# Patient Record
Sex: FEMALE | Race: BLACK OR AFRICAN AMERICAN | NOT HISPANIC OR LATINO | Employment: OTHER | URBAN - METROPOLITAN AREA
[De-identification: names, ages, dates, MRNs, and addresses within clinical notes are randomized per-mention and may not be internally consistent; named-entity substitution may affect disease eponyms.]

---

## 2017-08-18 ENCOUNTER — OFFICE VISIT (OUTPATIENT)
Dept: PAIN MEDICINE | Facility: CLINIC | Age: 52
End: 2017-08-18
Payer: MEDICARE

## 2017-08-18 VITALS
SYSTOLIC BLOOD PRESSURE: 141 MMHG | DIASTOLIC BLOOD PRESSURE: 98 MMHG | WEIGHT: 201 LBS | HEIGHT: 64 IN | HEART RATE: 78 BPM | BODY MASS INDEX: 34.31 KG/M2

## 2017-08-18 DIAGNOSIS — M47.819 FACET ARTHROPATHY: ICD-10-CM

## 2017-08-18 DIAGNOSIS — M25.552 LEFT HIP PAIN: Primary | ICD-10-CM

## 2017-08-18 DIAGNOSIS — M25.511 RIGHT SHOULDER PAIN, UNSPECIFIED CHRONICITY: ICD-10-CM

## 2017-08-18 PROCEDURE — 99203 OFFICE O/P NEW LOW 30 MIN: CPT | Mod: S$PBB,,, | Performed by: ANESTHESIOLOGY

## 2017-08-18 PROCEDURE — 99999 PR PBB SHADOW E&M-NEW PATIENT-LVL III: CPT | Mod: PBBFAC,,, | Performed by: ANESTHESIOLOGY

## 2017-08-18 PROCEDURE — 99203 OFFICE O/P NEW LOW 30 MIN: CPT | Mod: PBBFAC,PO | Performed by: ANESTHESIOLOGY

## 2017-08-18 RX ORDER — TRAMADOL HYDROCHLORIDE 50 MG/1
100 TABLET ORAL 2 TIMES DAILY PRN
Refills: 0 | COMMUNITY
Start: 2017-08-08 | End: 2019-10-14

## 2017-08-18 RX ORDER — AMOXICILLIN 500 MG/1
CAPSULE ORAL
Refills: 0 | COMMUNITY
Start: 2017-08-11 | End: 2019-10-14

## 2017-08-18 RX ORDER — DILTIAZEM HYDROCHLORIDE 240 MG/1
240 CAPSULE, COATED, EXTENDED RELEASE ORAL
COMMUNITY

## 2017-08-18 RX ORDER — SPIRONOLACTONE 25 MG/1
25 TABLET ORAL DAILY
Refills: 5 | COMMUNITY
Start: 2017-07-13 | End: 2022-01-12

## 2017-08-18 RX ORDER — FLUTICASONE PROPIONATE AND SALMETEROL 500; 50 UG/1; UG/1
1 POWDER RESPIRATORY (INHALATION)
COMMUNITY

## 2017-08-18 RX ORDER — HYDROCODONE BITARTRATE AND ACETAMINOPHEN 7.5; 325 MG/1; MG/1
1 TABLET ORAL EVERY 6 HOURS PRN
Refills: 0 | COMMUNITY
Start: 2017-08-15 | End: 2019-12-19

## 2017-08-18 RX ORDER — ONDANSETRON 4 MG/1
TABLET, FILM COATED ORAL
Refills: 0 | COMMUNITY
Start: 2017-07-13 | End: 2020-10-27

## 2017-08-18 RX ORDER — CYCLOBENZAPRINE HCL 5 MG
TABLET ORAL
Refills: 1 | COMMUNITY
Start: 2017-07-20 | End: 2019-10-14

## 2017-08-18 RX ORDER — GABAPENTIN 300 MG/1
CAPSULE ORAL
Refills: 0 | COMMUNITY
Start: 2017-08-03 | End: 2019-10-14

## 2017-08-18 RX ORDER — UBIDECARENONE 75 MG
500 CAPSULE ORAL
COMMUNITY
Start: 2015-06-10 | End: 2019-10-14

## 2017-08-18 RX ORDER — MELOXICAM 15 MG/1
15 TABLET ORAL DAILY
Refills: 3 | COMMUNITY
Start: 2017-08-10 | End: 2019-10-14

## 2017-08-18 RX ORDER — OMEPRAZOLE 20 MG/1
20 CAPSULE, DELAYED RELEASE ORAL
COMMUNITY
Start: 2015-04-09 | End: 2020-10-27

## 2017-08-18 RX ORDER — MONTELUKAST SODIUM 10 MG/1
10 TABLET ORAL
COMMUNITY
Start: 2015-04-09 | End: 2020-03-24 | Stop reason: SDUPTHER

## 2017-08-18 RX ORDER — HYDROCHLOROTHIAZIDE 12.5 MG/1
12.5 CAPSULE ORAL EVERY MORNING
Refills: 11 | COMMUNITY
Start: 2017-08-14 | End: 2023-05-18

## 2017-08-18 RX ORDER — OXYCODONE AND ACETAMINOPHEN 5; 325 MG/1; MG/1
1 TABLET ORAL EVERY 4 HOURS PRN
Refills: 0 | COMMUNITY
Start: 2017-08-10 | End: 2019-10-14

## 2017-08-18 RX ORDER — PREDNISONE 20 MG/1
TABLET ORAL
Refills: 0 | COMMUNITY
Start: 2017-07-13 | End: 2019-10-14

## 2017-08-18 RX ORDER — MEDROXYPROGESTERONE ACETATE 10 MG/1
TABLET ORAL
Refills: 3 | COMMUNITY
Start: 2017-08-09 | End: 2020-10-27

## 2017-08-18 RX ORDER — HYDROCODONE BITARTRATE AND ACETAMINOPHEN 5; 325 MG/1; MG/1
1 TABLET ORAL NIGHTLY PRN
Refills: 0 | COMMUNITY
Start: 2017-08-11 | End: 2019-10-14

## 2017-08-18 RX ORDER — OMEPRAZOLE 40 MG/1
CAPSULE, DELAYED RELEASE ORAL DAILY
Refills: 3 | COMMUNITY
Start: 2017-07-20 | End: 2020-10-27

## 2017-08-18 RX ORDER — AMOXICILLIN 875 MG/1
TABLET, FILM COATED ORAL
Refills: 0 | COMMUNITY
Start: 2017-07-13 | End: 2019-10-14

## 2017-08-18 RX ORDER — ZOLPIDEM TARTRATE 10 MG/1
TABLET ORAL
Refills: 0 | COMMUNITY
Start: 2017-08-03 | End: 2019-10-14

## 2017-08-18 RX ORDER — EPINEPHRINE 0.3 MG/.3ML
0.3 INJECTION SUBCUTANEOUS
COMMUNITY
Start: 2015-06-10

## 2017-08-18 RX ORDER — ALBUTEROL SULFATE 90 UG/1
2 AEROSOL, METERED RESPIRATORY (INHALATION)
COMMUNITY
Start: 2015-04-09 | End: 2021-12-16

## 2017-08-18 RX ORDER — OXYCODONE AND ACETAMINOPHEN 7.5; 325 MG/1; MG/1
1 TABLET ORAL EVERY 4 HOURS PRN
Refills: 0 | COMMUNITY
Start: 2017-07-13 | End: 2019-10-14

## 2017-08-18 RX ORDER — CHLORHEXIDINE GLUCONATE ORAL RINSE 1.2 MG/ML
SOLUTION DENTAL
Refills: 0 | COMMUNITY
Start: 2017-08-16 | End: 2019-10-14

## 2017-08-18 RX ORDER — BUTALBITAL, ACETAMINOPHEN AND CAFFEINE 50; 325; 40 MG/1; MG/1; MG/1
TABLET ORAL
Refills: 0 | COMMUNITY
Start: 2017-07-20 | End: 2019-10-14

## 2017-08-18 RX ORDER — NAPROXEN SODIUM 550 MG/1
TABLET ORAL
Refills: 3 | COMMUNITY
Start: 2017-08-09 | End: 2019-10-14

## 2017-08-18 RX ORDER — AMLODIPINE AND BENAZEPRIL HYDROCHLORIDE 10; 40 MG/1; MG/1
1 CAPSULE ORAL
COMMUNITY
Start: 2015-04-09 | End: 2020-10-27

## 2017-08-18 RX ORDER — FAMOTIDINE 20 MG/1
20 TABLET, FILM COATED ORAL
COMMUNITY
Start: 2015-06-10 | End: 2019-10-14

## 2017-08-18 RX ORDER — BUPROPION HYDROCHLORIDE 150 MG/1
150 TABLET ORAL DAILY
Refills: 0 | COMMUNITY
Start: 2017-08-03 | End: 2020-03-24

## 2017-08-18 RX ORDER — ESCITALOPRAM OXALATE 20 MG/1
20 TABLET ORAL DAILY
Refills: 2 | COMMUNITY
Start: 2017-07-13 | End: 2020-10-27

## 2017-08-18 NOTE — PROGRESS NOTES
This note was completed with dictation software and grammatical errors may exist.    Referring Physician: Self, Aaareferral    PCP: [facility]      CC:  Left hip pain    HPI:   Amanda Mcguire is a 51 y.o. female presents with left-sided hip pain.  Pain has been present for 2 years.  No recent traumatic incident.  She has intermittent aching, burning, grabbing pain over her left hip.  Pain radiates to her left groin and down her left leg.  Pain worsens with sitting, bending, walking, lifting getting up.  She has tried physical therapy with mild benefits.  She states having imaging at [facility] as well as [facility].  She did not provide any records today.  She denies any weakness.  No bowel bladder changes.  She states seeing orthopedics which did not provide any recommendations.  She has seen Dr. David Crystal in the past, outside pain provider who recently prescribed a prescription of tramadol 50 mg every 6 hours as needed.    ROS:  CONSTITUTIONAL: No fevers, chills, night sweats, wt. loss, appetite changes  SKIN: no rashes or itching  ENT: No headaches, head trauma, vision changes, or eye pain  LYMPH NODES: None noticed   CV: No chest pain, palpitations.   RESP: No shortness of breath, dyspnea on exertion, cough, wheezing, or hemoptysis  GI: No nausea, emesis, diarrhea, constipation, melena, hematochezia, pain.    : No dysuria, hematuria, urgency, or frequency   HEME: No easy bruising, bleeding problems  PSYCHIATRIC: No depression, anxiety, psychosis, hallucinations.  NEURO: No seizures, memory loss, dizziness or difficulty sleeping  MSK: + History of present illness      Past Medical History:   Diagnosis Date    Anxiety     Arthritis     Asthma     Depression     Encounter for blood transfusion     Hypertension      Past Surgical History:   Procedure Laterality Date    CHOLECYSTECTOMY      FRACTURE SURGERY       Family History   Problem  "Relation Age of Onset    Cancer Mother     Diabetes Mother     Arthritis Mother     Hypertension Mother     Cancer Father     Diabetes Father     Arthritis Father     Hypertension Father      Social History     Social History    Marital status:      Spouse name: N/A    Number of children: N/A    Years of education: N/A     Social History Main Topics    Smoking status: Never Smoker    Smokeless tobacco: Never Used    Alcohol use None    Drug use: No    Sexual activity: No     Other Topics Concern    None     Social History Narrative    None         Medications/Allergies: See med card    Vitals:    08/18/17 1001   BP: (!) 141/98   Pulse: 78   Weight: 91.2 kg (201 lb)   Height: 5' 4" (1.626 m)   PainSc: 10-Worst pain ever   PainLoc: Hip         Physical exam:    GENERAL: A and O x3, the patient appears well groomed and is in no acute distress.  Skin: No rashes or obvious lesions  HEENT: normocephalic, atraumatic  CARDIOVASCULAR:  Palpable peripheral pulses  LUNGS: easy work of breathing  ABDOMEN: soft, nontender   UPPER EXTREMITIES: Normal alignment, normal range of motion, no atrophy, no skin changes,  hair growth and nail growth normal and equal bilaterally. No swelling, no tenderness.    LOWER EXTREMITIES:  Normal alignment, normal range of motion, no atrophy, no skin changes,  hair growth and nail growth normal and equal bilaterally. No swelling, no tenderness.  CERVICAL SPINE:  Cervical spine: ROM is full in flexion, extension and lateral rotation with moderate increased pain.  Spurling's maneuver causes no neck pain to either side.  Myofascial exam: No Tenderness to palpation across cervical paraspinous region bilaterally.    LUMBAR SPINE  Lumbar spine: ROM is full with flexion extension and oblique extension with mild  increased pain.    Javad's test causes no increased pain on either side.    Supine straight leg raise is negative bilaterally.    Internal and external rotation of the " hip causes increased pain on left side.  Myofascial exam: No tenderness to palpation across lumbar paraspinous muscles.      MENTAL STATUS: normal orientation, speech, language, and fund of knowledge for social situation.  Emotional state appropriate.    CRANIAL NERVES:  II:  PERRL bilaterally,   III,IV,VI: EOMI.    V:  Facial sensation equal bilaterally  VII:  Facial motor function normal.  VIII:  Hearing equal to finger rub bilaterally  IX/X: Gag normal, palate symmetric  XI:  Shoulder shrug equal, head turn equal  XII:  Tongue midline without fasciculations      MOTOR: Tone and bulk: normal bilateral upper and lower Strength: normal   Delt Bi Tri WE WF     R 5 5 5 5 5 5   L 5 5 5 5 5 5     IP ADD ABD Quad TA Gas HAM  R 5 5 5 5 5 5 5  L 5 5 5 5 5 5 5    SENSATION: Light touch and pinprick intact bilaterally  REFLEXES: normal, symmetric, nonbrisk.  Toes down, no clonus. No hoffmans.  GAIT: normal rise, base, steps, and arm swing.        Imaging:  Requesting    Assessment:  Patient referred for left hip pain  1. Left hip pain    2. Right shoulder pain, unspecified chronicity    3. Facet arthropathy          Plan:  - I have stressed the importance of physical activity and exercise to improve overall health  - Request past records to review.  Patient states having imaging of her lower back as well as her left hip.  - Once imaging is reviewed, we'll make further recommendations for treatment  - We'll contact patient once records are reviewed      Thank you for referring this interesting patient, and I look forward to continuing to collaborate in her care.

## 2017-08-21 ENCOUNTER — DOCUMENTATION ONLY (OUTPATIENT)
Dept: PAIN MEDICINE | Facility: CLINIC | Age: 52
End: 2017-08-21

## 2017-08-21 NOTE — PROGRESS NOTES
MRI left lower extremity without contrast 4/20/2017 ([place]    Oblique fracture of the distal fibula with grossly unchanged alignment.  Evidence of healing onset is noted.

## 2018-12-28 ENCOUNTER — TELEPHONE (OUTPATIENT)
Dept: ADMINISTRATIVE | Facility: OTHER | Age: 53
End: 2018-12-28

## 2018-12-28 NOTE — TELEPHONE ENCOUNTER
----- Message from Alexis Ovalle sent at 12/28/2018  2:48 PM CST -----  Contact: same  Patient called in and stated she would like to become a NP with Dr. Beltran.  Patient stated she has RA.  Patient call back number is [phone]

## 2019-02-19 ENCOUNTER — NURSE TRIAGE (OUTPATIENT)
Dept: ADMINISTRATIVE | Facility: CLINIC | Age: 54
End: 2019-02-19

## 2019-02-19 ENCOUNTER — TELEPHONE (OUTPATIENT)
Dept: RHEUMATOLOGY | Facility: CLINIC | Age: 54
End: 2019-02-19

## 2019-02-19 NOTE — TELEPHONE ENCOUNTER
"  Reason for Disposition   Took another person's prescription drug    Answer Assessment - Initial Assessment Questions  1. SYMPTOMS: "Do you have any symptoms?"      vominting and diarrhea, abdominal pain, can barley swallow  2. SEVERITY: If symptoms are present, ask "Are they mild, moderate or severe?"      severe    Protocols used: ST MEDICATION QUESTION CALL-A-AH    Ms. Mcguire states she is experiencing vomiting, diarrhea, severe abdominal pain, and she can barely swallow. Patient states she has been taking the wrong medication for 1 - 2 weeks. She states the medication is Dexilant 60 mg, which was not prescribed for the patient. She states medication was issued by her pharmacist incorrectly and he is scheduled to pick the medication up from her home. Patient advised to go to the ED.   "

## 2019-02-19 NOTE — TELEPHONE ENCOUNTER
----- Message from Jaimie Wright sent at 2/19/2019 11:12 AM CST -----  Contact: patient contact #[phone]   Amanda Mcguire (stated she's not a patient of Dr. Beltran) call stated [facility] in [place] contacted her and told her to immediately stop taking a medication Dexilant.  He coming to her house to get the medication because he can lose his job.  He told her the medication was missing and he accidentally gave her one of Dr. Beltran's patients medication (Amanda Mcguire).  She just wanted to inform Dr. Beltran of what's happening.  She stated she never notice the different initials on the medication bottle.  She stated she been taking the medication for about 2 weeks complains of severe diarrhea, abdominal pain, and stated its messing with her lungs.  This call was connected to [facility] on call.

## 2019-04-02 ENCOUNTER — TELEPHONE (OUTPATIENT)
Dept: RHEUMATOLOGY | Facility: CLINIC | Age: 54
End: 2019-04-02

## 2019-04-02 NOTE — TELEPHONE ENCOUNTER
----- Message from Monique Harman sent at 4/2/2019  9:26 AM CDT -----  Type: Needs Medical Advice    Who Called:  Patient  Best Call Back Number: [phone]  Additional Information: Would like to schedule a new patient appointment due to having rheumatoid arthritis and osteoporosis. Please call to schedule.

## 2019-10-14 ENCOUNTER — OFFICE VISIT (OUTPATIENT)
Dept: RHEUMATOLOGY | Facility: CLINIC | Age: 54
End: 2019-10-14
Payer: MEDICARE

## 2019-10-14 VITALS
DIASTOLIC BLOOD PRESSURE: 81 MMHG | HEART RATE: 84 BPM | HEIGHT: 64 IN | BODY MASS INDEX: 36.96 KG/M2 | SYSTOLIC BLOOD PRESSURE: 124 MMHG | WEIGHT: 216.5 LBS

## 2019-10-14 DIAGNOSIS — M25.541 JOINT PAIN IN FINGERS OF BOTH HANDS: ICD-10-CM

## 2019-10-14 DIAGNOSIS — M02.30 REACTIVE ARTHRITIS: ICD-10-CM

## 2019-10-14 DIAGNOSIS — M62.838 MUSCLE SPASMS OF LOWER EXTREMITY, UNSPECIFIED LATERALITY: ICD-10-CM

## 2019-10-14 DIAGNOSIS — L52 ERYTHEMA NODOSUM: Primary | ICD-10-CM

## 2019-10-14 DIAGNOSIS — D52.9 ANEMIA DUE TO FOLIC ACID DEFICIENCY, UNSPECIFIED DEFICIENCY TYPE: ICD-10-CM

## 2019-10-14 DIAGNOSIS — G89.4 CHRONIC PAIN SYNDROME: ICD-10-CM

## 2019-10-14 DIAGNOSIS — M25.542 JOINT PAIN IN FINGERS OF BOTH HANDS: ICD-10-CM

## 2019-10-14 DIAGNOSIS — R79.9 ABNORMAL FINDING OF BLOOD CHEMISTRY, UNSPECIFIED: ICD-10-CM

## 2019-10-14 DIAGNOSIS — R94.6 ABNORMAL RESULTS OF THYROID FUNCTION STUDIES: ICD-10-CM

## 2019-10-14 DIAGNOSIS — K85.90 ACUTE PANCREATITIS, UNSPECIFIED COMPLICATION STATUS, UNSPECIFIED PANCREATITIS TYPE: ICD-10-CM

## 2019-10-14 PROCEDURE — 99205 PR OFFICE/OUTPT VISIT, NEW, LEVL V, 60-74 MIN: ICD-10-PCS | Mod: 25,S$GLB,, | Performed by: INTERNAL MEDICINE

## 2019-10-14 PROCEDURE — 99999 PR PBB SHADOW E&M-EST. PATIENT-LVL III: CPT | Mod: PBBFAC,,, | Performed by: INTERNAL MEDICINE

## 2019-10-14 PROCEDURE — 99205 OFFICE O/P NEW HI 60 MIN: CPT | Mod: 25,S$GLB,, | Performed by: INTERNAL MEDICINE

## 2019-10-14 PROCEDURE — 99999 PR PBB SHADOW E&M-EST. PATIENT-LVL III: ICD-10-PCS | Mod: PBBFAC,,, | Performed by: INTERNAL MEDICINE

## 2019-10-14 PROCEDURE — 3008F PR BODY MASS INDEX (BMI) DOCUMENTED: ICD-10-PCS | Mod: CPTII,S$GLB,, | Performed by: INTERNAL MEDICINE

## 2019-10-14 PROCEDURE — 96372 PR INJECTION,THERAP/PROPH/DIAG2ST, IM OR SUBCUT: ICD-10-PCS | Mod: S$GLB,,, | Performed by: INTERNAL MEDICINE

## 2019-10-14 PROCEDURE — 96372 THER/PROPH/DIAG INJ SC/IM: CPT | Mod: S$GLB,,, | Performed by: INTERNAL MEDICINE

## 2019-10-14 PROCEDURE — 3008F BODY MASS INDEX DOCD: CPT | Mod: CPTII,S$GLB,, | Performed by: INTERNAL MEDICINE

## 2019-10-14 RX ORDER — HYDROCODONE BITARTRATE AND ACETAMINOPHEN 10; 325 MG/1; MG/1
1 TABLET ORAL EVERY 8 HOURS PRN
Qty: 90 TABLET | Refills: 0 | Status: SHIPPED | OUTPATIENT
Start: 2019-11-01 | End: 2019-11-29 | Stop reason: SDUPTHER

## 2019-10-14 RX ORDER — GABAPENTIN 600 MG/1
600 TABLET ORAL 3 TIMES DAILY
Qty: 90 TABLET | Refills: 11 | Status: SHIPPED | OUTPATIENT
Start: 2019-10-14 | End: 2020-07-13

## 2019-10-14 RX ORDER — KETOROLAC TROMETHAMINE 30 MG/ML
60 INJECTION, SOLUTION INTRAMUSCULAR; INTRAVENOUS
Status: COMPLETED | OUTPATIENT
Start: 2019-10-14 | End: 2019-10-14

## 2019-10-14 RX ORDER — PROMETHAZINE HYDROCHLORIDE 25 MG/1
25 TABLET ORAL EVERY 4 HOURS
Qty: 45 TABLET | Refills: 3 | Status: SHIPPED | OUTPATIENT
Start: 2019-10-14 | End: 2019-11-03

## 2019-10-14 RX ORDER — PANTOPRAZOLE SODIUM 40 MG/1
40 TABLET, DELAYED RELEASE ORAL DAILY
Qty: 30 TABLET | Refills: 11 | Status: SHIPPED | OUTPATIENT
Start: 2019-10-14 | End: 2020-10-27 | Stop reason: SDUPTHER

## 2019-10-14 RX ORDER — CYANOCOBALAMIN 1000 UG/ML
1000 INJECTION, SOLUTION INTRAMUSCULAR; SUBCUTANEOUS
Status: COMPLETED | OUTPATIENT
Start: 2019-10-14 | End: 2019-10-14

## 2019-10-14 RX ORDER — TRIAMCINOLONE ACETONIDE 1 MG/G
OINTMENT TOPICAL 2 TIMES DAILY
Qty: 80 G | Refills: 3 | Status: SHIPPED | OUTPATIENT
Start: 2019-10-14 | End: 2020-03-24 | Stop reason: SDUPTHER

## 2019-10-14 RX ORDER — METHYLPREDNISOLONE ACETATE 80 MG/ML
160 INJECTION, SUSPENSION INTRA-ARTICULAR; INTRALESIONAL; INTRAMUSCULAR; SOFT TISSUE
Status: COMPLETED | OUTPATIENT
Start: 2019-10-14 | End: 2019-10-14

## 2019-10-14 RX ORDER — HYDROXYCHLOROQUINE SULFATE 200 MG/1
200 TABLET, FILM COATED ORAL 2 TIMES DAILY
Qty: 60 TABLET | Refills: 6 | Status: SHIPPED | OUTPATIENT
Start: 2019-10-14 | End: 2020-03-24 | Stop reason: SDUPTHER

## 2019-10-14 RX ORDER — BACLOFEN 20 MG/1
20 TABLET ORAL 3 TIMES DAILY
Qty: 90 TABLET | Refills: 11 | Status: SHIPPED | OUTPATIENT
Start: 2019-10-14 | End: 2020-10-08

## 2019-10-14 RX ORDER — AZITHROMYCIN 250 MG/1
TABLET, FILM COATED ORAL
Qty: 6 TABLET | Refills: 0 | Status: SHIPPED | OUTPATIENT
Start: 2019-10-14 | End: 2019-12-19

## 2019-10-14 RX ADMIN — METHYLPREDNISOLONE ACETATE 160 MG: 80 INJECTION, SUSPENSION INTRA-ARTICULAR; INTRALESIONAL; INTRAMUSCULAR; SOFT TISSUE at 01:10

## 2019-10-14 RX ADMIN — CYANOCOBALAMIN 1000 MCG: 1000 INJECTION, SOLUTION INTRAMUSCULAR; SUBCUTANEOUS at 01:10

## 2019-10-14 RX ADMIN — KETOROLAC TROMETHAMINE 60 MG: 30 INJECTION, SOLUTION INTRAMUSCULAR; INTRAVENOUS at 01:10

## 2019-10-14 ASSESSMENT — ROUTINE ASSESSMENT OF PATIENT INDEX DATA (RAPID3)
TOTAL RAPID3 SCORE: 8.55
MDHAQ FUNCTION SCORE: 1.7
PSYCHOLOGICAL DISTRESS SCORE: 7.7
PAIN SCORE: 10
PATIENT GLOBAL ASSESSMENT SCORE: 10

## 2019-10-14 NOTE — PROGRESS NOTES
Administered 1 cc B12 1000mcg to right upper outer gluteal .  Pt tolerated well.No acute reaction noted to site. Pt instructed on S/S to report. Advised pt to waitn in lobby 15 minutes after receiving injection to monitor for any reactions. Pt verbalized understanding.     Lot: [id number]  Exp:feb21  Administered 2 cc DepoMedrol 80mg/cc  to right upper outer gluteal. Pt tolerated well. No acute reaction noted to site. Pt instructed on S/S to report. Advised patient to wait in lobby 15 minutes after receiving injection to monitor for any reactions..  Pt verbalized understanding.     Lot: [id number]  Exp: 12/2020  Administered 2 cc  Toradol 30mg/cc  to right dorsogluteal. Pt tolerated well. No acute reaction noted to site. Pt instructed on S/S to report. Advised patient to wait in lobby 15 minutes after receiving injection to monitor for any reactions. Pt verbalized understanding.     Lot: [id number]  Exp: 05/2021

## 2019-10-14 NOTE — PROGRESS NOTES
Subjective:       Patient ID: Amanda Mcguire is a 54 y.o. female.    Chief Complaint: Osteoarthritis    HPI: 53 yo female wit oa sees Dr Saldana x 2years but last 6 months she has had sever muscle spasm , leg pain,, family hx RA sister, sjogren sister, guillian barre sister, pancreatic cancer 2 brother and 1sister.neice RA, Patient complains of arthralgias and myalgias for which has been present for a few years. Pain is located in multiple joints, both shoulder(s), both elbow(s), both wrist(s), both MCP(s): 1st, 2nd, 3rd, 4th and 5th, both PIP(s): 1st, 2nd, 3rd, 4th and 5th, both DIP(s): 1st and 2nd, both hip(s), both knee(s) and both MTP(s): 1st, 2nd, 3rd, 4th and 5th, is described as aching, pulsating, shooting and throbbing, and is constant, moderate .  Associated symptoms include: crepitation, decreased range of motion, edema, effusion, tenderness and warmth.  The patient has tried nothing for pain relief.  Dry eyes and dry mouth. Am gelling    Review of Systems   Constitutional: Positive for chills and fatigue. Negative for activity change, appetite change, diaphoresis, fever and unexpected weight change.   HENT: Negative for congestion, dental problem, ear discharge, ear pain, facial swelling, mouth sores, nosebleeds, postnasal drip, rhinorrhea, sinus pressure, sneezing, sore throat, tinnitus, trouble swallowing and voice change.    Eyes: Negative for photophobia, pain, discharge, redness and itching.   Respiratory: Negative for apnea, cough, chest tightness, shortness of breath and wheezing.    Cardiovascular: Positive for leg swelling. Negative for chest pain and palpitations.   Gastrointestinal: Positive for abdominal pain. Negative for abdominal distention, constipation, diarrhea, nausea and vomiting.   Endocrine: Negative for cold intolerance, heat intolerance, polydipsia and polyuria.   Genitourinary: Negative for decreased urine volume, difficulty urinating, dysuria, flank pain, frequency, hematuria and  "urgency.   Musculoskeletal: Positive for arthralgias, back pain, gait problem, joint swelling, myalgias, neck pain and neck stiffness.   Skin: Negative for pallor, rash and wound.   Allergic/Immunologic: Negative for immunocompromised state.   Neurological: Positive for weakness. Negative for dizziness, tremors, numbness and headaches.   Hematological: Negative for adenopathy. Does not bruise/bleed easily.   Psychiatric/Behavioral: Negative for sleep disturbance. The patient is not nervous/anxious.          Objective:   /81   Pulse 84   Ht 5' 4" (1.626 m)   Wt 98.2 kg (216 lb 7.9 oz)   BMI 37.16 kg/m²      Physical Exam   Vitals reviewed.  Constitutional: She is oriented to person, place, and time. She appears distressed.   HENT:   Head: Normocephalic and atraumatic.   Eyes: EOM are normal. Pupils are equal, round, and reactive to light. Right eye exhibits no discharge. Left eye exhibits no discharge.   Neck: Neck supple. No thyromegaly present.   Cardiovascular: Normal rate, regular rhythm and normal heart sounds.  Exam reveals no gallop and no friction rub.    No murmur heard.  Pulmonary/Chest: Breath sounds normal. She has no wheezes. She has no rales. She exhibits no tenderness.   Abdominal: There is no tenderness. There is no rebound and no guarding.       Right Side Rheumatological Exam     Examination finds the elbow normal.    The patient is tender to palpation of the shoulder, wrist, knee, 1st PIP, 1st MCP, 2nd PIP, 2nd MCP, 3rd PIP, 3rd MCP, 4th PIP, 4th MCP, 5th PIP and 5th MCP    She has swelling of the 1st PIP, 1st MCP, 2nd PIP, 2nd MCP, 3rd PIP, 3rd MCP, 4th PIP, 4th MCP, 5th PIP and 5th MCP    Shoulder Exam   Tenderness Location: no tenderness    Range of Motion   Active abduction: abnormal   Adduction: abnormal  Sensation: normal    Knee Exam   Patellofemoral Crepitus: positive  Effusion: positive  Sensation: normal    Hip Exam   Tenderness Location: posterior  Sensation: " normal    Elbow/Wrist Exam   Tenderness Location: no tenderness  Sensation: normal    Left Side Rheumatological Exam     The patient is tender to palpation of the shoulder, elbow, wrist, knee, 1st PIP, 1st MCP, 2nd PIP, 2nd MCP, 3rd PIP, 3rd MCP, 4th PIP, 4th MCP, 5th PIP and 5th MCP.    She has swelling of the 1st PIP, 1st MCP, 2nd PIP, 2nd MCP, 3rd PIP, 3rd MCP, 4th PIP, 4th MCP, 5th PIP and 5th MCP    Shoulder Exam   Tenderness Location: no tenderness    Range of Motion   Active abduction: abnormal   Sensation: normal    Knee Exam     Patellofemoral Crepitus: positive  Effusion: positive  Sensation: normal    Hip Exam   Tenderness Location: posterior  Sensation: normal    Elbow/Wrist Exam   Sensation: normal      Back/Neck Exam   General Inspection   Gait: normal         Lymphadenopathy:     She has no cervical adenopathy.   Neurological: She is alert and oriented to person, place, and time. Gait normal.   Skin: Skin is dry. No rash noted. No erythema. There is pallor.     Psychiatric: Mood and affect normal.   Musculoskeletal: She exhibits tenderness and deformity.   edema        Result Narrative   REASON FOR EXAM: right lower quadrant abdominal pain     TECHNICAL FACTORS: Multiple contiguous axial CT images were obtained of the abdomen and pelvis after administration of intravenous contrast. 2D reformatted images were performed. Automated exposure control was utilized for radiation dose reduction.     DOSE: 100 mL Isovue-370 IV    COMPARISON: 10/17/2018    ABDOMEN FINDINGS: Lung bases appear unremarkable. The liver displays generalized low density. No focal liver finding. The spleen appears unremarkable. There is edema and enlargement of the pancreatic head. There is edema of peripancreatic fat and fat of   the root of the mesentery. Multiple small mesenteric lymph nodes are demonstrated ranging up to short axis of 8 mm. Fluid layers within the posterior aspect of Gerota's space on the right. The gallbladder  and biliary duct system appear normal. No bowel   abnormality is identified. The left adrenal gland displays a 3 cm mass, unchanged from prior study. Osseous structures appear unremarkable.    PELVIS FINDINGS: No bowel abnormality is identified. The appendix is demonstrated and is normal. There is fluid layering within the lower aspect of the right posterior Gerota's space area. The uterus is enlarged and displays calcification. The urinary   bladder appears unremarkable. Osseous structures appear unremarkable.    IMPRESSION:   1.  Findings are consistent with pancreatitis. However there are additional new findings which may indicate concurrent mesenteric adenitis.   2.  The appendix demonstrated and is normal.  3. Uterine leiomyoma formation        Electronically signed by Terrence Willis MD on 9/14/2019 3:25 PM     Component Name  3/13/2019 7/26/2018     NEGATIVE     <16       NEGATIVE     SEE BELOW     SEE BELOW     149    CRP 54 (H)                Assessment:       1. Erythema nodosum    2. Reactive arthritis    3. Joint pain in fingers of both hands    4. Muscle spasms of lower extremity, unspecified laterality    5. Abnormal results of thyroid function studies     6. Anemia due to folic acid deficiency, unspecified deficiency type     7. Abnormal finding of blood chemistry, unspecified     8. Chronic pain syndrome    9. Acute pancreatitis, unspecified complication status, unspecified pancreatitis type            Plan:       Amanda was seen today for osteoarthritis.    Diagnoses and all orders for this visit:    Erythema nodosum  -     Cancel: CBC auto differential; Future  -     Cancel: Comprehensive metabolic panel; Future  -     Cancel: C-reactive protein; Future  -     Cancel: Sedimentation rate; Future  -     Cancel: TSH; Future  -     Cancel: T4, free; Future  -     Cancel: T3, free; Future  -     Cancel: Sjogrens syndrome-A extractable nuclear antibody; Future  -     Cancel: Sjogrens syndrome-B  extractable nuclear antibody; Future  -     Cancel: JONATHAN Screen w/Reflex; Future  -     Cancel: Anti Sm/RNP Antibody; Future  -     Cancel: Anti-DNA antibody, double-stranded; Future  -     Cancel: Anti-Histone Antibody; Future  -     Cancel: Antimitochondrial antibody; Future  -     Cancel: Anti-Smith antibody; Future  -     Cancel: Anti-smooth muscle antibody; Future  -     Cancel: Cyclic citrul peptide antibody, IgG; Future  -     Cancel: Folate; Future  -     Cancel: Hepatitis C antibody; Future  -     Cancel: Hepatitis B surface antigen; Future  -     Cancel: Hepatitis B surface antibody; Future  -     Cancel: Hepatitis B core antibody, IgM; Future  -     Cancel: Vitamin B12; Future  -     Cancel: PTH, intact; Future  -     Cancel: Rheumatoid factor; Future  -     Cancel: Angiotensin converting enzyme; Future  -     Cancel: Aldolase; Future  -     Cancel: CK; Future  -     azithromycin (Z-DUONG) 250 MG tablet; Take 2 tablets by mouth on day 1; Take 1 tablet by mouth on days 2-5  -     hydroxychloroquine (PLAQUENIL) 200 mg tablet; Take 1 tablet (200 mg total) by mouth 2 (two) times daily. for 60 doses  -     promethazine (PHENERGAN) 25 MG tablet; Take 1 tablet (25 mg total) by mouth every 4 (four) hours. for 20 days  -     pantoprazole (PROTONIX) 40 MG tablet; Take 1 tablet (40 mg total) by mouth once daily.  -     triamcinolone acetonide 0.1% (KENALOG) 0.1 % ointment; Apply topically 2 (two) times daily.  -     folic acid-vit B6-vit B12 (FOLBEE) 2.5-25-1 mg Tab; Take 1 tablet by mouth once daily.  -     ketorolac injection 60 mg  -     methylPREDNISolone acetate injection 160 mg  -     cyanocobalamin injection 1,000 mcg  -     Cancel: Hemoglobin A1c; Future  -     HYDROcodone-acetaminophen (NORCO)  mg per tablet; Take 1 tablet by mouth every 8 (eight) hours as needed for Pain.  -     gabapentin (NEURONTIN) 600 MG tablet; Take 1 tablet (600 mg total) by mouth 3 (three) times daily.  -     baclofen (LIORESAL)  20 MG tablet; Take 1 tablet (20 mg total) by mouth 3 (three) times daily.  -     Hemoglobin A1c; Future  -     CK; Future  -     Aldolase; Future  -     Angiotensin converting enzyme; Future  -     Rheumatoid factor; Future  -     PTH, intact; Future  -     Vitamin B12; Future  -     Hepatitis B core antibody, IgM; Future  -     Hepatitis B surface antibody; Future  -     Hemoglobin A1c  -     CK  -     Aldolase  -     Angiotensin converting enzyme  -     Rheumatoid factor  -     PTH, intact  -     Vitamin B12  -     Hepatitis B core antibody, IgM  -     Hepatitis B surface antibody  -     Hepatitis B surface antigen; Future  -     Hepatitis C antibody; Future  -     Folate; Future  -     Cyclic citrul peptide antibody, IgG; Future  -     Anti-smooth muscle antibody; Future  -     Anti-Smith antibody; Future  -     Antimitochondrial antibody; Future  -     Anti-Histone Antibody; Future  -     Anti-DNA antibody, double-stranded; Future  -     Hepatitis B surface antigen  -     Hepatitis C antibody  -     Folate  -     Cyclic citrul peptide antibody, IgG  -     Anti-smooth muscle antibody  -     Anti-Smith antibody  -     Antimitochondrial antibody  -     Anti-Histone Antibody  -     Anti-DNA antibody, double-stranded  -     JONATHAN Screen w/Reflex; Future  -     Anti Sm/RNP Antibody; Future  -     Sjogrens syndrome-B extractable nuclear antibody; Future  -     Sjogrens syndrome-A extractable nuclear antibody; Future  -     T3, free; Future  -     T4, free; Future  -     TSH; Future  -     Sedimentation rate; Future  -     C-reactive protein; Future  -     JONATHAN Screen w/Reflex  -     Anti Sm/RNP Antibody  -     Sjogrens syndrome-B extractable nuclear antibody  -     Sjogrens syndrome-A extractable nuclear antibody  -     T3, free  -     T4, free  -     TSH  -     Sedimentation rate  -     C-reactive protein  -     CBC auto differential; Future  -     Comprehensive metabolic panel; Future  -     CBC auto differential  -      Comprehensive metabolic panel    Reactive arthritis  -     Cancel: CBC auto differential; Future  -     Cancel: Comprehensive metabolic panel; Future  -     Cancel: C-reactive protein; Future  -     Cancel: Sedimentation rate; Future  -     Cancel: TSH; Future  -     Cancel: T4, free; Future  -     Cancel: T3, free; Future  -     Cancel: Sjogrens syndrome-A extractable nuclear antibody; Future  -     Cancel: Sjogrens syndrome-B extractable nuclear antibody; Future  -     Cancel: JONATHAN Screen w/Reflex; Future  -     Cancel: Anti Sm/RNP Antibody; Future  -     Cancel: Anti-DNA antibody, double-stranded; Future  -     Cancel: Anti-Histone Antibody; Future  -     Cancel: Antimitochondrial antibody; Future  -     Cancel: Anti-Smith antibody; Future  -     Cancel: Anti-smooth muscle antibody; Future  -     Cancel: Cyclic citrul peptide antibody, IgG; Future  -     Cancel: Folate; Future  -     Cancel: Hepatitis C antibody; Future  -     Cancel: Hepatitis B surface antigen; Future  -     Cancel: Hepatitis B surface antibody; Future  -     Cancel: Hepatitis B core antibody, IgM; Future  -     Cancel: Vitamin B12; Future  -     Cancel: PTH, intact; Future  -     Cancel: Rheumatoid factor; Future  -     Cancel: Angiotensin converting enzyme; Future  -     Cancel: Aldolase; Future  -     Cancel: CK; Future  -     azithromycin (Z-DUONG) 250 MG tablet; Take 2 tablets by mouth on day 1; Take 1 tablet by mouth on days 2-5  -     hydroxychloroquine (PLAQUENIL) 200 mg tablet; Take 1 tablet (200 mg total) by mouth 2 (two) times daily. for 60 doses  -     promethazine (PHENERGAN) 25 MG tablet; Take 1 tablet (25 mg total) by mouth every 4 (four) hours. for 20 days  -     pantoprazole (PROTONIX) 40 MG tablet; Take 1 tablet (40 mg total) by mouth once daily.  -     triamcinolone acetonide 0.1% (KENALOG) 0.1 % ointment; Apply topically 2 (two) times daily.  -     folic acid-vit B6-vit B12 (FOLBEE) 2.5-25-1 mg Tab; Take 1 tablet by mouth once  daily.  -     ketorolac injection 60 mg  -     methylPREDNISolone acetate injection 160 mg  -     cyanocobalamin injection 1,000 mcg  -     Cancel: Hemoglobin A1c; Future  -     HYDROcodone-acetaminophen (NORCO)  mg per tablet; Take 1 tablet by mouth every 8 (eight) hours as needed for Pain.  -     gabapentin (NEURONTIN) 600 MG tablet; Take 1 tablet (600 mg total) by mouth 3 (three) times daily.  -     baclofen (LIORESAL) 20 MG tablet; Take 1 tablet (20 mg total) by mouth 3 (three) times daily.  -     Hemoglobin A1c; Future  -     CK; Future  -     Aldolase; Future  -     Angiotensin converting enzyme; Future  -     Rheumatoid factor; Future  -     PTH, intact; Future  -     Vitamin B12; Future  -     Hepatitis B core antibody, IgM; Future  -     Hepatitis B surface antibody; Future  -     Hemoglobin A1c  -     CK  -     Aldolase  -     Angiotensin converting enzyme  -     Rheumatoid factor  -     PTH, intact  -     Vitamin B12  -     Hepatitis B core antibody, IgM  -     Hepatitis B surface antibody  -     Hepatitis B surface antigen; Future  -     Hepatitis C antibody; Future  -     Folate; Future  -     Cyclic citrul peptide antibody, IgG; Future  -     Anti-smooth muscle antibody; Future  -     Anti-Smith antibody; Future  -     Antimitochondrial antibody; Future  -     Anti-Histone Antibody; Future  -     Anti-DNA antibody, double-stranded; Future  -     Hepatitis B surface antigen  -     Hepatitis C antibody  -     Folate  -     Cyclic citrul peptide antibody, IgG  -     Anti-smooth muscle antibody  -     Anti-Smith antibody  -     Antimitochondrial antibody  -     Anti-Histone Antibody  -     Anti-DNA antibody, double-stranded  -     JONATHAN Screen w/Reflex; Future  -     Anti Sm/RNP Antibody; Future  -     Sjogrens syndrome-B extractable nuclear antibody; Future  -     Sjogrens syndrome-A extractable nuclear antibody; Future  -     T3, free; Future  -     T4, free; Future  -     TSH; Future  -      Sedimentation rate; Future  -     C-reactive protein; Future  -     JONATHAN Screen w/Reflex  -     Anti Sm/RNP Antibody  -     Sjogrens syndrome-B extractable nuclear antibody  -     Sjogrens syndrome-A extractable nuclear antibody  -     T3, free  -     T4, free  -     TSH  -     Sedimentation rate  -     C-reactive protein  -     CBC auto differential; Future  -     Comprehensive metabolic panel; Future  -     CBC auto differential  -     Comprehensive metabolic panel    Joint pain in fingers of both hands  -     Cancel: CBC auto differential; Future  -     Cancel: Comprehensive metabolic panel; Future  -     Cancel: C-reactive protein; Future  -     Cancel: Sedimentation rate; Future  -     Cancel: TSH; Future  -     Cancel: T4, free; Future  -     Cancel: T3, free; Future  -     Cancel: Sjogrens syndrome-A extractable nuclear antibody; Future  -     Cancel: Sjogrens syndrome-B extractable nuclear antibody; Future  -     Cancel: JONATHAN Screen w/Reflex; Future  -     Cancel: Anti Sm/RNP Antibody; Future  -     Cancel: Anti-DNA antibody, double-stranded; Future  -     Cancel: Anti-Histone Antibody; Future  -     Cancel: Antimitochondrial antibody; Future  -     Cancel: Anti-Smith antibody; Future  -     Cancel: Anti-smooth muscle antibody; Future  -     Cancel: Cyclic citrul peptide antibody, IgG; Future  -     Cancel: Folate; Future  -     Cancel: Hepatitis C antibody; Future  -     Cancel: Hepatitis B surface antigen; Future  -     Cancel: Hepatitis B surface antibody; Future  -     Cancel: Hepatitis B core antibody, IgM; Future  -     Cancel: Vitamin B12; Future  -     Cancel: PTH, intact; Future  -     Cancel: Rheumatoid factor; Future  -     Cancel: Angiotensin converting enzyme; Future  -     Cancel: Aldolase; Future  -     Cancel: CK; Future  -     azithromycin (Z-DUONG) 250 MG tablet; Take 2 tablets by mouth on day 1; Take 1 tablet by mouth on days 2-5  -     hydroxychloroquine (PLAQUENIL) 200 mg tablet; Take 1  tablet (200 mg total) by mouth 2 (two) times daily. for 60 doses  -     promethazine (PHENERGAN) 25 MG tablet; Take 1 tablet (25 mg total) by mouth every 4 (four) hours. for 20 days  -     pantoprazole (PROTONIX) 40 MG tablet; Take 1 tablet (40 mg total) by mouth once daily.  -     triamcinolone acetonide 0.1% (KENALOG) 0.1 % ointment; Apply topically 2 (two) times daily.  -     folic acid-vit B6-vit B12 (FOLBEE) 2.5-25-1 mg Tab; Take 1 tablet by mouth once daily.  -     ketorolac injection 60 mg  -     methylPREDNISolone acetate injection 160 mg  -     cyanocobalamin injection 1,000 mcg  -     Cancel: Hemoglobin A1c; Future  -     HYDROcodone-acetaminophen (NORCO)  mg per tablet; Take 1 tablet by mouth every 8 (eight) hours as needed for Pain.  -     gabapentin (NEURONTIN) 600 MG tablet; Take 1 tablet (600 mg total) by mouth 3 (three) times daily.  -     baclofen (LIORESAL) 20 MG tablet; Take 1 tablet (20 mg total) by mouth 3 (three) times daily.  -     Hemoglobin A1c; Future  -     CK; Future  -     Aldolase; Future  -     Angiotensin converting enzyme; Future  -     Rheumatoid factor; Future  -     PTH, intact; Future  -     Vitamin B12; Future  -     Hepatitis B core antibody, IgM; Future  -     Hepatitis B surface antibody; Future  -     Hemoglobin A1c  -     CK  -     Aldolase  -     Angiotensin converting enzyme  -     Rheumatoid factor  -     PTH, intact  -     Vitamin B12  -     Hepatitis B core antibody, IgM  -     Hepatitis B surface antibody  -     Hepatitis B surface antigen; Future  -     Hepatitis C antibody; Future  -     Folate; Future  -     Cyclic citrul peptide antibody, IgG; Future  -     Anti-smooth muscle antibody; Future  -     Anti-Smith antibody; Future  -     Antimitochondrial antibody; Future  -     Anti-Histone Antibody; Future  -     Anti-DNA antibody, double-stranded; Future  -     Hepatitis B surface antigen  -     Hepatitis C antibody  -     Folate  -     Cyclic citrul peptide  antibody, IgG  -     Anti-smooth muscle antibody  -     Anti-Smith antibody  -     Antimitochondrial antibody  -     Anti-Histone Antibody  -     Anti-DNA antibody, double-stranded  -     JONATHAN Screen w/Reflex; Future  -     Anti Sm/RNP Antibody; Future  -     Sjogrens syndrome-B extractable nuclear antibody; Future  -     Sjogrens syndrome-A extractable nuclear antibody; Future  -     T3, free; Future  -     T4, free; Future  -     TSH; Future  -     Sedimentation rate; Future  -     C-reactive protein; Future  -     JONATHAN Screen w/Reflex  -     Anti Sm/RNP Antibody  -     Sjogrens syndrome-B extractable nuclear antibody  -     Sjogrens syndrome-A extractable nuclear antibody  -     T3, free  -     T4, free  -     TSH  -     Sedimentation rate  -     C-reactive protein  -     CBC auto differential; Future  -     Comprehensive metabolic panel; Future  -     CBC auto differential  -     Comprehensive metabolic panel    Muscle spasms of lower extremity, unspecified laterality  -     Cancel: CBC auto differential; Future  -     Cancel: Comprehensive metabolic panel; Future  -     Cancel: C-reactive protein; Future  -     Cancel: Sedimentation rate; Future  -     Cancel: TSH; Future  -     Cancel: T4, free; Future  -     Cancel: T3, free; Future  -     Cancel: Sjogrens syndrome-A extractable nuclear antibody; Future  -     Cancel: Sjogrens syndrome-B extractable nuclear antibody; Future  -     Cancel: JONATHAN Screen w/Reflex; Future  -     Cancel: Anti Sm/RNP Antibody; Future  -     Cancel: Anti-DNA antibody, double-stranded; Future  -     Cancel: Anti-Histone Antibody; Future  -     Cancel: Antimitochondrial antibody; Future  -     Cancel: Anti-Smith antibody; Future  -     Cancel: Anti-smooth muscle antibody; Future  -     Cancel: Cyclic citrul peptide antibody, IgG; Future  -     Cancel: Folate; Future  -     Cancel: Hepatitis C antibody; Future  -     Cancel: Hepatitis B surface antigen; Future  -     Cancel: Hepatitis B  surface antibody; Future  -     Cancel: Hepatitis B core antibody, IgM; Future  -     Cancel: Vitamin B12; Future  -     Cancel: PTH, intact; Future  -     Cancel: Rheumatoid factor; Future  -     Cancel: Angiotensin converting enzyme; Future  -     Cancel: Aldolase; Future  -     Cancel: CK; Future  -     azithromycin (Z-DUONG) 250 MG tablet; Take 2 tablets by mouth on day 1; Take 1 tablet by mouth on days 2-5  -     hydroxychloroquine (PLAQUENIL) 200 mg tablet; Take 1 tablet (200 mg total) by mouth 2 (two) times daily. for 60 doses  -     promethazine (PHENERGAN) 25 MG tablet; Take 1 tablet (25 mg total) by mouth every 4 (four) hours. for 20 days  -     pantoprazole (PROTONIX) 40 MG tablet; Take 1 tablet (40 mg total) by mouth once daily.  -     triamcinolone acetonide 0.1% (KENALOG) 0.1 % ointment; Apply topically 2 (two) times daily.  -     folic acid-vit B6-vit B12 (FOLBEE) 2.5-25-1 mg Tab; Take 1 tablet by mouth once daily.  -     ketorolac injection 60 mg  -     methylPREDNISolone acetate injection 160 mg  -     cyanocobalamin injection 1,000 mcg  -     Cancel: Hemoglobin A1c; Future  -     HYDROcodone-acetaminophen (NORCO)  mg per tablet; Take 1 tablet by mouth every 8 (eight) hours as needed for Pain.  -     gabapentin (NEURONTIN) 600 MG tablet; Take 1 tablet (600 mg total) by mouth 3 (three) times daily.  -     baclofen (LIORESAL) 20 MG tablet; Take 1 tablet (20 mg total) by mouth 3 (three) times daily.  -     Hemoglobin A1c; Future  -     CK; Future  -     Aldolase; Future  -     Angiotensin converting enzyme; Future  -     Rheumatoid factor; Future  -     PTH, intact; Future  -     Vitamin B12; Future  -     Hepatitis B core antibody, IgM; Future  -     Hepatitis B surface antibody; Future  -     Hemoglobin A1c  -     CK  -     Aldolase  -     Angiotensin converting enzyme  -     Rheumatoid factor  -     PTH, intact  -     Vitamin B12  -     Hepatitis B core antibody, IgM  -     Hepatitis B surface  antibody  -     Hepatitis B surface antigen; Future  -     Hepatitis C antibody; Future  -     Folate; Future  -     Cyclic citrul peptide antibody, IgG; Future  -     Anti-smooth muscle antibody; Future  -     Anti-Smith antibody; Future  -     Antimitochondrial antibody; Future  -     Anti-Histone Antibody; Future  -     Anti-DNA antibody, double-stranded; Future  -     Hepatitis B surface antigen  -     Hepatitis C antibody  -     Folate  -     Cyclic citrul peptide antibody, IgG  -     Anti-smooth muscle antibody  -     Anti-Smith antibody  -     Antimitochondrial antibody  -     Anti-Histone Antibody  -     Anti-DNA antibody, double-stranded  -     JONATHAN Screen w/Reflex; Future  -     Anti Sm/RNP Antibody; Future  -     Sjogrens syndrome-B extractable nuclear antibody; Future  -     Sjogrens syndrome-A extractable nuclear antibody; Future  -     T3, free; Future  -     T4, free; Future  -     TSH; Future  -     Sedimentation rate; Future  -     C-reactive protein; Future  -     JONATHAN Screen w/Reflex  -     Anti Sm/RNP Antibody  -     Sjogrens syndrome-B extractable nuclear antibody  -     Sjogrens syndrome-A extractable nuclear antibody  -     T3, free  -     T4, free  -     TSH  -     Sedimentation rate  -     C-reactive protein  -     CBC auto differential; Future  -     Comprehensive metabolic panel; Future  -     CBC auto differential  -     Comprehensive metabolic panel    Abnormal results of thyroid function studies   -     Cancel: CBC auto differential; Future  -     Cancel: Comprehensive metabolic panel; Future  -     Cancel: C-reactive protein; Future  -     Cancel: Sedimentation rate; Future  -     Cancel: TSH; Future  -     Cancel: T4, free; Future  -     Cancel: T3, free; Future  -     Cancel: Sjogrens syndrome-A extractable nuclear antibody; Future  -     Cancel: Sjogrens syndrome-B extractable nuclear antibody; Future  -     Cancel: JONATHAN Screen w/Reflex; Future  -     Cancel: Anti Sm/RNP Antibody;  Future  -     Cancel: Anti-DNA antibody, double-stranded; Future  -     Cancel: Anti-Histone Antibody; Future  -     Cancel: Antimitochondrial antibody; Future  -     Cancel: Anti-Smith antibody; Future  -     Cancel: Anti-smooth muscle antibody; Future  -     Cancel: Cyclic citrul peptide antibody, IgG; Future  -     Cancel: Folate; Future  -     Cancel: Hepatitis C antibody; Future  -     Cancel: Hepatitis B surface antigen; Future  -     Cancel: Hepatitis B surface antibody; Future  -     Cancel: Hepatitis B core antibody, IgM; Future  -     Cancel: Vitamin B12; Future  -     Cancel: PTH, intact; Future  -     Cancel: Rheumatoid factor; Future  -     Cancel: Angiotensin converting enzyme; Future  -     Cancel: Aldolase; Future  -     Cancel: CK; Future  -     azithromycin (Z-DUONG) 250 MG tablet; Take 2 tablets by mouth on day 1; Take 1 tablet by mouth on days 2-5  -     hydroxychloroquine (PLAQUENIL) 200 mg tablet; Take 1 tablet (200 mg total) by mouth 2 (two) times daily. for 60 doses  -     promethazine (PHENERGAN) 25 MG tablet; Take 1 tablet (25 mg total) by mouth every 4 (four) hours. for 20 days  -     pantoprazole (PROTONIX) 40 MG tablet; Take 1 tablet (40 mg total) by mouth once daily.  -     triamcinolone acetonide 0.1% (KENALOG) 0.1 % ointment; Apply topically 2 (two) times daily.  -     folic acid-vit B6-vit B12 (FOLBEE) 2.5-25-1 mg Tab; Take 1 tablet by mouth once daily.  -     ketorolac injection 60 mg  -     methylPREDNISolone acetate injection 160 mg  -     cyanocobalamin injection 1,000 mcg  -     Cancel: Hemoglobin A1c; Future  -     HYDROcodone-acetaminophen (NORCO)  mg per tablet; Take 1 tablet by mouth every 8 (eight) hours as needed for Pain.  -     gabapentin (NEURONTIN) 600 MG tablet; Take 1 tablet (600 mg total) by mouth 3 (three) times daily.  -     baclofen (LIORESAL) 20 MG tablet; Take 1 tablet (20 mg total) by mouth 3 (three) times daily.  -     Hemoglobin A1c; Future  -     CK;  Future  -     Aldolase; Future  -     Angiotensin converting enzyme; Future  -     Rheumatoid factor; Future  -     PTH, intact; Future  -     Vitamin B12; Future  -     Hepatitis B core antibody, IgM; Future  -     Hepatitis B surface antibody; Future  -     Hemoglobin A1c  -     CK  -     Aldolase  -     Angiotensin converting enzyme  -     Rheumatoid factor  -     PTH, intact  -     Vitamin B12  -     Hepatitis B core antibody, IgM  -     Hepatitis B surface antibody  -     Hepatitis B surface antigen; Future  -     Hepatitis C antibody; Future  -     Folate; Future  -     Cyclic citrul peptide antibody, IgG; Future  -     Anti-smooth muscle antibody; Future  -     Anti-Smith antibody; Future  -     Antimitochondrial antibody; Future  -     Anti-Histone Antibody; Future  -     Anti-DNA antibody, double-stranded; Future  -     Hepatitis B surface antigen  -     Hepatitis C antibody  -     Folate  -     Cyclic citrul peptide antibody, IgG  -     Anti-smooth muscle antibody  -     Anti-Smith antibody  -     Antimitochondrial antibody  -     Anti-Histone Antibody  -     Anti-DNA antibody, double-stranded  -     JONATHAN Screen w/Reflex; Future  -     Anti Sm/RNP Antibody; Future  -     Sjogrens syndrome-B extractable nuclear antibody; Future  -     Sjogrens syndrome-A extractable nuclear antibody; Future  -     T3, free; Future  -     T4, free; Future  -     TSH; Future  -     Sedimentation rate; Future  -     C-reactive protein; Future  -     JONATHAN Screen w/Reflex  -     Anti Sm/RNP Antibody  -     Sjogrens syndrome-B extractable nuclear antibody  -     Sjogrens syndrome-A extractable nuclear antibody  -     T3, free  -     T4, free  -     TSH  -     Sedimentation rate  -     C-reactive protein  -     CBC auto differential; Future  -     Comprehensive metabolic panel; Future  -     CBC auto differential  -     Comprehensive metabolic panel    Anemia due to folic acid deficiency, unspecified deficiency type   -     Cancel:  CBC auto differential; Future  -     Cancel: Comprehensive metabolic panel; Future  -     Cancel: C-reactive protein; Future  -     Cancel: Sedimentation rate; Future  -     Cancel: TSH; Future  -     Cancel: T4, free; Future  -     Cancel: T3, free; Future  -     Cancel: Sjogrens syndrome-A extractable nuclear antibody; Future  -     Cancel: Sjogrens syndrome-B extractable nuclear antibody; Future  -     Cancel: JONATHAN Screen w/Reflex; Future  -     Cancel: Anti Sm/RNP Antibody; Future  -     Cancel: Anti-DNA antibody, double-stranded; Future  -     Cancel: Anti-Histone Antibody; Future  -     Cancel: Antimitochondrial antibody; Future  -     Cancel: Anti-Smith antibody; Future  -     Cancel: Anti-smooth muscle antibody; Future  -     Cancel: Cyclic citrul peptide antibody, IgG; Future  -     Cancel: Folate; Future  -     Cancel: Hepatitis C antibody; Future  -     Cancel: Hepatitis B surface antigen; Future  -     Cancel: Hepatitis B surface antibody; Future  -     Cancel: Hepatitis B core antibody, IgM; Future  -     Cancel: Vitamin B12; Future  -     Cancel: PTH, intact; Future  -     Cancel: Rheumatoid factor; Future  -     Cancel: Angiotensin converting enzyme; Future  -     Cancel: Aldolase; Future  -     Cancel: CK; Future  -     azithromycin (Z-DUONG) 250 MG tablet; Take 2 tablets by mouth on day 1; Take 1 tablet by mouth on days 2-5  -     hydroxychloroquine (PLAQUENIL) 200 mg tablet; Take 1 tablet (200 mg total) by mouth 2 (two) times daily. for 60 doses  -     promethazine (PHENERGAN) 25 MG tablet; Take 1 tablet (25 mg total) by mouth every 4 (four) hours. for 20 days  -     pantoprazole (PROTONIX) 40 MG tablet; Take 1 tablet (40 mg total) by mouth once daily.  -     triamcinolone acetonide 0.1% (KENALOG) 0.1 % ointment; Apply topically 2 (two) times daily.  -     folic acid-vit B6-vit B12 (FOLBEE) 2.5-25-1 mg Tab; Take 1 tablet by mouth once daily.  -     ketorolac injection 60 mg  -     methylPREDNISolone  acetate injection 160 mg  -     cyanocobalamin injection 1,000 mcg  -     Cancel: Hemoglobin A1c; Future  -     HYDROcodone-acetaminophen (NORCO)  mg per tablet; Take 1 tablet by mouth every 8 (eight) hours as needed for Pain.  -     gabapentin (NEURONTIN) 600 MG tablet; Take 1 tablet (600 mg total) by mouth 3 (three) times daily.  -     baclofen (LIORESAL) 20 MG tablet; Take 1 tablet (20 mg total) by mouth 3 (three) times daily.  -     Hemoglobin A1c; Future  -     CK; Future  -     Aldolase; Future  -     Angiotensin converting enzyme; Future  -     Rheumatoid factor; Future  -     PTH, intact; Future  -     Vitamin B12; Future  -     Hepatitis B core antibody, IgM; Future  -     Hepatitis B surface antibody; Future  -     Hemoglobin A1c  -     CK  -     Aldolase  -     Angiotensin converting enzyme  -     Rheumatoid factor  -     PTH, intact  -     Vitamin B12  -     Hepatitis B core antibody, IgM  -     Hepatitis B surface antibody  -     Hepatitis B surface antigen; Future  -     Hepatitis C antibody; Future  -     Folate; Future  -     Cyclic citrul peptide antibody, IgG; Future  -     Anti-smooth muscle antibody; Future  -     Anti-Smith antibody; Future  -     Antimitochondrial antibody; Future  -     Anti-Histone Antibody; Future  -     Anti-DNA antibody, double-stranded; Future  -     Hepatitis B surface antigen  -     Hepatitis C antibody  -     Folate  -     Cyclic citrul peptide antibody, IgG  -     Anti-smooth muscle antibody  -     Anti-Smith antibody  -     Antimitochondrial antibody  -     Anti-Histone Antibody  -     Anti-DNA antibody, double-stranded  -     JONATHAN Screen w/Reflex; Future  -     Anti Sm/RNP Antibody; Future  -     Sjogrens syndrome-B extractable nuclear antibody; Future  -     Sjogrens syndrome-A extractable nuclear antibody; Future  -     T3, free; Future  -     T4, free; Future  -     TSH; Future  -     Sedimentation rate; Future  -     C-reactive protein; Future  -     JONATHAN  Screen w/Reflex  -     Anti Sm/RNP Antibody  -     Sjogrens syndrome-B extractable nuclear antibody  -     Sjogrens syndrome-A extractable nuclear antibody  -     T3, free  -     T4, free  -     TSH  -     Sedimentation rate  -     C-reactive protein  -     CBC auto differential; Future  -     Comprehensive metabolic panel; Future  -     CBC auto differential  -     Comprehensive metabolic panel    Abnormal finding of blood chemistry, unspecified   -     Cancel: Hemoglobin A1c; Future  -     HYDROcodone-acetaminophen (NORCO)  mg per tablet; Take 1 tablet by mouth every 8 (eight) hours as needed for Pain.  -     gabapentin (NEURONTIN) 600 MG tablet; Take 1 tablet (600 mg total) by mouth 3 (three) times daily.  -     baclofen (LIORESAL) 20 MG tablet; Take 1 tablet (20 mg total) by mouth 3 (three) times daily.  -     Hemoglobin A1c; Future  -     Hemoglobin A1c    Chronic pain syndrome  -     HYDROcodone-acetaminophen (NORCO)  mg per tablet; Take 1 tablet by mouth every 8 (eight) hours as needed for Pain.  -     gabapentin (NEURONTIN) 600 MG tablet; Take 1 tablet (600 mg total) by mouth 3 (three) times daily.  -     baclofen (LIORESAL) 20 MG tablet; Take 1 tablet (20 mg total) by mouth 3 (three) times daily.    Acute pancreatitis, unspecified complication status, unspecified pancreatitis type    will start plaquenil increase gabapentin added promethazine for pancreatitis and protonix, pt is not a heavy drinker  I have  check [place] prescription monitoring program site and no unusual or abnormal behavior has occurred pt understand the risk and benefits of taking opioid medications and has decided to continue the  medication

## 2019-10-17 ENCOUNTER — TELEPHONE (OUTPATIENT)
Dept: RHEUMATOLOGY | Facility: CLINIC | Age: 54
End: 2019-10-17

## 2019-10-17 NOTE — TELEPHONE ENCOUNTER
Returned patient call regarding paper work. Patient stated she needs a letter stating her son lives with her. Stated he is her care giver and needs this for her . Nurse informed a message will be sent to Dr Beltran. Patient stated she will need this letter no later than the 23rd of this month.

## 2019-10-17 NOTE — TELEPHONE ENCOUNTER
----- Message from Nelsy Sauer sent at 10/16/2019 10:58 AM CDT -----  Type: Needs Medical Advice    Who Called:  Patient  Best Call Back Number: [phone]  Additional Information: Patient needs to speak with nurse concerning paperwork that was suppose to have been mailed to her/has not received/please call back to advise.

## 2019-10-18 NOTE — TELEPHONE ENCOUNTER
----- Message from Leatha Leach sent at 10/18/2019  9:21 AM CDT -----  Contact: Patient  Type: Needs Medical Advice    Who Called:  Patient  Best Call Back Number:   Additional Information: Calling to find out if the letter stating (that her son is living with her and taking care of her) has been mailed out. She needs the letter by Tuesday, 10/22/19

## 2019-11-05 ENCOUNTER — TELEPHONE (OUTPATIENT)
Dept: RHEUMATOLOGY | Facility: CLINIC | Age: 54
End: 2019-11-05

## 2019-11-05 NOTE — TELEPHONE ENCOUNTER
----- Message from Nia nAguiano sent at 11/5/2019  8:08 AM CST -----  Contact: patient  Type: Needs Medical Advice    Who Called:  Patient    Symptoms (please be specific):  Numbness from waist to knee on left side, painful. Also has a bad cold, can't taste    How long has patient had these symptoms:  Has worsened for about 6-7 days    Best Call Back Number: [phone]    Additional Information: requesting a call back to discuss issues

## 2019-11-05 NOTE — TELEPHONE ENCOUNTER
Spoke to pt, she reports worsening pain and numbness from waist down on left side over the last week. Pt does also have congestion. She reports the pain has severe. Recommonded pt seek attention at nearest ED. She advises she will report to [address].

## 2019-11-07 ENCOUNTER — TELEPHONE (OUTPATIENT)
Dept: RHEUMATOLOGY | Facility: CLINIC | Age: 54
End: 2019-11-07

## 2019-11-08 ENCOUNTER — DOCUMENTATION ONLY (OUTPATIENT)
Dept: RHEUMATOLOGY | Facility: CLINIC | Age: 54
End: 2019-11-08

## 2019-11-08 NOTE — TELEPHONE ENCOUNTER
Spoke to pt, she advises she has either a bad cold or pneumonia. Advised Dr. Beltran is not a PCP, if she was having any difficulty with congetion, shortness of breath or fever that she needed to be evaluated by her PCP, urgent care or ER if difficulty breathing. Pt verbalized understanding.

## 2019-11-08 NOTE — TELEPHONE ENCOUNTER
----- Message from Jonas Guerra sent at 11/7/2019  8:44 AM CST -----  Type: Needs Medical Advice    Who Called:  Patient  Symptoms (please be specific):  Cold, possible pneumonia  How long has patient had these symptoms: 5 days   Pharmacy name and phone #:    [facility] - [address]  [address]  [place] [address]  Phone: [phone] Fax: [phone]      Best Call Back Number: [phone]  Additional Information: Patient states that she would like a callback regarding her symptoms and needing something called in.  Patient states that she needs something as inexpensive as possible.  Please call to advise

## 2019-11-08 NOTE — PROGRESS NOTES
Received lab results from [facility] collected 10-31-19. Dr Beltran reviewed and no change in treatment needed at this time.

## 2019-11-08 NOTE — TELEPHONE ENCOUNTER
----- Message from Leatha Leach sent at 11/8/2019  1:14 PM CST -----  Contact: Patient  Type: Needs Medical Advice    Who Called:  Patient  Symptoms (please be specific):  Bad cold  Best Call Back Number:   Additional Information: Calling to follow up on the request for something to be called in for her bad cold

## 2019-11-20 ENCOUNTER — TELEPHONE (OUTPATIENT)
Dept: RHEUMATOLOGY | Facility: CLINIC | Age: 54
End: 2019-11-20

## 2019-11-20 NOTE — TELEPHONE ENCOUNTER
----- Message from Bre Bailey sent at 11/20/2019  1:10 PM CST -----  Contact: Pt  Type: Needs Medical Advice    Who Called:  Pt  Best Call Back Number: [phone]  Additional Information: Requesting a call back regarding calling in some medication.Pt wanted to know if the doctor can call in some congestion medication for her to the pharmacy . Pt stated that her PCP couldn't call it in for her .  Please Advise ---Thank you

## 2019-11-20 NOTE — TELEPHONE ENCOUNTER
Pt is not currently on treatment and was advised on 11/5 and 11/7 to go to nearest ED with symptoms.

## 2019-11-29 DIAGNOSIS — D52.9 ANEMIA DUE TO FOLIC ACID DEFICIENCY, UNSPECIFIED DEFICIENCY TYPE: ICD-10-CM

## 2019-11-29 DIAGNOSIS — R94.6 ABNORMAL RESULTS OF THYROID FUNCTION STUDIES: ICD-10-CM

## 2019-11-29 DIAGNOSIS — M02.30 REACTIVE ARTHRITIS: ICD-10-CM

## 2019-11-29 DIAGNOSIS — M25.542 JOINT PAIN IN FINGERS OF BOTH HANDS: ICD-10-CM

## 2019-11-29 DIAGNOSIS — M62.838 MUSCLE SPASMS OF LOWER EXTREMITY, UNSPECIFIED LATERALITY: ICD-10-CM

## 2019-11-29 DIAGNOSIS — G89.4 CHRONIC PAIN SYNDROME: ICD-10-CM

## 2019-11-29 DIAGNOSIS — L52 ERYTHEMA NODOSUM: ICD-10-CM

## 2019-11-29 DIAGNOSIS — M25.541 JOINT PAIN IN FINGERS OF BOTH HANDS: ICD-10-CM

## 2019-11-29 DIAGNOSIS — R79.9 ABNORMAL FINDING OF BLOOD CHEMISTRY, UNSPECIFIED: ICD-10-CM

## 2019-11-29 NOTE — TELEPHONE ENCOUNTER
----- Message from Alea Arora sent at 11/29/2019  3:49 PM CST -----  Contact: patient   Type:  RX Refill Request    Who Called: patient   Refill or New Rx:  refill  RX Name and Strength: [place]   Preferred Pharmacy with phone number:    [facility] - [id number] [facility] [address]  Phone: [phone] Fax: [phone]  Best Call Back Number:  [phone] (home)   Additional Information:  Pt is on her last pill

## 2019-11-29 NOTE — TELEPHONE ENCOUNTER
----- Message from Jennifer Bonner sent at 11/29/2019  9:12 AM CST -----    Type:  RX Refill Request    Who Called:  pt  Refill RX Name and Strength:    Narco 10  mg  How is the patient currently taking it? (ex. 1XDay):    3  A day  Is this a 30 day or 90 day RX:  30   Preferred Pharmacy with phone number:  [facility] [place] - [place] - [address]  [address]  [place] [address]  Phone: [phone] Fax: [phone]  Best Call Back Number:  [phone]  Or [phone]  Additional Information:    Almost  Out  of Med  Please   Fill  today

## 2019-12-01 RX ORDER — HYDROCODONE BITARTRATE AND ACETAMINOPHEN 10; 325 MG/1; MG/1
1 TABLET ORAL EVERY 8 HOURS PRN
Qty: 90 TABLET | Refills: 0 | Status: SHIPPED | OUTPATIENT
Start: 2019-12-01 | End: 2019-12-30 | Stop reason: SDUPTHER

## 2019-12-19 ENCOUNTER — OFFICE VISIT (OUTPATIENT)
Dept: RHEUMATOLOGY | Facility: CLINIC | Age: 54
End: 2019-12-19
Payer: MEDICARE

## 2019-12-19 VITALS
BODY MASS INDEX: 37.9 KG/M2 | HEIGHT: 64 IN | DIASTOLIC BLOOD PRESSURE: 89 MMHG | HEART RATE: 93 BPM | TEMPERATURE: 98 F | OXYGEN SATURATION: 97 % | WEIGHT: 222 LBS | SYSTOLIC BLOOD PRESSURE: 133 MMHG

## 2019-12-19 DIAGNOSIS — R05.9 COUGH: ICD-10-CM

## 2019-12-19 DIAGNOSIS — L52 ERYTHEMA NODOSUM: ICD-10-CM

## 2019-12-19 DIAGNOSIS — M02.30 REACTIVE ARTHRITIS: Primary | ICD-10-CM

## 2019-12-19 DIAGNOSIS — R73.9 HYPERGLYCEMIA: ICD-10-CM

## 2019-12-19 DIAGNOSIS — D52.9 ANEMIA DUE TO FOLIC ACID DEFICIENCY, UNSPECIFIED DEFICIENCY TYPE: ICD-10-CM

## 2019-12-19 DIAGNOSIS — Z79.899 HIGH RISK MEDICATION USE: ICD-10-CM

## 2019-12-19 DIAGNOSIS — R63.1 EXCESSIVE THIRST: ICD-10-CM

## 2019-12-19 DIAGNOSIS — J45.909 ASTHMA, UNSPECIFIED ASTHMA SEVERITY, UNSPECIFIED WHETHER COMPLICATED, UNSPECIFIED WHETHER PERSISTENT: ICD-10-CM

## 2019-12-19 DIAGNOSIS — M62.838 MUSCLE SPASMS OF LOWER EXTREMITY, UNSPECIFIED LATERALITY: ICD-10-CM

## 2019-12-19 DIAGNOSIS — J32.9 SINUSITIS, UNSPECIFIED CHRONICITY, UNSPECIFIED LOCATION: ICD-10-CM

## 2019-12-19 DIAGNOSIS — R20.2 PARESTHESIA OF SKIN: ICD-10-CM

## 2019-12-19 DIAGNOSIS — G89.4 CHRONIC PAIN SYNDROME: ICD-10-CM

## 2019-12-19 PROCEDURE — 99214 OFFICE O/P EST MOD 30 MIN: CPT | Mod: S$GLB,,, | Performed by: PHYSICIAN ASSISTANT

## 2019-12-19 PROCEDURE — 3008F PR BODY MASS INDEX (BMI) DOCUMENTED: ICD-10-PCS | Mod: CPTII,S$GLB,, | Performed by: PHYSICIAN ASSISTANT

## 2019-12-19 PROCEDURE — 99999 PR PBB SHADOW E&M-EST. PATIENT-LVL III: ICD-10-PCS | Mod: PBBFAC,,, | Performed by: PHYSICIAN ASSISTANT

## 2019-12-19 PROCEDURE — 99214 PR OFFICE/OUTPT VISIT, EST, LEVL IV, 30-39 MIN: ICD-10-PCS | Mod: S$GLB,,, | Performed by: PHYSICIAN ASSISTANT

## 2019-12-19 PROCEDURE — 3008F BODY MASS INDEX DOCD: CPT | Mod: CPTII,S$GLB,, | Performed by: PHYSICIAN ASSISTANT

## 2019-12-19 PROCEDURE — 99999 PR PBB SHADOW E&M-EST. PATIENT-LVL III: CPT | Mod: PBBFAC,,, | Performed by: PHYSICIAN ASSISTANT

## 2019-12-19 RX ORDER — ALBUTEROL SULFATE 90 UG/1
1 AEROSOL, METERED RESPIRATORY (INHALATION) EVERY 6 HOURS PRN
Qty: 18 G | Refills: 0 | Status: SHIPPED | OUTPATIENT
Start: 2019-12-19 | End: 2020-12-18

## 2019-12-19 RX ORDER — PROMETHAZINE HYDROCHLORIDE 6.25 MG/5ML
12.5 SYRUP ORAL EVERY 8 HOURS PRN
Qty: 118 ML | Refills: 0 | Status: SHIPPED | OUTPATIENT
Start: 2019-12-19 | End: 2020-01-23

## 2019-12-19 RX ORDER — LEVOFLOXACIN 500 MG/1
500 TABLET, FILM COATED ORAL DAILY
Qty: 7 TABLET | Refills: 0 | Status: SHIPPED | OUTPATIENT
Start: 2019-12-19 | End: 2020-03-24

## 2019-12-19 RX ORDER — HYDROXYZINE PAMOATE 25 MG/1
CAPSULE ORAL
Refills: 0 | COMMUNITY
Start: 2019-10-21 | End: 2020-03-24 | Stop reason: SDUPTHER

## 2019-12-19 RX ORDER — HYDRALAZINE HYDROCHLORIDE 50 MG/1
50 TABLET, FILM COATED ORAL DAILY
COMMUNITY
Start: 2019-11-23 | End: 2022-01-12

## 2019-12-19 RX ORDER — PROMETHAZINE HYDROCHLORIDE 6.25 MG/5ML
SYRUP ORAL
Refills: 0 | COMMUNITY
Start: 2019-11-18 | End: 2020-01-23

## 2019-12-19 RX ORDER — PHENTERMINE HYDROCHLORIDE 37.5 MG/1
37.5 TABLET ORAL DAILY
COMMUNITY
Start: 2019-11-23 | End: 2022-01-12

## 2019-12-19 RX ORDER — DICYCLOMINE HYDROCHLORIDE 20 MG/1
20 TABLET ORAL DAILY
COMMUNITY
Start: 2019-12-14

## 2019-12-19 ASSESSMENT — ROUTINE ASSESSMENT OF PATIENT INDEX DATA (RAPID3)
PATIENT GLOBAL ASSESSMENT SCORE: 10
PSYCHOLOGICAL DISTRESS SCORE: 4.4
FATIGUE SCORE: 3.3
PAIN SCORE: 8
TOTAL RAPID3 SCORE: 7.33
MDHAQ FUNCTION SCORE: 1.2

## 2019-12-19 NOTE — PROGRESS NOTES
Subjective:       Patient ID: Amanda Mcguire is a 54 y.o. female.    Chief Complaint: Disease Management    Mrs. Mcguire is a 54 year old female who presents to clinic for follow up on reactive arthritis, osteoarthritis. She is a new patient to me.She has been doing poorly since her last visit. She complains of 3 week hx of cough, sore throat, headache, chest congestion, rhinorrhea, and chills. She completed azithromycin and medrol dose pack with no improvement in her symptoms. Vital signs are stable today. She continues to suffer with pain in her hands and feet, worse in the morning, with stiffness lasting 15 minutes and improving with activity. Her main concern is leg cramps in the lower legs and arms. Cramping pain is severe and occurs mainly at night time. She does not feel baclofen as helped with her symptoms. Gabapentin is providing incomplete resolution as well. Norco has been helpful and she denies side effects. She is tolerating plaquenil without issues. Unfortunately, extensive labs were ordered, but only a handful of the tests were completed and work up thus far is negative.     Current tx:  1. Plaquenil      Review of Systems   Constitutional: Positive for chills. Negative for activity change, appetite change, fatigue and fever.   HENT: Positive for congestion, postnasal drip, rhinorrhea, sinus pressure, sinus pain and sore throat.    Eyes: Negative for visual disturbance.   Respiratory: Positive for cough and shortness of breath.    Cardiovascular: Negative for chest pain, palpitations and leg swelling.   Gastrointestinal: Positive for abdominal pain and nausea. Negative for constipation, diarrhea and vomiting.   Musculoskeletal: Positive for arthralgias, joint swelling and myalgias.   Neurological: Negative for dizziness, weakness, light-headedness and headaches.         Objective:     Vitals:    12/19/19 0944   BP: 133/89   Pulse: 93   Temp: 97.6 °F (36.4 °C)       Past Medical History:   Diagnosis  Date    Anxiety     Arthritis     Asthma     Depression     Encounter for blood transfusion     Hypertension      Past Surgical History:   Procedure Laterality Date    CHOLECYSTECTOMY      FRACTURE SURGERY            Physical Exam   Constitutional:   Obese body habitus.   Eyes: Right conjunctiva is not injected. Left conjunctiva is not injected. Right eye exhibits normal extraocular motion. Left eye exhibits normal extraocular motion.   Neck: No JVD present. No thyromegaly present.   Cardiovascular: Normal rate and regular rhythm.  Exam reveals no decreased pulses.    Pulmonary/Chest: She has no wheezes. She has no rhonchi. She has no rales.       Right Side Rheumatological Exam     Examination finds the shoulder, elbow, wrist, knee, 1st PIP, 1st MCP, 2nd MCP, 3rd MCP, 4th MCP and 5th MCP normal.    The patient is tender to palpation of the 2nd PIP, 3rd PIP, 4th PIP and 5th PIP    She has swelling of the 2nd PIP, 3rd PIP, 4th PIP and 5th PIP    Left Side Rheumatological Exam     Examination finds the shoulder, elbow, wrist, knee, 1st PIP, 1st MCP, 2nd MCP, 3rd MCP, 4th PIP, 4th MCP, 5th PIP and 5th MCP normal.    The patient is tender to palpation of the 2nd PIP and 3rd PIP.    She has swelling of the 2nd PIP and 3rd PIP      Lymphadenopathy:     She has no cervical adenopathy.   Neurological: Gait normal.   Skin: No rash noted.     Psychiatric: Mood and affect normal.   Musculoskeletal: She exhibits tenderness (multiple tender points in her muscles throughout).         Recent labs reviewed:  SM & RNP Antibodies (10/31/2019 1:32 PM CDT)  SM & RNP Antibodies (10/31/2019 1:32 PM CDT)   Component Value Ref Range Performed At Pathologist [facility]   Test Requested see belowComment: EXTRACTABLE NUCLEAR ANTIGENS ANTIBODY QUANT., ANTI-SM/RNP   [facility]     SM Antibody <1.0 NEG <1.0 NEGATIVE [facility]     SM/RNP Ab <1.0 NEG  Comment:   Test Performed By:  [facility]                      [place], [place].   <1.0 NEGATIVE [facility]       SM & RNP Antibodies (10/31/2019 1:32 PM CDT)   Specimen   Blood - Blood     SM & RNP Antibodies (10/31/2019 1:32 PM CDT)   Performing Organization Address City/[facility] Phone Number   [facility]             Back to top of Lab Results       JONATHAN (10/31/2019 1:32 PM CDT)  JONATHAN (10/31/2019 1:32 PM CDT)   Component Value Ref Range Performed At Pathologist [facility]   Test Requested see belowComment: ANTINUCLEAR ANTIBODY, TITER AND PATTERN NEGATIVE [facility]     JONATHAN NEGATIVE  Comment:   JONATHAN IFA is a first line screen for detecting the presence of  up to approximately 150 autoantibodies in various autoimmune  diseases. A negative JONATHAN IFA result suggests an  JONATHAN-associated autoimmune disease is not present at this  time, but is not definitive. If there is high clinical  suspicion for Sjogren's syndrome, testing for anti-SS-A/Ro  antibody should be considered. Anti-Eduarda-1 antibody should be  considered for clinically suspected inflammatory myopathies.  AC-0: Negative  International Consensus on JONATHAN Patterns  https://doi.org/10.1515/mmln-6021-4551  For additional information, please refer to  http://education.[employer]/faq/MEW391  (This link is being provided for information/educational  purposes only.)   NEGATIVE [facility]     JONATHAN Titer SEE BELOW  Comment:   Test Not Performed. Reflex testing not required.  Reference Ranges for Anti-Nuclear Ab Titer:    <1:40      Negative    1:40-1:80  Low Antibody Level    >1:80      Elevated Antibody Level     [facility]     JONATHAN Pattern SEE BELOW  Comment:   Test Not Performed. Reflex testing not required.  Test Performed By:  [facility], [place]     [facility]       JONATHAN (10/31/2019 1:32 PM CDT)   Specimen   Blood - Blood     JONATHAN (10/31/2019 1:32 PM CDT)   Performing Organization Address City/[facility] Phone Number   [facility]             Back to top  of Lab Results       T3, free (10/31/2019 1:32 PM CDT)  T3, free (10/31/2019 1:32 PM CDT)   Component Value Ref Range Performed At Pathologist [facility] [place]   Free T3 3.55  Comment:   Reference Range changed due to Reagent formulation design  change by Vendor.   2.90 - 4.20 pg/mL [facility]       T3, free (10/31/2019 1:32 PM CDT)   Specimen   Blood - Blood     T3, free (10/31/2019 1:32 PM CDT)   Performing Organization Address City/[facility] Phone Number   [facility]             Back to top of Lab Results       SJOGREN'S AB (10/31/2019 1:32 PM CDT)  SJOGREN'S AB (10/31/2019 1:32 PM CDT)   Component Value Ref Range Performed At [facility]   Test Requested see belowComment: SJOGREN'S SYNDROME ANTIBODIES (SS-A/RO & SS-B/LA)   [facility]     Sjogren's A Ab <1.0 NEG <1.0 NEGATIVE [facility]     Sjogren's B Ab <1.0 NEG  Comment:   Test Performed By:  [facility], [place].   <1.0 NEGATIVE [facility]       SJOGREN'S AB (10/31/2019 1:32 PM CDT)   Specimen   Blood - Blood     SJOGREN'S AB (10/31/2019 1:32 PM CDT)   Performing Organization Address City/[facility] Phone Number   [facility] [place]             Back to top of Lab Results       Sedimentation rate, automated (10/31/2019 1:32 PM CDT)  Sedimentation rate, automated (10/31/2019 1:32 PM CDT)   Component Value Ref Range Performed At [facility]   Sed Rate 24 0 - 30 [facility]       Sedimentation rate, automated (10/31/2019 1:32 PM CDT)   Specimen   Blood - Blood     Sedimentation rate, automated (10/31/2019 1:32 PM CDT)   Performing Organization Address City/[facility] Phone Number   [facility]             Back to top of Lab Results       TSH (10/31/2019 1:32 PM CDT)  TSH (10/31/2019 1:32 PM CDT)   Component Value Ref Range Performed At Pathologist [facility] [place]   TSH 1.86 0.34 - 5.60 [facility]       TSH (10/31/2019 1:32 PM CDT)   Specimen   Blood - Blood     TSH (10/31/2019 1:32 PM CDT)    Performing Organization Address City/[facility] Phone Number   [facility]             Back to top of Lab Results       T4, free (10/31/2019 1:32 PM CDT)  T4, free (10/31/2019 1:32 PM CDT)   Component Value Ref Range Performed At [facility]   T4, Free 0.87 0.58 - 1.34 ng/dL [facility]       T4, free (10/31/2019 1:32 PM CDT)   Specimen   Blood - Blood     T4, free (10/31/2019 1:32 PM CDT)   Performing Organization Address City/[facility] Phone Number   [facility]             Back to top of Lab Results       C-reactive protein (10/31/2019 1:32 PM CDT)  C-reactive protein (10/31/2019 1:32 PM CDT)   Component Value Ref Range Performed At [facility]ologist [facility]   CRP 7.0 0.0 - 9.9 mg/L [facility]       C-reactive protein (10/31/2019 1:32 PM CDT)   Specimen   Blood - Blood       Assessment:       1. Reactive arthritis    2. Erythema nodosum    3. Muscle spasms of lower extremity, unspecified laterality    4. Chronic pain syndrome    5. High risk medication use    6. Asthma, unspecified asthma severity, unspecified whether complicated, unspecified whether persistent    7. Sinusitis, unspecified chronicity, unspecified location    8. 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 Cough    10. Excessive thirst    11. Paresthesia of skin     12. Hyperglycemia            Plan:       Reactive arthritis  -     Hepatitis C antibody; Future; Expected date: 12/19/2019  -     Hepatitis B core antibody, IgM; Future; Expected date: 12/19/2019  -     Hepatitis B surface antigen; Future; Expected date: 12/19/2019  -     Hemoglobin A1c; Future; Expected date: 12/19/2019  -     PTH, intact; Future; Expected date: 12/19/2019  -     Folate; Future; Expected date: 12/19/2019  -     Cyclic citrul peptide antibody, IgG; Future; Expected date: 12/19/2019  -     CK; Future; Expected date: 12/19/2019  -     Aldolase; Future; Expected date: 12/19/2019  -     Angiotensin converting enzyme; Future; Expected  date: 12/19/2019  -     Antimitochondrial antibody; Future; Expected date: 12/19/2019  -     Anti-smooth muscle antibody; Future; Expected date: 12/19/2019    Erythema nodosum  -     Hepatitis C antibody; Future; Expected date: 12/19/2019  -     Hepatitis B core antibody, IgM; Future; Expected date: 12/19/2019  -     Hepatitis B surface antigen; Future; Expected date: 12/19/2019  -     Hemoglobin A1c; Future; Expected date: 12/19/2019  -     PTH, intact; Future; Expected date: 12/19/2019  -     Folate; Future; Expected date: 12/19/2019  -     Cyclic citrul peptide antibody, IgG; Future; Expected date: 12/19/2019  -     CK; Future; Expected date: 12/19/2019  -     Aldolase; Future; Expected date: 12/19/2019  -     Angiotensin converting enzyme; Future; Expected date: 12/19/2019  -     Antimitochondrial antibody; Future; Expected date: 12/19/2019  -     Anti-smooth muscle antibody; Future; Expected date: 12/19/2019    Muscle spasms of lower extremity, unspecified laterality    Chronic pain syndrome    High risk medication use    Asthma, unspecified asthma severity, unspecified whether complicated, unspecified whether persistent  -     albuterol (PROVENTIL/VENTOLIN HFA) 90 mcg/actuation inhaler; Inhale 1 puff into the lungs every 6 (six) hours as needed.  Dispense: 18 g; Refill: 0    Sinusitis, unspecified chronicity, unspecified location    Anemia due to folic acid deficiency, unspecified deficiency type   -     folic acid-vit B6-vit B12 (FOLBEE) 2.5-25-1 mg Tab; Take 1 tablet by mouth once daily.  Dispense: 90 each; Refill: 3    Cough  -     levoFLOXacin (LEVAQUIN) 500 MG tablet; Take 1 tablet (500 mg total) by mouth once daily.  Dispense: 7 tablet; Refill: 0  -     promethazine (PHENERGAN) 6.25 mg/5 mL syrup; Take 10 mLs (12.5 mg total) by mouth every 8 (eight) hours as needed (cough).  Dispense: 118 mL; Refill: 0    Excessive thirst  -     Hemoglobin A1c; Future; Expected date: 12/19/2019    Paresthesia of skin   -      Folate; Future; Expected date: 12/19/2019    Hyperglycemia  -     Hemoglobin A1c; Future; Expected date: 12/19/2019        Assessment:  54 year old female with   Reactive arthritis, erythema nodosum on plaquenil  --chronic pain syndrome on norco 7.5/325  --HTN  --hx of asthma  --hx of pancreatitis 9/2019    Plan:  1. Treat current infection: levaquin 500 x 7 days, cough syrup, albuterol inhal. F/U with PCP if sx persist   2. Print [facility] rx and good rx coupon provided  3. Cont. Plaquenil 200 mg bid  4. Cont baclofen PRN, gabapentin 600 tid  5. Cont norco 10/325 per Dr. Beltran. I have check [place] prescription monitoring program site and no unusual or abnormal behavior has occurred pt understand the risk and benefits of taking opioid medications and has decided to continue the medication.  6. Labs re-ordered    Follow up:  3 months Dr. Beltran w/ labs prior

## 2019-12-30 DIAGNOSIS — R79.9 ABNORMAL FINDING OF BLOOD CHEMISTRY, UNSPECIFIED: ICD-10-CM

## 2019-12-30 DIAGNOSIS — G89.4 CHRONIC PAIN SYNDROME: ICD-10-CM

## 2019-12-30 DIAGNOSIS — R94.6 ABNORMAL RESULTS OF THYROID FUNCTION STUDIES: ICD-10-CM

## 2019-12-30 DIAGNOSIS — M62.838 MUSCLE SPASMS OF LOWER EXTREMITY, UNSPECIFIED LATERALITY: ICD-10-CM

## 2019-12-30 DIAGNOSIS — D52.9 ANEMIA DUE TO FOLIC ACID DEFICIENCY, UNSPECIFIED DEFICIENCY TYPE: ICD-10-CM

## 2019-12-30 DIAGNOSIS — L52 ERYTHEMA NODOSUM: ICD-10-CM

## 2019-12-30 DIAGNOSIS — M25.542 JOINT PAIN IN FINGERS OF BOTH HANDS: ICD-10-CM

## 2019-12-30 DIAGNOSIS — M02.30 REACTIVE ARTHRITIS: ICD-10-CM

## 2019-12-30 DIAGNOSIS — M25.541 JOINT PAIN IN FINGERS OF BOTH HANDS: ICD-10-CM

## 2019-12-30 NOTE — TELEPHONE ENCOUNTER
----- Message from Leatha Hany sent at 12/30/2019  9:37 AM CST -----  Contact: Patient  Type:  RX Refill Request    Who Called:  Patient  Refill or New Rx:  Refill  RX Name and Strength:  HYDROcodone-acetaminophen (NORCO)  mg per tablet  How is the patient currently taking it? (ex. 1XDay):  Take 1 tablet by mouth every 8 (eight) hours as needed for Pain. - Oral  Is this a 30 day or 90 day RX:  90 tablet  Preferred Pharmacy with phone number:    [facility] [place] - [address] [address]  Phone: [phone] Fax: [phone]  Local or Mail Order:  Local  Ordering Provider:  Dr Sukhjinder Beltran  Best Call Back Number:  [phone]  Additional Information:  Calling to request a refill. She also wants to speak with the Nurse about getting something called in for the muscle spasms

## 2019-12-31 RX ORDER — HYDROCODONE BITARTRATE AND ACETAMINOPHEN 10; 325 MG/1; MG/1
1 TABLET ORAL EVERY 8 HOURS PRN
Qty: 90 TABLET | Refills: 0 | Status: SHIPPED | OUTPATIENT
Start: 2019-12-31 | End: 2020-02-17

## 2019-12-31 NOTE — TELEPHONE ENCOUNTER
----- Message from Nelsy Sauer sent at 12/31/2019  8:21 AM CST -----  Type: Needs Medical Advice    Who Called:  Patient  Best Call Back Number: [phone] or [phone] (cellphone)  Additional Information: Patient stated she requested refill of medication: HYDROcodone-acetaminophen (NORCO)  mg per tablet/stated that medication was ordered at [facility] but received only quantity of 60 instead of 90/patient is getting ready to leave town (daughter is having surgery)and will not have enough medication to get by/please call back to advise.

## 2020-01-10 ENCOUNTER — TELEPHONE (OUTPATIENT)
Dept: RHEUMATOLOGY | Facility: CLINIC | Age: 55
End: 2020-01-10

## 2020-01-10 NOTE — TELEPHONE ENCOUNTER
----- Message from Yolis Blount sent at 1/10/2020  9:27 AM CST -----  Contact: Racquel lawrence/ [facility]  Type: Needs Medical Advice    Who Called:  Racquel  Best Call Back Number: [phone]  Additional Information: Pls call Racquel regarding clarification on lab orders. The pt is there now. pls call ASAP

## 2020-01-10 NOTE — TELEPHONE ENCOUNTER
Spoke to lab and she wants to know if you would like to do the hep b screen(in house)  which includes (surface antibody, antigen, and core antibody, IgM ) and if not would you like qualitative or quantatative for the hep b surface antigen. Please advise. Thanks.

## 2020-01-10 NOTE — TELEPHONE ENCOUNTER
----- Message from Jaquelin Greer sent at 1/10/2020  9:53 AM CST -----  Contact: Racquel with [facility]  Type: Needs Medical Advice    Who Called:  Racquel with [facility]  Symptoms (please be specific):    How long has patient had these symptoms:    Pharmacy name and phone #:    Best Call Back Number: [phone]  Additional Information: Racquel has patient waiting to have labs drawn. She needs orders clarified. Patient has been waiting a hour. Please call Racquel. Thanks!

## 2020-01-21 ENCOUNTER — TELEPHONE (OUTPATIENT)
Dept: RHEUMATOLOGY | Facility: CLINIC | Age: 55
End: 2020-01-21

## 2020-01-21 NOTE — TELEPHONE ENCOUNTER
----- Message from Yolis Blount sent at 1/21/2020  2:16 PM CST -----  Contact: Amanda peters  Type:  Patient Returning Call    Who Called:  Amanda  Who Left Message for Patient:  Dominga  Does the patient know what this is regarding?:  meds being called in  Best Call Back Number:  [phone]  Additional Information:  Pls call.pt to adv. The number is her cell #. She is leaving her house so pls don't call on the house phone

## 2020-01-21 NOTE — TELEPHONE ENCOUNTER
----- Message from Renard Simms sent at 1/21/2020 12:05 PM CST -----  Contact: pt  Type: Needs Medical Advice    Who Called:  pt    Best Call Back Number: [phone]  Additional Information: pt has a runny nose, cough, and congestion. Would like to know if Dr. Beltran will call something in to the pharmacy. Please call to advise.    [facility] [place] [facility] [place] - [address] [facility] [address]  Phone: [phone] Fax: [phone]

## 2020-01-21 NOTE — TELEPHONE ENCOUNTER
----- Message from Kaitlynn Mcpherson sent at 1/21/2020  1:30 PM CST -----  Contact: Pt  Type:  Patient Returning Call    Who Called: Pt  Who Left Message for Patient: N/A   Does the patient know what this is regarding?:  N/A  Best Call Back Number:[phone]  Additional Information:  Pt is returning a call to office. Please call pt [phone] and advise.

## 2020-01-22 ENCOUNTER — TELEPHONE (OUTPATIENT)
Dept: RHEUMATOLOGY | Facility: CLINIC | Age: 55
End: 2020-01-22

## 2020-01-22 NOTE — TELEPHONE ENCOUNTER
----- Message from Serina Willis sent at 1/22/2020 12:03 PM CST -----  Contact: patient  Type:  Patient Returning Call    Who Called:  patient  Who Left Message for Patient:  Shameka  Does the patient know what this is regarding?:  meds  Best Call Back Number:  [phone] or [phone]  Additional Information:

## 2020-01-22 NOTE — TELEPHONE ENCOUNTER
Attempted pt again, lvm at provided number that if having cold/flu-like symptoms she needs to be evaluated by pcp.

## 2020-01-23 ENCOUNTER — TELEPHONE (OUTPATIENT)
Dept: RHEUMATOLOGY | Facility: CLINIC | Age: 55
End: 2020-01-23

## 2020-01-23 DIAGNOSIS — R05.9 COUGH: ICD-10-CM

## 2020-01-23 RX ORDER — PROMETHAZINE HYDROCHLORIDE 6.25 MG/5ML
12.5 SYRUP ORAL EVERY 8 HOURS PRN
Qty: 118 ML | Refills: 0 | Status: SHIPPED | OUTPATIENT
Start: 2020-01-23 | End: 2020-03-17 | Stop reason: SDUPTHER

## 2020-01-23 NOTE — TELEPHONE ENCOUNTER
----- Message from Kaitlynn Mcpherson sent at 1/23/2020  3:54 PM CST -----  Contact: Yue from [facility]  Type: Needs Medical Advice    Who Called:  Yue  Best Call Back Number: [phone]  Additional Information: Yue states the medication  promethazine (PHENERGAN) 6.25 mg/5 mL syrup is unavailable. Yue would like advise on what she need to do. Please call Yue [phone] advise.

## 2020-01-23 NOTE — TELEPHONE ENCOUNTER
----- Message from Alexis Ovalle sent at 1/23/2020 12:15 PM CST -----  Contact: same  Type:  RX Refill Request    Who Called:  patient  Refill or New Rx:  refill  RX Name and Strength:  promethazine (PHENERGAN) 6.25 mg/5 mL syrup  How is the patient currently taking it? (ex. 1XDay):  Take 10 mLs (12.5 mg total) by mouth every 8 (eight) hours as needed (cough). - Oral  Is this a 30 day or 90 day RX:  118 mL 0 12/19/2019   Preferred Pharmacy with phone number:    [facility] [place] [facility] [place] - [address] [facility]  [address] [facility] [address]  Phone: [phone] Fax: [phone]  Ordering Provider:  Nany Guerra Call Back Number:  [phone]  Additional Information:  n/a

## 2020-01-23 NOTE — TELEPHONE ENCOUNTER
----- Message from Amanda Guzman sent at 1/23/2020  3:28 PM CST -----  Contact: patient  Type: Needs Medical Advice    Who Called:  Patient  .Pharmacy name and phone #:  [facility] Call Back Number: [phone] (home)   Additional Information: the patient said she needs the Dr to send an order for  [facility] faxed today asap to [phone] she asked if this may be done today she said she hope she gets some success with this since she is not getting reply's back about her Rx for a cough Syrup she has been calling for 4 days says why does she has to wait days without no one getting back with her

## 2020-01-23 NOTE — TELEPHONE ENCOUNTER
Returned pharmacy call. Office was informed liquid phenergan was not available so tablets were filled.

## 2020-01-28 ENCOUNTER — TELEPHONE (OUTPATIENT)
Dept: RHEUMATOLOGY | Facility: CLINIC | Age: 55
End: 2020-01-28

## 2020-01-28 NOTE — TELEPHONE ENCOUNTER
----- Message from Monique Harman sent at 1/28/2020 11:49 AM CST -----  Type: Needs Medical Advice    Who Called:  Patient  Best Call Back Number: [phone] (home)       Additional Information: Needs office to fax a referral to her therapist. Please fax to the [facility] , Dr. Landers at fax [phone]. Please call patient when complete.

## 2020-02-14 DIAGNOSIS — D52.9 ANEMIA DUE TO FOLIC ACID DEFICIENCY, UNSPECIFIED DEFICIENCY TYPE: ICD-10-CM

## 2020-02-14 DIAGNOSIS — M25.541 JOINT PAIN IN FINGERS OF BOTH HANDS: ICD-10-CM

## 2020-02-14 DIAGNOSIS — R94.6 ABNORMAL RESULTS OF THYROID FUNCTION STUDIES: ICD-10-CM

## 2020-02-14 DIAGNOSIS — R05.9 COUGH: ICD-10-CM

## 2020-02-14 DIAGNOSIS — G89.4 CHRONIC PAIN SYNDROME: ICD-10-CM

## 2020-02-14 DIAGNOSIS — M62.838 MUSCLE SPASMS OF LOWER EXTREMITY, UNSPECIFIED LATERALITY: ICD-10-CM

## 2020-02-14 DIAGNOSIS — R79.9 ABNORMAL FINDING OF BLOOD CHEMISTRY, UNSPECIFIED: ICD-10-CM

## 2020-02-14 DIAGNOSIS — L52 ERYTHEMA NODOSUM: ICD-10-CM

## 2020-02-14 DIAGNOSIS — M25.542 JOINT PAIN IN FINGERS OF BOTH HANDS: ICD-10-CM

## 2020-02-14 DIAGNOSIS — M02.30 REACTIVE ARTHRITIS: ICD-10-CM

## 2020-02-17 NOTE — TELEPHONE ENCOUNTER
----- Message from Samantha Otto sent at 2/17/2020  2:56 PM CST -----  Contact: self  Patient need a refill on hydrocodone and cough medicine please send to [facility], any questions please call back at [phone] (home)     Case number [id number]  [facility] [place] - [address]  [place] [address]  Phone: [phone] Fax: [phone]

## 2020-02-18 RX ORDER — PROMETHAZINE HYDROCHLORIDE 6.25 MG/5ML
12.5 SYRUP ORAL EVERY 8 HOURS PRN
Qty: 118 ML | Refills: 0 | OUTPATIENT
Start: 2020-02-18

## 2020-02-18 RX ORDER — HYDROCODONE BITARTRATE AND ACETAMINOPHEN 10; 325 MG/1; MG/1
TABLET ORAL
Qty: 90 TABLET | Refills: 0 | Status: SHIPPED | OUTPATIENT
Start: 2020-02-18 | End: 2020-03-18 | Stop reason: SDUPTHER

## 2020-03-17 DIAGNOSIS — R05.9 COUGH: ICD-10-CM

## 2020-03-17 DIAGNOSIS — G89.4 CHRONIC PAIN SYNDROME: ICD-10-CM

## 2020-03-17 DIAGNOSIS — D52.9 ANEMIA DUE TO FOLIC ACID DEFICIENCY, UNSPECIFIED DEFICIENCY TYPE: ICD-10-CM

## 2020-03-17 DIAGNOSIS — M25.541 JOINT PAIN IN FINGERS OF BOTH HANDS: ICD-10-CM

## 2020-03-17 DIAGNOSIS — M62.838 MUSCLE SPASMS OF LOWER EXTREMITY, UNSPECIFIED LATERALITY: ICD-10-CM

## 2020-03-17 DIAGNOSIS — R94.6 ABNORMAL RESULTS OF THYROID FUNCTION STUDIES: ICD-10-CM

## 2020-03-17 DIAGNOSIS — L52 ERYTHEMA NODOSUM: ICD-10-CM

## 2020-03-17 DIAGNOSIS — R79.9 ABNORMAL FINDING OF BLOOD CHEMISTRY, UNSPECIFIED: ICD-10-CM

## 2020-03-17 DIAGNOSIS — M25.542 JOINT PAIN IN FINGERS OF BOTH HANDS: ICD-10-CM

## 2020-03-17 DIAGNOSIS — M02.30 REACTIVE ARTHRITIS: ICD-10-CM

## 2020-03-17 RX ORDER — PROMETHAZINE HYDROCHLORIDE 6.25 MG/5ML
12.5 SYRUP ORAL EVERY 8 HOURS PRN
Qty: 118 ML | Refills: 0 | Status: SHIPPED | OUTPATIENT
Start: 2020-03-17 | End: 2020-03-24

## 2020-03-17 NOTE — TELEPHONE ENCOUNTER
----- Message from Amanda Guzman sent at 3/17/2020  9:32 AM CDT -----  Contact: Patient  Type: Needs Medical Advice    Who Called: patient  Pharmacy name and phone #:  [facility] for the Hydro[facility]one  Best Call Back Number: [phone] (home)   Additional Information: the pt needs her pain medication and her cough medicine, the Hydrocodone goes to the [facility] and the cough medicine goes to [facility] please send today please call to advise.

## 2020-03-18 RX ORDER — HYDROCODONE BITARTRATE AND ACETAMINOPHEN 10; 325 MG/1; MG/1
1 TABLET ORAL EVERY 8 HOURS PRN
Qty: 90 TABLET | Refills: 0 | Status: SHIPPED | OUTPATIENT
Start: 2020-03-18 | End: 2020-03-19 | Stop reason: SDUPTHER

## 2020-03-18 RX ORDER — HYDROCODONE BITARTRATE AND ACETAMINOPHEN 10; 325 MG/1; MG/1
TABLET ORAL
Qty: 90 TABLET | Refills: 0 | OUTPATIENT
Start: 2020-03-18

## 2020-03-18 NOTE — TELEPHONE ENCOUNTER
----- Message from Nia Anguiano sent at 3/18/2020  9:03 AM CDT -----  Contact: Patient  Type:  RX Refill Request    Who Called:  Patient  Refill or New Rx:  Refill    RX Name and Strength:  HYDROcodone-acetaminophen (NORCO)  mg per tablet  How is the patient currently taking it? (ex. 1XDay):  2XDay  Is this a 30 day or 90 day RX:  30  Preferred Pharmacy with phone number: [facility] [place] - [address] [facility] [phone] (Phone)      Local or Mail Order:  Local  Ordering Provider: Devin Guerra Call Back Number:  [phone]  Additional Information:

## 2020-03-19 ENCOUNTER — TELEPHONE (OUTPATIENT)
Dept: RHEUMATOLOGY | Facility: CLINIC | Age: 55
End: 2020-03-19

## 2020-03-19 DIAGNOSIS — R79.9 ABNORMAL FINDING OF BLOOD CHEMISTRY, UNSPECIFIED: ICD-10-CM

## 2020-03-19 DIAGNOSIS — M25.541 JOINT PAIN IN FINGERS OF BOTH HANDS: ICD-10-CM

## 2020-03-19 DIAGNOSIS — M02.30 REACTIVE ARTHRITIS: ICD-10-CM

## 2020-03-19 DIAGNOSIS — G89.4 CHRONIC PAIN SYNDROME: ICD-10-CM

## 2020-03-19 DIAGNOSIS — L52 ERYTHEMA NODOSUM: ICD-10-CM

## 2020-03-19 DIAGNOSIS — M25.542 JOINT PAIN IN FINGERS OF BOTH HANDS: ICD-10-CM

## 2020-03-19 DIAGNOSIS — R94.6 ABNORMAL RESULTS OF THYROID FUNCTION STUDIES: ICD-10-CM

## 2020-03-19 DIAGNOSIS — M62.838 MUSCLE SPASMS OF LOWER EXTREMITY, UNSPECIFIED LATERALITY: ICD-10-CM

## 2020-03-19 DIAGNOSIS — D52.9 ANEMIA DUE TO FOLIC ACID DEFICIENCY, UNSPECIFIED DEFICIENCY TYPE: ICD-10-CM

## 2020-03-19 RX ORDER — HYDROCODONE BITARTRATE AND ACETAMINOPHEN 10; 325 MG/1; MG/1
1 TABLET ORAL EVERY 8 HOURS PRN
Qty: 90 TABLET | Refills: 0 | Status: SHIPPED | OUTPATIENT
Start: 2020-03-19 | End: 2020-03-24 | Stop reason: SDUPTHER

## 2020-03-19 NOTE — TELEPHONE ENCOUNTER
----- Message from Amanda Guzman sent at 3/19/2020  3:19 PM CDT -----  Contact: Pharmacist  Type: Needs Medical Advice    Who Called: Fide  Pharmacy name and phone #:  [facility] in [place]  Best Call Back Number: [phone]  Additional Information: the Pharmacist is calling in regards to the patient Rx  She is on Different controlled substances would like to speak a nurse before she can fill the Rx or the Dr please call to advise.

## 2020-03-19 NOTE — TELEPHONE ENCOUNTER
----- Message from Nelys Henley sent at 3/19/2020  3:17 PM CDT -----  Type: Needs Medical Advice    Who Called:  Patient   [facility] [place] - [address] [address] [facility]  [place] [address]  Phone: [phone] Fax: [phone]  Best Call Back Number: [phone]  Additional Information: patient states her prescription for [place] was sent to the wrong pharmacy it should have gone to [facility] in [place], she is not able to pick it up form [facility] stating it needs an approval. Contact to advise.

## 2020-03-19 NOTE — TELEPHONE ENCOUNTER
----- Message from Eddi Andrade sent at 3/19/2020  9:21 AM CDT -----  Contact: pt  Type:  Patient Returning Call    Who Called:  pt  Does the patient know what this is regarding?:  Pt received all med refills except for her  NORCO  Best Call Back Number:    Additional Information:  Thank you

## 2020-03-24 ENCOUNTER — OFFICE VISIT (OUTPATIENT)
Dept: RHEUMATOLOGY | Facility: CLINIC | Age: 55
End: 2020-03-24
Payer: COMMERCIAL

## 2020-03-24 ENCOUNTER — TELEPHONE (OUTPATIENT)
Dept: RHEUMATOLOGY | Facility: CLINIC | Age: 55
End: 2020-03-24

## 2020-03-24 DIAGNOSIS — D52.9 ANEMIA DUE TO FOLIC ACID DEFICIENCY, UNSPECIFIED DEFICIENCY TYPE: ICD-10-CM

## 2020-03-24 DIAGNOSIS — R94.6 ABNORMAL RESULTS OF THYROID FUNCTION STUDIES: ICD-10-CM

## 2020-03-24 DIAGNOSIS — M25.542 JOINT PAIN IN FINGERS OF BOTH HANDS: ICD-10-CM

## 2020-03-24 DIAGNOSIS — M25.541 JOINT PAIN IN FINGERS OF BOTH HANDS: ICD-10-CM

## 2020-03-24 DIAGNOSIS — R09.89 SINUS COMPLAINT: ICD-10-CM

## 2020-03-24 DIAGNOSIS — H53.8 VISION BLURRED: Primary | ICD-10-CM

## 2020-03-24 DIAGNOSIS — G89.4 CHRONIC PAIN SYNDROME: ICD-10-CM

## 2020-03-24 DIAGNOSIS — L52 ERYTHEMA NODOSUM: ICD-10-CM

## 2020-03-24 DIAGNOSIS — M62.838 MUSCLE SPASMS OF LOWER EXTREMITY, UNSPECIFIED LATERALITY: ICD-10-CM

## 2020-03-24 DIAGNOSIS — R79.9 ABNORMAL FINDING OF BLOOD CHEMISTRY, UNSPECIFIED: ICD-10-CM

## 2020-03-24 DIAGNOSIS — R23.2 HOT FLASHES: ICD-10-CM

## 2020-03-24 DIAGNOSIS — M02.30 REACTIVE ARTHRITIS: ICD-10-CM

## 2020-03-24 PROCEDURE — 99215 OFFICE O/P EST HI 40 MIN: CPT | Mod: 95,,, | Performed by: INTERNAL MEDICINE

## 2020-03-24 PROCEDURE — 99215 PR OFFICE/OUTPT VISIT, EST, LEVL V, 40-54 MIN: ICD-10-PCS | Mod: 95,,, | Performed by: INTERNAL MEDICINE

## 2020-03-24 RX ORDER — HYDROCODONE BITARTRATE AND ACETAMINOPHEN 10; 325 MG/1; MG/1
1 TABLET ORAL EVERY 8 HOURS PRN
Qty: 90 TABLET | Refills: 0 | Status: SHIPPED | OUTPATIENT
Start: 2020-04-17 | End: 2020-05-17

## 2020-03-24 RX ORDER — MONTELUKAST SODIUM 10 MG/1
10 TABLET ORAL NIGHTLY
Qty: 30 TABLET | Refills: 4 | Status: SHIPPED | OUTPATIENT
Start: 2020-03-24 | End: 2020-04-23

## 2020-03-24 RX ORDER — PROMETHAZINE HYDROCHLORIDE AND DEXTROMETHORPHAN HYDROBROMIDE 6.25; 15 MG/5ML; MG/5ML
5 SYRUP ORAL EVERY 6 HOURS PRN
Qty: 180 BOTTLE | Refills: 1 | Status: SHIPPED | OUTPATIENT
Start: 2020-03-24 | End: 2020-07-13

## 2020-03-24 RX ORDER — HYDROXYZINE PAMOATE 25 MG/1
CAPSULE ORAL
Qty: 45 CAPSULE | Refills: 3 | Status: SHIPPED | OUTPATIENT
Start: 2020-03-24 | End: 2020-10-27 | Stop reason: SDUPTHER

## 2020-03-24 RX ORDER — PROMETHAZINE HYDROCHLORIDE AND DEXTROMETHORPHAN HYDROBROMIDE 6.25; 15 MG/5ML; MG/5ML
SYRUP ORAL
Qty: 180 ML | Refills: 1 | OUTPATIENT
Start: 2020-03-24

## 2020-03-24 RX ORDER — HYDROXYCHLOROQUINE SULFATE 200 MG/1
200 TABLET, FILM COATED ORAL 2 TIMES DAILY
Qty: 60 TABLET | Refills: 6 | Status: SHIPPED | OUTPATIENT
Start: 2020-03-24 | End: 2020-04-23

## 2020-03-24 RX ORDER — AZITHROMYCIN 250 MG/1
TABLET, FILM COATED ORAL
Qty: 6 TABLET | Refills: 0 | Status: SHIPPED | OUTPATIENT
Start: 2020-03-24 | End: 2020-10-27 | Stop reason: ALTCHOICE

## 2020-03-24 RX ORDER — TRIAMCINOLONE ACETONIDE 1 MG/G
OINTMENT TOPICAL 2 TIMES DAILY
Qty: 80 G | Refills: 3 | Status: SHIPPED | OUTPATIENT
Start: 2020-03-24 | End: 2020-10-27 | Stop reason: SDUPTHER

## 2020-03-24 NOTE — TELEPHONE ENCOUNTER
----- Message from Jeff Mcpherson sent at 3/24/2020  5:31 PM CDT -----  Contact: Patient  Patient called in and wanted to speak with the office regarding a prescription and would like a call back from the office. She can be reached at    [phone]

## 2020-03-24 NOTE — TELEPHONE ENCOUNTER
Spoke to pt, she was verifying that her cough medicine was sent to her pharmacy. Confirmed that is was sent to [facility] today. No further questions.

## 2020-03-24 NOTE — TELEPHONE ENCOUNTER
----- Message from Collin Vera sent at 3/24/2020  3:39 PM CDT -----  Contact: pt  Type: Needs Medical Advice    Who Called:  pt    Pharmacy name and phone #:   [facility] - [facility] - [address] [id number] [facility]  [place] [address]  Phone: [phone] Fax: [phone]    Best Call Back Number: [phone]  Additional Information: pt called to inform the office that the cough med she was previously given is the only one her ins will cover. Please call to advise

## 2020-03-24 NOTE — PROGRESS NOTES
Subjective:       Patient ID: Amanda Mcguire is a 54 y.o. female.    Chief Complaint: Disease Management and Pain    Follow up: 54 year old female  reactive arthritis, osteoarthritis. She has  Numbness hip  B down both legs. She continues to suffer with pain in her hands and feet, worse in the morning, with stiffness lasting 15 minutes and improving with activity. Her main concern is leg cramps in the lower legs and arms. Cramping pain is severe and occurs mainly at night time. She does not feel baclofen as helped with her symptoms. Gabapentin is providing incomplete resolution as well. Norco has been helpful and she denies side effects. She is tolerating plaquenil without issues.      Current tx:  1. Plaquenil      Review of Systems   Constitutional: Positive for activity change. Negative for appetite change, chills, fatigue and fever.   HENT: Positive for congestion, postnasal drip, rhinorrhea, sinus pressure, sinus pain and sore throat.    Eyes: Negative for visual disturbance.   Respiratory: Negative for cough and shortness of breath.    Cardiovascular: Negative for chest pain, palpitations and leg swelling.   Gastrointestinal: Positive for abdominal pain and nausea. Negative for constipation, diarrhea and vomiting.   Musculoskeletal: Positive for arthralgias, joint swelling and myalgias.   Neurological: Negative for dizziness, weakness, light-headedness and headaches.         Objective:     There were no vitals filed for this visit.    Past Medical History:   Diagnosis Date    Anxiety     Arthritis     Asthma     Depression     Encounter for blood transfusion     Hypertension      Past Surgical History:   Procedure Laterality Date    CHOLECYSTECTOMY      FRACTURE SURGERY            Physical Exam   Constitutional: She is oriented to person, place, and time and well-developed, well-nourished, and in no distress.   Neurological: She is alert and oriented to person, place, and time.   Psychiatric: Affect  and judgment normal.         CK (01/10/2020 10:19 AM CST)  CK (01/10/2020 10:19 AM CST)   Component Value Ref Range Performed At Pathologist [facility]    (H) 5 - 235 U/L [facility]       CK (01/10/2020 10:19 AM CST)   Specimen   Blood - Blood     CK (01/10/2020 10:19 AM CST)   Performing Organization Address City/[facility] Phone Number   [place]             Back to top of Lab Results       PTH, Intact (01/10/2020 10:19 AM CST)  PTH, Intact (01/10/2020 10:19 AM CST)   Component Value Ref Range Performed At Pathologist [facility]   PTH Intact 59.0 12.0 - 88.0 pg/mL [facility]       PTH, Intact (01/10/2020 10:19 AM CST)   Specimen   Blood - Blood     PTH, Intact (01/10/2020 10:19 AM CST)   Performing Organization Address City/State/[facility] Phone Number   [place]             Back to top of Lab Results       Folate (01/10/2020 10:19 AM CST)  Folate (01/10/2020 10:19 AM CST)   Component Value Ref Range Performed At Pathologist [facility]   Folate 6.4 >5.8 ng/mL [facility]       Folate (01/10/2020 10:19 AM CST)   Specimen   Blood - Blood     Folate (01/10/2020 10:19 AM CST)   Performing Organization Address City/State/[facility] Phone Number   [place]             Back to top of Lab Results       Hemoglobin A1c (01/10/2020 10:19 AM CST)  Hemoglobin A1c (01/10/2020 10:19 AM CST)   Component Value Ref Range Performed At [facility]   Hemoglobin A1C 5.5 4.6 - 6.2 % [facility]       Hemoglobin A1c (01/10/2020 10:19 AM CST)   Specimen   Blood - Blood     Hemoglobin A1c (01/10/2020 10:19 AM CST)   Performing Organization Address City/[facility] Phone Number   [place]             Back to top of Lab Results       Cyclic citrul peptide antibody, IgG (01/10/2020 10:19 AM CST)  Cyclic citrul peptide antibody, IgG (01/10/2020 10:19 AM CST)   Component Value Ref Range Performed At [facility]   Test Requested see belowComment: CYCLIC CITRULLINE PEPTIDE (CCP) IGG   [facility]     CCP, Ab IGG  <16  Comment:   Test Performed By:  [facility] [place]   NEGATIVE:` <20`WEAK POSITIVE:` 20-39`MODERATE POSITIVE:` 40-59`STRONG POSITIVE` >59 [facility]       Cyclic citrul peptide antibody, IgG (01/10/2020 10:19 AM CST)   Specimen   Blood - Blood     Cyclic citrul peptide antibody, IgG (01/10/2020 10:19 AM CST)   Performing Organization Address City/[facility] Phone Number   [place]             Back to top of Lab Results       Hepatitis C Antibody Screen (01/10/2020 10:19 AM CST)  Hepatitis C Antibody Screen (01/10/2020 10:19 AM CST)   Component Value Ref Range Performed At [facility]ologist [facility]   Hepatitis C Ab NONREACTIVE NONREACTIVE [facility]       Hepatitis C Antibody Screen (01/10/2020 10:19 AM CST)   Specimen   Blood - Blood     Hepatitis C Antibody Screen (01/10/2020 10:19 AM CST)   Performing Organization Address City/State/[facility] Phone Number   [place]             Back to top of Lab Results       Aldolase (01/10/2020 10:18 AM CST)  Aldolase (01/10/2020 10:18 AM CST)   Component Value Ref Range Performed At [facility]ologist [facility]   Aldolase 4.6  Comment:   Test Performed By:  [facility] [place]   < OR = 8.1 U/L [facility]       Aldolase (01/10/2020 10:18 AM CST)   Specimen   Blood - Blood     Aldolase (01/10/2020 10:18 AM CST)   Performing Organization Address City/State/[facility] Phone Number   [place]             Back to top of Lab Results       Anti-smooth muscle antibody, IgG (01/10/2020 10:18 AM CST)  Anti-smooth muscle antibody, IgG (01/10/2020 10:18 AM CST)   Component Value Ref Range Performed At [facility]ologist [facility]   Test Requested see belowComment: ACTIN (SMOOTH MUSCLE) ANTIBODY, IGG   [facility] [place]     SM. Muscle Ab <20  Comment:   Antibodies recognizing actin are the main component  of smooth muscle antibodies associated with  autoimmune liver disease. Actin  antibodies are  found in approximately 75% of patients with  autoimmune hepatitis (AIH) type 1, approximately  65% of patients with autoimmune cholangitis,  approximately 30% of patients with primary biliary  cirrhosis, and approximately 2% of healthy people.  High values are closely correlated with AIH type 1.  Test Performed By:  [place]   <20 NEGATIVE`> OR = 20 POSITIVE U [facility]       Anti-smooth muscle antibody, IgG (01/10/2020 10:18 AM CST)   Specimen   Blood - Blood     Anti-smooth muscle antibody, IgG (01/10/2020 10:18 AM CST)   Performing Organization Address City/[facility] Phone Number   [place]             Back to top of Lab Results       Angiotensin converting enzyme (01/10/2020 10:18 AM CST)  Angiotensin converting enzyme (01/10/2020 10:18 AM CST)   Component Value Ref Range Performed At Pathologist [facility]   Test Requested see belowComment: ANGIOTENSIN CONVERTING ENZYME (ACE)   [facility]     ACE 23  Comment:   Test Performed By:  [place]   9 - 67 U/L [facility]       Angiotensin converting enzyme (01/10/2020 10:18 AM CST)   Specimen   Blood - Blood     Angiotensin converting enzyme (01/10/2020 10:18 AM CST)   Performing Organization Address City/[facility] Phone Number   [place]             Back to top of Lab Results       Antimitochondrial antibody (01/10/2020 10:18 AM CST)  Antimitochondrial antibody (01/10/2020 10:18 AM CST)   Component Value Ref Range Performed At Pathologist [facility]   Test Requested see belowComment: ANTIMITOCHONDRIAL ANTIBODY   [facility]     Mitochondrial Ab NEGATIVE  Comment:   This test was developed and its analytical performance  characteristics have been determined by [facility]. It has not been  cleared or approved by FDA. This assay has been validated  pursuant  to the CLIA regulations and is used for clinical  purposes.   NEGATIVE [place] [place]     Mitochondrial Ab Titer SEE BELOW  Comment:   Test Not Performed. Screening test Negative or Not Detected.  Titer not performed.  Test Performed By:  [facility] [place].   <1:20 [place]       Antimitochondrial antibody (01/10/2020 10:18 AM CST)   Specimen   Blood - Blood     Antimitochondrial antibody (01/10/2020 10:18 AM CST)   Performing Organization Address City/[facility] Phone Number   [place]             Back to top of Lab Results       Hepatitis B Screen (01/10/2020 10:13 AM CST)  Hepatitis B Screen (01/10/2020 10:13 AM CST)   Component Value Ref Range Performed At Pathologist [facility]   Hepatitis B Core Ab Total NONREACTIVE NONREACTIVE [facility]     Hepatitis B Surface Ab Qual NONREACTIVE NONREACTIVE [facility]     Hepatitis B Surface Ag NONREACTIVE NONREACTIVE [facility]       Hepatitis B Screen (01/10/2020 10:13 AM CST)   Specimen   Blood - Blood     Hepatitis B Screen (01/10/2020 10:13 AM CST)   Performing Organization Address City/[facility] Phone Number   [place]               Back to top of Lab Results  reviewed labs with patient during this visit              Assessment:       1. Vision blurred    2. Chronic pain syndrome    3. Erythema nodosum    4. Reactive arthritis    5. Joint pain in fingers of both hands    6. Muscle spasms of lower extremity, unspecified laterality    7. Abnormal results of thyroid function studies     8. 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 Abnormal finding of blood chemistry, unspecified     10. Sinus complaint    11. Hot flashes            Plan:       Vision blurred  -     Ambulatory referral/consult to Optometry; Future; Expected date: 03/31/2020    Chronic pain syndrome  -     hydrOXYzine pamoate (VISTARIL) 25 MG Cap; TAKE ONE CAPSULE BY MOUTH THREE TIMES DAILY AS NEEDED FOR  ITCHING  Dispense: 45 capsule; Refill: 3  -     HYDROcodone-acetaminophen (NORCO)  mg per tablet; Take 1 tablet by mouth every 8 (eight) hours as needed.  Dispense: 90 tablet; Refill: 0  -     CBC auto differential; Future; Expected date: 03/24/2020  -     Comprehensive metabolic panel; Future; Expected date: 03/24/2020  -     C-Reactive Protein; Future; Expected date: 03/24/2020  -     Sedimentation rate; Future; Expected date: 03/24/2020    Erythema nodosum  -     hydrOXYzine pamoate (VISTARIL) 25 MG Cap; TAKE ONE CAPSULE BY MOUTH THREE TIMES DAILY AS NEEDED FOR ITCHING  Dispense: 45 capsule; Refill: 3  -     HYDROcodone-acetaminophen (NORCO)  mg per tablet; Take 1 tablet by mouth every 8 (eight) hours as needed.  Dispense: 90 tablet; Refill: 0  -     azithromycin (Z-DUONG) 250 MG tablet; Take 2 tablets by mouth on day 1; Take 1 tablet by mouth on days 2-5  Dispense: 6 tablet; Refill: 0  -     triamcinolone acetonide 0.1% (KENALOG) 0.1 % ointment; Apply topically 2 (two) times daily.  Dispense: 80 g; Refill: 3  -     hydroxychloroquine (PLAQUENIL) 200 mg tablet; Take 1 tablet (200 mg total) by mouth 2 (two) times daily. for 60 doses  Dispense: 60 tablet; Refill: 6  -     CBC auto differential; Future; Expected date: 03/24/2020  -     Comprehensive metabolic panel; Future; Expected date: 03/24/2020  -     C-Reactive Protein; Future; Expected date: 03/24/2020  -     Sedimentation rate; Future; Expected date: 03/24/2020    Reactive arthritis  -     hydrOXYzine pamoate (VISTARIL) 25 MG Cap; TAKE ONE CAPSULE BY MOUTH THREE TIMES DAILY AS NEEDED FOR ITCHING  Dispense: 45 capsule; Refill: 3  -     HYDROcodone-acetaminophen (NORCO)  mg per tablet; Take 1 tablet by mouth every 8 (eight) hours as needed.  Dispense: 90 tablet; Refill: 0  -     azithromycin (Z-DUONG) 250 MG tablet; Take 2 tablets by mouth on day 1; Take 1 tablet by mouth on days 2-5  Dispense: 6 tablet; Refill: 0  -     triamcinolone acetonide  0.1% (KENALOG) 0.1 % ointment; Apply topically 2 (two) times daily.  Dispense: 80 g; Refill: 3  -     hydroxychloroquine (PLAQUENIL) 200 mg tablet; Take 1 tablet (200 mg total) by mouth 2 (two) times daily. for 60 doses  Dispense: 60 tablet; Refill: 6  -     CBC auto differential; Future; Expected date: 03/24/2020  -     Comprehensive metabolic panel; Future; Expected date: 03/24/2020  -     C-Reactive Protein; Future; Expected date: 03/24/2020  -     Sedimentation rate; Future; Expected date: 03/24/2020    Joint pain in fingers of both hands  -     hydrOXYzine pamoate (VISTARIL) 25 MG Cap; TAKE ONE CAPSULE BY MOUTH THREE TIMES DAILY AS NEEDED FOR ITCHING  Dispense: 45 capsule; Refill: 3  -     HYDROcodone-acetaminophen (NORCO)  mg per tablet; Take 1 tablet by mouth every 8 (eight) hours as needed.  Dispense: 90 tablet; Refill: 0  -     triamcinolone acetonide 0.1% (KENALOG) 0.1 % ointment; Apply topically 2 (two) times daily.  Dispense: 80 g; Refill: 3  -     hydroxychloroquine (PLAQUENIL) 200 mg tablet; Take 1 tablet (200 mg total) by mouth 2 (two) times daily. for 60 doses  Dispense: 60 tablet; Refill: 6    Muscle spasms of lower extremity, unspecified laterality  -     HYDROcodone-acetaminophen (NORCO)  mg per tablet; Take 1 tablet by mouth every 8 (eight) hours as needed.  Dispense: 90 tablet; Refill: 0  -     triamcinolone acetonide 0.1% (KENALOG) 0.1 % ointment; Apply topically 2 (two) times daily.  Dispense: 80 g; Refill: 3  -     hydroxychloroquine (PLAQUENIL) 200 mg tablet; Take 1 tablet (200 mg total) by mouth 2 (two) times daily. for 60 doses  Dispense: 60 tablet; Refill: 6  -     CBC auto differential; Future; Expected date: 03/24/2020  -     Comprehensive metabolic panel; Future; Expected date: 03/24/2020  -     C-Reactive Protein; Future; Expected date: 03/24/2020  -     Sedimentation rate; Future; Expected date: 03/24/2020    Abnormal results of thyroid function studies   -      HYDROcodone-acetaminophen (NORCO)  mg per tablet; Take 1 tablet by mouth every 8 (eight) hours as needed.  Dispense: 90 tablet; Refill: 0  -     triamcinolone acetonide 0.1% (KENALOG) 0.1 % ointment; Apply topically 2 (two) times daily.  Dispense: 80 g; Refill: 3  -     hydroxychloroquine (PLAQUENIL) 200 mg tablet; Take 1 tablet (200 mg total) by mouth 2 (two) times daily. for 60 doses  Dispense: 60 tablet; Refill: 6    Anemia due to folic acid deficiency, unspecified deficiency type   -     HYDROcodone-acetaminophen (NORCO)  mg per tablet; Take 1 tablet by mouth every 8 (eight) hours as needed.  Dispense: 90 tablet; Refill: 0  -     triamcinolone acetonide 0.1% (KENALOG) 0.1 % ointment; Apply topically 2 (two) times daily.  Dispense: 80 g; Refill: 3  -     hydroxychloroquine (PLAQUENIL) 200 mg tablet; Take 1 tablet (200 mg total) by mouth 2 (two) times daily. for 60 doses  Dispense: 60 tablet; Refill: 6    Abnormal finding of blood chemistry, unspecified   -     HYDROcodone-acetaminophen (NORCO)  mg per tablet; Take 1 tablet by mouth every 8 (eight) hours as needed.  Dispense: 90 tablet; Refill: 0  -     CBC auto differential; Future; Expected date: 03/24/2020  -     Comprehensive metabolic panel; Future; Expected date: 03/24/2020  -     C-Reactive Protein; Future; Expected date: 03/24/2020  -     Sedimentation rate; Future; Expected date: 03/24/2020    Sinus complaint  -     azithromycin (Z-DUONG) 250 MG tablet; Take 2 tablets by mouth on day 1; Take 1 tablet by mouth on days 2-5  Dispense: 6 tablet; Refill: 0  -     montelukast (SINGULAIR) 10 mg tablet; Take 1 tablet (10 mg total) by mouth every evening.  Dispense: 30 tablet; Refill: 4  -     promethazine-dextromethorphan (PROMETHAZINE-DM) 6.25-15 mg/5 mL Syrp; Take 5 mLs by mouth every 6 (six) hours as needed.  Dispense: 180 Bottle; Refill: 1    Hot flashes  -     Ambulatory referral/consult to Obstetrics / Gynecology; Future; Expected date:  03/31/2020    Other orders  -     Discontinue: CMPD gabapentin 10%- clonidine 0.2%- baclofen 2%- lidocaine 2%- prilocaine 2% transdermal gel; Apply 1 to 2 grams up to 4 times daily as directed for additional pain relief  Dispense: 240 g; Refill: 3      The patient location is: home  The chief complaint leading to consultation is: RA  Visit type: Virtual visit with synchronous audio and video  Total time spent with patient: 45  Each patient to whom he or she provides medical services by telemedicine is:  (1) informed of the relationship between the physician and patient and the respective role of any other health care provider with respect to management of the patient; and (2) notified that he or she may decline to receive medical services by telemedicine and may withdraw from such care at any time.

## 2020-03-25 DIAGNOSIS — J20.9 ACUTE BRONCHITIS, UNSPECIFIED ORGANISM: Primary | ICD-10-CM

## 2020-03-25 DIAGNOSIS — R05.9 COUGH: ICD-10-CM

## 2020-03-25 NOTE — TELEPHONE ENCOUNTER
----- Message from Noemi Linn sent at 3/25/2020  3:54 PM CDT -----  Contact: pt [phone]  Patient called  Back she states she called yesterday and asked if you will change the cough medication   To the plain medication she is not able to afford the DM   Pharmacy [facility]

## 2020-03-25 NOTE — TELEPHONE ENCOUNTER
----- Message from Princess SHELDON Fletcher sent at 3/25/2020  9:06 AM CDT -----  Contact: pt  Type: Needs Medical Advice    Who Called:  Patient  Pharmacy name and phone #:    [facility] - [address] [id number] [facility]  [place] [address]  Phone: [phone] Fax: [phone]  Best Call Back Number: [phone]  Additional Information: Calling to advise the patient's insurance wont cover medication promethazine-dextromethorphan (PROMETHAZINE-DM) 6.25-15 mg/5 mL Syrp). Patient is needing something else in place of it.

## 2020-03-26 RX ORDER — PROMETHAZINE HYDROCHLORIDE AND PHENYLEPHRINE HYDROCHLORIDE 6.25; 5 MG/5ML; MG/5ML
5 SYRUP ORAL EVERY 6 HOURS PRN
Qty: 118 ML | Refills: 0 | OUTPATIENT
Start: 2020-03-26 | End: 2020-04-05

## 2020-03-26 RX ORDER — PROMETHAZINE HYDROCHLORIDE 6.25 MG/5ML
6.25 SYRUP ORAL EVERY 8 HOURS PRN
Qty: 118 ML | Refills: 3 | Status: SHIPPED | OUTPATIENT
Start: 2020-03-26 | End: 2020-05-17

## 2020-04-07 ENCOUNTER — DOCUMENTATION ONLY (OUTPATIENT)
Dept: RHEUMATOLOGY | Facility: CLINIC | Age: 55
End: 2020-04-07

## 2020-04-07 NOTE — PROGRESS NOTES
Prescription for compound transdermal gel faxed with demographic sheet [facility] at [phone]  
Impaired Gait

## 2020-04-09 ENCOUNTER — DOCUMENTATION ONLY (OUTPATIENT)
Dept: RHEUMATOLOGY | Facility: CLINIC | Age: 55
End: 2020-04-09

## 2020-04-15 ENCOUNTER — TELEPHONE (OUTPATIENT)
Dept: RHEUMATOLOGY | Facility: CLINIC | Age: 55
End: 2020-04-15

## 2020-04-15 NOTE — TELEPHONE ENCOUNTER
----- Message from Mahsa Ashford sent at 4/15/2020  1:53 PM CDT -----  Contact: Dionne Truong needing a call back about the pt medicine promethazine-dextromethorphan (PROMETHAZINE-DM) 6.25-15 mg/5 mL Syrp      Human # [phone]  Ref #[id number]

## 2020-04-20 ENCOUNTER — DOCUMENTATION ONLY (OUTPATIENT)
Dept: RHEUMATOLOGY | Facility: CLINIC | Age: 55
End: 2020-04-20

## 2020-05-15 DIAGNOSIS — R94.6 ABNORMAL RESULTS OF THYROID FUNCTION STUDIES: ICD-10-CM

## 2020-05-15 DIAGNOSIS — M02.30 REACTIVE ARTHRITIS: ICD-10-CM

## 2020-05-15 DIAGNOSIS — R79.9 ABNORMAL FINDING OF BLOOD CHEMISTRY, UNSPECIFIED: ICD-10-CM

## 2020-05-15 DIAGNOSIS — D52.9 ANEMIA DUE TO FOLIC ACID DEFICIENCY, UNSPECIFIED DEFICIENCY TYPE: ICD-10-CM

## 2020-05-15 DIAGNOSIS — M25.542 JOINT PAIN IN FINGERS OF BOTH HANDS: ICD-10-CM

## 2020-05-15 DIAGNOSIS — M25.541 JOINT PAIN IN FINGERS OF BOTH HANDS: ICD-10-CM

## 2020-05-15 DIAGNOSIS — L52 ERYTHEMA NODOSUM: ICD-10-CM

## 2020-05-15 DIAGNOSIS — M62.838 MUSCLE SPASMS OF LOWER EXTREMITY, UNSPECIFIED LATERALITY: ICD-10-CM

## 2020-05-15 DIAGNOSIS — G89.4 CHRONIC PAIN SYNDROME: ICD-10-CM

## 2020-05-15 DIAGNOSIS — R05.9 COUGH: ICD-10-CM

## 2020-05-15 NOTE — TELEPHONE ENCOUNTER
----- Message from Annamarie Martinez MA sent at 5/15/2020  4:00 PM CDT -----  Contact: self/ [phone]  Refill request  Medication HYDROcodone-acetaminophen (NORCO)  mg per tablet   Provider: Dr Beltran   Pharmacy and Phone: [facility] [place] [address] [phone] (Phone

## 2020-05-17 RX ORDER — HYDROCODONE BITARTRATE AND ACETAMINOPHEN 10; 325 MG/1; MG/1
TABLET ORAL
Qty: 90 TABLET | Refills: 0 | Status: SHIPPED | OUTPATIENT
Start: 2020-05-17 | End: 2020-06-05 | Stop reason: SDUPTHER

## 2020-05-17 RX ORDER — PROMETHAZINE HYDROCHLORIDE 6.25 MG/5ML
SYRUP ORAL
Qty: 240 ML | Refills: 3 | Status: SHIPPED | OUTPATIENT
Start: 2020-05-17 | End: 2020-10-27 | Stop reason: SDUPTHER

## 2020-05-27 ENCOUNTER — TELEPHONE (OUTPATIENT)
Dept: RHEUMATOLOGY | Facility: CLINIC | Age: 55
End: 2020-05-27

## 2020-05-27 NOTE — TELEPHONE ENCOUNTER
----- Message from María Day sent at 5/27/2020  8:12 AM CDT -----  Contact: pt  ..Name of Caller Pt  Reason for Visit/Symptoms whole body hurts/hurts when she breathes /sides/hurts towalk  Best Contact Number or Confirm if Dion[facility]abt Preferred [phone]  Preferred Date/Time of Appointment today  Interested in Virtual Visit (yes/no)no  Additional Information pt went to [place] [facility] on Sunday and monday they advised UTI

## 2020-06-05 DIAGNOSIS — G89.4 CHRONIC PAIN SYNDROME: ICD-10-CM

## 2020-06-05 DIAGNOSIS — M25.542 JOINT PAIN IN FINGERS OF BOTH HANDS: ICD-10-CM

## 2020-06-05 DIAGNOSIS — R94.6 ABNORMAL RESULTS OF THYROID FUNCTION STUDIES: ICD-10-CM

## 2020-06-05 DIAGNOSIS — R05.9 COUGH: ICD-10-CM

## 2020-06-05 DIAGNOSIS — R79.9 ABNORMAL FINDING OF BLOOD CHEMISTRY, UNSPECIFIED: ICD-10-CM

## 2020-06-05 DIAGNOSIS — D52.9 ANEMIA DUE TO FOLIC ACID DEFICIENCY, UNSPECIFIED DEFICIENCY TYPE: ICD-10-CM

## 2020-06-05 DIAGNOSIS — M62.838 MUSCLE SPASMS OF LOWER EXTREMITY, UNSPECIFIED LATERALITY: ICD-10-CM

## 2020-06-05 DIAGNOSIS — L52 ERYTHEMA NODOSUM: ICD-10-CM

## 2020-06-05 DIAGNOSIS — M25.541 JOINT PAIN IN FINGERS OF BOTH HANDS: ICD-10-CM

## 2020-06-05 DIAGNOSIS — M02.30 REACTIVE ARTHRITIS: ICD-10-CM

## 2020-06-05 NOTE — TELEPHONE ENCOUNTER
----- Message from Hermelinda Robb sent at 6/5/2020  3:30 PM CDT -----  Contact: Patient Refill Need New Rx (2) Meds  Type:  RX Refill Request    Who Called:  Pt  Refill or New Rx:  New  RX Name and Strength:  promethazine (PHENERGAN) 6.25 mg/5 mL syrup and HYDROcodone-acetaminophen (NORCO)  mg per tablet  How is the patient currently taking it? (ex. 1XDay):  ..  Is this a 30 day or 90 day RX:  30 day  Preferred Pharmacy with phone number:    [facility]  [place] - [address] [address]  Phone: [phone] Fax: [phone]  Local or Mail Order:  LocalOrdering Provider:  melody Guerra Call Back Number:  [phone]

## 2020-06-08 RX ORDER — HYDROCODONE BITARTRATE AND ACETAMINOPHEN 10; 325 MG/1; MG/1
TABLET ORAL
Qty: 90 TABLET | Refills: 0 | Status: SHIPPED | OUTPATIENT
Start: 2020-06-17 | End: 2020-07-13 | Stop reason: SDUPTHER

## 2020-06-08 RX ORDER — PROMETHAZINE HYDROCHLORIDE 6.25 MG/5ML
SYRUP ORAL
Qty: 240 ML | Refills: 3 | OUTPATIENT
Start: 2020-06-08

## 2020-06-22 ENCOUNTER — TELEPHONE (OUTPATIENT)
Dept: RHEUMATOLOGY | Facility: CLINIC | Age: 55
End: 2020-06-22

## 2020-06-22 NOTE — TELEPHONE ENCOUNTER
Returned patient call regarding today's appointment. Raegan can do a virtual visit with Arabella. Patient agreed to see Arabella July 13th at 2 pm.

## 2020-06-22 NOTE — TELEPHONE ENCOUNTER
Attempted to return patient call regarding rescheduling appointment and PA on cough medication. Unable to reach or leave message no voice mail set up.

## 2020-06-22 NOTE — TELEPHONE ENCOUNTER
----- Message from Jonas Guerra sent at 6/22/2020 10:33 AM CDT -----  Type: Needs Medical Advice  Who Called: promethazine (PHENERGAN) 6.25 mg/5 mL syrup        Pharmacy name and phone #:    [facility] - [address] [address] [facility]  [place] [address]  Phone: [phone] Fax: [phone]    [facility] Call Back Number: [phone]  Additional Information: Patient states that she would like a callback regarding needing a PA from [employer] for her promethazine (PHENERGAN) 6.25 mg/5 mL syrup

## 2020-06-22 NOTE — TELEPHONE ENCOUNTER
----- Message from Jonas Guerra sent at 6/22/2020 10:31 AM CDT -----  Type:  Sooner Apoointment Request    Caller is requesting a sooner appointment.  Caller declined first available appointment listed below.  Caller will not accept being placed on the waitlist and is requesting a message be sent to doctor.    Name of Caller:  Patient  When is the first available appointment?  Patient needs to reschedule today's appointment and soonest is 08/21-Virtual Audio  Symptoms:  FU  Best Call Back Number:  [phone]  Additional Information:

## 2020-07-13 ENCOUNTER — OFFICE VISIT (OUTPATIENT)
Dept: RHEUMATOLOGY | Facility: CLINIC | Age: 55
End: 2020-07-13
Payer: COMMERCIAL

## 2020-07-13 DIAGNOSIS — M25.542 JOINT PAIN IN FINGERS OF BOTH HANDS: ICD-10-CM

## 2020-07-13 DIAGNOSIS — M02.30 REACTIVE ARTHRITIS: Primary | ICD-10-CM

## 2020-07-13 DIAGNOSIS — G89.4 CHRONIC PAIN SYNDROME: ICD-10-CM

## 2020-07-13 DIAGNOSIS — G62.9 NEUROPATHY: ICD-10-CM

## 2020-07-13 DIAGNOSIS — R79.9 ABNORMAL FINDING OF BLOOD CHEMISTRY, UNSPECIFIED: ICD-10-CM

## 2020-07-13 DIAGNOSIS — M25.541 JOINT PAIN IN FINGERS OF BOTH HANDS: ICD-10-CM

## 2020-07-13 DIAGNOSIS — M02.30 REACTIVE ARTHRITIS: ICD-10-CM

## 2020-07-13 DIAGNOSIS — D52.9 ANEMIA DUE TO FOLIC ACID DEFICIENCY, UNSPECIFIED DEFICIENCY TYPE: ICD-10-CM

## 2020-07-13 DIAGNOSIS — R05.3 CHRONIC COUGH: ICD-10-CM

## 2020-07-13 DIAGNOSIS — M62.838 MUSCLE SPASMS OF LOWER EXTREMITY, UNSPECIFIED LATERALITY: ICD-10-CM

## 2020-07-13 DIAGNOSIS — L52 ERYTHEMA NODOSUM: ICD-10-CM

## 2020-07-13 DIAGNOSIS — Z79.899 HIGH RISK MEDICATION USE: ICD-10-CM

## 2020-07-13 DIAGNOSIS — R94.6 ABNORMAL RESULTS OF THYROID FUNCTION STUDIES: ICD-10-CM

## 2020-07-13 PROCEDURE — 99214 OFFICE O/P EST MOD 30 MIN: CPT | Mod: 95,,, | Performed by: PHYSICIAN ASSISTANT

## 2020-07-13 PROCEDURE — 99214 PR OFFICE/OUTPT VISIT, EST, LEVL IV, 30-39 MIN: ICD-10-PCS | Mod: 95,,, | Performed by: PHYSICIAN ASSISTANT

## 2020-07-13 RX ORDER — GABAPENTIN 800 MG/1
800 TABLET ORAL 3 TIMES DAILY
Qty: 90 TABLET | Refills: 3 | Status: SHIPPED | OUTPATIENT
Start: 2020-07-13 | End: 2020-10-27 | Stop reason: SDUPTHER

## 2020-07-13 RX ORDER — PROMETHAZINE HYDROCHLORIDE 6.25 MG/5ML
6.25 SYRUP ORAL EVERY 8 HOURS PRN
Qty: 240 ML | Refills: 3 | Status: SHIPPED | OUTPATIENT
Start: 2020-07-13 | End: 2020-09-29 | Stop reason: SDUPTHER

## 2020-07-13 NOTE — Clinical Note
Ernesto four prior to f/u with dr. Beltran  Call pharmacy and cancel prometh syrup from me with 118 ml dispense. Dr. Beltran usually writes for 240 so I will send a new rx for that amount.

## 2020-07-13 NOTE — PROGRESS NOTES
The patient location is:  home  The chief complaint leading to consultation is: arthritis     Visit type: audiovisual    Face to Face time with patient: 20  25 minutes of total time spent on the encounter, which includes face to face time and non-face to face time preparing to see the patient (eg, review of tests), Obtaining and/or reviewing separately obtained history, Documenting clinical information in the electronic or other health record, Independently interpreting results (not separately reported) and communicating results to the patient/family/caregiver, or Care coordination (not separately reported).   Each patient to whom he or she provides medical services by telemedicine is:  (1) informed of the relationship between the physician and patient and the respective role of any other health care provider with respect to management of the patient; and (2) notified that he or she may decline to receive medical services by telemedicine and may withdraw from such care at any time.    Notes:   Subjective:       Patient ID: Amanda Mcguire is a 54 y.o. female.    Chief Complaint: Disease Management    Mrs. Mcguire is a 54 year old female who presents to telemedicine virtual visit for follow up on reactive arthritis, osteoarthritis. She has been doing poorly since her last visits. She complains of severe burning, throbbing pain in her feet that is constant throughout the day. Gabapentin does not provide relief. She denies drowsiness with medication. Norco is helpful for severe pain without side effects. She complains of joint pain involving her ankles and knees. Ortho completed bilateral IACS injections recently, which provided significant relief. [facility] for UTI.     Current tx:  1. Plaquenil      Review of Systems   Constitutional: Positive for chills. Negative for activity change, appetite change, fatigue and fever.   Eyes: Negative for visual disturbance.   Cardiovascular: Negative for chest  pain, palpitations and leg swelling.   Gastrointestinal: Positive for abdominal pain and nausea. Negative for constipation, diarrhea and vomiting.   Musculoskeletal: Positive for arthralgias, joint swelling and myalgias.   Neurological: Negative for dizziness, weakness, light-headedness and headaches.         Objective:     There were no vitals filed for this visit.    Past Medical History:   Diagnosis Date    Anxiety     Arthritis     Asthma     Depression     Encounter for blood transfusion     Hypertension      Past Surgical History:   Procedure Laterality Date    CHOLECYSTECTOMY      FRACTURE SURGERY            Physical Exam   Patient is alert and oriented.        Recent labs reviewed:  5/25/2020 reviewed from [facility]    Recent imaging reviewed:  Interface, Rad Results In - 07/09/2020  1:11 PM CDT  REASON FOR EXAM: [M25.562]-Pain in left knee     TECHNICAL FACTORS: Two views of the left knee and one view of the right knee    COMPARISON: Right knee radiographs from June 17, 2020    FINDINGS:   Right knee:  There is no evidence of acute fracture. There is no evidence of subluxation. There is joint space narrowing and osteophytosis of the medial and lateral compartments of the right knee. No significant soft tissue swelling is identified.    Left knee: There is no evidence of acute fracture. There is no evidence of subluxation. There is joint space narrowing and osteophytosis throughout the left knee, mostly affecting the medial compartment. No significant soft tissue swelling is identified. There is a left knee joint effusion.      IMPRESSION:   1.  Osteoarthritis of both knees, mostly affecting the medial compartments.   2.  Joint effusion of the left knee.  Assessment:       1. Reactive arthritis    2. Chronic pain syndrome    3. High risk medication use    4. Neuropathy    5. Chronic cough            Plan:       Reactive arthritis    Chronic pain syndrome  -     gabapentin (NEURONTIN) 800 MG tablet;  Take 1 tablet (800 mg total) by mouth 3 (three) times daily.  Dispense: 90 tablet; Refill: 3    High risk medication use    Neuropathy    Chronic cough        Assessment:  54 year old female with   Reactive arthritis, osteoarthritis, erythema nodosum on plaquenil  --chronic pain syndrome on norco 10/325  --HTN  --hx of asthma  --hx of pancreatitis 9/2019    Plan:  1. Cont. Plaquenil 200 mg bid  2. Increase gabapentin 800 mg tid. Monitor for s/e dizziness/drowsiness.  3. Cont norco 10/325 per Dr. Beltran. I have check [place] prescription monitoring program site and no unusual or abnormal behavior has occurred pt understand the risk and benefits of taking opioid medications and has decided to continue the medication.  4. Consider addition of NSAID in the future    Follow up:  3 months Dr. Beltran w/ labs prior

## 2020-07-16 NOTE — TELEPHONE ENCOUNTER
----- Message from Belinda Villalta sent at 7/16/2020  1:20 PM CDT -----  Regarding: refill  Type:  RX Refill Request    Who Called:  pt  Refill or New Rx:  refill  RX Name and Strength:  HYDROcodone-acetaminophen (NORCO)  mg per tablet, desoximetasone 0.25 per cent cream  How is the patient currently taking it? (ex. 1XDay):  2 day  Is this a 30 day or 90 day RX: 90  Preferred Pharmacy with phone number:    [facility] - [place], [place] - [address] [address]  Phone: [phone] Fax: [phone]       Local or Mail Order:  Local  Ordering Provider: Dr Devin Guerra Call Back Number:  [phone] (home)     Additional Information:

## 2020-07-17 RX ORDER — HYDROCODONE BITARTRATE AND ACETAMINOPHEN 10; 325 MG/1; MG/1
1 TABLET ORAL EVERY 8 HOURS PRN
Qty: 90 TABLET | Refills: 0 | Status: SHIPPED | OUTPATIENT
Start: 2020-07-17 | End: 2020-08-13

## 2020-09-08 ENCOUNTER — NURSE TRIAGE (OUTPATIENT)
Dept: ADMINISTRATIVE | Facility: CLINIC | Age: 55
End: 2020-09-08

## 2020-09-08 DIAGNOSIS — M02.30 REACTIVE ARTHRITIS: ICD-10-CM

## 2020-09-08 DIAGNOSIS — G89.4 CHRONIC PAIN SYNDROME: ICD-10-CM

## 2020-09-08 NOTE — TELEPHONE ENCOUNTER
Spoke to pt and she states she is having stomach pains and would like for you to order her a xray for her stomach. Pt would also like lab orders specific to gout and an A1c.   Pt would also like for her pain medication to be called in Friday if possible before you leave because it is due Saturday.

## 2020-09-08 NOTE — TELEPHONE ENCOUNTER
Pt called with a list of complaints/requests: she wants X-Rays of her stomach, BG test, gout test, pain medication refill, antibiotics for a sore throat. She reports the bottom of her feet burn. When she eats something sweet she starts itching. States she only wants sweets and is drinking a lot of water. She reported she went to ER twice for this now  and they say nothing is wrong with her. RN advised pt to go to ER now per protocol. Pt does not wish to do this since she said she has already been twice and they have not helped her. RN continued to advise ER. Provided reasons to call back . Pt has no further questions. Message sent to Dr Beltran requesting callback per pt's request.     Reason for Disposition   SEVERE abdominal pain (e.g., excruciating)    Additional Information   Negative: Passed out (i.e., fainted, collapsed and was not responding)   Negative: Shock suspected (e.g., cold/pale/clammy skin, too weak to stand, low BP, rapid pulse)   Negative: Sounds like a life-threatening emergency to the triager    Protocols used: ABDOMINAL PAIN - FEMALE-A-[place]

## 2020-09-08 NOTE — TELEPHONE ENCOUNTER
----- Message from Jennifer Bonner sent at 9/8/2020  9:11 AM CDT -----  Contact: call  pt [phone]// [phone]   Type: Needs Medical Advice  Who Called: pt  Best Call Back Number: call  pt [phone]// [phone]  Additional Information:  pt is  calling to get  some  order for   lab  wk  //  to  test  for   gout and  a1c and  stomach  pain // pt  sent to [facility]  also   for  stomach  pain// please call

## 2020-09-11 DIAGNOSIS — G89.4 CHRONIC PAIN SYNDROME: ICD-10-CM

## 2020-09-11 DIAGNOSIS — M02.30 REACTIVE ARTHRITIS: ICD-10-CM

## 2020-09-14 NOTE — TELEPHONE ENCOUNTER
----- Message from Shanna Downey sent at 2020  1:19 PM CDT -----  Regarding: refill  Contact: patient  Type:  RX Refill Request  Who Called:  patient  Refill or New Rx:  Refill  RX Name and Strength:  HYDROcodone-acetaminophen (NORCO)  mg per tablet  How is the patient currently taking it? (ex. 1XDay):  ?  Is this a 30 day or 90 day RX:  30  Preferred Pharmacy with phone number:    [facility] [place] - [address] [address] [facility] [address]  Phone: [phone] Fax: [phone]  Local or Mail Order:  local  Ordering Provider:  melody Guerra Call Back Number:  [phone]  Additional Information:  Patient is requesting early fill day due to weather and leaving town for . Thanks!

## 2020-09-15 NOTE — TELEPHONE ENCOUNTER
----- Message from Yolis Blount sent at 9/15/2020 11:13 AM CDT -----  Regarding: refill pain meds  Contact: Amanda peters  Type:  RX Refill Request    Who Called:  Amanda  Refill or New Rx:  refill  RX Name and Strength:  HYDROcodone-acetaminophen (NORCO)  mg per tablet  How is the patient currently taking it? (ex. 1XDay):  as directed  Is this a 30 day or 90 day RX:  30  Preferred Pharmacy with phone number:    [facility] - [place], [place] - [address] [facility] [address]  Phone: [phone] Fax: [phone]    Local or Mail Order:  local  Ordering Provider:  Devin Guerra Call Back Number:  [phone] or cell [phone]  Additional Information:  Pls call pt if any issues w/ her refill

## 2020-09-16 RX ORDER — HYDROCODONE BITARTRATE AND ACETAMINOPHEN 10; 325 MG/1; MG/1
1 TABLET ORAL EVERY 8 HOURS PRN
Qty: 90 TABLET | Refills: 0 | Status: SHIPPED | OUTPATIENT
Start: 2020-09-16 | End: 2020-10-16 | Stop reason: SDUPTHER

## 2020-09-16 RX ORDER — HYDROCODONE BITARTRATE AND ACETAMINOPHEN 10; 325 MG/1; MG/1
TABLET ORAL
Qty: 90 TABLET | Refills: 0 | OUTPATIENT
Start: 2020-09-16

## 2020-09-16 NOTE — TELEPHONE ENCOUNTER
----- Message from Ana Alfaro sent at 9/16/2020 11:19 AM CDT -----  Contact: patient  Type:  RX Refill Request    Who Called:  Patient  Refill or New Rx:  refill  RX Name and Strength:  HYDROcodone-acetaminophen (NORCO)  mg per tablet  How is the patient currently taking it? (ex. 1XDay):    Is this a 30 day or 90 day RX:    Preferred Pharmacy with phone number:    Local or Mail Order:  Local  Ordering Provider:  Dr.Spady Guerra Call Back Number:  [phone]  Additional Information: requesting a call  back stated left several messages to return call regarding medication

## 2020-09-16 NOTE — TELEPHONE ENCOUNTER
----- Message from Monique Harman sent at 9/16/2020  4:41 PM CDT -----  Regarding: refill  Contact: Patient/[phone]  Type:  RX Refill Request    Who Called:  Patient/[phone]    Refill or New Rx:  Refill  RX Name and Strength:  HYDROcodone-acetaminophen (NORCO)  mg per tablet      How is the patient currently taking it? (ex. 1XDay):  3xday  Is this a 30 day or 90 day RX:  90 pills  Preferred Pharmacy with phone number:    [facility] [place] - [address] [address]  Phone: [phone] Fax: [phone]       Local or Mail Order:  local  Ordering Provider:  same    Additional Information:  States she has called the office 3 or 4 times to get this refill, along with the pharmacy but neither of them has heard from the office as of yet. Please call to advise of the status. She has been completely out of her medication.

## 2020-09-29 DIAGNOSIS — R05.3 CHRONIC COUGH: ICD-10-CM

## 2020-09-29 RX ORDER — PROMETHAZINE HYDROCHLORIDE 6.25 MG/5ML
6.25 SYRUP ORAL EVERY 8 HOURS PRN
Qty: 240 ML | Refills: 3 | Status: SHIPPED | OUTPATIENT
Start: 2020-09-29 | End: 2020-10-27

## 2020-09-29 NOTE — TELEPHONE ENCOUNTER
----- Message from Chris Batres sent at 9/29/2020  1:17 PM CDT -----  Type:  RX Refill Request    Who Called:  Patient  Refill or New Rx:  Refill  RX Name and Strength:  promethazine (PHENERGAN) 6.25 mg/5 mL syrup  How is the patient currently taking it? (ex. 1XDay):  As ordered  Is this a 30 day or 90 day RX:  As ordered  Preferred Pharmacy with phone number:    [facility] - [place], [place] - [address] [facility] [address]  Phone: [phone] Fax: [phone]  Local or Mail Order:  Local  Ordering Provider:  Dr. Devin Guerra Call Back Number:  [phone] or [phone]  Additional Information:  NA

## 2020-10-16 DIAGNOSIS — M02.30 REACTIVE ARTHRITIS: ICD-10-CM

## 2020-10-16 DIAGNOSIS — G89.4 CHRONIC PAIN SYNDROME: ICD-10-CM

## 2020-10-16 NOTE — TELEPHONE ENCOUNTER
----- Message from Belinda Villalta sent at 10/16/2020  2:00 PM CDT -----  Regarding: refill  Type:  RX Refill Request    Who Called:  pt  Refill or New Rx:  pt  RX Name and Strength:  HYDROcodone-acetaminophen (NORCO)  mg per tablet, azithromycin (Z-DUONG) 250 MG tablet  How is the patient currently taking it? (ex. 1XDay):  3 x day  Is this a 30 day or 90 day RX:  90  Preferred Pharmacy with phone number:    [facility] - [place], [place] - [address] [address]  Phone: [phone] Fax: [phone]    Local or Mail Order: Local  Ordering Provider: Dr Devin Guerra Call Back Number:  [phone] (home)     Additional Information:  pt is out of medication thanks

## 2020-10-16 NOTE — TELEPHONE ENCOUNTER
----- Message from Les Bradley sent at 10/16/2020  8:48 AM CDT -----  Regarding: pt  Type: Needs Medical Advice    Who Called:  pt  Symptoms (please be specific):  sore throat  How long has patient had these symptoms:  4 days  Pharmacy name and phone #:    [facility] [place] - [address] [facility]  [address] [facility]  [place] [address]  Phone: [phone] Fax: [phone]    Best Call Back Number: [phone]   Additional Information: pt is asking for some antibiotics to assist with sore throat. Please call to discuss.

## 2020-10-16 NOTE — TELEPHONE ENCOUNTER
----- Message from Belinda Villalta sent at 10/16/2020  2:00 PM CDT -----  Regarding: refill  Type:  RX Refill Request    Who Called:  pt  Refill or New Rx:  pt  RX Name and Strength:  HYDROcodone-acetaminophen (NORCO)  mg per tablet, azithromycin (Z-DUONG) 250 MG tablet  How is the patient currently taking it? (ex. 1XDay):  3 x day  Is this a 30 day or 90 day RX:  90  Preferred Pharmacy with phone number:    [facility] - [place], [place] - [address] [address]  Phone: [phone] Fax: [phone]    Local or Mail Order: Local  Ordering Provider: Dr Devin Guerra Call Back Number:  [phone] (home)     Additional Information:  pt is out of medication thanks

## 2020-10-19 DIAGNOSIS — G89.4 CHRONIC PAIN SYNDROME: ICD-10-CM

## 2020-10-19 DIAGNOSIS — M02.30 REACTIVE ARTHRITIS: ICD-10-CM

## 2020-10-19 NOTE — TELEPHONE ENCOUNTER
----- Message from Ana Alfaro sent at 10/19/2020  3:40 PM CDT -----  Contact: patient  Type:  RX Refill Request    Who Called:  patient  Refill or New Rx:  refill  RX Name and Strength:  pain medication  How is the patient currently taking it? (ex. 1XDay):      Is this a 30 day or 90 day RX:      [facility] with phone number:  [facility]  Local or Mail Order:  local  Ordering Provider:  Dr.Spady Guerra Call Back Number:  [phone]  Additional Information:  patient stated she has been in pain the whole weekend requesting a call back

## 2020-10-19 NOTE — TELEPHONE ENCOUNTER
----- Message from Chris Batres sent at 10/19/2020  8:18 AM CDT -----  Type:  RX Refill Request    Who Called:  Patient  Refill or New Rx:  Refill  RX Name and Strength:  HYDROcodone-acetaminophen (NORCO)  mg per tablet  How is the patient currently taking it? (ex. 1XDay):  As ordered  Is this a 30 day or 90 day RX:  30  Preferred Pharmacy with phone number:    [facility] - [place], [place] - [address] [address] [facility] [address]  Phone: [phone] Fax: [phone]  Local or Mail Order:  Local  Ordering Provider:  Dr. Devin Guerra Call Back Number:  [phone]  Additional Information:  Patient would like to speak to the office. Thanks!

## 2020-10-19 NOTE — TELEPHONE ENCOUNTER
----- Message from Amanda Guzman sent at 10/19/2020 12:01 PM CDT -----  Regarding: Rx Refill  Type: Needs Medical Advice  Who Called:  Patient  Pharmacy [facility] and phone #:  [facility] Call Back Number: [phone] (home)   Additional Information: the patient is asking for her pain meds to be called in for hydrocodone please advise asap please patient is out and she said she has a sore throat and was supposed to have gotten a Rx for that as well please call her back to let her know if a Rx will be called in for the pain meds

## 2020-10-19 NOTE — TELEPHONE ENCOUNTER
----- Message from Ana Alfaro sent at 10/19/2020  3:40 PM CDT -----  Contact: patient  Type:  RX Refill Request    Who Called:  patient  Refill or New Rx:  refill  RX Name and Strength:  pain medication  How is the patient currently taking it? (ex. 1XDay):      Is this a 30 day or 90 day RX:      [facility] with phone number[facility]  [facility]  Local or Mail Order:  local  Ordering Provider:  Dr.Spady Guerra Call Back Number:  [phone]  Additional Information:  patient stated she has been in pain the whole weekend requesting a call back

## 2020-10-19 NOTE — TELEPHONE ENCOUNTER
----- Message from Brian Ness sent at 10/19/2020  2:15 PM CDT -----  Regarding: Rx refill  Type:  RX Refill Request    Who Called:  Ptnt  [phone] or [phone]    Refill    RX Name and Strength:  HYDROcodone-acetaminophen (NORCO)  mg per tablet 90 tablet 0 9/16/2020  No  Sig - Route: Take 1 tablet by mouth every 8 (eight) hours as needed for Pain. - Oral  Sent to pharmacy as: HYDROcodone-acetaminophen (NORCO)  mg per tablet  Class: Normal  Earliest Fill Date: 9/16/2020      How is the patient currently taking it? (ex. 1XDay): See above.    Is this a 30 day or 90 day RX:  See above.    Preferred Pharmacy with phone number:      [facility] [place] - [address] [facility] [address]  Phone: [phone] Fax: [phone]    Local or Mail Order:  Local    Ordering Provider:  Dr Devin Guerra Call Back Number: Ptnt  [phone] or [phone]        Additional Information:      Advised completely out of medication, advised [facility] pharmacy does not this RX, please advise.

## 2020-10-21 RX ORDER — HYDROCODONE BITARTRATE AND ACETAMINOPHEN 10; 325 MG/1; MG/1
1 TABLET ORAL EVERY 8 HOURS PRN
Qty: 90 TABLET | Refills: 0 | Status: SHIPPED | OUTPATIENT
Start: 2020-10-21 | End: 2020-10-27 | Stop reason: SDUPTHER

## 2020-10-22 RX ORDER — HYDROCODONE BITARTRATE AND ACETAMINOPHEN 10; 325 MG/1; MG/1
TABLET ORAL
Qty: 90 TABLET | Refills: 0 | OUTPATIENT
Start: 2020-10-22

## 2020-10-27 ENCOUNTER — OFFICE VISIT (OUTPATIENT)
Dept: RHEUMATOLOGY | Facility: CLINIC | Age: 55
End: 2020-10-27
Payer: MEDICARE

## 2020-10-27 VITALS
HEIGHT: 64 IN | HEART RATE: 79 BPM | DIASTOLIC BLOOD PRESSURE: 77 MMHG | BODY MASS INDEX: 37.22 KG/M2 | WEIGHT: 218 LBS | SYSTOLIC BLOOD PRESSURE: 115 MMHG

## 2020-10-27 DIAGNOSIS — R05.9 COUGH: ICD-10-CM

## 2020-10-27 DIAGNOSIS — L29.9 ITCHING: ICD-10-CM

## 2020-10-27 DIAGNOSIS — K12.1 ORAL ULCER: ICD-10-CM

## 2020-10-27 DIAGNOSIS — G89.4 CHRONIC PAIN SYNDROME: ICD-10-CM

## 2020-10-27 DIAGNOSIS — J32.9 SINUSITIS, UNSPECIFIED CHRONICITY, UNSPECIFIED LOCATION: ICD-10-CM

## 2020-10-27 DIAGNOSIS — M02.30 REACTIVE ARTHRITIS: Primary | ICD-10-CM

## 2020-10-27 DIAGNOSIS — K21.9 GASTROESOPHAGEAL REFLUX DISEASE, UNSPECIFIED WHETHER ESOPHAGITIS PRESENT: ICD-10-CM

## 2020-10-27 DIAGNOSIS — M02.30 REACTIVE ARTHRITIS: ICD-10-CM

## 2020-10-27 PROCEDURE — 99999 PR PBB SHADOW E&M-EST. PATIENT-LVL III: CPT | Mod: PBBFAC,,, | Performed by: PHYSICIAN ASSISTANT

## 2020-10-27 PROCEDURE — 3008F BODY MASS INDEX DOCD: CPT | Mod: CPTII,S$GLB,, | Performed by: PHYSICIAN ASSISTANT

## 2020-10-27 PROCEDURE — 99214 PR OFFICE/OUTPT VISIT, EST, LEVL IV, 30-39 MIN: ICD-10-PCS | Mod: S$GLB,,, | Performed by: PHYSICIAN ASSISTANT

## 2020-10-27 PROCEDURE — 3008F PR BODY MASS INDEX (BMI) DOCUMENTED: ICD-10-PCS | Mod: CPTII,S$GLB,, | Performed by: PHYSICIAN ASSISTANT

## 2020-10-27 PROCEDURE — 99214 OFFICE O/P EST MOD 30 MIN: CPT | Mod: S$GLB,,, | Performed by: PHYSICIAN ASSISTANT

## 2020-10-27 PROCEDURE — 99999 PR PBB SHADOW E&M-EST. PATIENT-LVL III: ICD-10-PCS | Mod: PBBFAC,,, | Performed by: PHYSICIAN ASSISTANT

## 2020-10-27 RX ORDER — SERTRALINE HYDROCHLORIDE 50 MG/1
TABLET, FILM COATED ORAL
COMMUNITY
Start: 2020-10-05 | End: 2022-01-12

## 2020-10-27 RX ORDER — AMOXICILLIN AND CLAVULANATE POTASSIUM 875; 125 MG/1; MG/1
1 TABLET, FILM COATED ORAL 2 TIMES DAILY
Qty: 20 TABLET | Refills: 0 | Status: SHIPPED | OUTPATIENT
Start: 2020-10-27 | End: 2020-11-06

## 2020-10-27 RX ORDER — BUDESONIDE AND FORMOTEROL FUMARATE DIHYDRATE 160; 4.5 UG/1; UG/1
AEROSOL RESPIRATORY (INHALATION)
COMMUNITY
Start: 2020-10-17 | End: 2021-12-16

## 2020-10-27 RX ORDER — IBUPROFEN 600 MG/1
600 TABLET ORAL 3 TIMES DAILY PRN
Qty: 90 TABLET | Refills: 1 | Status: SHIPPED | OUTPATIENT
Start: 2020-10-27 | End: 2021-03-03

## 2020-10-27 RX ORDER — PANTOPRAZOLE SODIUM 40 MG/1
40 TABLET, DELAYED RELEASE ORAL DAILY
Qty: 30 TABLET | Refills: 6 | Status: SHIPPED | OUTPATIENT
Start: 2020-10-27 | End: 2021-07-06 | Stop reason: SDUPTHER

## 2020-10-27 RX ORDER — GABAPENTIN 800 MG/1
800 TABLET ORAL 3 TIMES DAILY
Qty: 90 TABLET | Refills: 3 | Status: SHIPPED | OUTPATIENT
Start: 2020-10-27 | End: 2021-07-06 | Stop reason: SDUPTHER

## 2020-10-27 RX ORDER — NAPROXEN 500 MG/1
TABLET ORAL
COMMUNITY
Start: 2020-10-08 | End: 2020-10-27

## 2020-10-27 RX ORDER — FUROSEMIDE 20 MG/1
TABLET ORAL
COMMUNITY
Start: 2020-10-08

## 2020-10-27 RX ORDER — TRIAMCINOLONE ACETONIDE 1 MG/G
OINTMENT TOPICAL 2 TIMES DAILY
Qty: 80 G | Refills: 3 | Status: SHIPPED | OUTPATIENT
Start: 2020-10-27 | End: 2021-07-06 | Stop reason: SDUPTHER

## 2020-10-27 RX ORDER — DIPHENOXYLATE HYDROCHLORIDE AND ATROPINE SULFATE 2.5; .025 MG/1; MG/1
TABLET ORAL
COMMUNITY
Start: 2020-10-16 | End: 2020-10-27

## 2020-10-27 RX ORDER — MELOXICAM 15 MG/1
15 TABLET ORAL DAILY
COMMUNITY
Start: 2020-09-08 | End: 2021-07-06

## 2020-10-27 RX ORDER — TIZANIDINE 4 MG/1
4 TABLET ORAL DAILY
COMMUNITY
Start: 2020-09-09 | End: 2020-10-27

## 2020-10-27 RX ORDER — HYDROXYCHLOROQUINE SULFATE 200 MG/1
200 TABLET, FILM COATED ORAL 2 TIMES DAILY
Qty: 60 TABLET | Refills: 6 | Status: SHIPPED | OUTPATIENT
Start: 2020-10-27 | End: 2021-03-03

## 2020-10-27 RX ORDER — HYDROXYZINE PAMOATE 25 MG/1
CAPSULE ORAL
Qty: 45 CAPSULE | Refills: 3 | Status: SHIPPED | OUTPATIENT
Start: 2020-10-27 | End: 2021-03-03 | Stop reason: SDUPTHER

## 2020-10-27 RX ORDER — LIDOCAINE HYDROCHLORIDE 20 MG/ML
SOLUTION OROPHARYNGEAL EVERY 8 HOURS PRN
Qty: 100 ML | Refills: 0 | Status: SHIPPED | OUTPATIENT
Start: 2020-10-27

## 2020-10-27 RX ORDER — PROMETHAZINE HYDROCHLORIDE 6.25 MG/5ML
6.25 SYRUP ORAL EVERY 6 HOURS PRN
Qty: 240 ML | Refills: 3 | Status: SHIPPED | OUTPATIENT
Start: 2020-10-27 | End: 2021-01-25 | Stop reason: SDUPTHER

## 2020-10-27 RX ORDER — ALPRAZOLAM 0.5 MG/1
0.5 TABLET ORAL NIGHTLY PRN
COMMUNITY
Start: 2020-10-02 | End: 2020-10-27

## 2020-10-27 RX ORDER — POTASSIUM CHLORIDE 20 MEQ/1
TABLET, EXTENDED RELEASE ORAL
COMMUNITY
Start: 2020-10-14 | End: 2021-03-03 | Stop reason: SDUPTHER

## 2020-10-27 RX ORDER — DICLOFENAC SODIUM 10 MG/G
GEL TOPICAL
COMMUNITY
Start: 2020-09-01 | End: 2020-10-27 | Stop reason: SDUPTHER

## 2020-10-27 RX ORDER — ZOLPIDEM TARTRATE 10 MG/1
TABLET ORAL
COMMUNITY
Start: 2020-10-02 | End: 2020-10-27

## 2020-10-27 RX ORDER — DICLOFENAC SODIUM 10 MG/G
GEL TOPICAL 4 TIMES DAILY
Qty: 300 G | Refills: 3 | Status: SHIPPED | OUTPATIENT
Start: 2020-10-27 | End: 2021-02-17 | Stop reason: SDUPTHER

## 2020-10-27 RX ORDER — PREDNISONE 20 MG/1
40 TABLET ORAL DAILY
COMMUNITY
Start: 2020-10-02 | End: 2020-10-27

## 2020-10-27 RX ORDER — ATORVASTATIN CALCIUM 40 MG/1
TABLET, FILM COATED ORAL
COMMUNITY
Start: 2020-10-07 | End: 2023-08-15

## 2020-10-27 RX ORDER — HYDROXYCHLOROQUINE SULFATE 200 MG/1
TABLET, FILM COATED ORAL
COMMUNITY
Start: 2020-10-08 | End: 2020-10-27 | Stop reason: SDUPTHER

## 2020-10-27 RX ORDER — SUCRALFATE 1 G/10ML
SUSPENSION ORAL
COMMUNITY
Start: 2020-10-12 | End: 2021-03-03

## 2020-10-27 RX ORDER — HYDROCODONE BITARTRATE AND ACETAMINOPHEN 10; 325 MG/1; MG/1
1 TABLET ORAL EVERY 8 HOURS PRN
Qty: 90 TABLET | Refills: 0 | Status: SHIPPED | OUTPATIENT
Start: 2020-11-20 | End: 2020-12-15 | Stop reason: SDUPTHER

## 2020-10-27 RX ORDER — TRAZODONE HYDROCHLORIDE 100 MG/1
100 TABLET ORAL NIGHTLY
COMMUNITY
Start: 2020-09-17 | End: 2022-03-08 | Stop reason: SDUPTHER

## 2020-10-27 RX ORDER — AMLODIPINE BESYLATE 10 MG/1
TABLET ORAL
COMMUNITY
Start: 2020-10-03

## 2020-10-27 ASSESSMENT — ROUTINE ASSESSMENT OF PATIENT INDEX DATA (RAPID3)
FATIGUE SCORE: 2.2
MDHAQ FUNCTION SCORE: 0.9
PATIENT GLOBAL ASSESSMENT SCORE: 9
PSYCHOLOGICAL DISTRESS SCORE: 4.4
TOTAL RAPID3 SCORE: 6.67
PAIN SCORE: 8

## 2020-10-27 NOTE — PROGRESS NOTES
"  Subjective:       Patient ID: Amanda Mcguire is a 55 y.o. female.    Chief Complaint: Disease Management and Arthritis    Mrs. Mcguire is a 55 year old female who presents to clinic for follow up on reactive arthritis, osteoarthritis. Multiple hospital admissions since her last visit:    She was admitted to [facility] 9/15 presenting with chest pain.   "ACS was ruled out.Her echocardiogram was unremarkable with an ejection fraction = 65-75%. Nuclear medicine stress test showed no evidence of reversible defects to nuclear imaging.She also had complaints of abdominal pain and diarrhea on arrival. Her CT scan showed evidence of acute colitis. She is followed by Dr. Wilkins with general surgery who has performed a colonoscopy on her in the past. The case was discussed with him and he recommended antibiotic therapy and will follow up with her in the outpatient setting. She'll be discharged home on a 10 day course of Cipro and Flagyl. "    She was admitted to [facility] 9/25/2020 with SBO. General surgery and GI consulted NG tube placed, given IV antibiotics and CT scan consistent with crohn's disease. Colonoscopy was also consistent with crohn's disease.  discharged 10/2. No f/u with Dr. Medina in GI yet, but she plans to call. She reports sore throat and painful tongue. PCP evaluated her on 10/23 prescribed magic mouthwash for thrush, but she could not afford this.     She complains of severe burning, throbbing pain in her feet that is constant throughout the day. Gabapentin does not provide relief. She denies drowsiness with medication. Norco is helpful for severe pain without side effects. She complains of joint pain involving her ankles and knees.     Current tx:  1. Plaquenil      Review of Systems   Constitutional: Positive for chills. Negative for activity change, appetite change, fatigue and fever.   Eyes: Negative for visual disturbance.   Cardiovascular: Negative for chest pain, palpitations and leg " swelling.   Gastrointestinal: Positive for abdominal pain and nausea. Negative for constipation, diarrhea and vomiting.   Musculoskeletal: Positive for arthralgias, joint swelling and myalgias.   Neurological: Negative for dizziness, weakness, light-headedness and headaches.         Objective:     Vitals:    10/27/20 0919   BP: 115/77   Pulse: 79       Past Medical History:   Diagnosis Date    Anxiety     Arthritis     Asthma     Depression     Encounter for blood transfusion     Hypertension      Past Surgical History:   Procedure Laterality Date    CHOLECYSTECTOMY      FRACTURE SURGERY            Outside records from hospital admissions reviewed:    Recent labs:  Glucose 86  Comment:               Fasting reference interval     65 - 99 mg/dL [facility]     BUN 15 7 - 25 mg/dL [facility]     CREATININE 0.95  Comment:   For patients >49 years of age, the reference limit  for Creatinine is approximately 13% higher for people  identified as -American.     0.50 - 1.05 mg/dL [facility]     EGFR Non- 68 > OR = 60 mL/min/1.73m2 [facility]     EGFR  79 > OR = 60 mL/min/1.73m2 [facility]     BUN/Creatinine Ratio NOT APPLICABLE 6 - 22 (calc) [facility]     Sodium 143 135 - 146 mmol/L [facility]     Potassium 4.2 3.5 - 5.3 mmol/L [facility]     Chloride 108 98 - 110 mmol/L [facility]     CARBON DIOXIDE 25 20 - 32 mmol/L [facility]     Calcium 9.4 8.6 - 10.4 mg/dL [facility]       Basic metabolic panel (10/19/2020 1:10 PM CDT)   Specimen   Blood specimen (specimen) - Blood     Basic metabolic panel (10/19/2020 1:10 PM CDT)   Resulting Agency Comment   Performing Organization Information:       Site ID: [id number]       Name: [facility]       Address: [address] [place] [id number]       Director: Zia Lara       Basic metabolic panel (10/19/2020 1:10 PM CDT)   Performing Organization Address City/State/ZIP Code Phone Number   [facility]   [address] [place] [address]        Back to top of Lab  Results       Hemoglobin A1c (10/19/2020 1:10 PM CDT)  Hemoglobin A1c (10/19/2020 1:10 PM CDT)   Component Value Ref Range Performed At Pathologist Signature   HGB A1C% 5.3  Comment:   For the purpose of screening for the presence of  diabetes:    <5.7%       Consistent with the absence of diabetes  5.7-6.4%    Consistent with increased risk for diabetes             (prediabetes)  > or =6.5%  Consistent with diabetes    This assay result is consistent with a decreased risk  of diabetes.    Currently, no consensus exists regarding use of  hemoglobin A1c for diagnosis of diabetes in children.    According to American Diabetes Association (ADA)  guidelines, hemoglobin A1c <7.0% represents optimal  control in non-pregnant diabetic patients. Different  metrics may apply to specific patient populations.   Standards of Medical Care in Diabetes(ADA).      <5.7 % of total Hgb       CBC with Differential (10/09/2020 9:45 AM CDT)  CBC with Differential (10/09/2020 9:45 AM CDT)   Component Value Ref Range Performed At Pathologist Signature   WBC 19.0 (H) 4.4 - 11.2 10*3/uL [facility]     RBC 4.48 4.20 - 5.40 10*6/uL [facility]     HGB 11.6 (L) 12.0 - 16.0 g/dL [facility]     HCT 36.5 (L) 37.0 - 47.0 % [facility]     MCV 81.5 81.0 - 99.0 fL [facility]     MCH 25.9 (L) 27.0 - 31.0 pg [facility]     MCHC 31.8 (L) 33.0 - 37.0 g/dL [facility]     RDW 15.5 (H) 11.5 - 14.5 % [facility]     Platelet Count 569 (H) 130 - 375 10*3/uL [facility]     MPV 8.9 8.7 - 13.0 fL [facility]     Neutrophils Percent 71.0 (H) 36.0 - 66.0 % [facility]     Lymphocytes Percent 15.3 (L) 21.0 - 50.0 % [facility]     Monocytes Percent 8.6 2.0 - 10.0 % [facility]     Eosinophils Percent 0.0 0.0 - 10.0 % [facility]     Basophils Percent 0.4 0.0 - 1.0 % [facility]     Immature Granulocyte % 4.4 (H) 0.0 - 0.4 % [facility]     Neutrophils Absolute 13.5 (H) 1.4 - 6.5 10*3/uL [facility]     Lymphocytes Absolute 2.9 1.2 - 3.4 10*3/uL [facility]     Monocytes  Absolute 1.6 (H) 0.1 - 1.0 10*3/uL [facility]     Eosinophils Absolute 0.1 0.0 - 0.7 10*3/uL [facility]     Basophils Absolute 0.1 0.0 - 0.2 10*3/uL [facility]     # Immature Granulocyte 0.83 (H) 0.00 - 0.03 10*3/uL [facility]     Anisocytosis 1+   [facility]         Assessment:       1. Reactive arthritis    2. Cough    3. Chronic pain syndrome    4. Itching    5. Gastroesophageal reflux disease, unspecified whether esophagitis present    6. Sinusitis, unspecified chronicity, unspecified location    7. Oral ulcer            Plan:       Reactive arthritis  -     diclofenac sodium (VOLTAREN) 1 % Gel; Apply topically 4 (four) times daily.  Dispense: 300 g; Refill: 3  -     gabapentin (NEURONTIN) 800 MG tablet; Take 1 tablet (800 mg total) by mouth 3 (three) times daily.  Dispense: 90 tablet; Refill: 3  -     hydrOXYchloroQUINE (PLAQUENIL) 200 mg tablet; Take 1 tablet (200 mg total) by mouth 2 (two) times daily.  Dispense: 60 tablet; Refill: 6  -     ibuprofen (ADVIL,MOTRIN) 600 MG tablet; Take 1 tablet (600 mg total) by mouth 3 (three) times daily as needed for Pain.  Dispense: 90 tablet; Refill: 1  -     CBC auto differential; Future; Expected date: 10/27/2020  -     Comprehensive Metabolic Panel; Future; Expected date: 10/27/2020  -     C-Reactive Protein; Future; Expected date: 10/27/2020  -     Sedimentation rate; Future; Expected date: 10/27/2020    Cough  -     promethazine (PHENERGAN) 6.25 mg/5 mL syrup; Take 5 mLs (6.25 mg total) by mouth every 6 (six) hours as needed (cough).  Dispense: 240 mL; Refill: 3    Chronic pain syndrome    Itching  -     hydrOXYzine pamoate (VISTARIL) 25 MG Cap; TAKE ONE CAPSULE BY MOUTH THREE TIMES DAILY AS NEEDED FOR ITCHING  Dispense: 45 capsule; Refill: 3  -     triamcinolone acetonide 0.1% (KENALOG) 0.1 % ointment; Apply topically 2 (two) times daily.  Dispense: 80 g; Refill: 3    Gastroesophageal reflux disease, unspecified whether esophagitis present  -     pantoprazole  (PROTONIX) 40 MG tablet; Take 1 tablet (40 mg total) by mouth once daily.  Dispense: 30 tablet; Refill: 6    Sinusitis, unspecified chronicity, unspecified location  -     amoxicillin-clavulanate 875-125mg (AUGMENTIN) 875-125 mg per tablet; Take 1 tablet by mouth 2 (two) times daily. for 10 days  Dispense: 20 tablet; Refill: 0    Oral ulcer  -     lidocaine HCl 2% (LIDOCAINE VISCOUS) 2 % Soln; by Mucous Membrane route every 8 (eight) hours as needed.  Dispense: 100 mL; Refill: 0        Assessment:  55 year old female with   Reactive arthritis, osteoarthritis, erythema nodosum on plaquenil  --chronic pain syndrome on norco 10/325  --HTN  --hx of asthma  --hx of pancreatitis 9/2019  --NEW diagnosis of Crohn's disease 10/2020    Plan:  1. Check labs today  2. Augmentin bid x 10 days for sinusitis. Oragel and viscous lidocaine for oral ulcer. F/u with ENT if sx persist  3. Cont norco 10/325 per Dr. Beltran. I have check [place] prescription monitoring program site and no unusual or abnormal behavior has occurred pt understand the risk and benefits of taking opioid medications and has decided to continue the medication.  4. Cont. Gabapentin 800 mg tid, ibuprofen PRN  5. F/u with GI for crohn's tx and discuss alternative arthritis tx with Dr. Beltran given new diagnosis     Follow up:  3 months Dr. Beltran w/ labs prior

## 2020-12-01 ENCOUNTER — TELEPHONE (OUTPATIENT)
Dept: RHEUMATOLOGY | Facility: CLINIC | Age: 55
End: 2020-12-01

## 2020-12-02 ENCOUNTER — TELEPHONE (OUTPATIENT)
Dept: RHEUMATOLOGY | Facility: CLINIC | Age: 55
End: 2020-12-02

## 2020-12-02 NOTE — TELEPHONE ENCOUNTER
Returned patient call and informed 240 ml was sent to pharmacy. Patient stated only 4 oz was filled patient stated she normally get 8 oz. Nurse informed that a script was sent for 240 ml. Advised patient to check bottle when she gets home.

## 2020-12-02 NOTE — TELEPHONE ENCOUNTER
----- Message from Noemi Linn sent at 12/1/2020 10:49 AM CST -----  Contact: pt  Patient called back she is asking why her cough medication was only called in for 4 ml only instead of 8 ml  promethazine (PHENERGAN) 6.25 mg/5 mL syrup    - [facility] [place] - [address] [facility];    Call back [phone]

## 2020-12-02 NOTE — TELEPHONE ENCOUNTER
----- Message from Nelsy Henley sent at 12/2/2020  2:43 PM CST -----  Contact: self  Type: Needs Medical Advice  Who Called:  patient     Pharmacy name and phone #:    [facility] [place] [place] - [address] [facility]  [place] [address]  Phone: [phone] Fax: [phone]    Best Call Back Number: [phone] (home)   Additional Information: patient requesting a return call states on 4oz of promethazine was called in and she usually gets 8oz, also requesting antibiotics for a throat infection

## 2020-12-03 ENCOUNTER — TELEPHONE (OUTPATIENT)
Dept: RHEUMATOLOGY | Facility: CLINIC | Age: 55
End: 2020-12-03

## 2020-12-03 NOTE — TELEPHONE ENCOUNTER
----- Message from Nelsy Sauer sent at 12/3/2020 10:36 AM CST -----  Type: Needs Medical Advice    Who Called:  Patient  Best Call Back Number: [phone] or [phone]  Additional Information:  Patient calling nurse (Arelis)back concerning medication: promethazine (PHENERGAN) 6.25 mg/5 mL syrup/stated pharmacy informed her that only 110 ml was ordered/please call patient back to advise.

## 2020-12-10 ENCOUNTER — TELEPHONE (OUTPATIENT)
Dept: RHEUMATOLOGY | Facility: CLINIC | Age: 55
End: 2020-12-10

## 2020-12-10 DIAGNOSIS — G89.4 CHRONIC PAIN SYNDROME: ICD-10-CM

## 2020-12-10 DIAGNOSIS — R05.9 COUGH: ICD-10-CM

## 2020-12-10 DIAGNOSIS — M02.30 REACTIVE ARTHRITIS: ICD-10-CM

## 2020-12-10 RX ORDER — PROMETHAZINE HYDROCHLORIDE 6.25 MG/5ML
6.25 SYRUP ORAL EVERY 6 HOURS PRN
Qty: 240 ML | Refills: 3 | Status: CANCELLED | OUTPATIENT
Start: 2020-12-10

## 2020-12-10 RX ORDER — HYDROCODONE BITARTRATE AND ACETAMINOPHEN 10; 325 MG/1; MG/1
1 TABLET ORAL EVERY 8 HOURS PRN
Qty: 90 TABLET | Refills: 0 | Status: CANCELLED | OUTPATIENT
Start: 2020-12-10

## 2020-12-10 NOTE — TELEPHONE ENCOUNTER
----- Message from Joie Hendrickson sent at 12/10/2020 10:58 AM CST -----  Contact: pt  Type:  RX Refill Request    Who Called:  pt  Refill or New Rx:  refill  RX Name and Strength:    1. promethazine (PHENERGAN) 6.25 mg/5 mL syrup-Pt states that she'll need to get the big bottle if possible.  2. HYDROcodone-acetaminophen (NORCO)  mg per tablet  How is the patient currently taking it? (ex. 1XDay):  as directed  Preferred Pharmacy with phone number:    [facility]  [place] - [address] [facility] [address]  Phone: [phone] Fax: [phone]        Local or Mail Order:  local  Ordering Provider:  Dr. Devin Guerra Call Back Number:  [phone]

## 2020-12-11 DIAGNOSIS — R05.9 COUGH: ICD-10-CM

## 2020-12-11 NOTE — TELEPHONE ENCOUNTER
Pt is requesting refill. Spoke to pharmacy and the reason she only got half is because that rx came from a different provider. Advised pt that the refills that we sent are on file at pharmacy so she could contact them.

## 2020-12-15 DIAGNOSIS — M02.30 REACTIVE ARTHRITIS: ICD-10-CM

## 2020-12-15 DIAGNOSIS — G89.4 CHRONIC PAIN SYNDROME: ICD-10-CM

## 2020-12-21 ENCOUNTER — TELEPHONE (OUTPATIENT)
Dept: RHEUMATOLOGY | Facility: CLINIC | Age: 55
End: 2020-12-21

## 2020-12-21 NOTE — TELEPHONE ENCOUNTER
----- Message from Shanna Downey sent at 12/21/2020 11:56 AM CST -----  Contact: patient  Type:  RX Refill Request    Who Called:  patient  Refill or New Rx:  refill  RX Name and Strength:  HYDROcodone-acetaminophen (NORCO)  mg per tablet  How is the patient currently taking it? (ex. 1XDay):  ?  Is this a 30 day or 90 day RX:  ?  Preferred Pharmacy with phone number:    [facility] [place] - [address] [facility] [address]  Phone: [phone] Fax: [phone]  Local or Mail Order:  local  Ordering Provider:  melody Guerra Call Back Number:  na  Additional Information:  Patient states she called last week in early but meds still not at pharmacy.  Thanks!

## 2020-12-21 NOTE — TELEPHONE ENCOUNTER
----- Message from Shanna Downey sent at 12/21/2020 11:56 AM CST -----  Contact: patient  Type:  RX Refill Request    Who Called:  patient  Refill or New Rx:  refill  RX Name and Strength:  HYDROcodone-acetaminophen (NORCO)  mg per tablet  How is the patient currently taking it? (ex. 1XDay):  ?  Is this a 30 day or 90 day RX:  ?  Preferred Pharmacy with phone number:    [facility] [place] - [address] [facility] [address]  Phone: [phone] Fax: [phone]  Local or Mail Order:  local  Ordering Provider:  melody Guerra Call Back Number:  na  Additional Information:  Patient states she called last week in early but meds still not at pharmacy.  Thanks!

## 2020-12-22 RX ORDER — HYDROCODONE BITARTRATE AND ACETAMINOPHEN 10; 325 MG/1; MG/1
1 TABLET ORAL EVERY 8 HOURS PRN
Qty: 90 TABLET | Refills: 0 | Status: SHIPPED | OUTPATIENT
Start: 2020-12-22 | End: 2021-01-21

## 2020-12-24 DIAGNOSIS — M02.30 REACTIVE ARTHRITIS: ICD-10-CM

## 2020-12-24 DIAGNOSIS — G89.4 CHRONIC PAIN SYNDROME: ICD-10-CM

## 2020-12-25 RX ORDER — HYDROCODONE BITARTRATE AND ACETAMINOPHEN 10; 325 MG/1; MG/1
TABLET ORAL
Qty: 90 TABLET | Refills: 0 | OUTPATIENT
Start: 2020-12-25

## 2021-01-19 DIAGNOSIS — M02.30 REACTIVE ARTHRITIS: ICD-10-CM

## 2021-01-19 DIAGNOSIS — G89.4 CHRONIC PAIN SYNDROME: ICD-10-CM

## 2021-01-21 ENCOUNTER — TELEPHONE (OUTPATIENT)
Dept: RHEUMATOLOGY | Facility: CLINIC | Age: 56
End: 2021-01-21

## 2021-01-21 RX ORDER — HYDROCODONE BITARTRATE AND ACETAMINOPHEN 10; 325 MG/1; MG/1
TABLET ORAL
Qty: 90 TABLET | Refills: 0 | Status: SHIPPED | OUTPATIENT
Start: 2021-01-21 | End: 2021-03-03 | Stop reason: SDUPTHER

## 2021-01-25 DIAGNOSIS — R05.9 COUGH: ICD-10-CM

## 2021-01-25 RX ORDER — PROMETHAZINE HYDROCHLORIDE 6.25 MG/5ML
6.25 SYRUP ORAL EVERY 6 HOURS PRN
Qty: 240 ML | Refills: 3 | Status: SHIPPED | OUTPATIENT
Start: 2021-01-25 | End: 2021-03-15 | Stop reason: SDUPTHER

## 2021-01-27 ENCOUNTER — TELEPHONE (OUTPATIENT)
Dept: RHEUMATOLOGY | Facility: CLINIC | Age: 56
End: 2021-01-27

## 2021-02-11 DIAGNOSIS — G89.4 CHRONIC PAIN SYNDROME: ICD-10-CM

## 2021-02-11 DIAGNOSIS — M02.30 REACTIVE ARTHRITIS: ICD-10-CM

## 2021-02-17 DIAGNOSIS — M02.30 REACTIVE ARTHRITIS: ICD-10-CM

## 2021-02-17 DIAGNOSIS — G89.4 CHRONIC PAIN SYNDROME: ICD-10-CM

## 2021-02-17 RX ORDER — HYDROCODONE BITARTRATE AND ACETAMINOPHEN 10; 325 MG/1; MG/1
1 TABLET ORAL EVERY 8 HOURS PRN
Qty: 90 TABLET | Refills: 0 | Status: CANCELLED | OUTPATIENT
Start: 2021-02-17

## 2021-02-18 RX ORDER — HYDROCODONE BITARTRATE AND ACETAMINOPHEN 10; 325 MG/1; MG/1
1 TABLET ORAL EVERY 8 HOURS PRN
Qty: 90 TABLET | Refills: 0 | Status: SHIPPED | OUTPATIENT
Start: 2021-02-18 | End: 2021-03-03 | Stop reason: SDUPTHER

## 2021-02-18 RX ORDER — DICLOFENAC SODIUM 10 MG/G
GEL TOPICAL 4 TIMES DAILY
Qty: 300 G | Refills: 3 | Status: SHIPPED | OUTPATIENT
Start: 2021-02-18 | End: 2022-01-02

## 2021-02-22 ENCOUNTER — LAB VISIT (OUTPATIENT)
Dept: LAB | Facility: HOSPITAL | Age: 56
End: 2021-02-22
Attending: INTERNAL MEDICINE
Payer: MEDICARE

## 2021-02-22 DIAGNOSIS — G89.4 CHRONIC PAIN SYNDROME: ICD-10-CM

## 2021-02-22 DIAGNOSIS — R79.9 ABNORMAL FINDING OF BLOOD CHEMISTRY, UNSPECIFIED: ICD-10-CM

## 2021-02-22 DIAGNOSIS — M62.838 MUSCLE SPASMS OF LOWER EXTREMITY, UNSPECIFIED LATERALITY: ICD-10-CM

## 2021-02-22 DIAGNOSIS — M02.30 REACTIVE ARTHRITIS: ICD-10-CM

## 2021-02-22 DIAGNOSIS — L52 ERYTHEMA NODOSUM: ICD-10-CM

## 2021-02-22 LAB — ERYTHROCYTE [SEDIMENTATION RATE] IN BLOOD BY WESTERGREN METHOD: 13 MM/HR (ref 0–20)

## 2021-02-22 PROCEDURE — 85651 RBC SED RATE NONAUTOMATED: CPT | Mod: PO

## 2021-02-22 PROCEDURE — 86140 C-REACTIVE PROTEIN: CPT

## 2021-02-22 PROCEDURE — 36415 COLL VENOUS BLD VENIPUNCTURE: CPT | Mod: PO

## 2021-02-22 PROCEDURE — 80053 COMPREHEN METABOLIC PANEL: CPT

## 2021-02-22 PROCEDURE — 85025 COMPLETE CBC W/AUTO DIFF WBC: CPT

## 2021-02-23 LAB
ALBUMIN SERPL BCP-MCNC: 3.7 G/DL (ref 3.5–5.2)
ALP SERPL-CCNC: 94 U/L (ref 55–135)
ALT SERPL W/O P-5'-P-CCNC: 30 U/L (ref 10–44)
ANION GAP SERPL CALC-SCNC: 10 MMOL/L (ref 8–16)
AST SERPL-CCNC: 26 U/L (ref 10–40)
BASOPHILS # BLD AUTO: 0.07 K/UL (ref 0–0.2)
BASOPHILS NFR BLD: 0.9 % (ref 0–1.9)
BILIRUB SERPL-MCNC: 0.3 MG/DL (ref 0.1–1)
BUN SERPL-MCNC: 20 MG/DL (ref 6–20)
CALCIUM SERPL-MCNC: 8.9 MG/DL (ref 8.7–10.5)
CHLORIDE SERPL-SCNC: 109 MMOL/L (ref 95–110)
CO2 SERPL-SCNC: 23 MMOL/L (ref 23–29)
CREAT SERPL-MCNC: 0.7 MG/DL (ref 0.5–1.4)
CRP SERPL-MCNC: 3 MG/L (ref 0–8.2)
DIFFERENTIAL METHOD: ABNORMAL
EOSINOPHIL # BLD AUTO: 0.3 K/UL (ref 0–0.5)
EOSINOPHIL NFR BLD: 3.6 % (ref 0–8)
ERYTHROCYTE [DISTWIDTH] IN BLOOD BY AUTOMATED COUNT: 21.3 % (ref 11.5–14.5)
EST. GFR  (AFRICAN AMERICAN): >60 ML/MIN/1.73 M^2
EST. GFR  (NON AFRICAN AMERICAN): >60 ML/MIN/1.73 M^2
GLUCOSE SERPL-MCNC: 76 MG/DL (ref 70–110)
HCT VFR BLD AUTO: 36.2 % (ref 37–48.5)
HGB BLD-MCNC: 10.8 G/DL (ref 12–16)
IMM GRANULOCYTES # BLD AUTO: 0.03 K/UL (ref 0–0.04)
IMM GRANULOCYTES NFR BLD AUTO: 0.4 % (ref 0–0.5)
LYMPHOCYTES # BLD AUTO: 3.1 K/UL (ref 1–4.8)
LYMPHOCYTES NFR BLD: 38.7 % (ref 18–48)
MCH RBC QN AUTO: 22.3 PG (ref 27–31)
MCHC RBC AUTO-ENTMCNC: 29.8 G/DL (ref 32–36)
MCV RBC AUTO: 75 FL (ref 82–98)
MONOCYTES # BLD AUTO: 0.9 K/UL (ref 0.3–1)
MONOCYTES NFR BLD: 11.8 % (ref 4–15)
NEUTROPHILS # BLD AUTO: 3.6 K/UL (ref 1.8–7.7)
NEUTROPHILS NFR BLD: 44.6 % (ref 38–73)
NRBC BLD-RTO: 0 /100 WBC
PLATELET # BLD AUTO: 324 K/UL (ref 150–350)
PMV BLD AUTO: 10.5 FL (ref 9.2–12.9)
POTASSIUM SERPL-SCNC: 3.3 MMOL/L (ref 3.5–5.1)
PROT SERPL-MCNC: 6.8 G/DL (ref 6–8.4)
RBC # BLD AUTO: 4.84 M/UL (ref 4–5.4)
SODIUM SERPL-SCNC: 142 MMOL/L (ref 136–145)
WBC # BLD AUTO: 7.96 K/UL (ref 3.9–12.7)

## 2021-03-03 ENCOUNTER — OFFICE VISIT (OUTPATIENT)
Dept: RHEUMATOLOGY | Facility: CLINIC | Age: 56
End: 2021-03-03
Payer: MEDICARE

## 2021-03-03 ENCOUNTER — TELEPHONE (OUTPATIENT)
Dept: RHEUMATOLOGY | Facility: CLINIC | Age: 56
End: 2021-03-03

## 2021-03-03 VITALS
SYSTOLIC BLOOD PRESSURE: 129 MMHG | HEART RATE: 81 BPM | BODY MASS INDEX: 39.36 KG/M2 | DIASTOLIC BLOOD PRESSURE: 83 MMHG | WEIGHT: 229.25 LBS

## 2021-03-03 DIAGNOSIS — M87.052 AVASCULAR NECROSIS OF BONES OF BOTH HIPS: Primary | ICD-10-CM

## 2021-03-03 DIAGNOSIS — M02.30 REACTIVE ARTHRITIS: ICD-10-CM

## 2021-03-03 DIAGNOSIS — K05.10 BLISTER OF GINGIVA WITH INFECTION, INITIAL ENCOUNTER: ICD-10-CM

## 2021-03-03 DIAGNOSIS — S00.522A BLISTER OF GINGIVA WITH INFECTION, INITIAL ENCOUNTER: ICD-10-CM

## 2021-03-03 DIAGNOSIS — M62.838 MUSCLE SPASMS OF LOWER EXTREMITY, UNSPECIFIED LATERALITY: ICD-10-CM

## 2021-03-03 DIAGNOSIS — K50.012 CROHN'S DISEASE OF SMALL INTESTINE WITH INTESTINAL OBSTRUCTION: ICD-10-CM

## 2021-03-03 DIAGNOSIS — M25.542 JOINT PAIN IN FINGERS OF BOTH HANDS: ICD-10-CM

## 2021-03-03 DIAGNOSIS — G89.4 CHRONIC PAIN SYNDROME: ICD-10-CM

## 2021-03-03 DIAGNOSIS — L40.9 PSORIASIS OF NAIL: ICD-10-CM

## 2021-03-03 DIAGNOSIS — Z79.899 HIGH RISK MEDICATION USE: ICD-10-CM

## 2021-03-03 DIAGNOSIS — M25.541 JOINT PAIN IN FINGERS OF BOTH HANDS: ICD-10-CM

## 2021-03-03 DIAGNOSIS — L29.9 ITCHING: ICD-10-CM

## 2021-03-03 DIAGNOSIS — D52.9 ANEMIA DUE TO FOLIC ACID DEFICIENCY, UNSPECIFIED DEFICIENCY TYPE: ICD-10-CM

## 2021-03-03 DIAGNOSIS — M87.051 AVASCULAR NECROSIS OF BONES OF BOTH HIPS: Primary | ICD-10-CM

## 2021-03-03 PROCEDURE — 96372 THER/PROPH/DIAG INJ SC/IM: CPT | Mod: S$GLB,,, | Performed by: INTERNAL MEDICINE

## 2021-03-03 PROCEDURE — 99215 OFFICE O/P EST HI 40 MIN: CPT | Mod: 25,S$GLB,, | Performed by: INTERNAL MEDICINE

## 2021-03-03 PROCEDURE — 3008F PR BODY MASS INDEX (BMI) DOCUMENTED: ICD-10-PCS | Mod: CPTII,S$GLB,, | Performed by: INTERNAL MEDICINE

## 2021-03-03 PROCEDURE — 96372 PR INJECTION,THERAP/PROPH/DIAG2ST, IM OR SUBCUT: ICD-10-PCS | Mod: S$GLB,,, | Performed by: INTERNAL MEDICINE

## 2021-03-03 PROCEDURE — 3008F BODY MASS INDEX DOCD: CPT | Mod: CPTII,S$GLB,, | Performed by: INTERNAL MEDICINE

## 2021-03-03 PROCEDURE — 99999 PR PBB SHADOW E&M-EST. PATIENT-LVL III: ICD-10-PCS | Mod: PBBFAC,,, | Performed by: INTERNAL MEDICINE

## 2021-03-03 PROCEDURE — 99999 PR PBB SHADOW E&M-EST. PATIENT-LVL III: CPT | Mod: PBBFAC,,, | Performed by: INTERNAL MEDICINE

## 2021-03-03 PROCEDURE — 1125F AMNT PAIN NOTED PAIN PRSNT: CPT | Mod: S$GLB,,, | Performed by: INTERNAL MEDICINE

## 2021-03-03 PROCEDURE — 99215 PR OFFICE/OUTPT VISIT, EST, LEVL V, 40-54 MIN: ICD-10-PCS | Mod: 25,S$GLB,, | Performed by: INTERNAL MEDICINE

## 2021-03-03 PROCEDURE — 1125F PR PAIN SEVERITY QUANTIFIED, PAIN PRESENT: ICD-10-PCS | Mod: S$GLB,,, | Performed by: INTERNAL MEDICINE

## 2021-03-03 RX ORDER — SUMATRIPTAN 50 MG/1
50 TABLET, FILM COATED ORAL
COMMUNITY
Start: 2021-02-23 | End: 2022-09-09

## 2021-03-03 RX ORDER — HYDROCODONE BITARTRATE AND ACETAMINOPHEN 10; 325 MG/1; MG/1
1 TABLET ORAL EVERY 8 HOURS PRN
Qty: 90 TABLET | Refills: 0 | Status: SHIPPED | OUTPATIENT
Start: 2021-04-15 | End: 2021-05-15

## 2021-03-03 RX ORDER — PREDNISONE 20 MG/1
20 TABLET ORAL DAILY
COMMUNITY
Start: 2020-12-14 | End: 2021-03-03

## 2021-03-03 RX ORDER — BACLOFEN 20 MG/1
20 TABLET ORAL 3 TIMES DAILY
Qty: 90 TABLET | Refills: 11 | Status: SHIPPED | OUTPATIENT
Start: 2021-03-03 | End: 2022-01-12

## 2021-03-03 RX ORDER — POTASSIUM CHLORIDE 750 MG/1
TABLET, EXTENDED RELEASE ORAL
Qty: 30 TABLET | Refills: 2 | Status: SHIPPED | OUTPATIENT
Start: 2021-03-03 | End: 2022-01-12

## 2021-03-03 RX ORDER — CHLORHEXIDINE GLUCONATE ORAL RINSE 1.2 MG/ML
15 SOLUTION DENTAL 2 TIMES DAILY
Qty: 473 ML | Refills: 6 | Status: SHIPPED | OUTPATIENT
Start: 2021-03-03 | End: 2021-03-17

## 2021-03-03 RX ORDER — CYANOCOBALAMIN/FOLIC AC/VIT B6 1-2.5-25MG
1 TABLET ORAL DAILY
Qty: 90 EACH | Refills: 3 | Status: SHIPPED | OUTPATIENT
Start: 2021-03-03 | End: 2021-06-01

## 2021-03-03 RX ORDER — HYDROCODONE BITARTRATE AND ACETAMINOPHEN 10; 325 MG/1; MG/1
1 TABLET ORAL EVERY 8 HOURS PRN
Qty: 90 TABLET | Refills: 0 | Status: SHIPPED | OUTPATIENT
Start: 2021-03-15 | End: 2021-04-14

## 2021-03-03 RX ORDER — NITROFURANTOIN 25; 75 MG/1; MG/1
CAPSULE ORAL
COMMUNITY
Start: 2021-02-19 | End: 2021-03-03 | Stop reason: ALTCHOICE

## 2021-03-03 RX ORDER — TIZANIDINE 4 MG/1
4 TABLET ORAL EVERY 8 HOURS
COMMUNITY
Start: 2021-02-24 | End: 2023-07-17 | Stop reason: SDUPTHER

## 2021-03-03 RX ORDER — CEPHALEXIN 500 MG/1
1000 CAPSULE ORAL EVERY 12 HOURS
Qty: 40 CAPSULE | Refills: 0 | Status: SHIPPED | OUTPATIENT
Start: 2021-03-03 | End: 2021-03-13

## 2021-03-03 RX ORDER — HYDROCODONE BITARTRATE AND ACETAMINOPHEN 10; 325 MG/1; MG/1
1 TABLET ORAL EVERY 8 HOURS PRN
Qty: 90 TABLET | Refills: 0 | Status: SHIPPED | OUTPATIENT
Start: 2021-05-14 | End: 2021-06-14

## 2021-03-03 RX ORDER — CEFTRIAXONE 1 G/1
1 INJECTION, POWDER, FOR SOLUTION INTRAMUSCULAR; INTRAVENOUS
Status: COMPLETED | OUTPATIENT
Start: 2021-03-03 | End: 2021-03-03

## 2021-03-03 RX ORDER — ALPRAZOLAM 0.5 MG/1
0.5 TABLET ORAL NIGHTLY PRN
COMMUNITY
Start: 2021-02-18 | End: 2022-07-06

## 2021-03-03 RX ORDER — ZOLPIDEM TARTRATE 10 MG/1
TABLET ORAL
COMMUNITY
Start: 2021-02-23 | End: 2021-03-03

## 2021-03-03 RX ORDER — METHOTREXATE 10 MG/1
TABLET, FILM COATED ORAL
COMMUNITY
Start: 2021-01-29 | End: 2021-11-03

## 2021-03-03 RX ORDER — HYDROXYZINE PAMOATE 25 MG/1
CAPSULE ORAL
Qty: 45 CAPSULE | Refills: 3 | Status: SHIPPED | OUTPATIENT
Start: 2021-03-03 | End: 2021-07-06 | Stop reason: SDUPTHER

## 2021-03-03 RX ORDER — FLUTICASONE PROPIONATE 50 MCG
SPRAY, SUSPENSION (ML) NASAL
COMMUNITY
Start: 2021-02-23 | End: 2023-05-18

## 2021-03-03 RX ORDER — BUDESONIDE 3 MG/1
3 CAPSULE, COATED PELLETS ORAL DAILY
Qty: 30 CAPSULE | Refills: 3 | Status: SHIPPED | OUTPATIENT
Start: 2021-03-03 | End: 2021-09-04

## 2021-03-03 RX ORDER — CYANOCOBALAMIN 1000 UG/ML
1000 INJECTION, SOLUTION INTRAMUSCULAR; SUBCUTANEOUS
Status: COMPLETED | OUTPATIENT
Start: 2021-03-03 | End: 2021-03-03

## 2021-03-03 RX ADMIN — CYANOCOBALAMIN 1000 MCG: 1000 INJECTION, SOLUTION INTRAMUSCULAR; SUBCUTANEOUS at 03:03

## 2021-03-03 RX ADMIN — CEFTRIAXONE 1 G: 1 INJECTION, POWDER, FOR SOLUTION INTRAMUSCULAR; INTRAVENOUS at 03:03

## 2021-03-03 ASSESSMENT — ROUTINE ASSESSMENT OF PATIENT INDEX DATA (RAPID3)
PSYCHOLOGICAL DISTRESS SCORE: 4.4
FATIGUE SCORE: 2.2
PATIENT GLOBAL ASSESSMENT SCORE: 9
TOTAL RAPID3 SCORE: 6.61
MDHAQ FUNCTION SCORE: 1
PAIN SCORE: 7.5

## 2021-03-04 ENCOUNTER — TELEPHONE (OUTPATIENT)
Dept: RHEUMATOLOGY | Facility: CLINIC | Age: 56
End: 2021-03-04

## 2021-03-15 DIAGNOSIS — R05.9 COUGH: ICD-10-CM

## 2021-03-19 RX ORDER — PROMETHAZINE HYDROCHLORIDE 6.25 MG/5ML
6.25 SYRUP ORAL EVERY 6 HOURS PRN
Qty: 240 ML | Refills: 3 | Status: SHIPPED | OUTPATIENT
Start: 2021-03-19 | End: 2021-06-08

## 2021-03-26 RX ORDER — METHOTREXATE 10 MG/1
10 TABLET, FILM COATED ORAL
Status: CANCELLED | OUTPATIENT
Start: 2021-03-26

## 2021-03-29 ENCOUNTER — TELEPHONE (OUTPATIENT)
Dept: RHEUMATOLOGY | Facility: CLINIC | Age: 56
End: 2021-03-29

## 2021-04-12 ENCOUNTER — TELEPHONE (OUTPATIENT)
Dept: RHEUMATOLOGY | Facility: CLINIC | Age: 56
End: 2021-04-12

## 2021-04-21 RX ORDER — MONTELUKAST SODIUM 10 MG/1
10 TABLET ORAL NIGHTLY
Qty: 30 TABLET | Refills: 3 | Status: SHIPPED | OUTPATIENT
Start: 2021-04-21 | End: 2021-05-21

## 2021-05-10 ENCOUNTER — TELEPHONE (OUTPATIENT)
Dept: RHEUMATOLOGY | Facility: CLINIC | Age: 56
End: 2021-05-10

## 2021-05-10 ENCOUNTER — PATIENT MESSAGE (OUTPATIENT)
Dept: RESEARCH | Facility: HOSPITAL | Age: 56
End: 2021-05-10

## 2021-05-10 DIAGNOSIS — R05.9 COUGH: ICD-10-CM

## 2021-05-10 RX ORDER — PROMETHAZINE HYDROCHLORIDE 6.25 MG/5ML
6.25 SYRUP ORAL EVERY 6 HOURS PRN
Qty: 240 ML | Refills: 3 | Status: CANCELLED | OUTPATIENT
Start: 2021-05-10

## 2021-05-11 DIAGNOSIS — R05.9 COUGH: ICD-10-CM

## 2021-05-11 RX ORDER — PROMETHAZINE HYDROCHLORIDE 6.25 MG/5ML
SYRUP ORAL
Qty: 240 ML | Refills: 3 | OUTPATIENT
Start: 2021-05-11

## 2021-05-12 ENCOUNTER — TELEPHONE (OUTPATIENT)
Dept: OPHTHALMOLOGY | Facility: CLINIC | Age: 56
End: 2021-05-12

## 2021-05-12 ENCOUNTER — OFFICE VISIT (OUTPATIENT)
Dept: OPTOMETRY | Facility: CLINIC | Age: 56
End: 2021-05-12
Payer: MEDICARE

## 2021-05-12 ENCOUNTER — TELEPHONE (OUTPATIENT)
Dept: RHEUMATOLOGY | Facility: CLINIC | Age: 56
End: 2021-05-12

## 2021-05-12 VITALS — SYSTOLIC BLOOD PRESSURE: 117 MMHG | HEART RATE: 89 BPM | DIASTOLIC BLOOD PRESSURE: 77 MMHG

## 2021-05-12 DIAGNOSIS — H11.31 SUBCONJUNCTIVAL HEMORRHAGE, NON-TRAUMATIC, RIGHT: Primary | ICD-10-CM

## 2021-05-12 DIAGNOSIS — H16.141 SUPERFICIAL PUNCTATE KERATITIS OF RIGHT EYE: ICD-10-CM

## 2021-05-12 DIAGNOSIS — H43.393 VITREOUS FLOATERS, BILATERAL: ICD-10-CM

## 2021-05-12 PROCEDURE — 92004 PR EYE EXAM, NEW PATIENT,COMPREHESV: ICD-10-PCS | Mod: S$GLB,,, | Performed by: OPTOMETRIST

## 2021-05-12 PROCEDURE — 1125F AMNT PAIN NOTED PAIN PRSNT: CPT | Mod: S$GLB,,, | Performed by: OPTOMETRIST

## 2021-05-12 PROCEDURE — 99999 PR PBB SHADOW E&M-EST. PATIENT-LVL V: CPT | Mod: PBBFAC,,, | Performed by: OPTOMETRIST

## 2021-05-12 PROCEDURE — 99999 PR PBB SHADOW E&M-EST. PATIENT-LVL V: ICD-10-PCS | Mod: PBBFAC,,, | Performed by: OPTOMETRIST

## 2021-05-12 PROCEDURE — 1125F PR PAIN SEVERITY QUANTIFIED, PAIN PRESENT: ICD-10-PCS | Mod: S$GLB,,, | Performed by: OPTOMETRIST

## 2021-05-12 PROCEDURE — 92004 COMPRE OPH EXAM NEW PT 1/>: CPT | Mod: S$GLB,,, | Performed by: OPTOMETRIST

## 2021-05-12 RX ORDER — PREDNISOLONE ACETATE 10 MG/ML
1 SUSPENSION/ DROPS OPHTHALMIC 4 TIMES DAILY
Qty: 5 ML | Refills: 0 | Status: SHIPPED | OUTPATIENT
Start: 2021-05-12 | End: 2021-05-17

## 2021-05-18 RX ORDER — HYDROXYCHLOROQUINE SULFATE 200 MG/1
200 TABLET, FILM COATED ORAL 2 TIMES DAILY
Qty: 60 TABLET | Refills: 5 | Status: SHIPPED | OUTPATIENT
Start: 2021-05-18 | End: 2021-07-06

## 2021-05-20 ENCOUNTER — TELEPHONE (OUTPATIENT)
Dept: RHEUMATOLOGY | Facility: CLINIC | Age: 56
End: 2021-05-20

## 2021-05-24 ENCOUNTER — TELEPHONE (OUTPATIENT)
Dept: RHEUMATOLOGY | Facility: CLINIC | Age: 56
End: 2021-05-24

## 2021-05-27 ENCOUNTER — TELEPHONE (OUTPATIENT)
Dept: RHEUMATOLOGY | Facility: CLINIC | Age: 56
End: 2021-05-27

## 2021-05-27 DIAGNOSIS — R05.9 COUGH: ICD-10-CM

## 2021-05-27 DIAGNOSIS — R05.9 COUGH: Primary | ICD-10-CM

## 2021-05-27 RX ORDER — BENZONATATE 100 MG/1
100 CAPSULE ORAL 3 TIMES DAILY PRN
Qty: 90 CAPSULE | Refills: 3 | Status: SHIPPED | OUTPATIENT
Start: 2021-05-27 | End: 2021-06-06

## 2021-05-27 RX ORDER — PROMETHAZINE HYDROCHLORIDE 6.25 MG/5ML
6.25 SYRUP ORAL EVERY 6 HOURS PRN
Qty: 240 ML | Refills: 3 | Status: CANCELLED | OUTPATIENT
Start: 2021-05-27

## 2021-06-02 DIAGNOSIS — R05.9 COUGH: ICD-10-CM

## 2021-06-02 RX ORDER — PROMETHAZINE HYDROCHLORIDE 6.25 MG/5ML
6.25 SYRUP ORAL EVERY 6 HOURS PRN
Qty: 240 ML | Refills: 0 | Status: CANCELLED | OUTPATIENT
Start: 2021-06-02

## 2021-06-04 ENCOUNTER — TELEPHONE (OUTPATIENT)
Dept: RHEUMATOLOGY | Facility: CLINIC | Age: 56
End: 2021-06-04

## 2021-06-08 ENCOUNTER — TELEPHONE (OUTPATIENT)
Dept: RHEUMATOLOGY | Facility: CLINIC | Age: 56
End: 2021-06-08

## 2021-06-11 ENCOUNTER — TELEPHONE (OUTPATIENT)
Dept: RHEUMATOLOGY | Facility: CLINIC | Age: 56
End: 2021-06-11

## 2021-06-14 DIAGNOSIS — M02.30 REACTIVE ARTHRITIS: ICD-10-CM

## 2021-06-14 DIAGNOSIS — G89.4 CHRONIC PAIN SYNDROME: ICD-10-CM

## 2021-06-14 RX ORDER — HYDROCODONE BITARTRATE AND ACETAMINOPHEN 10; 325 MG/1; MG/1
TABLET ORAL
Qty: 90 TABLET | Refills: 0 | Status: SHIPPED | OUTPATIENT
Start: 2021-06-14 | End: 2021-07-06 | Stop reason: SDUPTHER

## 2021-07-06 ENCOUNTER — OFFICE VISIT (OUTPATIENT)
Dept: RHEUMATOLOGY | Facility: CLINIC | Age: 56
End: 2021-07-06
Payer: MEDICARE

## 2021-07-06 DIAGNOSIS — M02.30 REACTIVE ARTHRITIS: Primary | ICD-10-CM

## 2021-07-06 DIAGNOSIS — M87.052 AVASCULAR NECROSIS OF BONES OF BOTH HIPS: ICD-10-CM

## 2021-07-06 DIAGNOSIS — K50.012 CROHN'S DISEASE OF SMALL INTESTINE WITH INTESTINAL OBSTRUCTION: ICD-10-CM

## 2021-07-06 DIAGNOSIS — R05.9 COUGH: ICD-10-CM

## 2021-07-06 DIAGNOSIS — L40.9 PSORIASIS: ICD-10-CM

## 2021-07-06 DIAGNOSIS — G89.4 CHRONIC PAIN SYNDROME: ICD-10-CM

## 2021-07-06 DIAGNOSIS — K21.9 GASTROESOPHAGEAL REFLUX DISEASE, UNSPECIFIED WHETHER ESOPHAGITIS PRESENT: ICD-10-CM

## 2021-07-06 DIAGNOSIS — K04.7 DENTAL INFECTION: ICD-10-CM

## 2021-07-06 DIAGNOSIS — M87.051 AVASCULAR NECROSIS OF BONES OF BOTH HIPS: ICD-10-CM

## 2021-07-06 DIAGNOSIS — L29.9 ITCHING: ICD-10-CM

## 2021-07-06 PROCEDURE — 99214 OFFICE O/P EST MOD 30 MIN: CPT | Mod: 95,,, | Performed by: PHYSICIAN ASSISTANT

## 2021-07-06 PROCEDURE — 99214 PR OFFICE/OUTPT VISIT, EST, LEVL IV, 30-39 MIN: ICD-10-PCS | Mod: 95,,, | Performed by: PHYSICIAN ASSISTANT

## 2021-07-06 RX ORDER — PANTOPRAZOLE SODIUM 40 MG/1
40 TABLET, DELAYED RELEASE ORAL DAILY
Qty: 30 TABLET | Refills: 3 | Status: SHIPPED | OUTPATIENT
Start: 2021-07-06 | End: 2021-11-09 | Stop reason: SDUPTHER

## 2021-07-06 RX ORDER — PROMETHAZINE HYDROCHLORIDE 6.25 MG/5ML
5 SYRUP ORAL EVERY 6 HOURS PRN
Qty: 240 ML | Refills: 1 | Status: SHIPPED | OUTPATIENT
Start: 2021-07-06 | End: 2021-09-10 | Stop reason: SDUPTHER

## 2021-07-06 RX ORDER — TRIAMCINOLONE ACETONIDE 1 MG/G
OINTMENT TOPICAL 2 TIMES DAILY
Qty: 80 G | Refills: 3 | Status: SHIPPED | OUTPATIENT
Start: 2021-07-06 | End: 2024-06-21

## 2021-07-06 RX ORDER — CEPHALEXIN 500 MG/1
1000 CAPSULE ORAL EVERY 12 HOURS
Qty: 40 CAPSULE | Refills: 0 | Status: SHIPPED | OUTPATIENT
Start: 2021-07-06 | End: 2021-07-16

## 2021-07-06 RX ORDER — GABAPENTIN 800 MG/1
800 TABLET ORAL 3 TIMES DAILY
Qty: 90 TABLET | Refills: 3 | Status: SHIPPED | OUTPATIENT
Start: 2021-07-06 | End: 2022-03-08 | Stop reason: SDUPTHER

## 2021-07-06 RX ORDER — HYDROXYZINE PAMOATE 25 MG/1
CAPSULE ORAL
Qty: 45 CAPSULE | Refills: 3 | Status: SHIPPED | OUTPATIENT
Start: 2021-07-06 | End: 2021-11-09 | Stop reason: SDUPTHER

## 2021-07-06 RX ORDER — PROMETHAZINE HYDROCHLORIDE AND DEXTROMETHORPHAN HYDROBROMIDE 6.25; 15 MG/5ML; MG/5ML
5 SYRUP ORAL EVERY 6 HOURS PRN
Qty: 240 ML | Refills: 0 | Status: CANCELLED | OUTPATIENT
Start: 2021-07-06

## 2021-08-10 DIAGNOSIS — G89.4 CHRONIC PAIN SYNDROME: ICD-10-CM

## 2021-08-10 DIAGNOSIS — M02.30 REACTIVE ARTHRITIS: ICD-10-CM

## 2021-08-14 RX ORDER — HYDROCODONE BITARTRATE AND ACETAMINOPHEN 10; 325 MG/1; MG/1
1 TABLET ORAL EVERY 8 HOURS PRN
Qty: 90 TABLET | Refills: 0 | Status: SHIPPED | OUTPATIENT
Start: 2021-08-14 | End: 2021-09-15

## 2021-08-16 ENCOUNTER — TELEPHONE (OUTPATIENT)
Dept: RHEUMATOLOGY | Facility: CLINIC | Age: 56
End: 2021-08-16

## 2021-09-08 ENCOUNTER — TELEPHONE (OUTPATIENT)
Dept: RHEUMATOLOGY | Facility: CLINIC | Age: 56
End: 2021-09-08

## 2021-09-10 ENCOUNTER — TELEPHONE (OUTPATIENT)
Dept: RHEUMATOLOGY | Facility: CLINIC | Age: 56
End: 2021-09-10
Payer: MEDICARE

## 2021-09-10 DIAGNOSIS — R05.9 COUGH: ICD-10-CM

## 2021-09-10 RX ORDER — PROMETHAZINE HYDROCHLORIDE 6.25 MG/5ML
5 SYRUP ORAL EVERY 6 HOURS PRN
Qty: 240 ML | Refills: 1 | Status: SHIPPED | OUTPATIENT
Start: 2021-09-10 | End: 2021-12-08 | Stop reason: SDUPTHER

## 2021-09-13 DIAGNOSIS — M02.30 REACTIVE ARTHRITIS: ICD-10-CM

## 2021-09-13 DIAGNOSIS — G89.4 CHRONIC PAIN SYNDROME: ICD-10-CM

## 2021-09-15 DIAGNOSIS — M02.30 REACTIVE ARTHRITIS: ICD-10-CM

## 2021-09-15 DIAGNOSIS — G89.4 CHRONIC PAIN SYNDROME: ICD-10-CM

## 2021-09-15 RX ORDER — HYDROCODONE BITARTRATE AND ACETAMINOPHEN 10; 325 MG/1; MG/1
TABLET ORAL
Qty: 90 TABLET | Refills: 0 | Status: SHIPPED | OUTPATIENT
Start: 2021-09-15 | End: 2021-10-15

## 2021-09-19 ENCOUNTER — TELEPHONE (OUTPATIENT)
Dept: RHEUMATOLOGY | Facility: CLINIC | Age: 56
End: 2021-09-19

## 2021-09-19 RX ORDER — HYDROCODONE BITARTRATE AND ACETAMINOPHEN 10; 325 MG/1; MG/1
1 TABLET ORAL EVERY 8 HOURS PRN
Qty: 90 TABLET | Refills: 0 | OUTPATIENT
Start: 2021-09-19

## 2021-10-14 DIAGNOSIS — G89.4 CHRONIC PAIN SYNDROME: ICD-10-CM

## 2021-10-14 DIAGNOSIS — M02.30 REACTIVE ARTHRITIS: ICD-10-CM

## 2021-10-17 RX ORDER — HYDROCODONE BITARTRATE AND ACETAMINOPHEN 10; 325 MG/1; MG/1
TABLET ORAL
Qty: 90 TABLET | Refills: 0 | OUTPATIENT
Start: 2021-10-17

## 2021-10-27 ENCOUNTER — LAB VISIT (OUTPATIENT)
Dept: LAB | Facility: HOSPITAL | Age: 56
End: 2021-10-27
Attending: PHYSICIAN ASSISTANT
Payer: MEDICARE

## 2021-10-27 DIAGNOSIS — M87.052 AVASCULAR NECROSIS OF BONES OF BOTH HIPS: ICD-10-CM

## 2021-10-27 DIAGNOSIS — M87.051 AVASCULAR NECROSIS OF BONES OF BOTH HIPS: ICD-10-CM

## 2021-10-27 DIAGNOSIS — M02.30 REACTIVE ARTHRITIS: ICD-10-CM

## 2021-10-27 DIAGNOSIS — K50.012 CROHN'S DISEASE OF SMALL INTESTINE WITH INTESTINAL OBSTRUCTION: ICD-10-CM

## 2021-10-27 LAB
ALBUMIN SERPL BCP-MCNC: 3.7 G/DL (ref 3.5–5.2)
ALP SERPL-CCNC: 113 U/L (ref 55–135)
ALT SERPL W/O P-5'-P-CCNC: 30 U/L (ref 10–44)
ANION GAP SERPL CALC-SCNC: 15 MMOL/L (ref 8–16)
AST SERPL-CCNC: 28 U/L (ref 10–40)
BASOPHILS # BLD AUTO: 0.05 K/UL (ref 0–0.2)
BASOPHILS NFR BLD: 0.7 % (ref 0–1.9)
BILIRUB SERPL-MCNC: 0.3 MG/DL (ref 0.1–1)
BUN SERPL-MCNC: 17 MG/DL (ref 6–20)
CALCIUM SERPL-MCNC: 10 MG/DL (ref 8.7–10.5)
CHLORIDE SERPL-SCNC: 104 MMOL/L (ref 95–110)
CO2 SERPL-SCNC: 20 MMOL/L (ref 23–29)
CREAT SERPL-MCNC: 0.8 MG/DL (ref 0.5–1.4)
CRP SERPL-MCNC: 16.5 MG/L (ref 0–8.2)
DIFFERENTIAL METHOD: ABNORMAL
EOSINOPHIL # BLD AUTO: 0.2 K/UL (ref 0–0.5)
EOSINOPHIL NFR BLD: 2.5 % (ref 0–8)
ERYTHROCYTE [DISTWIDTH] IN BLOOD BY AUTOMATED COUNT: 16.9 % (ref 11.5–14.5)
ERYTHROCYTE [SEDIMENTATION RATE] IN BLOOD BY WESTERGREN METHOD: 18 MM/HR (ref 0–20)
EST. GFR  (AFRICAN AMERICAN): >60 ML/MIN/1.73 M^2
EST. GFR  (NON AFRICAN AMERICAN): >60 ML/MIN/1.73 M^2
GLUCOSE SERPL-MCNC: 75 MG/DL (ref 70–110)
HCT VFR BLD AUTO: 43.5 % (ref 37–48.5)
HGB BLD-MCNC: 14.1 G/DL (ref 12–16)
IMM GRANULOCYTES # BLD AUTO: 0.05 K/UL (ref 0–0.04)
IMM GRANULOCYTES NFR BLD AUTO: 0.7 % (ref 0–0.5)
LYMPHOCYTES # BLD AUTO: 2.2 K/UL (ref 1–4.8)
LYMPHOCYTES NFR BLD: 31.4 % (ref 18–48)
MCH RBC QN AUTO: 26.8 PG (ref 27–31)
MCHC RBC AUTO-ENTMCNC: 32.4 G/DL (ref 32–36)
MCV RBC AUTO: 83 FL (ref 82–98)
MONOCYTES # BLD AUTO: 0.8 K/UL (ref 0.3–1)
MONOCYTES NFR BLD: 11.3 % (ref 4–15)
NEUTROPHILS # BLD AUTO: 3.8 K/UL (ref 1.8–7.7)
NEUTROPHILS NFR BLD: 53.4 % (ref 38–73)
NRBC BLD-RTO: 0 /100 WBC
PLATELET # BLD AUTO: 255 K/UL (ref 150–450)
PMV BLD AUTO: 11.2 FL (ref 9.2–12.9)
POTASSIUM SERPL-SCNC: 3.9 MMOL/L (ref 3.5–5.1)
PROT SERPL-MCNC: 7.7 G/DL (ref 6–8.4)
RBC # BLD AUTO: 5.27 M/UL (ref 4–5.4)
SODIUM SERPL-SCNC: 139 MMOL/L (ref 136–145)
WBC # BLD AUTO: 7.08 K/UL (ref 3.9–12.7)

## 2021-10-27 PROCEDURE — 80053 COMPREHEN METABOLIC PANEL: CPT | Performed by: PHYSICIAN ASSISTANT

## 2021-10-27 PROCEDURE — 85651 RBC SED RATE NONAUTOMATED: CPT | Mod: PO | Performed by: PHYSICIAN ASSISTANT

## 2021-10-27 PROCEDURE — 86140 C-REACTIVE PROTEIN: CPT | Performed by: PHYSICIAN ASSISTANT

## 2021-10-27 PROCEDURE — 36415 COLL VENOUS BLD VENIPUNCTURE: CPT | Mod: PO | Performed by: PHYSICIAN ASSISTANT

## 2021-10-27 PROCEDURE — 85025 COMPLETE CBC W/AUTO DIFF WBC: CPT | Performed by: PHYSICIAN ASSISTANT

## 2021-11-03 ENCOUNTER — OFFICE VISIT (OUTPATIENT)
Dept: RHEUMATOLOGY | Facility: CLINIC | Age: 56
End: 2021-11-03
Payer: MEDICARE

## 2021-11-03 VITALS
HEART RATE: 89 BPM | DIASTOLIC BLOOD PRESSURE: 79 MMHG | WEIGHT: 242 LBS | SYSTOLIC BLOOD PRESSURE: 129 MMHG | BODY MASS INDEX: 41.32 KG/M2 | HEIGHT: 64 IN

## 2021-11-03 DIAGNOSIS — J45.909 ASTHMA, UNSPECIFIED ASTHMA SEVERITY, UNSPECIFIED WHETHER COMPLICATED, UNSPECIFIED WHETHER PERSISTENT: ICD-10-CM

## 2021-11-03 DIAGNOSIS — R94.6 ABNORMAL RESULTS OF THYROID FUNCTION STUDIES: ICD-10-CM

## 2021-11-03 DIAGNOSIS — H53.8 BLURRY VISION: ICD-10-CM

## 2021-11-03 DIAGNOSIS — R05.9 COUGH: ICD-10-CM

## 2021-11-03 DIAGNOSIS — M02.311 REACTIVE ARTHRITIS OF RIGHT SHOULDER: ICD-10-CM

## 2021-11-03 DIAGNOSIS — L40.9 PSORIASIS OF NAIL: ICD-10-CM

## 2021-11-03 DIAGNOSIS — J40 BRONCHITIS: ICD-10-CM

## 2021-11-03 DIAGNOSIS — M87.059 AVASCULAR NECROSIS OF BONE OF HIP, UNSPECIFIED LATERALITY: ICD-10-CM

## 2021-11-03 DIAGNOSIS — G44.1 OTHER VASCULAR HEADACHE: ICD-10-CM

## 2021-11-03 DIAGNOSIS — G89.4 CHRONIC PAIN SYNDROME: ICD-10-CM

## 2021-11-03 DIAGNOSIS — R42 DIZZINESS AND GIDDINESS: ICD-10-CM

## 2021-11-03 DIAGNOSIS — K50.012 CROHN'S DISEASE OF SMALL INTESTINE WITH INTESTINAL OBSTRUCTION: Primary | ICD-10-CM

## 2021-11-03 PROCEDURE — 99215 OFFICE O/P EST HI 40 MIN: CPT | Mod: 25,S$GLB,, | Performed by: INTERNAL MEDICINE

## 2021-11-03 PROCEDURE — 99499 UNLISTED E&M SERVICE: CPT | Mod: S$GLB,,, | Performed by: INTERNAL MEDICINE

## 2021-11-03 PROCEDURE — 99499 RISK ADDL DX/OHS AUDIT: ICD-10-PCS | Mod: S$GLB,,, | Performed by: INTERNAL MEDICINE

## 2021-11-03 PROCEDURE — 3008F PR BODY MASS INDEX (BMI) DOCUMENTED: ICD-10-PCS | Mod: CPTII,S$GLB,, | Performed by: INTERNAL MEDICINE

## 2021-11-03 PROCEDURE — 3078F PR MOST RECENT DIASTOLIC BLOOD PRESSURE < 80 MM HG: ICD-10-PCS | Mod: CPTII,S$GLB,, | Performed by: INTERNAL MEDICINE

## 2021-11-03 PROCEDURE — 1160F PR REVIEW ALL MEDS BY PRESCRIBER/CLIN PHARMACIST DOCUMENTED: ICD-10-PCS | Mod: CPTII,S$GLB,, | Performed by: INTERNAL MEDICINE

## 2021-11-03 PROCEDURE — 1160F RVW MEDS BY RX/DR IN RCRD: CPT | Mod: CPTII,S$GLB,, | Performed by: INTERNAL MEDICINE

## 2021-11-03 PROCEDURE — 99999 PR PBB SHADOW E&M-EST. PATIENT-LVL IV: CPT | Mod: PBBFAC,,, | Performed by: INTERNAL MEDICINE

## 2021-11-03 PROCEDURE — 3008F BODY MASS INDEX DOCD: CPT | Mod: CPTII,S$GLB,, | Performed by: INTERNAL MEDICINE

## 2021-11-03 PROCEDURE — 96372 THER/PROPH/DIAG INJ SC/IM: CPT | Mod: S$GLB,,, | Performed by: INTERNAL MEDICINE

## 2021-11-03 PROCEDURE — 3078F DIAST BP <80 MM HG: CPT | Mod: CPTII,S$GLB,, | Performed by: INTERNAL MEDICINE

## 2021-11-03 PROCEDURE — 99999 PR PBB SHADOW E&M-EST. PATIENT-LVL IV: ICD-10-PCS | Mod: PBBFAC,,, | Performed by: INTERNAL MEDICINE

## 2021-11-03 PROCEDURE — 3074F SYST BP LT 130 MM HG: CPT | Mod: CPTII,S$GLB,, | Performed by: INTERNAL MEDICINE

## 2021-11-03 PROCEDURE — 1159F MED LIST DOCD IN RCRD: CPT | Mod: CPTII,S$GLB,, | Performed by: INTERNAL MEDICINE

## 2021-11-03 PROCEDURE — 1159F PR MEDICATION LIST DOCUMENTED IN MEDICAL RECORD: ICD-10-PCS | Mod: CPTII,S$GLB,, | Performed by: INTERNAL MEDICINE

## 2021-11-03 PROCEDURE — 3074F PR MOST RECENT SYSTOLIC BLOOD PRESSURE < 130 MM HG: ICD-10-PCS | Mod: CPTII,S$GLB,, | Performed by: INTERNAL MEDICINE

## 2021-11-03 PROCEDURE — 96372 PR INJECTION,THERAP/PROPH/DIAG2ST, IM OR SUBCUT: ICD-10-PCS | Mod: S$GLB,,, | Performed by: INTERNAL MEDICINE

## 2021-11-03 PROCEDURE — 99215 PR OFFICE/OUTPT VISIT, EST, LEVL V, 40-54 MIN: ICD-10-PCS | Mod: 25,S$GLB,, | Performed by: INTERNAL MEDICINE

## 2021-11-03 RX ORDER — NEBULIZER AND COMPRESSOR
EACH MISCELLANEOUS
COMMUNITY
Start: 2020-12-01 | End: 2024-02-26

## 2021-11-03 RX ORDER — CEFTRIAXONE 1 G/1
1 INJECTION, POWDER, FOR SOLUTION INTRAMUSCULAR; INTRAVENOUS
Status: COMPLETED | OUTPATIENT
Start: 2021-11-03 | End: 2021-11-03

## 2021-11-03 RX ORDER — MELOXICAM 15 MG/1
TABLET ORAL
COMMUNITY
Start: 2021-08-09 | End: 2022-03-08 | Stop reason: SDUPTHER

## 2021-11-03 RX ORDER — ALBUTEROL SULFATE 90 UG/1
2 AEROSOL, METERED RESPIRATORY (INHALATION) EVERY 6 HOURS PRN
Qty: 18 G | Refills: 6 | Status: SHIPPED | OUTPATIENT
Start: 2021-11-03 | End: 2024-06-21

## 2021-11-03 RX ORDER — KETOROLAC TROMETHAMINE 30 MG/ML
60 INJECTION, SOLUTION INTRAMUSCULAR; INTRAVENOUS
Status: COMPLETED | OUTPATIENT
Start: 2021-11-03 | End: 2021-11-03

## 2021-11-03 RX ORDER — DEXAMETHASONE SODIUM PHOSPHATE 4 MG/ML
8 INJECTION, SOLUTION INTRA-ARTICULAR; INTRALESIONAL; INTRAMUSCULAR; INTRAVENOUS; SOFT TISSUE
Status: COMPLETED | OUTPATIENT
Start: 2021-11-03 | End: 2021-11-03

## 2021-11-03 RX ORDER — HYDROCODONE BITARTRATE AND ACETAMINOPHEN 10; 325 MG/1; MG/1
1 TABLET ORAL EVERY 8 HOURS PRN
Qty: 90 TABLET | Refills: 0 | Status: SHIPPED | OUTPATIENT
Start: 2022-01-03 | End: 2022-02-02

## 2021-11-03 RX ORDER — HYDROCODONE BITARTRATE AND ACETAMINOPHEN 10; 325 MG/1; MG/1
1 TABLET ORAL EVERY 8 HOURS PRN
Qty: 90 TABLET | Refills: 0 | Status: SHIPPED | OUTPATIENT
Start: 2021-11-03 | End: 2021-11-03 | Stop reason: SDUPTHER

## 2021-11-03 RX ORDER — HYDROXYCHLOROQUINE SULFATE 200 MG/1
TABLET, FILM COATED ORAL
COMMUNITY
Start: 2021-10-09 | End: 2021-11-17 | Stop reason: SDUPTHER

## 2021-11-03 RX ORDER — BUTALBITAL, ACETAMINOPHEN AND CAFFEINE 50; 325; 40 MG/1; MG/1; MG/1
1 TABLET ORAL EVERY 4 HOURS PRN
COMMUNITY
Start: 2021-08-11 | End: 2023-05-18

## 2021-11-03 RX ORDER — PROMETHAZINE HYDROCHLORIDE AND DEXTROMETHORPHAN HYDROBROMIDE 6.25; 15 MG/5ML; MG/5ML
5 SYRUP ORAL EVERY 4 HOURS PRN
Qty: 240 ML | Refills: 3 | Status: SHIPPED | OUTPATIENT
Start: 2021-11-03 | End: 2021-11-18

## 2021-11-03 RX ORDER — DOXYCYCLINE HYCLATE 100 MG
100 TABLET ORAL 2 TIMES DAILY
Qty: 20 TABLET | Refills: 0 | Status: SHIPPED | OUTPATIENT
Start: 2021-11-03 | End: 2021-11-13

## 2021-11-03 RX ORDER — ONDANSETRON 4 MG/1
4 TABLET, ORALLY DISINTEGRATING ORAL EVERY 8 HOURS PRN
COMMUNITY
Start: 2021-08-18 | End: 2022-12-22

## 2021-11-03 RX ORDER — CYANOCOBALAMIN 1000 UG/ML
1000 INJECTION, SOLUTION INTRAMUSCULAR; SUBCUTANEOUS
Status: COMPLETED | OUTPATIENT
Start: 2021-11-03 | End: 2021-11-03

## 2021-11-03 RX ORDER — ZOLPIDEM TARTRATE 10 MG/1
TABLET ORAL
COMMUNITY
Start: 2021-10-08 | End: 2021-12-16

## 2021-11-03 RX ORDER — ALBUTEROL SULFATE 1.25 MG/3ML
1.25 SOLUTION RESPIRATORY (INHALATION) EVERY 6 HOURS PRN
Qty: 75 ML | Refills: 3 | Status: SHIPPED | OUTPATIENT
Start: 2021-11-03 | End: 2022-11-03

## 2021-11-03 RX ORDER — HYDROCODONE BITARTRATE AND ACETAMINOPHEN 10; 325 MG/1; MG/1
1 TABLET ORAL EVERY 8 HOURS PRN
Qty: 90 TABLET | Refills: 0 | Status: SHIPPED | OUTPATIENT
Start: 2021-12-03 | End: 2021-11-03 | Stop reason: SDUPTHER

## 2021-11-03 RX ORDER — CHLORHEXIDINE GLUCONATE ORAL RINSE 1.2 MG/ML
SOLUTION DENTAL
COMMUNITY
Start: 2021-08-09 | End: 2022-08-02

## 2021-11-03 RX ADMIN — DEXAMETHASONE SODIUM PHOSPHATE 8 MG: 4 INJECTION, SOLUTION INTRA-ARTICULAR; INTRALESIONAL; INTRAMUSCULAR; INTRAVENOUS; SOFT TISSUE at 12:11

## 2021-11-03 RX ADMIN — KETOROLAC TROMETHAMINE 60 MG: 30 INJECTION, SOLUTION INTRAMUSCULAR; INTRAVENOUS at 12:11

## 2021-11-03 RX ADMIN — CYANOCOBALAMIN 1000 MCG: 1000 INJECTION, SOLUTION INTRAMUSCULAR; SUBCUTANEOUS at 12:11

## 2021-11-03 RX ADMIN — CEFTRIAXONE 1 G: 1 INJECTION, POWDER, FOR SOLUTION INTRAMUSCULAR; INTRAVENOUS at 12:11

## 2021-11-03 ASSESSMENT — ROUTINE ASSESSMENT OF PATIENT INDEX DATA (RAPID3)
PATIENT GLOBAL ASSESSMENT SCORE: 9
TOTAL RAPID3 SCORE: 7.55
PSYCHOLOGICAL DISTRESS SCORE: 4.4
PAIN SCORE: 9
FATIGUE SCORE: 1.1
MDHAQ FUNCTION SCORE: 1.4

## 2021-11-09 DIAGNOSIS — K21.9 GASTROESOPHAGEAL REFLUX DISEASE, UNSPECIFIED WHETHER ESOPHAGITIS PRESENT: ICD-10-CM

## 2021-11-09 DIAGNOSIS — L29.9 ITCHING: ICD-10-CM

## 2021-11-09 NOTE — TELEPHONE ENCOUNTER
Pharmacy requesting refill on Hydroxyzine 25mg and Pantoprazole DR 40mg  Pt's LOV 11/03/2021  Pt's NOV 03/08/2022  Medication pending

## 2021-11-10 RX ORDER — HYDROXYZINE PAMOATE 25 MG/1
CAPSULE ORAL
Qty: 45 CAPSULE | Refills: 3 | Status: SHIPPED | OUTPATIENT
Start: 2021-11-10 | End: 2022-11-08 | Stop reason: SDUPTHER

## 2021-11-10 RX ORDER — PANTOPRAZOLE SODIUM 40 MG/1
40 TABLET, DELAYED RELEASE ORAL DAILY
Qty: 30 TABLET | Refills: 3 | Status: SHIPPED | OUTPATIENT
Start: 2021-11-10 | End: 2022-03-08 | Stop reason: SDUPTHER

## 2021-11-10 NOTE — TELEPHONE ENCOUNTER
Attempted to return patient call regarding infusion confusion. Nurse left a message that at last visit Dr Beltran said to schedule a appointment with GI doctor to discuss treatment for chron's and see if they can write something that will also cover RA. Advised to contact office with any questions.

## 2021-11-10 NOTE — TELEPHONE ENCOUNTER
----- Message from Bety Romero sent at 11/4/2021 11:23 AM CDT -----  Type: Needs Medical Advice  Who Called:  pt  Best Call Back Number: [phone]  Additional Information: There seems to be confusion regarding some infusions that were discussed in the appt or last conversation with Dr Beltran.   Please call to clarify.

## 2021-11-18 RX ORDER — HYDROXYCHLOROQUINE SULFATE 200 MG/1
TABLET, FILM COATED ORAL
Qty: 60 TABLET | Refills: 3 | Status: SHIPPED | OUTPATIENT
Start: 2021-11-18 | End: 2022-03-08 | Stop reason: SDUPTHER

## 2021-11-24 ENCOUNTER — HOSPITAL ENCOUNTER (OUTPATIENT)
Dept: RADIOLOGY | Facility: HOSPITAL | Age: 56
Discharge: HOME | End: 2021-11-24
Attending: INTERNAL MEDICINE
Payer: MEDICARE

## 2021-11-24 DIAGNOSIS — G44.1 OTHER VASCULAR HEADACHE: ICD-10-CM

## 2021-11-24 DIAGNOSIS — R42 DIZZINESS AND GIDDINESS: ICD-10-CM

## 2021-11-24 DIAGNOSIS — M87.059 AVASCULAR NECROSIS OF BONE OF HIP, UNSPECIFIED LATERALITY: ICD-10-CM

## 2021-11-24 PROCEDURE — 73521 X-RAY EXAM HIPS BI 2 VIEWS: CPT | Mod: TC,FY,PO

## 2021-11-24 PROCEDURE — 73521 XR HIPS BILATERAL 2 VIEW INCL AP PELVIS: ICD-10-PCS | Mod: 26,,, | Performed by: RADIOLOGY

## 2021-11-24 PROCEDURE — 70450 CT HEAD WITHOUT CONTRAST: ICD-10-PCS | Mod: 26,,, | Performed by: RADIOLOGY

## 2021-11-24 PROCEDURE — 70450 CT HEAD/BRAIN W/O DYE: CPT | Mod: TC,PO

## 2021-11-24 PROCEDURE — 70450 CT HEAD/BRAIN W/O DYE: CPT | Mod: 26,,, | Performed by: RADIOLOGY

## 2021-11-24 PROCEDURE — 73521 X-RAY EXAM HIPS BI 2 VIEWS: CPT | Mod: 26,,, | Performed by: RADIOLOGY

## 2021-12-06 ENCOUNTER — TELEPHONE (OUTPATIENT)
Dept: GASTROENTEROLOGY | Facility: CLINIC | Age: 56
End: 2021-12-06
Payer: MEDICARE

## 2021-12-07 ENCOUNTER — TELEPHONE (OUTPATIENT)
Dept: RHEUMATOLOGY | Facility: CLINIC | Age: 56
End: 2021-12-07
Payer: MEDICARE

## 2021-12-08 ENCOUNTER — TELEPHONE (OUTPATIENT)
Dept: OPTOMETRY | Facility: CLINIC | Age: 56
End: 2021-12-08
Payer: MEDICARE

## 2021-12-08 DIAGNOSIS — M25.559 HIP PAIN: Primary | ICD-10-CM

## 2021-12-08 DIAGNOSIS — R05.9 COUGH: ICD-10-CM

## 2021-12-08 RX ORDER — PROMETHAZINE HYDROCHLORIDE 6.25 MG/5ML
5 SYRUP ORAL EVERY 6 HOURS PRN
Qty: 240 ML | Refills: 1 | Status: SHIPPED | OUTPATIENT
Start: 2021-12-08 | End: 2022-08-12

## 2021-12-10 ENCOUNTER — TELEPHONE (OUTPATIENT)
Dept: RHEUMATOLOGY | Facility: CLINIC | Age: 56
End: 2021-12-10
Payer: MEDICARE

## 2021-12-10 ENCOUNTER — TELEPHONE (OUTPATIENT)
Dept: ORTHOPEDICS | Facility: CLINIC | Age: 56
End: 2021-12-10
Payer: MEDICARE

## 2021-12-10 DIAGNOSIS — R05.9 COUGH: ICD-10-CM

## 2021-12-10 RX ORDER — PROMETHAZINE HYDROCHLORIDE 6.25 MG/5ML
5 SYRUP ORAL EVERY 6 HOURS PRN
Qty: 240 ML | Refills: 1 | Status: CANCELLED | OUTPATIENT
Start: 2021-12-10

## 2021-12-16 ENCOUNTER — OFFICE VISIT (OUTPATIENT)
Dept: GASTROENTEROLOGY | Facility: CLINIC | Age: 56
End: 2021-12-16
Payer: MEDICARE

## 2021-12-16 VITALS — WEIGHT: 242.94 LBS | BODY MASS INDEX: 43.05 KG/M2 | HEIGHT: 63 IN

## 2021-12-16 DIAGNOSIS — L40.9 PSORIASIS OF NAIL: ICD-10-CM

## 2021-12-16 DIAGNOSIS — K63.9 DISEASE OF INTESTINE, UNSPECIFIED: ICD-10-CM

## 2021-12-16 DIAGNOSIS — G89.4 CHRONIC PAIN SYNDROME: ICD-10-CM

## 2021-12-16 DIAGNOSIS — R05.9 COUGH: ICD-10-CM

## 2021-12-16 DIAGNOSIS — M02.311 REACTIVE ARTHRITIS OF RIGHT SHOULDER: ICD-10-CM

## 2021-12-16 DIAGNOSIS — K50.012 CROHN'S DISEASE OF SMALL INTESTINE WITH INTESTINAL OBSTRUCTION: Primary | ICD-10-CM

## 2021-12-16 PROCEDURE — 99999 PR PBB SHADOW E&M-EST. PATIENT-LVL IV: ICD-10-PCS | Mod: PBBFAC,,, | Performed by: INTERNAL MEDICINE

## 2021-12-16 PROCEDURE — 99999 PR PBB SHADOW E&M-EST. PATIENT-LVL IV: CPT | Mod: PBBFAC,,, | Performed by: INTERNAL MEDICINE

## 2021-12-16 PROCEDURE — 99204 OFFICE O/P NEW MOD 45 MIN: CPT | Mod: S$GLB,,, | Performed by: INTERNAL MEDICINE

## 2021-12-16 PROCEDURE — 99204 PR OFFICE/OUTPT VISIT, NEW, LEVL IV, 45-59 MIN: ICD-10-PCS | Mod: S$GLB,,, | Performed by: INTERNAL MEDICINE

## 2021-12-28 ENCOUNTER — TELEPHONE (OUTPATIENT)
Dept: RHEUMATOLOGY | Facility: CLINIC | Age: 56
End: 2021-12-28
Payer: MEDICARE

## 2021-12-28 ENCOUNTER — CLINICAL SUPPORT (OUTPATIENT)
Dept: RHEUMATOLOGY | Facility: CLINIC | Age: 56
End: 2021-12-28
Payer: MEDICARE

## 2021-12-28 VITALS — DIASTOLIC BLOOD PRESSURE: 85 MMHG | SYSTOLIC BLOOD PRESSURE: 134 MMHG | HEART RATE: 76 BPM

## 2021-12-28 DIAGNOSIS — G89.4 CHRONIC PAIN SYNDROME: Primary | ICD-10-CM

## 2021-12-28 DIAGNOSIS — M25.542 JOINT PAIN IN FINGERS OF BOTH HANDS: ICD-10-CM

## 2021-12-28 DIAGNOSIS — M02.311 REACTIVE ARTHRITIS OF RIGHT SHOULDER: ICD-10-CM

## 2021-12-28 DIAGNOSIS — M25.541 JOINT PAIN IN FINGERS OF BOTH HANDS: ICD-10-CM

## 2021-12-28 PROCEDURE — 96372 PR INJECTION,THERAP/PROPH/DIAG2ST, IM OR SUBCUT: ICD-10-PCS | Mod: S$GLB,,, | Performed by: PHYSICIAN ASSISTANT

## 2021-12-28 PROCEDURE — 96372 THER/PROPH/DIAG INJ SC/IM: CPT | Mod: S$GLB,,, | Performed by: PHYSICIAN ASSISTANT

## 2021-12-28 PROCEDURE — 99999 PR PBB SHADOW E&M-EST. PATIENT-LVL II: CPT | Mod: PBBFAC,,,

## 2021-12-28 PROCEDURE — 99999 PR PBB SHADOW E&M-EST. PATIENT-LVL II: ICD-10-PCS | Mod: PBBFAC,,,

## 2021-12-28 RX ORDER — CYANOCOBALAMIN 1000 UG/ML
1000 INJECTION, SOLUTION INTRAMUSCULAR; SUBCUTANEOUS
Status: COMPLETED | OUTPATIENT
Start: 2021-12-28 | End: 2021-12-28

## 2021-12-28 RX ORDER — DEXAMETHASONE SODIUM PHOSPHATE 4 MG/ML
8 INJECTION, SOLUTION INTRA-ARTICULAR; INTRALESIONAL; INTRAMUSCULAR; INTRAVENOUS; SOFT TISSUE
Status: COMPLETED | OUTPATIENT
Start: 2021-12-28 | End: 2021-12-28

## 2021-12-28 RX ORDER — KETOROLAC TROMETHAMINE 30 MG/ML
60 INJECTION, SOLUTION INTRAMUSCULAR; INTRAVENOUS
Status: COMPLETED | OUTPATIENT
Start: 2021-12-28 | End: 2021-12-28

## 2021-12-28 RX ADMIN — CYANOCOBALAMIN 1000 MCG: 1000 INJECTION, SOLUTION INTRAMUSCULAR; SUBCUTANEOUS at 10:12

## 2021-12-28 RX ADMIN — DEXAMETHASONE SODIUM PHOSPHATE 8 MG: 4 INJECTION, SOLUTION INTRA-ARTICULAR; INTRALESIONAL; INTRAMUSCULAR; INTRAVENOUS; SOFT TISSUE at 10:12

## 2021-12-28 RX ADMIN — KETOROLAC TROMETHAMINE 60 MG: 30 INJECTION, SOLUTION INTRAMUSCULAR; INTRAVENOUS at 10:12

## 2021-12-30 ENCOUNTER — TELEPHONE (OUTPATIENT)
Dept: RHEUMATOLOGY | Facility: CLINIC | Age: 56
End: 2021-12-30

## 2021-12-30 NOTE — TELEPHONE ENCOUNTER
----- Message from Zohreh Woodruff sent at 12/29/2021  3:45 PM CST -----  Contact: Pt  Type: Needs Medical Advice    Who Called:Pt  Best Call Back Number:[phone]    Additional Information Requesting a call back regarding Pt was calling in regards to some antibiotics an cough medicine pt states they were supposed to pick some up last month when called in but the medication were too expensive pt was requesting that different brand be called in if possible please call if any questions Thank you  Please Advise-Thank you

## 2021-12-30 NOTE — TELEPHONE ENCOUNTER
If she is still sick since her last visit with Dr. Beltran in November then she needs to be re-evaluated by PCP or urgent care for infection and obtain covid testing. Nurse informed pt of this on 12/10/21.     Which cough medication was too expensive? Dr. Beltran sent promethazine dm 11/3/21 and also phenergan syrup 12/8/21 with a refill. Did she get those filled?

## 2022-01-03 ENCOUNTER — TELEPHONE (OUTPATIENT)
Dept: GASTROENTEROLOGY | Facility: CLINIC | Age: 57
End: 2022-01-03
Payer: MEDICARE

## 2022-01-03 NOTE — TELEPHONE ENCOUNTER
----- Message from Darby Richey sent at 1/3/2022 11:15 AM CST -----  Type: Needs Medical Advice  Who Called:  Pt  Symptoms (please be specific):  Pt whole house hold is sick and needs to reschedule her procedure    Best Call Back Number: [phone]  Additional Information: Please call and advise

## 2022-01-07 ENCOUNTER — TELEPHONE (OUTPATIENT)
Dept: RHEUMATOLOGY | Facility: CLINIC | Age: 57
End: 2022-01-07
Payer: MEDICARE

## 2022-01-07 NOTE — TELEPHONE ENCOUNTER
----- Message from Mauricio Sheridan sent at 1/5/2022 12:26 PM CST -----  Contact: Self  Type: Needs Medical Advice  Who Called:  Patient  Symptoms (please be specific):  COVID-positive, cough, sore throat, body aches, runny eyes  How long has patient had these symptoms:  3d  Pharmacy name and phone #:    [facility] [place] - [address] [facility]  [address] [place] [address]  Phone: [phone] Fax: [phone]    Best Call Back Number: [phone]  Additional Information:  Would like cough meds and antibiotics called in for symptoms.

## 2022-01-07 NOTE — TELEPHONE ENCOUNTER
S/w pt and informed her of Dr. Beltran's protocol for Covid positive patients.  I will call in a Zpak and Decadron to pt's preferred pharmacy.  Pt verbalized understanding.    Flakita Ashley MA  1/7/2022

## 2022-01-07 NOTE — TELEPHONE ENCOUNTER
----- Message from Belinda Villalta sent at 1/6/2022 12:20 PM CST -----  Regarding: positive for covid pt called to see if provider would call in medication to her pharmacy pls call to advise  Type: Needs Medical Advice  Who Called: pt  Symptoms (please be specific):    How long has patient had these symptoms:    Pharmacy name and phone #:   [facility] [place] - [address] [facility]  [address] [facility] [place] [address]  Phone: [phone] Fax: [phone]      Best Call Back Number: [phone] (home)     Additional Information: positive for covid pt called to see if provider would call in medication to her pharmacy pls call to advise

## 2022-01-11 ENCOUNTER — TELEPHONE (OUTPATIENT)
Dept: RHEUMATOLOGY | Facility: CLINIC | Age: 57
End: 2022-01-11
Payer: MEDICARE

## 2022-01-11 ENCOUNTER — TELEPHONE (OUTPATIENT)
Dept: GASTROENTEROLOGY | Facility: CLINIC | Age: 57
End: 2022-01-11
Payer: MEDICARE

## 2022-01-11 DIAGNOSIS — Z01.818 PRE-OP TESTING: ICD-10-CM

## 2022-01-11 NOTE — TELEPHONE ENCOUNTER
----- Message from Jonas Guerra sent at 1/11/2022 10:14 AM CST -----  Type:  Sooner Apoointment Request    Caller is requesting a sooner appointment.  Caller declined first available appointment listed below.  Caller will not accept being placed on the waitlist and is requesting a message be sent to doctor.    Name of Caller:  Patient  When is the first available appointment?  ---  Symptoms:  Procedure  Best Call Back Number:  [phone]  Additional Information:

## 2022-01-11 NOTE — TELEPHONE ENCOUNTER
----- Message from Monique Harman sent at 1/11/2022 10:04 AM CST -----  Regarding: test results  Contact: Patient/[phone] (home)  Type:  Test Results    Who Called:  Patient/[phone] (home)     Name of Test (Lab/Mammo/Etc):  Cat Scan  Date of Test:  11/24/21  Ordering Provider:  same  Where the test was performed:  [facility]

## 2022-01-12 ENCOUNTER — OFFICE VISIT (OUTPATIENT)
Dept: ORTHOPEDICS | Facility: CLINIC | Age: 57
End: 2022-01-12
Payer: MEDICARE

## 2022-01-12 VITALS — HEIGHT: 63 IN | BODY MASS INDEX: 43.05 KG/M2 | WEIGHT: 242.94 LBS

## 2022-01-12 DIAGNOSIS — M70.61 TROCHANTERIC BURSITIS OF BOTH HIPS: Primary | ICD-10-CM

## 2022-01-12 DIAGNOSIS — M25.551 CHRONIC HIP PAIN, BILATERAL: ICD-10-CM

## 2022-01-12 DIAGNOSIS — M25.552 CHRONIC HIP PAIN, BILATERAL: ICD-10-CM

## 2022-01-12 DIAGNOSIS — M70.62 TROCHANTERIC BURSITIS OF BOTH HIPS: Primary | ICD-10-CM

## 2022-01-12 DIAGNOSIS — G89.29 CHRONIC HIP PAIN, BILATERAL: ICD-10-CM

## 2022-01-12 PROCEDURE — 99204 PR OFFICE/OUTPT VISIT, NEW, LEVL IV, 45-59 MIN: ICD-10-PCS | Mod: 25,S$GLB,, | Performed by: NURSE PRACTITIONER

## 2022-01-12 PROCEDURE — 1160F RVW MEDS BY RX/DR IN RCRD: CPT | Mod: CPTII,S$GLB,, | Performed by: NURSE PRACTITIONER

## 2022-01-12 PROCEDURE — 20610 DRAIN/INJ JOINT/BURSA W/O US: CPT | Mod: 50,S$GLB,, | Performed by: NURSE PRACTITIONER

## 2022-01-12 PROCEDURE — 99999 PR PBB SHADOW E&M-EST. PATIENT-LVL IV: ICD-10-PCS | Mod: PBBFAC,,, | Performed by: NURSE PRACTITIONER

## 2022-01-12 PROCEDURE — 3008F PR BODY MASS INDEX (BMI) DOCUMENTED: ICD-10-PCS | Mod: CPTII,S$GLB,, | Performed by: NURSE PRACTITIONER

## 2022-01-12 PROCEDURE — 3008F BODY MASS INDEX DOCD: CPT | Mod: CPTII,S$GLB,, | Performed by: NURSE PRACTITIONER

## 2022-01-12 PROCEDURE — 20610 LARGE JOINT ASPIRATION/INJECTION: BILATERAL GREATER TROCHANTERIC BURSA: ICD-10-PCS | Mod: 50,S$GLB,, | Performed by: NURSE PRACTITIONER

## 2022-01-12 PROCEDURE — 99204 OFFICE O/P NEW MOD 45 MIN: CPT | Mod: 25,S$GLB,, | Performed by: NURSE PRACTITIONER

## 2022-01-12 PROCEDURE — 99999 PR PBB SHADOW E&M-EST. PATIENT-LVL IV: CPT | Mod: PBBFAC,,, | Performed by: NURSE PRACTITIONER

## 2022-01-12 PROCEDURE — 1159F MED LIST DOCD IN RCRD: CPT | Mod: CPTII,S$GLB,, | Performed by: NURSE PRACTITIONER

## 2022-01-12 PROCEDURE — 1159F PR MEDICATION LIST DOCUMENTED IN MEDICAL RECORD: ICD-10-PCS | Mod: CPTII,S$GLB,, | Performed by: NURSE PRACTITIONER

## 2022-01-12 PROCEDURE — 1160F PR REVIEW ALL MEDS BY PRESCRIBER/CLIN PHARMACIST DOCUMENTED: ICD-10-PCS | Mod: CPTII,S$GLB,, | Performed by: NURSE PRACTITIONER

## 2022-01-12 RX ORDER — SERTRALINE HYDROCHLORIDE 100 MG/1
100 TABLET, FILM COATED ORAL DAILY
COMMUNITY
Start: 2022-01-05 | End: 2023-05-18

## 2022-01-12 RX ADMIN — TRIAMCINOLONE ACETONIDE 40 MG: 40 INJECTION, SUSPENSION INTRA-ARTICULAR; INTRAMUSCULAR at 03:01

## 2022-01-12 RX ADMIN — LIDOCAINE HYDROCHLORIDE 5 ML: 10 INJECTION INFILTRATION; PERINEURAL at 03:01

## 2022-01-12 NOTE — PROGRESS NOTES
Chief Complaint   Patient presents with    Right Hip - Pain    Left Hip - Pain       HPI:    This is a 56 y.o. F who presents today complaining of bilateral hip pain for 6 weeks after no known injury or trauma. Pain is dull; states hurts to sleep on it. No numbness or tingling. No associated signs or symptoms.      Past Medical History:   Diagnosis Date    Anxiety     Arthritis     Asthma     Depression     Encounter for blood transfusion     Hypertension       Past Surgical History:   Procedure Laterality Date    CHOLECYSTECTOMY      FRACTURE SURGERY        Current Outpatient Medications on File Prior to Visit   Medication Sig Dispense Refill    albuterol (ACCUNEB) 1.25 mg/3 mL Nebu Take 3 mLs (1.25 mg total) by nebulization every 6 (six) hours as needed (cough). Rescue 75 mL 3    albuterol (PROVENTIL/VENTOLIN HFA) 90 mcg/actuation inhaler Inhale 2 puffs into the lungs every 6 (six) hours as needed for Wheezing. Rescue 18 g 6    ALPRAZolam (XANAX) 0.5 MG tablet Take 0.5 mg by mouth nightly as needed.      amLODIPine (NORVASC) 10 MG tablet Take 1 tablet (10 mg total) by mouth daily      atorvastatin (LIPITOR) 40 MG tablet Take 1 tablet (40 mg total) by mouth daily      budesonide (ENTOCORT EC) 3 mg capsule TAKE ONE CAPSULE BY MOUTH DAILY 30 capsule 3    butalbital-acetaminophen-caffeine -40 mg (FIORICET, ESGIC) -40 mg per tablet Take 1 tablet by mouth every 4 (four) hours as needed.      chlorhexidine (PERIDEX) 0.12 % solution SMARTSIG:By Mouth      diclofenac sodium (VOLTAREN) 1 % Gel APPLY TOPICALLY FOUR TIMES DAILY 300 g 3    dicyclomine (BENTYL) 20 mg tablet Take 20 mg by mouth once daily.       diltiaZEM (CARDIZEM CD) 240 MG 24 hr capsule Take 240 mg by mouth.      epinephrine (EPIPEN) 0.3 mg/0.3 mL AtIn Inject 0.3 mg into the muscle.      fluticasone propionate (FLONASE) 50 mcg/actuation nasal spray       fluticasone-salmeterol 500-50 mcg/dose (ADVAIR) 500-50 mcg/dose DsDv  diskus inhaler Inhale 1 puff into the lungs.      furosemide (LASIX) 20 MG tablet Take 1 tablet (20 mg total) by mouth daily      gabapentin (NEURONTIN) 800 MG tablet Take 1 tablet (800 mg total) by mouth 3 (three) times daily. 90 tablet 3    hydrochlorothiazide (MICROZIDE) 12.5 mg capsule Take 12.5 mg by mouth every morning.  11    HYDROcodone-acetaminophen (NORCO)  mg per tablet Take 1 tablet by mouth every 8 (eight) hours as needed for Pain. 90 tablet 0    hydrOXYchloroQUINE (PLAQUENIL) 200 mg tablet Take 1 tablet (200 mg total) by mouth 2 (two) times daily. 60 tablet 3    hydrOXYzine pamoate (VISTARIL) 25 MG Cap TAKE ONE CAPSULE BY MOUTH THREE TIMES DAILY AS NEEDED FOR ITCHING 45 capsule 3    ipratropium-albuteroL (COMBIVENT)  mcg/actuation inhaler Inhale 1 puff into the lungs 4 (four) times daily. Rescue 4 g 12    lidocaine HCl 2% (LIDOCAINE VISCOUS) 2 % Soln by Mucous Membrane route every 8 (eight) hours as needed. 100 mL 0    meloxicam (MOBIC) 15 MG tablet Take by mouth.      nebulizer and compressor Siomara Use as directed      ondansetron (ZOFRAN-ODT) 4 MG TbDL Take 4 mg by mouth every 8 (eight) hours as needed.      pantoprazole (PROTONIX) 40 MG tablet Take 1 tablet (40 mg total) by mouth once daily. 30 tablet 3    promethazine (PHENERGAN) 6.25 mg/5 mL syrup Take 5 mLs (6.25 mg total) by mouth every 6 (six) hours as needed (cough). 240 mL 1    sertraline (ZOLOFT) 100 MG tablet Take 100 mg by mouth once daily.      sumatriptan (IMITREX) 50 MG tablet Take 50 mg by mouth.       tiZANidine (ZANAFLEX) 4 MG tablet Take 4 mg by mouth every 8 (eight) hours.       traZODone (DESYREL) 100 MG tablet Take 100 mg by mouth nightly.      triamcinolone acetonide 0.1% (KENALOG) 0.1 % ointment Apply topically 2 (two) times daily. 80 g 3     No current facility-administered medications on file prior to visit.      Review of patient's allergies indicates:   Allergen Reactions     Amlodipine-benazepril Swelling    Ace inhibitors Swelling     Angioedema; was taking Benazepril.    Tramadol Itching      Family History not pertinent   Social History     Socioeconomic History    Marital status:    Tobacco Use    Smoking status: Never Smoker    Smokeless tobacco: Never Used   Substance and Sexual Activity    Drug use: No    Sexual activity: Never         Review of Systems:   Constitutional:  Denies fever or chills    Eyes:  Denies change in visual acuity    HENT:  Denies nasal congestion or sore throat    Respiratory:  Denies cough or shortness of breath    Cardiovascular:  Denies chest pain or edema    GI:  Denies abdominal pain, nausea, vomiting, bloody stools or diarrhea    :  Denies dysuria    Integument:  Denies rash    Neurologic:  Denies headache, focal weakness or sensory changes    Endocrine:  Denies polyuria or polydipsia    Lymphatic:  Denies swollen glands    Psychiatric:  Denies depression or anxiety       Physical Exam:    Constitutional:  Well developed, well nourished, no acute distress, non-toxic appearance    Integument:  Well hydrated, no rash    Lymphatic:  No lymphadenopathy noted    Neurologic:  Alert & oriented x 3,     Psychiatric:  Speech and behavior appropriate    Gi: abdomen soft  Eyes: EOMI     Bilateral Hip Exam Performed    bilateral Hip Exam     Tenderness   The patient is experiencing tenderness in the greater trochanter.    Range of Motion   The patient has normal hip ROM.    Muscle Strength   Abduction: 4/5     Other   Erythema: absent  Sensation: normal  Pulse: present    bilateral Hip Exam   Hip exam performed same as contralateral hip and is normal.     Imaging:    X-rays were performed today, personally reviewed by me and findings discussed with the patient.  2 views of the bilateral hip show no fractures or dislocations; degenerative changes/arthritis is noted   Bilaterally.       Assessment / Plan:      Trochanteric bursitis of both hips  -      Large Joint Aspiration/Injection: bilateral greater trochanteric bursa    Chronic hip pain, bilateral    Using an aseptic technique, I injected 5 cc of lidocaine 1% without and 1 cc of kenalog 40mg into the bilateral Hip. The patient tolerated this well.    RTC in 6 weeks for follow up with MD Fam for evaluation of further injections vs hip surgery.

## 2022-01-12 NOTE — TELEPHONE ENCOUNTER
No acute changes seen on CT scan head.  X-ray hips shows mild joint space loss consistent with osteoarthritis. There is also spurring noted.

## 2022-01-13 ENCOUNTER — PATIENT MESSAGE (OUTPATIENT)
Dept: RHEUMATOLOGY | Facility: CLINIC | Age: 57
End: 2022-01-13
Payer: MEDICARE

## 2022-01-13 RX ORDER — LIDOCAINE HYDROCHLORIDE 10 MG/ML
5 INJECTION INFILTRATION; PERINEURAL
Status: DISCONTINUED | OUTPATIENT
Start: 2022-01-12 | End: 2022-01-13 | Stop reason: HOSPADM

## 2022-01-13 RX ORDER — TRIAMCINOLONE ACETONIDE 40 MG/ML
40 INJECTION, SUSPENSION INTRA-ARTICULAR; INTRAMUSCULAR
Status: DISCONTINUED | OUTPATIENT
Start: 2022-01-12 | End: 2022-01-13 | Stop reason: HOSPADM

## 2022-02-07 RX ORDER — HYDROCODONE BITARTRATE AND ACETAMINOPHEN 10; 325 MG/1; MG/1
TABLET ORAL
Qty: 90 TABLET | Refills: 0 | Status: SHIPPED | OUTPATIENT
Start: 2022-02-07 | End: 2022-03-08 | Stop reason: SDUPTHER

## 2022-02-11 ENCOUNTER — TELEPHONE (OUTPATIENT)
Dept: RHEUMATOLOGY | Facility: CLINIC | Age: 57
End: 2022-02-11
Payer: MEDICARE

## 2022-02-11 NOTE — TELEPHONE ENCOUNTER
----- Message from Bety Romero sent at 2/10/2022  2:24 PM CST -----  Type: Needs Medical Advice  Who Called:  pt  Symptoms (please be specific):  pt is requesting cough syrup and antibiotics Please advise  Pharmacy name and phone #:    [facility] [place] - [address] [facility]  [address] [place] [address]  Phone: [phone] Fax: [phone]        Best Call Back Number:  [phone] (home)     Additional Information:

## 2022-02-11 NOTE — TELEPHONE ENCOUNTER
----- Message from Bety Romero sent at 2/10/2022  2:24 PM CST -----  Type: Needs Medical Advice  Who Called:  pt  Symptoms (please be specific):  pt is requesting cough syrup and antibiotics Please advise  Pharmacy name and phone #:    [facility] [place] - [address] [facility]  [address] [place] [address]  Phone: [phone] Fax: [phone]        Best Call Back Number:  [phone] (home)     Additional Information:

## 2022-02-11 NOTE — TELEPHONE ENCOUNTER
Pt has productive cough, congestion, sore throat, runny eyes, etc. She tested negative for Covid. Please advise

## 2022-02-11 NOTE — TELEPHONE ENCOUNTER
----- Message from Jaelyn Gibbs MA sent at 2/11/2022  8:37 AM CST -----  Pt has productive cough, very sore throat, congestion, runny eyes, etc, She tested negative for covid. She is aware she would have to pay for cough syrup out of pocket.  ----- Message -----  From: Bety Romero  Sent: 2/10/2022   2:25 PM CST  To: Devin Slaughter Staff    Type: Needs Medical Advice  Who Called:  pt  Symptoms (please be specific):  pt is requesting cough syrup and antibiotics Please advise  Pharmacy name and phone #:    [facility] [place] - [address]  [address] [place] [address]  Phone: [phone] Fax: [phone]        Best Call Back Number:  [phone] (home)     Additional Information:

## 2022-02-11 NOTE — TELEPHONE ENCOUNTER
----- Message from Jaelyn Gibbs MA sent at 2/11/2022  8:37 AM CST -----  Pt has productive cough, very sore throat, congestion, runny eyes, etc, She tested negative for covid. She is aware she would have to pay for cough syrup out of pocket.  ----- Message -----  From: Bety Romero  Sent: 2/10/2022   2:25 PM CST  To: Devin Slaughter Staff    Type: Needs Medical Advice  Who Called:  pt  Symptoms (please be specific):  pt is requesting cough syrup and antibiotics Please advise  Pharmacy name and phone #:    [facility] [place] - [address] [facility]  [address] [place] [address]  Phone: [phone] Fax: [phone]        Best Call Back Number:  [phone] (home)     Additional Information:

## 2022-02-14 ENCOUNTER — TELEPHONE (OUTPATIENT)
Dept: RHEUMATOLOGY | Facility: CLINIC | Age: 57
End: 2022-02-14
Payer: MEDICARE

## 2022-02-14 NOTE — TELEPHONE ENCOUNTER
----- Message from Lexi Diaz sent at 2/14/2022 12:44 PM CST -----  Type: Needs Medical Advice  Who Called:  Pt  Pharmacy name and phone #:    [facility] [place] - [address] [facility]  [address]  [place] [address]  Phone: [phone] Fax: [phone]  Best Call Back Number: [phone]   Additional Information: Pt requesting Cough rx and antibiotics for a cold she has had for 1 week-pt sts it is getting hard for pt to swallow--please advise--Thank you

## 2022-02-15 NOTE — TELEPHONE ENCOUNTER
----- Message from Soheila Robin sent at 2/15/2022 10:38 AM CST -----  .Type:  RX Refill Request    Who Called: pt     Refill or New Rx: refills     RX Name and Strength: promethazine (PHENERGAN) 6.25 mg/5 mL syrup pt also asked to have antibiotics     Preferred Pharmacy with phone number: [facility] [phone]     Pt Call Back Number:[phone]    Additional Information: Thank You

## 2022-02-15 NOTE — TELEPHONE ENCOUNTER
Spoke with patient and advised her to go to urgent care given her symptoms. Patient said she went to [facility] care and was diagnosed with slight cold but given no medication. Advised pt to follow up with PCP if symptoms continue.   None

## 2022-02-15 NOTE — TELEPHONE ENCOUNTER
----- Message from Blanca Franco sent at 2/15/2022  8:37 AM CST -----  This is her 3rd request.  Please call her.  Thank you!    Type: Needs Medical Advice  Who Called:  Pt  Pharmacy name and phone #:    [facility] - [place] - [address] [facility]  [address] [facility]  [place] [address]  Phone: [phone] Fax: [phone]  Best Call Back Number: [phone]   Additional Information: Pt requesting Cough rx and antibiotics for a cold she has had for 1 week-pt sts it is getting hard for pt to swallow and she has chills--please advise--Thank you

## 2022-02-25 ENCOUNTER — ANESTHESIA EVENT (OUTPATIENT)
Dept: ENDOSCOPY | Facility: HOSPITAL | Age: 57
End: 2022-02-25
Payer: MEDICARE

## 2022-02-25 ENCOUNTER — ANESTHESIA (OUTPATIENT)
Dept: ENDOSCOPY | Facility: HOSPITAL | Age: 57
End: 2022-02-25
Payer: MEDICARE

## 2022-02-25 NOTE — ANESTHESIA PREPROCEDURE EVALUATION
02/25/2022  Amanda Mcguire is a 56 y.o., female.      Pre-op Assessment    I have reviewed the Patient Summary Reports.     I have reviewed the Nursing Notes. I have reviewed the NPO Status.   I have reviewed the Medications.     Review of Systems  Anesthesia Hx:  No problems with previous Anesthesia  Denies Family Hx of Anesthesia complications.   Denies Personal Hx of Anesthesia complications.   Social:  Non-Smoker    Hematology/Oncology:         -- Anemia:   Cardiovascular:   Hypertension ECG has been reviewed. 9/20  Normal Cardiac ST   Pulmonary:   Asthma moderate    Hepatic/GI:   Bowel Prep. GERD Crohn's disease  Elevated Calprotectin   Musculoskeletal:   Arthritis   Spine Disorders: lumbar Chronic Pain    Neurological:   Headaches   Chronic Pain Syndrome   Endocrine:  Morbid Obesity / BMI > 40  Dermatological:   Psoriasis    Psych:   anxiety depression          Physical Exam  General: Anxious    Airway:  Mallampati: III / II  Neck: Girth Increased    Chest/Lungs:  Normal Respiratory Rate    Heart:  Rate: Normal  Rhythm: Regular Rhythm        Anesthesia Plan  Type of Anesthesia, risks & benefits discussed:    Anesthesia Type: Gen Natural Airway  Intra-op Monitoring Plan: Standard ASA Monitors  Induction:  IV  Informed Consent: Informed consent signed with the Patient and all parties understand the risks and agree with anesthesia plan.  All questions answered.   ASA Score: 3  Day of Surgery Review of History & Physical: H&P Update referred to the surgeon/provider.    Ready For Surgery From Anesthesia Perspective.     .

## 2022-02-28 ENCOUNTER — LAB VISIT (OUTPATIENT)
Dept: LAB | Facility: HOSPITAL | Age: 57
End: 2022-02-28
Attending: INTERNAL MEDICINE
Payer: MEDICARE

## 2022-02-28 DIAGNOSIS — M25.542 JOINT PAIN IN FINGERS OF BOTH HANDS: ICD-10-CM

## 2022-02-28 DIAGNOSIS — M62.838 MUSCLE SPASMS OF LOWER EXTREMITY, UNSPECIFIED LATERALITY: ICD-10-CM

## 2022-02-28 DIAGNOSIS — G44.1 OTHER VASCULAR HEADACHE: ICD-10-CM

## 2022-02-28 DIAGNOSIS — M87.052 AVASCULAR NECROSIS OF BONES OF BOTH HIPS: ICD-10-CM

## 2022-02-28 DIAGNOSIS — S00.522A BLISTER OF GINGIVA WITH INFECTION, INITIAL ENCOUNTER: ICD-10-CM

## 2022-02-28 DIAGNOSIS — K50.012 CROHN'S DISEASE OF SMALL INTESTINE WITH INTESTINAL OBSTRUCTION: ICD-10-CM

## 2022-02-28 DIAGNOSIS — M02.30 REACTIVE ARTHRITIS: ICD-10-CM

## 2022-02-28 DIAGNOSIS — M02.311 REACTIVE ARTHRITIS OF RIGHT SHOULDER: ICD-10-CM

## 2022-02-28 DIAGNOSIS — L29.9 ITCHING: ICD-10-CM

## 2022-02-28 DIAGNOSIS — G89.4 CHRONIC PAIN SYNDROME: ICD-10-CM

## 2022-02-28 DIAGNOSIS — L40.9 PSORIASIS OF NAIL: ICD-10-CM

## 2022-02-28 DIAGNOSIS — R05.9 COUGH: ICD-10-CM

## 2022-02-28 DIAGNOSIS — R42 DIZZINESS AND GIDDINESS: ICD-10-CM

## 2022-02-28 DIAGNOSIS — Z79.899 HIGH RISK MEDICATION USE: ICD-10-CM

## 2022-02-28 DIAGNOSIS — M25.541 JOINT PAIN IN FINGERS OF BOTH HANDS: ICD-10-CM

## 2022-02-28 DIAGNOSIS — D52.9 ANEMIA DUE TO FOLIC ACID DEFICIENCY, UNSPECIFIED DEFICIENCY TYPE: ICD-10-CM

## 2022-02-28 DIAGNOSIS — M87.051 AVASCULAR NECROSIS OF BONES OF BOTH HIPS: ICD-10-CM

## 2022-02-28 DIAGNOSIS — R94.6 ABNORMAL RESULTS OF THYROID FUNCTION STUDIES: ICD-10-CM

## 2022-02-28 DIAGNOSIS — K05.10 BLISTER OF GINGIVA WITH INFECTION, INITIAL ENCOUNTER: ICD-10-CM

## 2022-02-28 LAB
ALBUMIN SERPL BCP-MCNC: 3.2 G/DL (ref 3.5–5.2)
ALP SERPL-CCNC: 106 U/L (ref 55–135)
ALT SERPL W/O P-5'-P-CCNC: 20 U/L (ref 10–44)
ANION GAP SERPL CALC-SCNC: 11 MMOL/L (ref 8–16)
AST SERPL-CCNC: 16 U/L (ref 10–40)
BASOPHILS # BLD AUTO: 0.03 K/UL (ref 0–0.2)
BASOPHILS NFR BLD: 0.5 % (ref 0–1.9)
BILIRUB SERPL-MCNC: 0.3 MG/DL (ref 0.1–1)
BUN SERPL-MCNC: 19 MG/DL (ref 6–20)
CALCIUM SERPL-MCNC: 9.2 MG/DL (ref 8.7–10.5)
CCP AB SER IA-ACNC: 0.5 U/ML
CHLORIDE SERPL-SCNC: 109 MMOL/L (ref 95–110)
CO2 SERPL-SCNC: 20 MMOL/L (ref 23–29)
CREAT SERPL-MCNC: 0.7 MG/DL (ref 0.5–1.4)
CRP SERPL-MCNC: 9.7 MG/L (ref 0–8.2)
DIFFERENTIAL METHOD: ABNORMAL
EOSINOPHIL # BLD AUTO: 0.1 K/UL (ref 0–0.5)
EOSINOPHIL NFR BLD: 0.9 % (ref 0–8)
ERYTHROCYTE [DISTWIDTH] IN BLOOD BY AUTOMATED COUNT: 16.5 % (ref 11.5–14.5)
ERYTHROCYTE [SEDIMENTATION RATE] IN BLOOD BY WESTERGREN METHOD: 50 MM/HR (ref 0–36)
EST. GFR  (AFRICAN AMERICAN): >60 ML/MIN/1.73 M^2
EST. GFR  (NON AFRICAN AMERICAN): >60 ML/MIN/1.73 M^2
ESTIMATED AVG GLUCOSE: 100 MG/DL (ref 68–131)
GLUCOSE SERPL-MCNC: 87 MG/DL (ref 70–110)
HBA1C MFR BLD: 5.1 % (ref 4–5.6)
HCT VFR BLD AUTO: 37.4 % (ref 37–48.5)
HGB BLD-MCNC: 11.9 G/DL (ref 12–16)
IMM GRANULOCYTES # BLD AUTO: 0.05 K/UL (ref 0–0.04)
IMM GRANULOCYTES NFR BLD AUTO: 0.9 % (ref 0–0.5)
IRON SERPL-MCNC: 57 UG/DL (ref 30–160)
LYMPHOCYTES # BLD AUTO: 1.9 K/UL (ref 1–4.8)
LYMPHOCYTES NFR BLD: 32.4 % (ref 18–48)
MCH RBC QN AUTO: 27.9 PG (ref 27–31)
MCHC RBC AUTO-ENTMCNC: 31.8 G/DL (ref 32–36)
MCV RBC AUTO: 88 FL (ref 82–98)
MONOCYTES # BLD AUTO: 0.7 K/UL (ref 0.3–1)
MONOCYTES NFR BLD: 11.6 % (ref 4–15)
NEUTROPHILS # BLD AUTO: 3.1 K/UL (ref 1.8–7.7)
NEUTROPHILS NFR BLD: 53.7 % (ref 38–73)
NRBC BLD-RTO: 0 /100 WBC
PLATELET # BLD AUTO: 246 K/UL (ref 150–450)
PMV BLD AUTO: 10.6 FL (ref 9.2–12.9)
POTASSIUM SERPL-SCNC: 3.6 MMOL/L (ref 3.5–5.1)
PROT SERPL-MCNC: 6.6 G/DL (ref 6–8.4)
RBC # BLD AUTO: 4.27 M/UL (ref 4–5.4)
RHEUMATOID FACT SERPL-ACNC: <13 IU/ML (ref 0–15)
SARS-COV-2 IGG SERPL IA-ACNC: NORMAL AU/ML
SARS-COV-2 IGG SERPL QL IA: POSITIVE
SATURATED IRON: 16 % (ref 20–50)
SODIUM SERPL-SCNC: 140 MMOL/L (ref 136–145)
T3FREE SERPL-MCNC: 2.4 PG/ML (ref 2.3–4.2)
T4 FREE SERPL-MCNC: 0.81 NG/DL (ref 0.71–1.51)
TOTAL IRON BINDING CAPACITY: 349 UG/DL (ref 250–450)
TRANSFERRIN SERPL-MCNC: 236 MG/DL (ref 200–375)
TSH SERPL DL<=0.005 MIU/L-ACNC: 2.92 UIU/ML (ref 0.4–4)
WBC # BLD AUTO: 5.77 K/UL (ref 3.9–12.7)

## 2022-02-28 PROCEDURE — 86431 RHEUMATOID FACTOR QUANT: CPT | Performed by: INTERNAL MEDICINE

## 2022-02-28 PROCEDURE — 86200 CCP ANTIBODY: CPT | Performed by: INTERNAL MEDICINE

## 2022-02-28 PROCEDURE — 80053 COMPREHEN METABOLIC PANEL: CPT | Performed by: INTERNAL MEDICINE

## 2022-02-28 PROCEDURE — 85652 RBC SED RATE AUTOMATED: CPT | Performed by: INTERNAL MEDICINE

## 2022-02-28 PROCEDURE — 86039 ANTINUCLEAR ANTIBODIES (ANA): CPT | Performed by: INTERNAL MEDICINE

## 2022-02-28 PROCEDURE — 84439 ASSAY OF FREE THYROXINE: CPT | Performed by: INTERNAL MEDICINE

## 2022-02-28 PROCEDURE — 85025 COMPLETE CBC W/AUTO DIFF WBC: CPT | Performed by: INTERNAL MEDICINE

## 2022-02-28 PROCEDURE — 86140 C-REACTIVE PROTEIN: CPT | Performed by: INTERNAL MEDICINE

## 2022-02-28 PROCEDURE — 84481 FREE ASSAY (FT-3): CPT | Performed by: INTERNAL MEDICINE

## 2022-02-28 PROCEDURE — 86038 ANTINUCLEAR ANTIBODIES: CPT | Performed by: INTERNAL MEDICINE

## 2022-02-28 PROCEDURE — 86235 NUCLEAR ANTIGEN ANTIBODY: CPT | Mod: 59 | Performed by: INTERNAL MEDICINE

## 2022-02-28 PROCEDURE — 86235 NUCLEAR ANTIGEN ANTIBODY: CPT | Performed by: INTERNAL MEDICINE

## 2022-02-28 PROCEDURE — 84443 ASSAY THYROID STIM HORMONE: CPT | Performed by: INTERNAL MEDICINE

## 2022-02-28 PROCEDURE — 84466 ASSAY OF TRANSFERRIN: CPT | Performed by: INTERNAL MEDICINE

## 2022-02-28 PROCEDURE — 83036 HEMOGLOBIN GLYCOSYLATED A1C: CPT | Performed by: INTERNAL MEDICINE

## 2022-02-28 PROCEDURE — 36415 COLL VENOUS BLD VENIPUNCTURE: CPT | Mod: PO | Performed by: INTERNAL MEDICINE

## 2022-02-28 PROCEDURE — 86769 SARS-COV-2 COVID-19 ANTIBODY: CPT | Performed by: INTERNAL MEDICINE

## 2022-03-02 ENCOUNTER — TELEPHONE (OUTPATIENT)
Dept: GASTROENTEROLOGY | Facility: CLINIC | Age: 57
End: 2022-03-02
Payer: MEDICARE

## 2022-03-02 DIAGNOSIS — Z01.818 PRE-OP TESTING: ICD-10-CM

## 2022-03-02 LAB
ANA PATTERN 1: NORMAL
ANA SER QL IF: POSITIVE
ANA TITR SER IF: NORMAL {TITER}
ANTI-SSB ANTIBODY: 0.08 RATIO (ref 0–0.99)
ANTI-SSB INTERPRETATION: NEGATIVE

## 2022-03-02 NOTE — TELEPHONE ENCOUNTER
----- Message from Anny Maher sent at 3/2/2022 12:22 PM CST -----  Type:  Patient Call Back    Who Called: Amanda    What is the reqeust in detail:  pt is requesting a call back to schedule her procedure. Please Advise     Can the clinic reply by [facility]?    Best Call Back Number:[phone]

## 2022-03-04 ENCOUNTER — OFFICE VISIT (OUTPATIENT)
Dept: OPHTHALMOLOGY | Facility: CLINIC | Age: 57
End: 2022-03-04
Payer: MEDICARE

## 2022-03-04 DIAGNOSIS — H52.7 REFRACTIVE ERROR: ICD-10-CM

## 2022-03-04 DIAGNOSIS — H25.13 NUCLEAR SCLEROSIS OF BOTH EYES: Primary | ICD-10-CM

## 2022-03-04 DIAGNOSIS — H04.123 DRY EYES, BILATERAL: ICD-10-CM

## 2022-03-04 LAB
ANTI SM ANTIBODY: 0.08 RATIO (ref 0–0.99)
ANTI SM/RNP ANTIBODY: 0.07 RATIO (ref 0–0.99)
ANTI-SM INTERPRETATION: NEGATIVE
ANTI-SM/RNP INTERPRETATION: NEGATIVE
ANTI-SSA ANTIBODY: 0.07 RATIO (ref 0–0.99)
ANTI-SSA INTERPRETATION: NEGATIVE
ANTI-SSB ANTIBODY: 0.08 RATIO (ref 0–0.99)
ANTI-SSB INTERPRETATION: NEGATIVE
DSDNA AB SER-ACNC: NORMAL [IU]/ML

## 2022-03-04 PROCEDURE — 1159F MED LIST DOCD IN RCRD: CPT | Mod: CPTII,S$GLB,, | Performed by: OPHTHALMOLOGY

## 2022-03-04 PROCEDURE — 92004 COMPRE OPH EXAM NEW PT 1/>: CPT | Mod: S$GLB,,, | Performed by: OPHTHALMOLOGY

## 2022-03-04 PROCEDURE — 99999 PR PBB SHADOW E&M-EST. PATIENT-LVL III: CPT | Mod: PBBFAC,,, | Performed by: OPHTHALMOLOGY

## 2022-03-04 PROCEDURE — 1160F RVW MEDS BY RX/DR IN RCRD: CPT | Mod: CPTII,S$GLB,, | Performed by: OPHTHALMOLOGY

## 2022-03-04 PROCEDURE — 1160F PR REVIEW ALL MEDS BY PRESCRIBER/CLIN PHARMACIST DOCUMENTED: ICD-10-PCS | Mod: CPTII,S$GLB,, | Performed by: OPHTHALMOLOGY

## 2022-03-04 PROCEDURE — 92004 PR EYE EXAM, NEW PATIENT,COMPREHESV: ICD-10-PCS | Mod: S$GLB,,, | Performed by: OPHTHALMOLOGY

## 2022-03-04 PROCEDURE — 1159F PR MEDICATION LIST DOCUMENTED IN MEDICAL RECORD: ICD-10-PCS | Mod: CPTII,S$GLB,, | Performed by: OPHTHALMOLOGY

## 2022-03-04 PROCEDURE — 99999 PR PBB SHADOW E&M-EST. PATIENT-LVL III: ICD-10-PCS | Mod: PBBFAC,,, | Performed by: OPHTHALMOLOGY

## 2022-03-04 PROCEDURE — 3044F HG A1C LEVEL LT 7.0%: CPT | Mod: CPTII,S$GLB,, | Performed by: OPHTHALMOLOGY

## 2022-03-04 PROCEDURE — 92015 DETERMINE REFRACTIVE STATE: CPT | Mod: S$GLB,,, | Performed by: OPHTHALMOLOGY

## 2022-03-04 PROCEDURE — 3044F PR MOST RECENT HEMOGLOBIN A1C LEVEL <7.0%: ICD-10-PCS | Mod: CPTII,S$GLB,, | Performed by: OPHTHALMOLOGY

## 2022-03-04 PROCEDURE — 92015 PR REFRACTION: ICD-10-PCS | Mod: S$GLB,,, | Performed by: OPHTHALMOLOGY

## 2022-03-04 RX ORDER — MONTELUKAST SODIUM 10 MG/1
10 TABLET ORAL NIGHTLY
COMMUNITY
Start: 2022-02-15

## 2022-03-04 RX ORDER — PHENTERMINE HYDROCHLORIDE 37.5 MG/1
37.5 TABLET ORAL DAILY
COMMUNITY
Start: 2022-02-09 | End: 2024-02-26

## 2022-03-04 NOTE — PROGRESS NOTES
HPI     Pt here for diabetic eye exam. LDE- 1 year    Pt sts she has a rx for glasses that are about a year old. Pt can not wear   them due to them being to strong or depth perception is off with them on.   Pt has floaters OU, no changes. Pt denies flashes/pain. Pt sts eyes always   feel like they have trash in them. Pt using AT PRN.     Last edited by Clemencia Canada on 3/4/2022  2:06 PM. (History)        ROS     Negative for: Constitutional, Gastrointestinal, Neurological, Skin,   Genitourinary, Musculoskeletal, HENT, Endocrine, Cardiovascular, Eyes,   Respiratory, Psychiatric, Allergic/Imm, Heme/Lymph    Last edited by Israel Rai Jr., MD on 3/4/2022  2:51 PM. (History)        Assessment /Plan     For exam results, see Encounter Report.    Nuclear sclerosis of both eyes    Dry eyes, bilateral    Refractive error      -no decrease in ADLS, no significant glare, and functionality is satisfactory  -counseled on what to expect if the cataracts worsening and how it can effect the vision  -continue to monitor and if vision changes patient can call for another appointment  -  Warm compresses to eyelids along with eyelid massages at least twice daily OU  -  Refresh liquid gel 3-4x daliy OU; counseled patient on different brands that are available at pharmacies  -  Avoid Visine and Clear Eyes if they are not the preservative free brand  -  Discussed Gels and PM Ointment options  -  Counseled on external factors that can worsen symptom such as air vents and ceiling fans if they are blowing directly on patient for extended amounts of time  -  Counseled on taking more breaks when using the computer and reading  Rx for glasses given to the patient  Follow up in about 1 year (around 3/4/2023) for Annual with optometry.

## 2022-03-07 ENCOUNTER — TELEPHONE (OUTPATIENT)
Dept: GASTROENTEROLOGY | Facility: CLINIC | Age: 57
End: 2022-03-07
Payer: MEDICARE

## 2022-03-07 NOTE — TELEPHONE ENCOUNTER
----- Message from Yolis Valladares sent at 3/7/2022  3:11 PM CST -----  Contact: pt  Type: Needs Medical Advice    Who Called:pt  Best Call Back Number:[phone]  Additional  Information: pt would like a call to get instructions before her upcoming Colonoscopy on 3/10/22  Please Advise- Thank you

## 2022-03-08 ENCOUNTER — OFFICE VISIT (OUTPATIENT)
Dept: RHEUMATOLOGY | Facility: CLINIC | Age: 57
End: 2022-03-08
Payer: MEDICARE

## 2022-03-08 VITALS
HEIGHT: 63 IN | HEART RATE: 78 BPM | DIASTOLIC BLOOD PRESSURE: 87 MMHG | SYSTOLIC BLOOD PRESSURE: 126 MMHG | BODY MASS INDEX: 42.03 KG/M2 | WEIGHT: 237.19 LBS

## 2022-03-08 DIAGNOSIS — R76.8 ANA POSITIVE: ICD-10-CM

## 2022-03-08 DIAGNOSIS — I10 HYPERTENSION, UNSPECIFIED TYPE: ICD-10-CM

## 2022-03-08 DIAGNOSIS — G89.4 CHRONIC PAIN SYNDROME: Primary | ICD-10-CM

## 2022-03-08 DIAGNOSIS — G47.00 INSOMNIA, UNSPECIFIED TYPE: ICD-10-CM

## 2022-03-08 DIAGNOSIS — M02.30 REACTIVE ARTHRITIS, UNSPECIFIED SITE: ICD-10-CM

## 2022-03-08 DIAGNOSIS — M02.39 REACTIVE ARTHRITIS OF MULTIPLE SITES: ICD-10-CM

## 2022-03-08 DIAGNOSIS — R05.3 CHRONIC COUGH: Primary | ICD-10-CM

## 2022-03-08 DIAGNOSIS — K21.9 GASTROESOPHAGEAL REFLUX DISEASE, UNSPECIFIED WHETHER ESOPHAGITIS PRESENT: ICD-10-CM

## 2022-03-08 PROCEDURE — 99999 PR PBB SHADOW E&M-EST. PATIENT-LVL V: ICD-10-PCS | Mod: PBBFAC,,, | Performed by: PHYSICIAN ASSISTANT

## 2022-03-08 PROCEDURE — 3074F SYST BP LT 130 MM HG: CPT | Mod: CPTII,S$GLB,, | Performed by: PHYSICIAN ASSISTANT

## 2022-03-08 PROCEDURE — 99999 PR PBB SHADOW E&M-EST. PATIENT-LVL V: CPT | Mod: PBBFAC,,, | Performed by: PHYSICIAN ASSISTANT

## 2022-03-08 PROCEDURE — 99214 PR OFFICE/OUTPT VISIT, EST, LEVL IV, 30-39 MIN: ICD-10-PCS | Mod: S$GLB,,, | Performed by: PHYSICIAN ASSISTANT

## 2022-03-08 PROCEDURE — 3074F PR MOST RECENT SYSTOLIC BLOOD PRESSURE < 130 MM HG: ICD-10-PCS | Mod: CPTII,S$GLB,, | Performed by: PHYSICIAN ASSISTANT

## 2022-03-08 PROCEDURE — 3044F PR MOST RECENT HEMOGLOBIN A1C LEVEL <7.0%: ICD-10-PCS | Mod: CPTII,S$GLB,, | Performed by: PHYSICIAN ASSISTANT

## 2022-03-08 PROCEDURE — 3044F HG A1C LEVEL LT 7.0%: CPT | Mod: CPTII,S$GLB,, | Performed by: PHYSICIAN ASSISTANT

## 2022-03-08 PROCEDURE — 3008F BODY MASS INDEX DOCD: CPT | Mod: CPTII,S$GLB,, | Performed by: PHYSICIAN ASSISTANT

## 2022-03-08 PROCEDURE — 99214 OFFICE O/P EST MOD 30 MIN: CPT | Mod: S$GLB,,, | Performed by: PHYSICIAN ASSISTANT

## 2022-03-08 PROCEDURE — 3079F DIAST BP 80-89 MM HG: CPT | Mod: CPTII,S$GLB,, | Performed by: PHYSICIAN ASSISTANT

## 2022-03-08 PROCEDURE — 3079F PR MOST RECENT DIASTOLIC BLOOD PRESSURE 80-89 MM HG: ICD-10-PCS | Mod: CPTII,S$GLB,, | Performed by: PHYSICIAN ASSISTANT

## 2022-03-08 PROCEDURE — 3008F PR BODY MASS INDEX (BMI) DOCUMENTED: ICD-10-PCS | Mod: CPTII,S$GLB,, | Performed by: PHYSICIAN ASSISTANT

## 2022-03-08 RX ORDER — DOXYCYCLINE 100 MG/1
100 CAPSULE ORAL EVERY 12 HOURS
Qty: 20 CAPSULE | Refills: 0 | Status: SHIPPED | OUTPATIENT
Start: 2022-03-08 | End: 2022-12-22

## 2022-03-08 RX ORDER — PANTOPRAZOLE SODIUM 40 MG/1
40 TABLET, DELAYED RELEASE ORAL DAILY
Qty: 30 TABLET | Refills: 3 | Status: SHIPPED | OUTPATIENT
Start: 2022-03-08 | End: 2022-11-08

## 2022-03-08 RX ORDER — DILTIAZEM HYDROCHLORIDE 240 MG/1
240 CAPSULE, EXTENDED RELEASE ORAL DAILY
Qty: 90 CAPSULE | Refills: 0 | Status: SHIPPED | OUTPATIENT
Start: 2022-03-08 | End: 2023-05-18

## 2022-03-08 RX ORDER — MELOXICAM 15 MG/1
15 TABLET ORAL DAILY
Qty: 90 TABLET | Refills: 1 | Status: SHIPPED | OUTPATIENT
Start: 2022-03-08 | End: 2022-11-08

## 2022-03-08 RX ORDER — HYDROXYCHLOROQUINE SULFATE 200 MG/1
TABLET, FILM COATED ORAL
Qty: 180 TABLET | Refills: 1 | Status: SHIPPED | OUTPATIENT
Start: 2022-03-08 | End: 2022-07-06 | Stop reason: SDUPTHER

## 2022-03-08 RX ORDER — DILTIAZEM HYDROCHLORIDE 240 MG/1
240 CAPSULE, EXTENDED RELEASE ORAL
COMMUNITY
Start: 2022-03-08 | End: 2022-03-08 | Stop reason: SDUPTHER

## 2022-03-08 RX ORDER — TRAZODONE HYDROCHLORIDE 100 MG/1
100 TABLET ORAL NIGHTLY
Qty: 90 TABLET | Refills: 1 | Status: SHIPPED | OUTPATIENT
Start: 2022-03-08 | End: 2022-07-06 | Stop reason: SDUPTHER

## 2022-03-08 RX ORDER — GABAPENTIN 800 MG/1
800 TABLET ORAL 3 TIMES DAILY
Qty: 90 TABLET | Refills: 3 | Status: SHIPPED | OUTPATIENT
Start: 2022-03-08 | End: 2022-11-08 | Stop reason: SDUPTHER

## 2022-03-08 NOTE — PROGRESS NOTES
Subjective:       Patient ID: Amanda Mcguire is a 56 y.o. female.    Chief Complaint: Disease Management (4 mth f/u) and Pain (Back and side)    Mrs. Mcguire is a 56 year old female who presents to clinic for follow up on reactive arthritis, osteoarthritis. She is scheduled for colonoscopy this week with Dr. Whitman to evaluate crohn's disease.     She is doing poorly with regards to her arthritis. She has joint pain in her hands, knees, ankles, and low back. Pain is constant and aching. Taking percocet for severe pain with adequate control of her symptoms.     She complains of sore throat, productive cough, and chest congestion for 3-4 days.    Current tx:  1. Remicade and MTX (per GI)  2. norco  3. baclofen      Review of Systems   Constitutional: Negative for activity change, appetite change, fatigue and fever.   Eyes: Negative for visual disturbance.   Cardiovascular: Negative for chest pain, palpitations and leg swelling.   Gastrointestinal: Positive for abdominal pain and nausea. Negative for constipation, diarrhea and vomiting.   Musculoskeletal: Positive for arthralgias, joint swelling and myalgias.   Pulmonary: Positive for cough, shortness of breath  Neurological: Negative for dizziness, weakness, light-headedness and headaches.   Skin: no rash  Psych: No anxiety        Objective:     Vitals:    03/08/22 1108   BP: 126/87   Pulse: 78       Past Medical History:   Diagnosis Date    Anxiety     Arthritis     Asthma     Crohn's disease     Depression     Encounter for blood transfusion     Hypertension      Past Surgical History:   Procedure Laterality Date    CHOLECYSTECTOMY      FRACTURE SURGERY       Physical Exam   Constitutional:   Obese body habitus.   Eyes: Right conjunctiva is not injected. Left conjunctiva is not injected. Right eye exhibits normal extraocular motion. Left eye exhibits normal extraocular motion.   Neck: No JVD present. No thyromegaly present.   Cardiovascular: Normal  rate and regular rhythm.  Exam reveals no decreased pulses.    Pulmonary/Chest: She has no wheezes. She has no rhonchi. She has no rales.         Right Side Rheumatological Exam     Examination finds the shoulder, elbow, wrist, knee, 1st PIP, 1st MCP, 2nd MCP, 3rd MCP, 4th MCP and 5th MCP normal.    The patient is tender to palpation of the 2nd PIP, 3rd PIP, 4th PIP and 5th PIP    She has swelling of the 2nd PIP, 3rd PIP, 4th PIP and 5th PIP     Left Side Rheumatological Exam     Examination finds the shoulder, elbow, wrist, knee, 1st PIP, 1st MCP, 2nd MCP, 3rd MCP, 4th PIP, 4th MCP, 5th PIP and 5th MCP normal.    The patient is tender to palpation of the 2nd PIP and 3rd PIP.    She has swelling of the 2nd PIP and 3rd PIP       Lymphadenopathy:     She has no cervical adenopathy.   Neurological: Gait normal.   Skin: No rash noted.     Psychiatric: Mood and affect normal.   Musculoskeletal: She exhibits tenderness (multiple tender points in her muscles throughout).        Labs:  Component      Latest Ref Rng & Units 3/10/2022 2/28/2022            9:16 AM   WBC      3.90 - 12.70 K/uL     RBC      4.00 - 5.40 M/uL     Hemoglobin      12.0 - 16.0 g/dL     Hematocrit      37.0 - 48.5 %     MCV      82 - 98 fL     MCH      27.0 - 31.0 pg     MCHC      32.0 - 36.0 g/dL     RDW      11.5 - 14.5 %     Platelets      150 - 450 K/uL     MPV      9.2 - 12.9 fL     Immature Granulocytes      0.0 - 0.5 %     Gran # (ANC)      1.8 - 7.7 K/uL     Immature Grans (Abs)      0.00 - 0.04 K/uL     Lymph #      1.0 - 4.8 K/uL     Mono #      0.3 - 1.0 K/uL     Eos #      0.0 - 0.5 K/uL     Baso #      0.00 - 0.20 K/uL     nRBC      0 /100 WBC     Gran %      38.0 - 73.0 %     Lymph %      18.0 - 48.0 %     Mono %      4.0 - 15.0 %     Eosinophil %      0.0 - 8.0 %     Basophil %      0.0 - 1.9 %     Differential Method           Sodium      136 - 145 mmol/L     Potassium      3.5 - 5.1 mmol/L     Chloride      95 - 110 mmol/L     CO2       23 - 29 mmol/L     Glucose      70 - 110 mg/dL     BUN      6 - 20 mg/dL     Creatinine      0.5 - 1.4 mg/dL     Calcium      8.7 - 10.5 mg/dL     PROTEIN TOTAL      6.0 - 8.4 g/dL     Albumin      3.5 - 5.2 g/dL     BILIRUBIN TOTAL      0.1 - 1.0 mg/dL     Alkaline Phosphatase      55 - 135 U/L     AST      10 - 40 U/L     ALT      10 - 44 U/L     Anion Gap      8 - 16 mmol/L     eGFR if African American      >60 mL/min/1.73 m:2     eGFR if non African American      >60 mL/min/1.73 m:2     Anti Sm Antibody      0.00 - 0.99 Ratio  0.08   Anti-Sm Interpretation      Negative  Negative   Anti-SSA Antibody      0.00 - 0.99 Ratio  0.07   Anti-SSA Interpretation      Negative  Negative   Anti-SSB Antibody      0.00 - 0.99 Ratio  0.08   Anti-SSB Interpretation      Negative  Negative   ds DNA Ab      Negative 1:10  Negative 1:10   Anti Sm/RNP Antibody      0.00 - 0.99 Ratio  0.07   Anti-Sm/RNP Interpretation      Negative  Negative   Iron      30 - 160 ug/dL     Transferrin      200 - 375 mg/dL     TIBC      250 - 450 ug/dL     Saturated Iron      20 - 50 %     Hemoglobin A1C External      4.0 - 5.6 %     Estimated Avg Glucose      68 - 131 mg/dL     COVID-19 (SARS CoV-2) IgG Antibody Quantitative      AU/ml     COVID-19 (SARS CoV-2) IgG Antibody Interpretation           SARS Coronavirus 2 Antigen      Negative Negative     Acceptable       Yes    Sed Rate      0 - 36 mm/Hr     CRP      0.0 - 8.2 mg/L     Rheumatoid Factor      0.0 - 15.0 IU/mL     CCP Antibodies      <5.0 U/mL     JONATHAN Screen      Negative <1:80     TSH      0.400 - 4.000 uIU/mL     Free T4      0.71 - 1.51 ng/dL     T3, Free      2.3 - 4.2 pg/mL     JONATHAN PATTERN 1           JONATHAN Titer 1             Component      Latest Ref Rng & Units 2/28/2022           9:16 AM   WBC      3.90 - 12.70 K/uL 5.77   RBC      4.00 - 5.40 M/uL 4.27   Hemoglobin      12.0 - 16.0 g/dL 11.9 (L)   Hematocrit      37.0 - 48.5 % 37.4   MCV      82 - 98 fL 88   MCH       27.0 - 31.0 pg 27.9   MCHC      32.0 - 36.0 g/dL 31.8 (L)   RDW      11.5 - 14.5 % 16.5 (H)   Platelets      150 - 450 K/uL 246   MPV      9.2 - 12.9 fL 10.6   Immature Granulocytes      0.0 - 0.5 % 0.9 (H)   Gran # (ANC)      1.8 - 7.7 K/uL 3.1   Immature Grans (Abs)      0.00 - 0.04 K/uL 0.05 (H)   Lymph #      1.0 - 4.8 K/uL 1.9   Mono #      0.3 - 1.0 K/uL 0.7   Eos #      0.0 - 0.5 K/uL 0.1   Baso #      0.00 - 0.20 K/uL 0.03   nRBC      0 /100 WBC 0   Gran %      38.0 - 73.0 % 53.7   Lymph %      18.0 - 48.0 % 32.4   Mono %      4.0 - 15.0 % 11.6   Eosinophil %      0.0 - 8.0 % 0.9   Basophil %      0.0 - 1.9 % 0.5   Differential Method       Automated   Sodium      136 - 145 mmol/L 140   Potassium      3.5 - 5.1 mmol/L 3.6   Chloride      95 - 110 mmol/L 109   CO2      23 - 29 mmol/L 20 (L)   Glucose      70 - 110 mg/dL 87   BUN      6 - 20 mg/dL 19   Creatinine      0.5 - 1.4 mg/dL 0.7   Calcium      8.7 - 10.5 mg/dL 9.2   PROTEIN TOTAL      6.0 - 8.4 g/dL 6.6   Albumin      3.5 - 5.2 g/dL 3.2 (L)   BILIRUBIN TOTAL      0.1 - 1.0 mg/dL 0.3   Alkaline Phosphatase      55 - 135 U/L 106   AST      10 - 40 U/L 16   ALT      10 - 44 U/L 20   Anion Gap      8 - 16 mmol/L 11   eGFR if African American      >60 mL/min/1.73 m:2 >60.0   eGFR if non African American      >60 mL/min/1.73 m:2 >60.0   Anti Sm Antibody      0.00 - 0.99 Ratio    Anti-Sm Interpretation      Negative    Anti-SSA Antibody      0.00 - 0.99 Ratio    Anti-SSA Interpretation      Negative    Anti-SSB Antibody      0.00 - 0.99 Ratio 0.08   Anti-SSB Interpretation      Negative Negative   ds DNA Ab      Negative 1:10    Anti Sm/RNP Antibody      0.00 - 0.99 Ratio    Anti-Sm/RNP Interpretation      Negative    Iron      30 - 160 ug/dL 57   Transferrin      200 - 375 mg/dL 236   TIBC      250 - 450 ug/dL 349   Saturated Iron      20 - 50 % 16 (L)   Hemoglobin A1C External      4.0 - 5.6 % 5.1   Estimated Avg Glucose      68 - 131 mg/dL 100    COVID-19 (SARS CoV-2) IgG Antibody Quantitative      AU/ml >97833.0   COVID-19 (SARS CoV-2) IgG Antibody Interpretation       Positive   SARS Coronavirus 2 Antigen      Negative     Acceptable          Sed Rate      0 - 36 mm/Hr 50 (H)   CRP      0.0 - 8.2 mg/L 9.7 (H)   Rheumatoid Factor      0.0 - 15.0 IU/mL <13.0   CCP Antibodies      <5.0 U/mL 0.5   JONATHAN Screen      Negative <1:80 Positive (A)   TSH      0.400 - 4.000 uIU/mL 2.921   Free T4      0.71 - 1.51 ng/dL 0.81   T3, Free      2.3 - 4.2 pg/mL 2.4   JONATHAN PATTERN 1       Homogeneous   JONATHAN Titer 1       >=1:2560         1. Chronic cough    2. Reactive arthritis    3. Gastroesophageal reflux disease, unspecified whether esophagitis present    4. Hypertension, unspecified type    5. Insomnia, unspecified type    6. JONATHAN positive            Plan:       Chronic cough  -     NEBULIZER FOR HOME USE  -     NEBULIZER KIT (SUPPLIES) FOR HOME USE  -     Ambulatory referral/consult to Pulmonology; Future; Expected date: 03/15/2022  -     doxycycline (VIBRAMYCIN) 100 MG Cap; Take 1 capsule (100 mg total) by mouth every 12 (twelve) hours.  Dispense: 20 capsule; Refill: 0  -     X-Ray Chest PA And Lateral; Future; Expected date: 03/08/2022    Reactive arthritis  -     hydrOXYchloroQUINE (PLAQUENIL) 200 mg tablet; Take 1 tablet (200 mg total) by mouth 2 (two) times daily.  Dispense: 180 tablet; Refill: 1  -     meloxicam (MOBIC) 15 MG tablet; Take 1 tablet (15 mg total) by mouth once daily.  Dispense: 90 tablet; Refill: 1  -     gabapentin (NEURONTIN) 800 MG tablet; Take 1 tablet (800 mg total) by mouth 3 (three) times daily.  Dispense: 90 tablet; Refill: 3    Gastroesophageal reflux disease, unspecified whether esophagitis present  -     pantoprazole (PROTONIX) 40 MG tablet; Take 1 tablet (40 mg total) by mouth once daily.  Dispense: 30 tablet; Refill: 3    Hypertension, unspecified type  -     diltiaZEM (TIAZAC) 240 MG Cs24; Take 1 capsule (240 mg total) by  mouth once daily.  Dispense: 90 capsule; Refill: 0    Insomnia, unspecified type  -     traZODone (DESYREL) 100 MG tablet; Take 1 tablet (100 mg total) by mouth every evening.  Dispense: 90 tablet; Refill: 1    JONATHAN positive  -     JONATHAN Screen w/Reflex; Future; Expected date: 03/08/2022  -     CBC Auto Differential; Future; Expected date: 03/08/2022  -     Comprehensive Metabolic Panel; Future; Expected date: 03/08/2022  -     C-Reactive Protein; Future; Expected date: 03/08/2022  -     Sedimentation rate; Future; Expected date: 03/08/2022  -     Anti-Histone Antibody; Future; Expected date: 03/08/2022  -     Anti-DNA Ab, Double-Stranded; Future; Expected date: 03/08/2022  -     C3 Complement; Future; Expected date: 03/08/2022  -     C4 Complement; Future; Expected date: 03/08/2022        Assessment:  56 year old female with   Reactive arthritis, osteoarthritis, erythema nodosum on plaquenil  --chronic pain syndrome on norco 10/325  --HTN  --hx of asthma  --hx of pancreatitis 9/2019  --NEW diagnosis of Crohn's disease 10/2020    Plan:  1. Cont Remicade per GI  2. Nebulizer machine. CXR. Doxycycline. Referral to pulmonary for chronic cough  3. Cont. norco per MD.  I have checked [place] prescription monitoring program site and no unusual or abnormal behavior has occurred pt understand the risk and benefits of taking opioid medications and has decided to continue the medication.  4. Meds refilled.       Follow up:  3 months Dr. Beltran w/ labs prior

## 2022-03-10 ENCOUNTER — HOSPITAL ENCOUNTER (OUTPATIENT)
Facility: HOSPITAL | Age: 57
Discharge: HOME | End: 2022-03-10
Attending: INTERNAL MEDICINE | Admitting: INTERNAL MEDICINE
Payer: MEDICARE

## 2022-03-10 ENCOUNTER — PATIENT MESSAGE (OUTPATIENT)
Dept: RHEUMATOLOGY | Facility: CLINIC | Age: 57
End: 2022-03-10
Payer: MEDICARE

## 2022-03-10 DIAGNOSIS — K50.012 CROHN'S DISEASE OF SMALL INTESTINE WITH INTESTINAL OBSTRUCTION: ICD-10-CM

## 2022-03-10 DIAGNOSIS — Z01.818 PREOP TESTING: ICD-10-CM

## 2022-03-10 DIAGNOSIS — K50.919 CROHN'S DISEASE WITH COMPLICATION, UNSPECIFIED GASTROINTESTINAL TRACT LOCATION: Primary | ICD-10-CM

## 2022-03-10 DIAGNOSIS — K50.018 CROHN'S DISEASE OF SMALL INTESTINE WITH OTHER COMPLICATION: ICD-10-CM

## 2022-03-10 LAB
CTP QC/QA: YES
SARS-COV-2 AG RESP QL IA.RAPID: NEGATIVE

## 2022-03-10 PROCEDURE — 87811 SARS-COV-2 COVID19 W/OPTIC: CPT | Mod: PO | Performed by: INTERNAL MEDICINE

## 2022-03-10 PROCEDURE — 37000008 HC ANESTHESIA 1ST 15 MINUTES: Mod: PO | Performed by: INTERNAL MEDICINE

## 2022-03-10 PROCEDURE — 88305 TISSUE EXAM BY PATHOLOGIST: ICD-10-PCS | Mod: 26,,, | Performed by: PATHOLOGY

## 2022-03-10 PROCEDURE — 63600175 PHARM REV CODE 636 W HCPCS: Mod: PO | Performed by: NURSE ANESTHETIST, CERTIFIED REGISTERED

## 2022-03-10 PROCEDURE — 88305 TISSUE EXAM BY PATHOLOGIST: CPT | Performed by: PATHOLOGY

## 2022-03-10 PROCEDURE — 63600175 PHARM REV CODE 636 W HCPCS: Mod: PO | Performed by: INTERNAL MEDICINE

## 2022-03-10 PROCEDURE — 88342 CHG IMMUNOCYTOCHEMISTRY: ICD-10-PCS | Mod: 26,,, | Performed by: PATHOLOGY

## 2022-03-10 PROCEDURE — D9220A PRA ANESTHESIA: ICD-10-PCS | Mod: CRNA,,, | Performed by: NURSE ANESTHETIST, CERTIFIED REGISTERED

## 2022-03-10 PROCEDURE — 43251 EGD REMOVE LESION SNARE: CPT | Mod: PO | Performed by: INTERNAL MEDICINE

## 2022-03-10 PROCEDURE — 43251 PR EGD, FLEX, W/REMOVAL, TUMOR/POLYP/LESION(S), SNARE: ICD-10-PCS | Mod: ,,, | Performed by: INTERNAL MEDICINE

## 2022-03-10 PROCEDURE — 88305 TISSUE EXAM BY PATHOLOGIST: CPT | Mod: 26,,, | Performed by: PATHOLOGY

## 2022-03-10 PROCEDURE — 45380 COLONOSCOPY AND BIOPSY: CPT | Mod: ,,, | Performed by: INTERNAL MEDICINE

## 2022-03-10 PROCEDURE — 37000009 HC ANESTHESIA EA ADD 15 MINS: Mod: PO | Performed by: INTERNAL MEDICINE

## 2022-03-10 PROCEDURE — 45380 PR COLONOSCOPY,BIOPSY: ICD-10-PCS | Mod: ,,, | Performed by: INTERNAL MEDICINE

## 2022-03-10 PROCEDURE — 27201089 HC SNARE, DISP (ANY): Mod: PO | Performed by: INTERNAL MEDICINE

## 2022-03-10 PROCEDURE — 25000003 PHARM REV CODE 250: Mod: PO | Performed by: NURSE ANESTHETIST, CERTIFIED REGISTERED

## 2022-03-10 PROCEDURE — 27201012 HC FORCEPS, HOT/COLD, DISP: Mod: PO | Performed by: INTERNAL MEDICINE

## 2022-03-10 PROCEDURE — 43239 PR EGD, FLEX, W/BIOPSY, SGL/MULTI: ICD-10-PCS | Mod: 59,,, | Performed by: INTERNAL MEDICINE

## 2022-03-10 PROCEDURE — D9220A PRA ANESTHESIA: ICD-10-PCS | Mod: ANES,,, | Performed by: ANESTHESIOLOGY

## 2022-03-10 PROCEDURE — 45380 COLONOSCOPY AND BIOPSY: CPT | Mod: PO | Performed by: INTERNAL MEDICINE

## 2022-03-10 PROCEDURE — D9220A PRA ANESTHESIA: Mod: ANES,,, | Performed by: ANESTHESIOLOGY

## 2022-03-10 PROCEDURE — D9220A PRA ANESTHESIA: Mod: CRNA,,, | Performed by: NURSE ANESTHETIST, CERTIFIED REGISTERED

## 2022-03-10 PROCEDURE — 43239 EGD BIOPSY SINGLE/MULTIPLE: CPT | Mod: 59,,, | Performed by: INTERNAL MEDICINE

## 2022-03-10 PROCEDURE — 43251 EGD REMOVE LESION SNARE: CPT | Mod: ,,, | Performed by: INTERNAL MEDICINE

## 2022-03-10 PROCEDURE — 88342 IMHCHEM/IMCYTCHM 1ST ANTB: CPT | Performed by: PATHOLOGY

## 2022-03-10 PROCEDURE — 88342 IMHCHEM/IMCYTCHM 1ST ANTB: CPT | Mod: 26,,, | Performed by: PATHOLOGY

## 2022-03-10 RX ORDER — FENTANYL CITRATE 50 UG/ML
INJECTION, SOLUTION INTRAMUSCULAR; INTRAVENOUS
Status: DISCONTINUED | OUTPATIENT
Start: 2022-03-10 | End: 2022-03-10

## 2022-03-10 RX ORDER — HYDROCODONE BITARTRATE AND ACETAMINOPHEN 10; 325 MG/1; MG/1
1 TABLET ORAL EVERY 8 HOURS PRN
Qty: 90 TABLET | Refills: 0 | Status: SHIPPED | OUTPATIENT
Start: 2022-05-07 | End: 2022-06-03 | Stop reason: SDUPTHER

## 2022-03-10 RX ORDER — PROPOFOL 10 MG/ML
VIAL (ML) INTRAVENOUS
Status: DISCONTINUED | OUTPATIENT
Start: 2022-03-10 | End: 2022-03-10

## 2022-03-10 RX ORDER — HYDROCODONE BITARTRATE AND ACETAMINOPHEN 10; 325 MG/1; MG/1
1 TABLET ORAL EVERY 8 HOURS PRN
Qty: 90 TABLET | Refills: 0 | Status: SHIPPED | OUTPATIENT
Start: 2022-04-07 | End: 2022-07-06

## 2022-03-10 RX ORDER — PANTOPRAZOLE SODIUM 40 MG/1
40 TABLET, DELAYED RELEASE ORAL 2 TIMES DAILY
Qty: 60 TABLET | Refills: 11 | Status: SHIPPED | OUTPATIENT
Start: 2022-03-10 | End: 2022-11-08

## 2022-03-10 RX ORDER — LIDOCAINE HCL/PF 100 MG/5ML
SYRINGE (ML) INTRAVENOUS
Status: DISCONTINUED | OUTPATIENT
Start: 2022-03-10 | End: 2022-03-10

## 2022-03-10 RX ORDER — HYDROCODONE BITARTRATE AND ACETAMINOPHEN 10; 325 MG/1; MG/1
1 TABLET ORAL EVERY 8 HOURS PRN
Qty: 90 TABLET | Refills: 0 | Status: SHIPPED | OUTPATIENT
Start: 2022-03-10 | End: 2022-07-06

## 2022-03-10 RX ORDER — SODIUM CHLORIDE, SODIUM LACTATE, POTASSIUM CHLORIDE, CALCIUM CHLORIDE 600; 310; 30; 20 MG/100ML; MG/100ML; MG/100ML; MG/100ML
INJECTION, SOLUTION INTRAVENOUS CONTINUOUS
Status: DISCONTINUED | OUTPATIENT
Start: 2022-03-10 | End: 2022-12-22

## 2022-03-10 RX ORDER — PROPOFOL 10 MG/ML
VIAL (ML) INTRAVENOUS CONTINUOUS PRN
Status: DISCONTINUED | OUTPATIENT
Start: 2022-03-10 | End: 2022-03-10

## 2022-03-10 RX ORDER — SODIUM CHLORIDE 0.9 % (FLUSH) 0.9 %
10 SYRINGE (ML) INJECTION EVERY 6 HOURS PRN
Status: DISCONTINUED | OUTPATIENT
Start: 2022-03-10 | End: 2022-12-22

## 2022-03-10 RX ADMIN — PROPOFOL 150 MG: 10 INJECTION, EMULSION INTRAVENOUS at 09:03

## 2022-03-10 RX ADMIN — SODIUM CHLORIDE, SODIUM LACTATE, POTASSIUM CHLORIDE, AND CALCIUM CHLORIDE: .6; .31; .03; .02 INJECTION, SOLUTION INTRAVENOUS at 08:03

## 2022-03-10 RX ADMIN — PROPOFOL 100 MCG/KG/MIN: 10 INJECTION, EMULSION INTRAVENOUS at 08:03

## 2022-03-10 RX ADMIN — PROPOFOL 50 MG: 10 INJECTION, EMULSION INTRAVENOUS at 08:03

## 2022-03-10 RX ADMIN — PROPOFOL 100 MG: 10 INJECTION, EMULSION INTRAVENOUS at 08:03

## 2022-03-10 RX ADMIN — SODIUM CHLORIDE, SODIUM LACTATE, POTASSIUM CHLORIDE, AND CALCIUM CHLORIDE: .6; .31; .03; .02 INJECTION, SOLUTION INTRAVENOUS at 09:03

## 2022-03-10 RX ADMIN — LIDOCAINE HYDROCHLORIDE 100 MG: 20 INJECTION, SOLUTION INTRAVENOUS at 08:03

## 2022-03-10 RX ADMIN — PROPOFOL 100 MG: 10 INJECTION, EMULSION INTRAVENOUS at 09:03

## 2022-03-10 RX ADMIN — FENTANYL CITRATE 100 MCG: 50 INJECTION, SOLUTION INTRAMUSCULAR; INTRAVENOUS at 08:03

## 2022-03-10 NOTE — TRANSFER OF CARE
"Anesthesia Transfer of Care Note    Patient: Amanda Mcguire    Procedure(s) Performed: Procedure(s) (LRB):  EGD (ESOPHAGOGASTRODUODENOSCOPY) (N/A)  COLONOSCOPY (N/A)    Patient location: PACU    Anesthesia Type: general    Transport from OR: Transported from OR on room air with adequate spontaneous ventilation    Post pain: adequate analgesia    Post assessment: no apparent anesthetic complications and tolerated procedure well    Post vital signs: stable    Level of consciousness: awake and sedated    Nausea/Vomiting: no nausea/vomiting    Complications: none    Transfer of care protocol was followed      Last vitals:   Visit Vitals  BP (!) 154/78 (BP Location: Right arm, Patient Position: Lying)   Pulse 83   Temp 36.6 °C (97.9 °F) (Skin)   Resp 19   Ht 5' 3" (1.6 m)   Wt 107.5 kg (237 lb)   SpO2 95%   Breastfeeding No   BMI 41.98 kg/m²     "

## 2022-03-10 NOTE — PROVATION PATIENT INSTRUCTIONS
Discharge Summary/Instructions after an Endoscopic Procedure  Patient Name: Amanda Mcguire  Patient MRN: [id number]  Patient YOB: 1965  Thursday, March 10, 2022  Nilson Wiseman MD  Dear patient,  As a result of recent federal legislation (The Federal Cures Act), you may   receive lab or pathology results from your procedure in your [facility]   account before your physician is able to contact you. Your physician or   their representative will relay the results to you with their   recommendations at their soonest availability.  Thank you,  RESTRICTIONS:  During your procedure today, you received medications for sedation.  These   medications may affect your judgment, balance and coordination.  Therefore,   for 24 hours, you have the following restrictions:   - DO NOT drive a car, operate machinery, make legal/financial decisions,   sign important papers or drink alcohol.    ACTIVITY:  Today: no heavy lifting, straining or running due to procedural   sedation/anesthesia.  The following day: return to full activity including work.  DIET:  Eat and drink normally unless instructed otherwise.     TREATMENT FOR COMMON SIDE EFFECTS:  - Mild abdominal pain, nausea, belching, bloating or excessive gas:  rest,   eat lightly and use a heating pad.  - Sore Throat: treat with throat lozenges and/or gargle with warm salt   water.  - Because air was used during the procedure, expelling large amounts of air   from your rectum or belching is normal.  - If a bowel prep was taken, you may not have a bowel movement for 1-3 days.    This is normal.  SYMPTOMS TO WATCH FOR AND REPORT TO YOUR PHYSICIAN:  1. Abdominal pain or bloating, other than gas cramps.  2. Chest pain.  3. Back pain.  4. Signs of infection such as: chills or fever occurring within 24 hours   after the procedure.  5. Rectal bleeding, which would show as bright red, maroon, or black stools.   (A tablespoon of blood from the rectum is not serious, especially  if   hemorrhoids are present.)  6. Vomiting.  7. Weakness or dizziness.  GO DIRECTLY TO THE NEAREST EMERGENCY ROOM IF YOU HAVE ANY OF THE FOLLOWING:      Difficulty breathing              Chills and/or fever over 101 F   Persistent vomiting and/or vomiting blood   Severe abdominal pain   Severe chest pain   Black, tarry stools   Bleeding- more than one tablespoon   Any other symptom or condition that you feel may need urgent attention  Your doctor recommends these additional instructions:  If any biopsies were taken, your doctors clinic will contact you in 1 to 2   weeks with any results.  You are being discharged to home.   Advance your diet as tolerated.   Continue your present medications.   We are waiting for your pathology results.   Your physician has recommended a repeat upper endoscopy in eight weeks for   surveillance.   Take Protonix (pantoprazole) 40 mg by mouth twice a day for eight weeks.  For questions, problems or results please call your physician - Nilson Wiseman MD at Work:  [phone].  EMERGENCY PHONE NUMBER: [phone], LAB RESULTS: [phone]  IF A COMPLICATION OR EMERGENCY SITUATION ARISES AND YOU ARE UNABLE TO REACH   YOUR PHYSICIAN - GO DIRECTLY TO THE EMERGENCY ROOM.  ___________________________________________  Nurse Signature  ___________________________________________  Patient/Designated Responsible Party Signature  Nilson Wiseman MD  3/10/2022 9:17:23 AM  This report has been verified and signed electronically.  Dear patient,  As a result of recent federal legislation (The Federal Cures Act), you may   receive lab or pathology results from your procedure in your [facility]   account before your physician is able to contact you. Your physician or   their representative will relay the results to you with their   recommendations at their soonest availability.  Thank you.  PROVATION

## 2022-03-10 NOTE — PROVATION PATIENT INSTRUCTIONS
Discharge Summary/Instructions after an Endoscopic Procedure  Patient Name: Amanda Mcguire  Patient MRN: [id number]  Patient YOB: 1965  Thursday, March 10, 2022  Nilson Wiseman MD  Dear patient,  As a result of recent federal legislation (The Federal Cures Act), you may   receive lab or pathology results from your procedure in your [facility]   account before your physician is able to contact you. Your physician or   their representative will relay the results to you with their   recommendations at their soonest availability.  Thank you,  RESTRICTIONS:  During your procedure today, you received medications for sedation.  These   medications may affect your judgment, balance and coordination.  Therefore,   for 24 hours, you have the following restrictions:   - DO NOT drive a car, operate machinery, make legal/financial decisions,   sign important papers or drink alcohol.    ACTIVITY:  Today: no heavy lifting, straining or running due to procedural   sedation/anesthesia.  The following day: return to full activity including work.  DIET:  Eat and drink normally unless instructed otherwise.     TREATMENT FOR COMMON SIDE EFFECTS:  - Mild abdominal pain, nausea, belching, bloating or excessive gas:  rest,   eat lightly and use a heating pad.  - Sore Throat: treat with throat lozenges and/or gargle with warm salt   water.  - Because air was used during the procedure, expelling large amounts of air   from your rectum or belching is normal.  - If a bowel prep was taken, you may not have a bowel movement for 1-3 days.    This is normal.  SYMPTOMS TO WATCH FOR AND REPORT TO YOUR PHYSICIAN:  1. Abdominal pain or bloating, other than gas cramps.  2. Chest pain.  3. Back pain.  4. Signs of infection such as: chills or fever occurring within 24 hours   after the procedure.  5. Rectal bleeding, which would show as bright red, maroon, or black stools.   (A tablespoon of blood from the rectum is not serious, especially  if   hemorrhoids are present.)  6. Vomiting.  7. Weakness or dizziness.  GO DIRECTLY TO THE NEAREST EMERGENCY ROOM IF YOU HAVE ANY OF THE FOLLOWING:      Difficulty breathing              Chills and/or fever over 101 F   Persistent vomiting and/or vomiting blood   Severe abdominal pain   Severe chest pain   Black, tarry stools   Bleeding- more than one tablespoon   Any other symptom or condition that you feel may need urgent attention  Your doctor recommends these additional instructions:  If any biopsies were taken, your doctors clinic will contact you in 1 to 2   weeks with any results.  Your physician has recommended a repeat colonoscopy in six months because   the bowel preparation was poor.   You are being discharged to home.   Advance your diet as tolerated.   Continue your present medications.   We are waiting for your pathology results.  For questions, problems or results please call your physician - Nilson Wiseman MD at Work:  [phone].  EMERGENCY PHONE NUMBER: [phone], LAB RESULTS: [phone]  IF A COMPLICATION OR EMERGENCY SITUATION ARISES AND YOU ARE UNABLE TO REACH   YOUR PHYSICIAN - GO DIRECTLY TO THE EMERGENCY ROOM.  ___________________________________________  Nurse Signature  ___________________________________________  Patient/Designated Responsible Party Signature  Nilson Wiseman MD  3/10/2022 9:23:37 AM  This report has been verified and signed electronically.  Dear patient,  As a result of recent federal legislation (The Federal Cures Act), you may   receive lab or pathology results from your procedure in your [facility]   account before your physician is able to contact you. Your physician or   their representative will relay the results to you with their   recommendations at their soonest availability.  Thank you.  PROVATION

## 2022-03-10 NOTE — H&P
History & Physical - Short Stay  Gastroenterology      SUBJECTIVE:     Procedure: Colonoscopy and EGD    Chief Complaint/Indication for Procedure: Crohn's disease    PTA Medications   Medication Sig    albuterol (ACCUNEB) 1.25 mg/3 mL Nebu Take 3 mLs (1.25 mg total) by nebulization every 6 (six) hours as needed (cough). Rescue    albuterol (PROVENTIL/VENTOLIN HFA) 90 mcg/actuation inhaler Inhale 2 puffs into the lungs every 6 (six) hours as needed for Wheezing. Rescue    amLODIPine (NORVASC) 10 MG tablet Take 1 tablet (10 mg total) by mouth daily    budesonide (ENTOCORT EC) 3 mg capsule TAKE ONE CAPSULE BY MOUTH DAILY    butalbital-acetaminophen-caffeine -40 mg (FIORICET, ESGIC) -40 mg per tablet Take 1 tablet by mouth every 4 (four) hours as needed.    chlorhexidine (PERIDEX) 0.12 % solution SMARTSIG:By Mouth    diclofenac sodium (VOLTAREN) 1 % Gel APPLY TOPICALLY FOUR TIMES DAILY    diltiaZEM (CARDIZEM CD) 240 MG 24 hr capsule Take 240 mg by mouth.    diltiaZEM (TIAZAC) 240 MG Cs24 Take 1 capsule (240 mg total) by mouth once daily.    fluticasone propionate (FLONASE) 50 mcg/actuation nasal spray     fluticasone-salmeterol 500-50 mcg/dose (ADVAIR) 500-50 mcg/dose DsDv diskus inhaler Inhale 1 puff into the lungs.    furosemide (LASIX) 20 MG tablet Take 1 tablet (20 mg total) by mouth daily    gabapentin (NEURONTIN) 800 MG tablet Take 1 tablet (800 mg total) by mouth 3 (three) times daily.    HYDROcodone-acetaminophen (NORCO)  mg per tablet TAKE 1 TABLET BY MOUTH EVERY 8 HOURS AS NEEDED **1/3/2022    hydrOXYchloroQUINE (PLAQUENIL) 200 mg tablet Take 1 tablet (200 mg total) by mouth 2 (two) times daily.    ipratropium-albuteroL (COMBIVENT)  mcg/actuation inhaler Inhale 1 puff into the lungs 4 (four) times daily. Rescue    meloxicam (MOBIC) 15 MG tablet Take 1 tablet (15 mg total) by mouth once daily.    montelukast (SINGULAIR) 10 mg tablet Take 10 mg by mouth every evening.     nebulizer and compressor Siomara Use as directed    pantoprazole (PROTONIX) 40 MG tablet Take 1 tablet (40 mg total) by mouth once daily.    phentermine (ADIPEX-P) 37.5 mg tablet Take 37.5 mg by mouth once daily.    promethazine (PHENERGAN) 50 MG tablet TAKE 1 TABLET BY MOUTH EVERY 6 HOURS AS NEEDED FOR NAUSEA    promethazine (PHENERGAN) 6.25 mg/5 mL syrup Take 5 mLs (6.25 mg total) by mouth every 6 (six) hours as needed (cough).    tiZANidine (ZANAFLEX) 4 MG tablet Take 4 mg by mouth every 8 (eight) hours.     traZODone (DESYREL) 100 MG tablet Take 1 tablet (100 mg total) by mouth every evening.    ALPRAZolam (XANAX) 0.5 MG tablet Take 0.5 mg by mouth nightly as needed.    atorvastatin (LIPITOR) 40 MG tablet Take 1 tablet (40 mg total) by mouth daily    dicyclomine (BENTYL) 20 mg tablet Take 20 mg by mouth once daily.     doxycycline (VIBRAMYCIN) 100 MG Cap Take 1 capsule (100 mg total) by mouth every 12 (twelve) hours.    epinephrine (EPIPEN) 0.3 mg/0.3 mL AtIn Inject 0.3 mg into the muscle.    hydrochlorothiazide (MICROZIDE) 12.5 mg capsule Take 12.5 mg by mouth every morning.    hydrOXYzine pamoate (VISTARIL) 25 MG Cap TAKE ONE CAPSULE BY MOUTH THREE TIMES DAILY AS NEEDED FOR ITCHING    lidocaine HCl 2% (LIDOCAINE VISCOUS) 2 % Soln by Mucous Membrane route every 8 (eight) hours as needed.    ondansetron (ZOFRAN-ODT) 4 MG TbDL Take 4 mg by mouth every 8 (eight) hours as needed.    sertraline (ZOLOFT) 100 MG tablet Take 100 mg by mouth once daily.    sumatriptan (IMITREX) 50 MG tablet Take 50 mg by mouth.     triamcinolone acetonide 0.1% (KENALOG) 0.1 % ointment Apply topically 2 (two) times daily.       Review of patient's allergies indicates:   Allergen Reactions    Amlodipine-benazepril Swelling    Ace inhibitors Swelling     Angioedema; was taking Benazepril.    Tramadol Itching        Past Medical History:   Diagnosis Date    Anxiety     Arthritis     Asthma     Crohn's disease      Depression     Encounter for blood transfusion     Hypertension      History reviewed. No pertinent surgical history.  Family History   Problem Relation Age of Onset    Cancer Mother     Diabetes Mother     Arthritis Mother     Hypertension Mother     Cancer Father     Diabetes Father     Arthritis Father     Hypertension Father      Social History     Tobacco Use    Smoking status: Never Smoker    Smokeless tobacco: Never Used   Substance Use Topics    Drug use: No     OBJECTIVE:     Vital Signs (Most Recent)  Temp: 97.9 °F (36.6 °C) (03/10/22 0745)  Pulse: 83 (03/10/22 0745)  Resp: 19 (03/10/22 0745)  BP: (!) 154/78 (03/10/22 0745)  SpO2: 95 % (03/10/22 0745)    Physical Exam:                                                       GENERAL:  Comfortable, in no acute distress.                                 HEENT EXAM:  Nonicteric.  No adenopathy.  Oropharynx is clear.               NECK:  Supple.                                                               LUNGS:  Clear.                                                               CARDIAC:  Regular rate and rhythm.  S1, S2.  No murmur.                      ABDOMEN:  Soft, positive bowel sounds, nontender.  No hepatosplenomegaly or masses.  No rebound or guarding.                                             EXTREMITIES:  No edema.     MENTAL STATUS:  Normal, alert and oriented.    ASSESSMENT/PLAN:     Assessment: Crohn's disease    Plan: Colonoscopy and EGD

## 2022-03-10 NOTE — ANESTHESIA POSTPROCEDURE EVALUATION
Anesthesia Post Evaluation    Patient: Amanda Mcguire    Procedure(s) Performed: Procedure(s) (LRB):  EGD (ESOPHAGOGASTRODUODENOSCOPY) (N/A)  COLONOSCOPY (N/A)    Final Anesthesia Type: MAC      Patient location during evaluation: PACU  Patient participation: Yes- Able to Participate  Level of consciousness: awake and alert  Post-procedure vital signs: reviewed and stable  Pain management: adequate  Airway patency: patent    PONV status at discharge: No PONV  Anesthetic complications: no      Cardiovascular status: blood pressure returned to baseline  Respiratory status: unassisted  Hydration status: euvolemic  Follow-up not needed.          Vitals Value Taken Time   /67 03/10/22 0952   Temp 36.5 °C (97.7 °F) 03/10/22 0920   Pulse 88 03/10/22 0952   Resp 14 03/10/22 0952   SpO2 97 % 03/10/22 0952         Event Time   Out of Recovery 09:53:56         Pain/Beny Score: Beny Score: 10 (3/10/2022  9:20 AM)

## 2022-03-11 VITALS
RESPIRATION RATE: 14 BRPM | DIASTOLIC BLOOD PRESSURE: 67 MMHG | SYSTOLIC BLOOD PRESSURE: 116 MMHG | TEMPERATURE: 98 F | HEART RATE: 88 BPM | OXYGEN SATURATION: 97 % | HEIGHT: 63 IN | BODY MASS INDEX: 41.99 KG/M2 | WEIGHT: 237 LBS

## 2022-03-15 ENCOUNTER — TELEPHONE (OUTPATIENT)
Dept: RHEUMATOLOGY | Facility: CLINIC | Age: 57
End: 2022-03-15
Payer: MEDICARE

## 2022-03-15 NOTE — TELEPHONE ENCOUNTER
Returned patient call and scheduled a nurse visit for tomorrow morning. Patient is aware of time of appointment.

## 2022-03-15 NOTE — TELEPHONE ENCOUNTER
----- Message from Tiny López sent at 3/14/2022  2:41 PM CDT -----  Contact: Pt  Type: Needs Medical Advice    Who Called:  Pt    Best Call Back Number: [phone] or [phone]    Additional Information: Please call to schedule injection.  Thanks.

## 2022-03-16 ENCOUNTER — TELEPHONE (OUTPATIENT)
Dept: RHEUMATOLOGY | Facility: CLINIC | Age: 57
End: 2022-03-16
Payer: MEDICARE

## 2022-03-16 NOTE — TELEPHONE ENCOUNTER
----- Message from Deepali  sent at 3/16/2022 10:16 AM CDT -----  Regarding: appt access  Type: Needs Medical Advice    Who Called:      Best Call Back Number:     Additional Information: Requesting a call back from Nurse, regarding pt needs reschedule her nurse visit ,please advise and call back

## 2022-03-18 LAB
FINAL PATHOLOGIC DIAGNOSIS: NORMAL
Lab: NORMAL

## 2022-03-29 ENCOUNTER — CLINICAL SUPPORT (OUTPATIENT)
Dept: RHEUMATOLOGY | Facility: CLINIC | Age: 57
End: 2022-03-29
Payer: MEDICARE

## 2022-03-29 DIAGNOSIS — M02.39 REACTIVE ARTHRITIS OF MULTIPLE SITES: Primary | ICD-10-CM

## 2022-03-29 DIAGNOSIS — G89.4 CHRONIC PAIN SYNDROME: ICD-10-CM

## 2022-03-29 PROCEDURE — 96372 THER/PROPH/DIAG INJ SC/IM: CPT | Mod: S$GLB,,, | Performed by: PHYSICIAN ASSISTANT

## 2022-03-29 PROCEDURE — 96372 PR INJECTION,THERAP/PROPH/DIAG2ST, IM OR SUBCUT: ICD-10-PCS | Mod: S$GLB,,, | Performed by: PHYSICIAN ASSISTANT

## 2022-03-29 RX ORDER — DEXAMETHASONE SODIUM PHOSPHATE 4 MG/ML
8 INJECTION, SOLUTION INTRA-ARTICULAR; INTRALESIONAL; INTRAMUSCULAR; INTRAVENOUS; SOFT TISSUE
Status: COMPLETED | OUTPATIENT
Start: 2022-03-29 | End: 2022-03-29

## 2022-03-29 RX ORDER — KETOROLAC TROMETHAMINE 30 MG/ML
60 INJECTION, SOLUTION INTRAMUSCULAR; INTRAVENOUS ONCE
Status: COMPLETED | OUTPATIENT
Start: 2022-03-29 | End: 2022-03-29

## 2022-03-29 RX ORDER — CYANOCOBALAMIN 1000 UG/ML
1000 INJECTION, SOLUTION INTRAMUSCULAR; SUBCUTANEOUS
Status: COMPLETED | OUTPATIENT
Start: 2022-03-29 | End: 2022-03-29

## 2022-03-29 RX ADMIN — KETOROLAC TROMETHAMINE 60 MG: 30 INJECTION, SOLUTION INTRAMUSCULAR; INTRAVENOUS at 11:03

## 2022-03-29 RX ADMIN — CYANOCOBALAMIN 1000 MCG: 1000 INJECTION, SOLUTION INTRAMUSCULAR; SUBCUTANEOUS at 11:03

## 2022-03-29 RX ADMIN — DEXAMETHASONE SODIUM PHOSPHATE 8 MG: 4 INJECTION, SOLUTION INTRA-ARTICULAR; INTRALESIONAL; INTRAMUSCULAR; INTRAVENOUS; SOFT TISSUE at 11:03

## 2022-03-31 ENCOUNTER — PATIENT MESSAGE (OUTPATIENT)
Dept: GASTROENTEROLOGY | Facility: CLINIC | Age: 57
End: 2022-03-31
Payer: MEDICARE

## 2022-03-31 ENCOUNTER — TELEPHONE (OUTPATIENT)
Dept: ORTHOPEDICS | Facility: CLINIC | Age: 57
End: 2022-03-31
Payer: MEDICARE

## 2022-03-31 NOTE — TELEPHONE ENCOUNTER
----- Message from Deepali  sent at 3/31/2022  9:18 AM CDT -----  Regarding: same appt access  Type: Needs Medical Advice    Who Called:      Best Call Back Number:     Additional Information: Requesting a call back from Nurse, regarding transportation didn't come get pt and is now telling her they will not make it and she found a ride and can make it by 10 am ,please advise

## 2022-03-31 NOTE — TELEPHONE ENCOUNTER
Attempted to return patients call. No answer. Left voicemail.   Explained we will need to set up new appointment. Call for appointment date.

## 2022-04-08 ENCOUNTER — TELEPHONE (OUTPATIENT)
Dept: ORTHOPEDICS | Facility: CLINIC | Age: 57
End: 2022-04-08

## 2022-04-08 ENCOUNTER — OFFICE VISIT (OUTPATIENT)
Dept: ORTHOPEDICS | Facility: CLINIC | Age: 57
End: 2022-04-08
Payer: MEDICARE

## 2022-04-08 VITALS — WEIGHT: 237 LBS | HEIGHT: 63 IN | BODY MASS INDEX: 41.99 KG/M2

## 2022-04-08 DIAGNOSIS — M70.62 TROCHANTERIC BURSITIS OF BOTH HIPS: Primary | ICD-10-CM

## 2022-04-08 DIAGNOSIS — M70.61 TROCHANTERIC BURSITIS OF BOTH HIPS: Primary | ICD-10-CM

## 2022-04-08 PROCEDURE — 1160F PR REVIEW ALL MEDS BY PRESCRIBER/CLIN PHARMACIST DOCUMENTED: ICD-10-PCS | Mod: CPTII,S$GLB,, | Performed by: NURSE PRACTITIONER

## 2022-04-08 PROCEDURE — 1160F RVW MEDS BY RX/DR IN RCRD: CPT | Mod: CPTII,S$GLB,, | Performed by: NURSE PRACTITIONER

## 2022-04-08 PROCEDURE — 3008F PR BODY MASS INDEX (BMI) DOCUMENTED: ICD-10-PCS | Mod: CPTII,S$GLB,, | Performed by: NURSE PRACTITIONER

## 2022-04-08 PROCEDURE — 3044F HG A1C LEVEL LT 7.0%: CPT | Mod: CPTII,S$GLB,, | Performed by: NURSE PRACTITIONER

## 2022-04-08 PROCEDURE — 99999 PR PBB SHADOW E&M-EST. PATIENT-LVL IV: ICD-10-PCS | Mod: PBBFAC,,, | Performed by: NURSE PRACTITIONER

## 2022-04-08 PROCEDURE — 99214 OFFICE O/P EST MOD 30 MIN: CPT | Mod: 25,S$GLB,, | Performed by: NURSE PRACTITIONER

## 2022-04-08 PROCEDURE — 1159F MED LIST DOCD IN RCRD: CPT | Mod: CPTII,S$GLB,, | Performed by: NURSE PRACTITIONER

## 2022-04-08 PROCEDURE — 20610 LARGE JOINT ASPIRATION/INJECTION: BILATERAL GREATER TROCHANTERIC BURSA: ICD-10-PCS | Mod: 50,S$GLB,, | Performed by: NURSE PRACTITIONER

## 2022-04-08 PROCEDURE — 99999 PR PBB SHADOW E&M-EST. PATIENT-LVL IV: CPT | Mod: PBBFAC,,, | Performed by: NURSE PRACTITIONER

## 2022-04-08 PROCEDURE — 1159F PR MEDICATION LIST DOCUMENTED IN MEDICAL RECORD: ICD-10-PCS | Mod: CPTII,S$GLB,, | Performed by: NURSE PRACTITIONER

## 2022-04-08 PROCEDURE — 99214 PR OFFICE/OUTPT VISIT, EST, LEVL IV, 30-39 MIN: ICD-10-PCS | Mod: 25,S$GLB,, | Performed by: NURSE PRACTITIONER

## 2022-04-08 PROCEDURE — 3008F BODY MASS INDEX DOCD: CPT | Mod: CPTII,S$GLB,, | Performed by: NURSE PRACTITIONER

## 2022-04-08 PROCEDURE — 20610 DRAIN/INJ JOINT/BURSA W/O US: CPT | Mod: 50,S$GLB,, | Performed by: NURSE PRACTITIONER

## 2022-04-08 PROCEDURE — 3044F PR MOST RECENT HEMOGLOBIN A1C LEVEL <7.0%: ICD-10-PCS | Mod: CPTII,S$GLB,, | Performed by: NURSE PRACTITIONER

## 2022-04-08 RX ORDER — TRIAMCINOLONE ACETONIDE 40 MG/ML
40 INJECTION, SUSPENSION INTRA-ARTICULAR; INTRAMUSCULAR
Status: DISCONTINUED | OUTPATIENT
Start: 2022-04-08 | End: 2022-04-08 | Stop reason: HOSPADM

## 2022-04-08 RX ADMIN — TRIAMCINOLONE ACETONIDE 40 MG: 40 INJECTION, SUSPENSION INTRA-ARTICULAR; INTRAMUSCULAR at 09:04

## 2022-04-08 NOTE — PROCEDURES
Large Joint Aspiration/Injection: bilateral greater trochanteric bursa    Date/Time: 4/8/2022 9:40 AM  Performed by: SALVATORE Grey  Authorized by: SALVATORE Grey     Consent Done?:  Yes (Verbal)  Indications:  Pain  Timeout: prior to procedure the correct patient, procedure, and site was verified    Prep: patient was prepped and draped in usual sterile fashion      Local anesthesia used?: Yes    Local anesthetic:  Lidocaine 1% without epinephrine  Anesthetic total (ml):  5      Details:  Needle Size:  21 G  Approach:  Lateral  Location:  Hip  Laterality:  Bilateral  Site:  Bilateral greater trochanteric bursa  Medications (Right):  40 mg triamcinolone acetonide 40 mg/mL  Medications (Left):  40 mg triamcinolone acetonide 40 mg/mL  Patient tolerance:  Patient tolerated the procedure well with no immediate complications

## 2022-04-08 NOTE — PROGRESS NOTES
Chief Complaint   Patient presents with    Right Hip - Pain    Left Hip - Pain      HPI:   This is a 56 y.o. F who presents to clinic today for bilateral hip pain for the past 2 weeks after no known trauma. Complains of pain while sleeping on side and when descending stairs. Pain is deep. No numbness or tingling. No associated signs or symptoms.      Past Medical History:   Diagnosis Date    Anxiety     Arthritis     Asthma     Crohn's disease     Depression     Encounter for blood transfusion     Hypertension      Past Surgical History:   Procedure Laterality Date    COLONOSCOPY N/A 3/10/2022    Procedure: COLONOSCOPY;  Surgeon: Nilson Wiseman MD;  Location: [facility] ENDO;  Service: Endoscopy;  Laterality: N/A;    ESOPHAGOGASTRODUODENOSCOPY N/A 3/10/2022    Procedure: EGD (ESOPHAGOGASTRODUODENOSCOPY);  Surgeon: Nilson Wiseman MD;  Location: [facility];  Service: Endoscopy;  Laterality: N/A;     Current Outpatient Medications on File Prior to Visit   Medication Sig Dispense Refill    albuterol (ACCUNEB) 1.25 mg/3 mL Nebu Take 3 mLs (1.25 mg total) by nebulization every 6 (six) hours as needed (cough). Rescue 75 mL 3    albuterol (PROVENTIL/VENTOLIN HFA) 90 mcg/actuation inhaler Inhale 2 puffs into the lungs every 6 (six) hours as needed for Wheezing. Rescue 18 g 6    ALPRAZolam (XANAX) 0.5 MG tablet Take 0.5 mg by mouth nightly as needed.      amLODIPine (NORVASC) 10 MG tablet Take 1 tablet (10 mg total) by mouth daily      atorvastatin (LIPITOR) 40 MG tablet Take 1 tablet (40 mg total) by mouth daily      budesonide (ENTOCORT EC) 3 mg capsule TAKE ONE CAPSULE BY MOUTH DAILY 30 capsule 3    butalbital-acetaminophen-caffeine -40 mg (FIORICET, ESGIC) -40 mg per tablet Take 1 tablet by mouth every 4 (four) hours as needed.      chlorhexidine (PERIDEX) 0.12 % solution SMARTSIG:By Mouth      diclofenac sodium (VOLTAREN) 1 % Gel APPLY TOPICALLY FOUR TIMES DAILY 300 g 3     dicyclomine (BENTYL) 20 mg tablet Take 20 mg by mouth once daily.       diltiaZEM (CARDIZEM CD) 240 MG 24 hr capsule Take 240 mg by mouth.      diltiaZEM (TIAZAC) 240 MG Cs24 Take 1 capsule (240 mg total) by mouth once daily. 90 capsule 0    doxycycline (VIBRAMYCIN) 100 MG Cap Take 1 capsule (100 mg total) by mouth every 12 (twelve) hours. 20 capsule 0    epinephrine (EPIPEN) 0.3 mg/0.3 mL AtIn Inject 0.3 mg into the muscle.      fluticasone propionate (FLONASE) 50 mcg/actuation nasal spray       fluticasone-salmeterol 500-50 mcg/dose (ADVAIR) 500-50 mcg/dose DsDv diskus inhaler Inhale 1 puff into the lungs.      furosemide (LASIX) 20 MG tablet Take 1 tablet (20 mg total) by mouth daily      gabapentin (NEURONTIN) 800 MG tablet Take 1 tablet (800 mg total) by mouth 3 (three) times daily. 90 tablet 3    hydrochlorothiazide (MICROZIDE) 12.5 mg capsule Take 12.5 mg by mouth every morning.  11    [START ON 5/7/2022] HYDROcodone-acetaminophen (NORCO)  mg per tablet Take 1 tablet by mouth every 8 (eight) hours as needed for Pain. 90 tablet 0    HYDROcodone-acetaminophen (NORCO)  mg per tablet Take 1 tablet by mouth every 8 (eight) hours as needed for Pain. 90 tablet 0    HYDROcodone-acetaminophen (NORCO)  mg per tablet Take 1 tablet by mouth every 8 (eight) hours as needed for Pain. 90 tablet 0    hydrOXYchloroQUINE (PLAQUENIL) 200 mg tablet Take 1 tablet (200 mg total) by mouth 2 (two) times daily. 180 tablet 1    hydrOXYzine pamoate (VISTARIL) 25 MG Cap TAKE ONE CAPSULE BY MOUTH THREE TIMES DAILY AS NEEDED FOR ITCHING 45 capsule 3    ipratropium-albuteroL (COMBIVENT)  mcg/actuation inhaler Inhale 1 puff into the lungs 4 (four) times daily. Rescue 4 g 12    lidocaine HCl 2% (LIDOCAINE VISCOUS) 2 % Soln by Mucous Membrane route every 8 (eight) hours as needed. 100 mL 0    meloxicam (MOBIC) 15 MG tablet Take 1 tablet (15 mg total) by mouth once daily. 90 tablet 1    montelukast  (SINGULAIR) 10 mg tablet Take 10 mg by mouth every evening.      nebulizer and compressor Siomara Use as directed      ondansetron (ZOFRAN-ODT) 4 MG TbDL Take 4 mg by mouth every 8 (eight) hours as needed.      pantoprazole (PROTONIX) 40 MG tablet Take 1 tablet (40 mg total) by mouth once daily. 30 tablet 3    pantoprazole (PROTONIX) 40 MG tablet Take 1 tablet (40 mg total) by mouth 2 (two) times daily. 60 tablet 11    phentermine (ADIPEX-P) 37.5 mg tablet Take 37.5 mg by mouth once daily.      promethazine (PHENERGAN) 50 MG tablet TAKE 1 TABLET BY MOUTH EVERY 6 HOURS AS NEEDED FOR NAUSEA 30 tablet 0    promethazine (PHENERGAN) 6.25 mg/5 mL syrup Take 5 mLs (6.25 mg total) by mouth every 6 (six) hours as needed (cough). 240 mL 1    sertraline (ZOLOFT) 100 MG tablet Take 100 mg by mouth once daily.      sumatriptan (IMITREX) 50 MG tablet Take 50 mg by mouth.       tiZANidine (ZANAFLEX) 4 MG tablet Take 4 mg by mouth every 8 (eight) hours.       traZODone (DESYREL) 100 MG tablet Take 1 tablet (100 mg total) by mouth every evening. 90 tablet 1    triamcinolone acetonide 0.1% (KENALOG) 0.1 % ointment Apply topically 2 (two) times daily. 80 g 3     Current Facility-Administered Medications on File Prior to Visit   Medication Dose Route Frequency Provider Last Rate Last Admin    lactated ringers infusion   Intravenous Continuous Nilson Wiseman MD 75 mL/hr at 03/10/22 0806 New Bag at 03/10/22 0911    sodium chloride 0.9% flush 10 mL  10 mL Intravenous Q6H PRN Nilson Wiseman MD         Review of patient's allergies indicates:   Allergen Reactions    Amlodipine-benazepril Swelling    Ace inhibitors Swelling     Angioedema; was taking Benazepril.    Tramadol Itching     Family History   Problem Relation Age of Onset    Cancer Mother     Diabetes Mother     Arthritis Mother     Hypertension Mother     Cancer Father     Diabetes Father     Arthritis Father     Hypertension Father      Social  History     Socioeconomic History    Marital status:    Tobacco Use    Smoking status: Never Smoker    Smokeless tobacco: Never Used   Substance and Sexual Activity    Drug use: No    Sexual activity: Never       Review of Systems:  Constitutional:  Denies fever or chills   Eyes:  Denies change in visual acuity   HENT:  Denies nasal congestion or sore throat   Respiratory:  Denies cough or shortness of breath   Cardiovascular:  Denies chest pain or edema   GI:  Denies abdominal pain, nausea, vomiting, bloody stools or diarrhea   :  Denies dysuria   Integument:  Denies rash   Neurologic:  Denies headache, focal weakness or sensory changes   Endocrine:  Denies polyuria or polydipsia   Lymphatic:  Denies swollen glands   Psychiatric:  Denies depression or anxiety     Physical Exam:   Constitutional:  Well developed, well nourished, no acute distress, non-toxic appearance   Integument:  Well hydrated, no rash   Lymphatic:  No lymphadenopathy noted   Neurologic:  Alert & oriented x 3  Psychiatric:  Speech and behavior appropriate       Bilateral Hip Exam   Bilateral hip exam performed same as contralateral hip and is normal.     Bilateral Hip Exam   Tenderness   The patient is experiencing tenderness in the greater trochanter.  Range of Motion   The patient has normal hip ROM.  Muscle Strength   Abduction: 4/5   Other   Erythema: absent  Sensation: normal  Pulse: present      Assessment   Trochanteric bursitis of both hips  -     Large Joint Aspiration/Injection: bilateral greater trochanteric bursa       Plan  Using an aseptic technique, I injected 5 cc of lidocaine 1% without and 1 cc of kenalog 40mg into the bilateral Hip. The patient tolerated this well.   RTC in 6 weeks or as needed.

## 2022-04-08 NOTE — TELEPHONE ENCOUNTER
We saw patient  ----- Message from Terrence Billingsley sent at 4/8/2022  9:54 AM CDT -----  Regarding: pt is running 1/2 hour late, [phone]  Contact: pt   pt is running 1/2 hour late, [phone]

## 2022-05-19 ENCOUNTER — TELEPHONE (OUTPATIENT)
Dept: RHEUMATOLOGY | Facility: CLINIC | Age: 57
End: 2022-05-19
Payer: MEDICARE

## 2022-05-19 NOTE — TELEPHONE ENCOUNTER
----- Message from Wendi Pickard sent at 5/19/2022  7:59 AM CDT -----  Contact: pt  Type: Needs Medical Advice    Who: Called: Pt     Best Call Back Number: [phone]    Inquiry/Question: pt is asking if you can call her in some cough medication, she can't stop coughing  please call to advise    [facility] - [place] - [address]  [address]  [place] [address]  Phone: [phone] Fax: [phone]      Thank you~

## 2022-05-20 ENCOUNTER — OFFICE VISIT (OUTPATIENT)
Dept: ORTHOPEDICS | Facility: CLINIC | Age: 57
End: 2022-05-20
Payer: MEDICARE

## 2022-05-20 VITALS — HEIGHT: 63 IN | WEIGHT: 237 LBS | BODY MASS INDEX: 41.99 KG/M2

## 2022-05-20 DIAGNOSIS — M70.62 GREATER TROCHANTERIC BURSITIS OF LEFT HIP: ICD-10-CM

## 2022-05-20 DIAGNOSIS — M70.61 TROCHANTERIC BURSITIS OF RIGHT HIP: Primary | ICD-10-CM

## 2022-05-20 PROCEDURE — 1160F RVW MEDS BY RX/DR IN RCRD: CPT | Mod: CPTII,S$GLB,, | Performed by: NURSE PRACTITIONER

## 2022-05-20 PROCEDURE — 3008F PR BODY MASS INDEX (BMI) DOCUMENTED: ICD-10-PCS | Mod: CPTII,S$GLB,, | Performed by: NURSE PRACTITIONER

## 2022-05-20 PROCEDURE — 1159F MED LIST DOCD IN RCRD: CPT | Mod: CPTII,S$GLB,, | Performed by: NURSE PRACTITIONER

## 2022-05-20 PROCEDURE — 3008F BODY MASS INDEX DOCD: CPT | Mod: CPTII,S$GLB,, | Performed by: NURSE PRACTITIONER

## 2022-05-20 PROCEDURE — 3044F HG A1C LEVEL LT 7.0%: CPT | Mod: CPTII,S$GLB,, | Performed by: NURSE PRACTITIONER

## 2022-05-20 PROCEDURE — 99499 NO LOS: ICD-10-PCS | Mod: S$GLB,,, | Performed by: NURSE PRACTITIONER

## 2022-05-20 PROCEDURE — 20610 LARGE JOINT ASPIRATION/INJECTION: BILATERAL GREATER TROCHANTERIC BURSA: ICD-10-PCS | Mod: 50,S$GLB,, | Performed by: NURSE PRACTITIONER

## 2022-05-20 PROCEDURE — 99999 PR PBB SHADOW E&M-EST. PATIENT-LVL IV: ICD-10-PCS | Mod: PBBFAC,,, | Performed by: NURSE PRACTITIONER

## 2022-05-20 PROCEDURE — 3044F PR MOST RECENT HEMOGLOBIN A1C LEVEL <7.0%: ICD-10-PCS | Mod: CPTII,S$GLB,, | Performed by: NURSE PRACTITIONER

## 2022-05-20 PROCEDURE — 1159F PR MEDICATION LIST DOCUMENTED IN MEDICAL RECORD: ICD-10-PCS | Mod: CPTII,S$GLB,, | Performed by: NURSE PRACTITIONER

## 2022-05-20 PROCEDURE — 99999 PR PBB SHADOW E&M-EST. PATIENT-LVL IV: CPT | Mod: PBBFAC,,, | Performed by: NURSE PRACTITIONER

## 2022-05-20 PROCEDURE — 99499 UNLISTED E&M SERVICE: CPT | Mod: S$GLB,,, | Performed by: NURSE PRACTITIONER

## 2022-05-20 PROCEDURE — 20610 DRAIN/INJ JOINT/BURSA W/O US: CPT | Mod: 50,S$GLB,, | Performed by: NURSE PRACTITIONER

## 2022-05-20 PROCEDURE — 1160F PR REVIEW ALL MEDS BY PRESCRIBER/CLIN PHARMACIST DOCUMENTED: ICD-10-PCS | Mod: CPTII,S$GLB,, | Performed by: NURSE PRACTITIONER

## 2022-05-20 RX ORDER — TRIAMCINOLONE ACETONIDE 40 MG/ML
40 INJECTION, SUSPENSION INTRA-ARTICULAR; INTRAMUSCULAR
Status: DISCONTINUED | OUTPATIENT
Start: 2022-05-20 | End: 2022-05-20 | Stop reason: HOSPADM

## 2022-05-20 RX ADMIN — TRIAMCINOLONE ACETONIDE 40 MG: 40 INJECTION, SUSPENSION INTRA-ARTICULAR; INTRAMUSCULAR at 10:05

## 2022-05-20 NOTE — PROCEDURES
Large Joint Aspiration/Injection: bilateral greater trochanteric bursa    Date/Time: 5/20/2022 10:40 AM  Performed by: SALVATORE Grey  Authorized by: SALVATORE Grey     Consent Done?:  Yes (Verbal)  Indications:  Pain  Timeout: prior to procedure the correct patient, procedure, and site was verified    Prep: patient was prepped and draped in usual sterile fashion      Local anesthesia used?: Yes    Local anesthetic:  Lidocaine 1% without epinephrine  Anesthetic total (ml):  5      Details:  Needle Size:  21 G  Approach:  Lateral  Location:  Hip  Laterality:  Bilateral  Site:  Bilateral greater trochanteric bursa  Medications (Right):  40 mg triamcinolone acetonide 40 mg/mL  Medications (Left):  40 mg triamcinolone acetonide 40 mg/mL  Patient tolerance:  Patient tolerated the procedure well with no immediate complications

## 2022-05-20 NOTE — PROGRESS NOTES
Chief Complaint   Patient presents with    Right Hip - Pain    Left Hip - Pain      HPI:   This is a 56 y.o. who presents to clinic today for bilateral hip pain for the past 2 weeks after no known trauma. Complains of pain while sleeping on side and when descending stairs. Pain is deep. No numbness or tingling. No associated signs or symptoms.      Past Medical History:   Diagnosis Date    Anxiety     Arthritis     Asthma     Crohn's disease     Depression     Encounter for blood transfusion     Hypertension      Past Surgical History:   Procedure Laterality Date    COLONOSCOPY N/A 3/10/2022    Procedure: COLONOSCOPY;  Surgeon: Nilson Wiseman MD;  Location: [facility];  Service: Endoscopy;  Laterality: N/A;    ESOPHAGOGASTRODUODENOSCOPY N/A 3/10/2022    Procedure: EGD (ESOPHAGOGASTRODUODENOSCOPY);  Surgeon: Nilson Wiseman MD;  Location: [facility];  Service: Endoscopy;  Laterality: N/A;     Current Outpatient Medications on File Prior to Visit   Medication Sig Dispense Refill    albuterol (ACCUNEB) 1.25 mg/3 mL Nebu Take 3 mLs (1.25 mg total) by nebulization every 6 (six) hours as needed (cough). Rescue 75 mL 3    albuterol (PROVENTIL/VENTOLIN HFA) 90 mcg/actuation inhaler Inhale 2 puffs into the lungs every 6 (six) hours as needed for Wheezing. Rescue 18 g 6    ALPRAZolam (XANAX) 0.5 MG tablet Take 0.5 mg by mouth nightly as needed.      amLODIPine (NORVASC) 10 MG tablet Take 1 tablet (10 mg total) by mouth daily      atorvastatin (LIPITOR) 40 MG tablet Take 1 tablet (40 mg total) by mouth daily      budesonide (ENTOCORT EC) 3 mg capsule TAKE ONE CAPSULE BY MOUTH DAILY 30 capsule 3    butalbital-acetaminophen-caffeine -40 mg (FIORICET, ESGIC) -40 mg per tablet Take 1 tablet by mouth every 4 (four) hours as needed.      chlorhexidine (PERIDEX) 0.12 % solution SMARTSIG:By Mouth      diclofenac sodium (VOLTAREN) 1 % Gel APPLY TOPICALLY FOUR TIMES DAILY 300 g 3    dicyclomine  (BENTYL) 20 mg tablet Take 20 mg by mouth once daily.       diltiaZEM (CARDIZEM CD) 240 MG 24 hr capsule Take 240 mg by mouth.      diltiaZEM (TIAZAC) 240 MG Cs24 Take 1 capsule (240 mg total) by mouth once daily. 90 capsule 0    doxycycline (VIBRAMYCIN) 100 MG Cap Take 1 capsule (100 mg total) by mouth every 12 (twelve) hours. 20 capsule 0    epinephrine (EPIPEN) 0.3 mg/0.3 mL AtIn Inject 0.3 mg into the muscle.      fluticasone propionate (FLONASE) 50 mcg/actuation nasal spray       fluticasone-salmeterol 500-50 mcg/dose (ADVAIR) 500-50 mcg/dose DsDv diskus inhaler Inhale 1 puff into the lungs.      furosemide (LASIX) 20 MG tablet Take 1 tablet (20 mg total) by mouth daily      gabapentin (NEURONTIN) 800 MG tablet Take 1 tablet (800 mg total) by mouth 3 (three) times daily. 90 tablet 3    hydrochlorothiazide (MICROZIDE) 12.5 mg capsule Take 12.5 mg by mouth every morning.  11    HYDROcodone-acetaminophen (NORCO)  mg per tablet Take 1 tablet by mouth every 8 (eight) hours as needed for Pain. 90 tablet 0    HYDROcodone-acetaminophen (NORCO)  mg per tablet Take 1 tablet by mouth every 8 (eight) hours as needed for Pain. 90 tablet 0    HYDROcodone-acetaminophen (NORCO)  mg per tablet Take 1 tablet by mouth every 8 (eight) hours as needed for Pain. 90 tablet 0    hydrOXYchloroQUINE (PLAQUENIL) 200 mg tablet Take 1 tablet (200 mg total) by mouth 2 (two) times daily. 180 tablet 1    hydrOXYzine pamoate (VISTARIL) 25 MG Cap TAKE ONE CAPSULE BY MOUTH THREE TIMES DAILY AS NEEDED FOR ITCHING 45 capsule 3    ipratropium-albuteroL (COMBIVENT)  mcg/actuation inhaler Inhale 1 puff into the lungs 4 (four) times daily. Rescue 4 g 12    lidocaine HCl 2% (LIDOCAINE VISCOUS) 2 % Soln by Mucous Membrane route every 8 (eight) hours as needed. 100 mL 0    meloxicam (MOBIC) 15 MG tablet Take 1 tablet (15 mg total) by mouth once daily. 90 tablet 1    montelukast (SINGULAIR) 10 mg tablet Take 10 mg  by mouth every evening.      nebulizer and compressor Siomara Use as directed      ondansetron (ZOFRAN-ODT) 4 MG TbDL Take 4 mg by mouth every 8 (eight) hours as needed.      pantoprazole (PROTONIX) 40 MG tablet Take 1 tablet (40 mg total) by mouth once daily. 30 tablet 3    pantoprazole (PROTONIX) 40 MG tablet Take 1 tablet (40 mg total) by mouth 2 (two) times daily. 60 tablet 11    phentermine (ADIPEX-P) 37.5 mg tablet Take 37.5 mg by mouth once daily.      promethazine (PHENERGAN) 50 MG tablet TAKE 1 TABLET BY MOUTH EVERY 6 HOURS AS NEEDED FOR NAUSEA 30 tablet 0    promethazine (PHENERGAN) 6.25 mg/5 mL syrup Take 5 mLs (6.25 mg total) by mouth every 6 (six) hours as needed (cough). 240 mL 1    sertraline (ZOLOFT) 100 MG tablet Take 100 mg by mouth once daily.      sumatriptan (IMITREX) 50 MG tablet Take 50 mg by mouth.       tiZANidine (ZANAFLEX) 4 MG tablet Take 4 mg by mouth every 8 (eight) hours.       traZODone (DESYREL) 100 MG tablet Take 1 tablet (100 mg total) by mouth every evening. 90 tablet 1    triamcinolone acetonide 0.1% (KENALOG) 0.1 % ointment Apply topically 2 (two) times daily. 80 g 3     Current Facility-Administered Medications on File Prior to Visit   Medication Dose Route Frequency Provider Last Rate Last Admin    lactated ringers infusion   Intravenous Continuous Nilson Wiseman MD 75 mL/hr at 03/10/22 0806 New Bag at 03/10/22 0911    sodium chloride 0.9% flush 10 mL  10 mL Intravenous Q6H PRN Nilson Wiseman MD         Review of patient's allergies indicates:   Allergen Reactions    Amlodipine-benazepril Swelling    Ace inhibitors Swelling     Angioedema; was taking Benazepril.    Tramadol Itching     Family History   Problem Relation Age of Onset    Cancer Mother     Diabetes Mother     Arthritis Mother     Hypertension Mother     Cancer Father     Diabetes Father     Arthritis Father     Hypertension Father      Social History     Socioeconomic History     Marital status:    Tobacco Use    Smoking status: Never Smoker    Smokeless tobacco: Never Used   Substance and Sexual Activity    Drug use: No    Sexual activity: Never       Review of Systems:  Constitutional:  Denies fever or chills   Eyes:  Denies change in visual acuity   HENT:  Denies nasal congestion or sore throat   Respiratory:  Denies cough or shortness of breath   Cardiovascular:  Denies chest pain or edema   GI:  Denies abdominal pain, nausea, vomiting, bloody stools or diarrhea   :  Denies dysuria   Integument:  Denies rash   Neurologic:  Denies headache, focal weakness or sensory changes   Endocrine:  Denies polyuria or polydipsia   Lymphatic:  Denies swollen glands   Psychiatric:  Denies depression or anxiety     Physical Exam:   Constitutional:  Well developed, well nourished, no acute distress, non-toxic appearance   Integument:  Well hydrated, no rash   Lymphatic:  No lymphadenopathy noted   Neurologic:  Alert & oriented x 3  Psychiatric:  Speech and behavior appropriate     Bilateral Hip Exam    bilateral Hip Exam   bilateral hip exam performed same as contralateral hip and is normal.     bilateral Hip Exam   Tenderness   The patient is experiencing tenderness in the greater trochanter.  Range of Motion   The patient has normal hip ROM.  Muscle Strength   Abduction: 4/5   Other   Erythema: absent  Sensation: normal  Pulse: present          Trochanteric bursitis of right hip  -     Large Joint Aspiration/Injection: bilateral greater trochanteric bursa    Greater trochanteric bursitis of left hip  -     Large Joint Aspiration/Injection: bilateral greater trochanteric bursa           Using an aseptic technique, I injected 5 cc of lidocaine 1% without and 1 cc of kenalog 40mg into the bilateral Hip. The patient tolerated this well.  RTC in 6 weeks.

## 2022-06-03 DIAGNOSIS — M02.30 REACTIVE ARTHRITIS, UNSPECIFIED SITE: ICD-10-CM

## 2022-06-03 DIAGNOSIS — G89.4 CHRONIC PAIN SYNDROME: ICD-10-CM

## 2022-06-03 NOTE — TELEPHONE ENCOUNTER
----- Message from Blanca Franco sent at 6/3/2022 12:10 PM CDT -----  Type:  RX Refill Request    Who Called:  Amanda  Refill or New Rx:  refill  RX Name and Strength:  HYDROcodone-acetaminophen (NORCO)  mg per tablet  How is the patient currently taking it? (ex. 1XDay):  1 every 8 hours as needed  Is this a 30 day or 90 day RX:  30  Preferred Pharmacy with phone number:    [facility] - [place], [place] - [address]  [address] [facility] [address]  Phone: [phone] Fax: [phone]        Local or Mail Order:  local  Ordering Provider:  Dr Devin Guerra Call Back Number:  [phone] or [phone]  Additional Information:  Patient is also requesting a new cough medication because she cannot take the that Dr Bro prescribed because it is too strong. She said she cannot take the perls either.

## 2022-06-06 NOTE — TELEPHONE ENCOUNTER
----- Message from Clemenciajese Bowlingard sent at 6/6/2022  9:00 AM CDT -----  Contact: Patient  Type:  RX Refill Request    Who Called: Patient    Refill or New Rx: refill    RX Name and Strength: HYDROcodone-acetaminophen (NORCO)  mg per tablet [Pharmacy Med Name: hydrocodone 10 mg-acetaminophen 325 mg tablet]    How is the patient currently taking it? (ex. 1XDay)    Is this a 30 day or 90 day RX:    Preferred Pharmacy with phone number:   [facility] - [place], [place] - [id number] [facility] [address]  Phone: [phone] Fax: [phone]    Local or Mail Order:    Ordering Provider:    Would the patient rather a call back or a response via [facility]? call    Best Call Back Number: [phone]    Additional Information: calling to f/u on refill request; states she called in last week; please advise

## 2022-06-07 RX ORDER — HYDROCODONE BITARTRATE AND ACETAMINOPHEN 10; 325 MG/1; MG/1
1 TABLET ORAL EVERY 8 HOURS PRN
Qty: 90 TABLET | Refills: 0 | Status: SHIPPED | OUTPATIENT
Start: 2022-06-07 | End: 2022-07-06

## 2022-06-07 NOTE — TELEPHONE ENCOUNTER
----- Message from Alis Mcguire sent at 6/7/2022  4:28 PM CDT -----  Contact: KALA MCGUIRE [[id number]]  Type:  RX Refill Request    Who Called: KALA MCGUIRE [[id number]]    Refill or New Rx: Refill     RX Name and Strength:HYDROcodone-acetaminophen (NORCO)  mg per tablet    How is the patient currently taking it? (ex. 1XDay):    Is this a 30 day or 90 day RX:    Preferred Pharmacy with phone number:[facility] [place], [place] - [address] [facility]    Local or Mail Order: Local     Ordering Provider:    Would the patient rather a call back or a response via [facility]?     Best Call Back Number: [phone] (mobile)    Additional Information:

## 2022-07-01 ENCOUNTER — LAB VISIT (OUTPATIENT)
Dept: LAB | Facility: HOSPITAL | Age: 57
End: 2022-07-01
Attending: PHYSICIAN ASSISTANT
Payer: MEDICARE

## 2022-07-01 DIAGNOSIS — R76.8 ANA POSITIVE: ICD-10-CM

## 2022-07-01 LAB
ALBUMIN SERPL BCP-MCNC: 3.7 G/DL (ref 3.5–5.2)
ALP SERPL-CCNC: 101 U/L (ref 55–135)
ALT SERPL W/O P-5'-P-CCNC: 36 U/L (ref 10–44)
ANION GAP SERPL CALC-SCNC: 8 MMOL/L (ref 8–16)
AST SERPL-CCNC: 27 U/L (ref 10–40)
BASOPHILS # BLD AUTO: 0.04 K/UL (ref 0–0.2)
BASOPHILS NFR BLD: 0.6 % (ref 0–1.9)
BILIRUB SERPL-MCNC: 0.3 MG/DL (ref 0.1–1)
BUN SERPL-MCNC: 14 MG/DL (ref 6–20)
C3 SERPL-MCNC: 140 MG/DL (ref 50–180)
C4 SERPL-MCNC: 40 MG/DL (ref 11–44)
CALCIUM SERPL-MCNC: 9.7 MG/DL (ref 8.7–10.5)
CHLORIDE SERPL-SCNC: 107 MMOL/L (ref 95–110)
CO2 SERPL-SCNC: 28 MMOL/L (ref 23–29)
CREAT SERPL-MCNC: 0.7 MG/DL (ref 0.5–1.4)
CRP SERPL-MCNC: 9.3 MG/L (ref 0–8.2)
DIFFERENTIAL METHOD: ABNORMAL
EOSINOPHIL # BLD AUTO: 0.2 K/UL (ref 0–0.5)
EOSINOPHIL NFR BLD: 2.2 % (ref 0–8)
ERYTHROCYTE [DISTWIDTH] IN BLOOD BY AUTOMATED COUNT: 18.6 % (ref 11.5–14.5)
ERYTHROCYTE [SEDIMENTATION RATE] IN BLOOD BY WESTERGREN METHOD: 25 MM/HR (ref 0–20)
EST. GFR  (AFRICAN AMERICAN): >60 ML/MIN/1.73 M^2
EST. GFR  (NON AFRICAN AMERICAN): >60 ML/MIN/1.73 M^2
GLUCOSE SERPL-MCNC: 99 MG/DL (ref 70–110)
HCT VFR BLD AUTO: 41.8 % (ref 37–48.5)
HGB BLD-MCNC: 13.1 G/DL (ref 12–16)
IMM GRANULOCYTES # BLD AUTO: 0.11 K/UL (ref 0–0.04)
IMM GRANULOCYTES NFR BLD AUTO: 1.6 % (ref 0–0.5)
LYMPHOCYTES # BLD AUTO: 2.3 K/UL (ref 1–4.8)
LYMPHOCYTES NFR BLD: 32.5 % (ref 18–48)
MCH RBC QN AUTO: 27 PG (ref 27–31)
MCHC RBC AUTO-ENTMCNC: 31.3 G/DL (ref 32–36)
MCV RBC AUTO: 86 FL (ref 82–98)
MONOCYTES # BLD AUTO: 0.7 K/UL (ref 0.3–1)
MONOCYTES NFR BLD: 10.5 % (ref 4–15)
NEUTROPHILS # BLD AUTO: 3.7 K/UL (ref 1.8–7.7)
NEUTROPHILS NFR BLD: 52.6 % (ref 38–73)
NRBC BLD-RTO: 0 /100 WBC
PLATELET # BLD AUTO: 292 K/UL (ref 150–450)
PMV BLD AUTO: 10.3 FL (ref 9.2–12.9)
POTASSIUM SERPL-SCNC: 4.1 MMOL/L (ref 3.5–5.1)
PROT SERPL-MCNC: 7.3 G/DL (ref 6–8.4)
RBC # BLD AUTO: 4.86 M/UL (ref 4–5.4)
SODIUM SERPL-SCNC: 143 MMOL/L (ref 136–145)
WBC # BLD AUTO: 6.95 K/UL (ref 3.9–12.7)

## 2022-07-01 PROCEDURE — 85651 RBC SED RATE NONAUTOMATED: CPT | Mod: PO | Performed by: PHYSICIAN ASSISTANT

## 2022-07-01 PROCEDURE — 86160 COMPLEMENT ANTIGEN: CPT | Mod: 59 | Performed by: PHYSICIAN ASSISTANT

## 2022-07-01 PROCEDURE — 83516 IMMUNOASSAY NONANTIBODY: CPT | Performed by: PHYSICIAN ASSISTANT

## 2022-07-01 PROCEDURE — 86039 ANTINUCLEAR ANTIBODIES (ANA): CPT | Performed by: PHYSICIAN ASSISTANT

## 2022-07-01 PROCEDURE — 85025 COMPLETE CBC W/AUTO DIFF WBC: CPT | Performed by: PHYSICIAN ASSISTANT

## 2022-07-01 PROCEDURE — 86225 DNA ANTIBODY NATIVE: CPT | Performed by: PHYSICIAN ASSISTANT

## 2022-07-01 PROCEDURE — 86038 ANTINUCLEAR ANTIBODIES: CPT | Performed by: PHYSICIAN ASSISTANT

## 2022-07-01 PROCEDURE — 86160 COMPLEMENT ANTIGEN: CPT | Performed by: PHYSICIAN ASSISTANT

## 2022-07-01 PROCEDURE — 86235 NUCLEAR ANTIGEN ANTIBODY: CPT | Mod: 59 | Performed by: PHYSICIAN ASSISTANT

## 2022-07-01 PROCEDURE — 86140 C-REACTIVE PROTEIN: CPT | Performed by: PHYSICIAN ASSISTANT

## 2022-07-01 PROCEDURE — 36415 COLL VENOUS BLD VENIPUNCTURE: CPT | Mod: PO | Performed by: PHYSICIAN ASSISTANT

## 2022-07-01 PROCEDURE — 80053 COMPREHEN METABOLIC PANEL: CPT | Performed by: PHYSICIAN ASSISTANT

## 2022-07-04 LAB — HISTONE IGG SER IA-ACNC: 0.8 UNITS (ref 0–0.9)

## 2022-07-05 LAB
ANA PATTERN 1: NORMAL
ANA SER QL IF: POSITIVE
ANA TITR SER IF: NORMAL {TITER}
DSDNA AB SER-ACNC: NORMAL [IU]/ML

## 2022-07-06 ENCOUNTER — OFFICE VISIT (OUTPATIENT)
Dept: RHEUMATOLOGY | Facility: CLINIC | Age: 57
End: 2022-07-06
Payer: MEDICARE

## 2022-07-06 VITALS
DIASTOLIC BLOOD PRESSURE: 91 MMHG | HEART RATE: 75 BPM | BODY MASS INDEX: 44.2 KG/M2 | SYSTOLIC BLOOD PRESSURE: 150 MMHG | HEIGHT: 63 IN | WEIGHT: 249.44 LBS

## 2022-07-06 DIAGNOSIS — G47.00 INSOMNIA, UNSPECIFIED TYPE: ICD-10-CM

## 2022-07-06 DIAGNOSIS — H66.91 RIGHT OTITIS MEDIA, UNSPECIFIED OTITIS MEDIA TYPE: ICD-10-CM

## 2022-07-06 DIAGNOSIS — K50.918 CROHN'S DISEASE WITH OTHER COMPLICATION, UNSPECIFIED GASTROINTESTINAL TRACT LOCATION: ICD-10-CM

## 2022-07-06 DIAGNOSIS — G89.4 CHRONIC PAIN SYNDROME: ICD-10-CM

## 2022-07-06 DIAGNOSIS — M02.39 REACTIVE ARTHRITIS OF MULTIPLE SITES: ICD-10-CM

## 2022-07-06 DIAGNOSIS — L40.9 PSORIASIS: ICD-10-CM

## 2022-07-06 DIAGNOSIS — R05.3 CHRONIC COUGH: Primary | ICD-10-CM

## 2022-07-06 DIAGNOSIS — R76.8 ANA POSITIVE: ICD-10-CM

## 2022-07-06 LAB
ANTI SM ANTIBODY: 0.06 RATIO (ref 0–0.99)
ANTI SM/RNP ANTIBODY: 0.06 RATIO (ref 0–0.99)
ANTI-SM INTERPRETATION: NEGATIVE
ANTI-SM/RNP INTERPRETATION: NEGATIVE
ANTI-SSA ANTIBODY: 0.04 RATIO (ref 0–0.99)
ANTI-SSA INTERPRETATION: NEGATIVE
ANTI-SSB ANTIBODY: 0.05 RATIO (ref 0–0.99)
ANTI-SSB INTERPRETATION: NEGATIVE
DSDNA AB SER-ACNC: NORMAL [IU]/ML

## 2022-07-06 PROCEDURE — 3008F BODY MASS INDEX DOCD: CPT | Mod: CPTII,S$GLB,, | Performed by: INTERNAL MEDICINE

## 2022-07-06 PROCEDURE — 1159F MED LIST DOCD IN RCRD: CPT | Mod: CPTII,S$GLB,, | Performed by: INTERNAL MEDICINE

## 2022-07-06 PROCEDURE — 3008F PR BODY MASS INDEX (BMI) DOCUMENTED: ICD-10-PCS | Mod: CPTII,S$GLB,, | Performed by: INTERNAL MEDICINE

## 2022-07-06 PROCEDURE — 4010F PR ACE/ARB THEARPY RXD/TAKEN: ICD-10-PCS | Mod: CPTII,S$GLB,, | Performed by: INTERNAL MEDICINE

## 2022-07-06 PROCEDURE — 99215 PR OFFICE/OUTPT VISIT, EST, LEVL V, 40-54 MIN: ICD-10-PCS | Mod: 25,S$GLB,, | Performed by: INTERNAL MEDICINE

## 2022-07-06 PROCEDURE — 99999 PR PBB SHADOW E&M-EST. PATIENT-LVL V: ICD-10-PCS | Mod: PBBFAC,,, | Performed by: INTERNAL MEDICINE

## 2022-07-06 PROCEDURE — 96372 THER/PROPH/DIAG INJ SC/IM: CPT | Mod: S$GLB,,, | Performed by: INTERNAL MEDICINE

## 2022-07-06 PROCEDURE — 3044F HG A1C LEVEL LT 7.0%: CPT | Mod: CPTII,S$GLB,, | Performed by: INTERNAL MEDICINE

## 2022-07-06 PROCEDURE — 1159F PR MEDICATION LIST DOCUMENTED IN MEDICAL RECORD: ICD-10-PCS | Mod: CPTII,S$GLB,, | Performed by: INTERNAL MEDICINE

## 2022-07-06 PROCEDURE — 99215 OFFICE O/P EST HI 40 MIN: CPT | Mod: 25,S$GLB,, | Performed by: INTERNAL MEDICINE

## 2022-07-06 PROCEDURE — 99999 PR PBB SHADOW E&M-EST. PATIENT-LVL V: CPT | Mod: PBBFAC,,, | Performed by: INTERNAL MEDICINE

## 2022-07-06 PROCEDURE — 4010F ACE/ARB THERAPY RXD/TAKEN: CPT | Mod: CPTII,S$GLB,, | Performed by: INTERNAL MEDICINE

## 2022-07-06 PROCEDURE — 3044F PR MOST RECENT HEMOGLOBIN A1C LEVEL <7.0%: ICD-10-PCS | Mod: CPTII,S$GLB,, | Performed by: INTERNAL MEDICINE

## 2022-07-06 PROCEDURE — 96372 PR INJECTION,THERAP/PROPH/DIAG2ST, IM OR SUBCUT: ICD-10-PCS | Mod: S$GLB,,, | Performed by: INTERNAL MEDICINE

## 2022-07-06 RX ORDER — HYDROXYCHLOROQUINE SULFATE 200 MG/1
TABLET, FILM COATED ORAL
Qty: 180 TABLET | Refills: 1 | Status: SHIPPED | OUTPATIENT
Start: 2022-07-06 | End: 2022-11-08 | Stop reason: SDUPTHER

## 2022-07-06 RX ORDER — HYDROCODONE BITARTRATE AND ACETAMINOPHEN 10; 325 MG/1; MG/1
1 TABLET ORAL EVERY 8 HOURS PRN
Qty: 90 TABLET | Refills: 0 | Status: SHIPPED | OUTPATIENT
Start: 2022-08-04 | End: 2022-09-03

## 2022-07-06 RX ORDER — DEXAMETHASONE SODIUM PHOSPHATE 4 MG/ML
8 INJECTION, SOLUTION INTRA-ARTICULAR; INTRALESIONAL; INTRAMUSCULAR; INTRAVENOUS; SOFT TISSUE
Status: COMPLETED | OUTPATIENT
Start: 2022-07-06 | End: 2022-07-06

## 2022-07-06 RX ORDER — NEOMYCIN SULFATE, POLYMYXIN B SULFATE, HYDROCORTISONE 3.5; 10000; 1 MG/ML; [USP'U]/ML; MG/ML
SOLUTION/ DROPS AURICULAR (OTIC)
COMMUNITY
Start: 2022-05-03

## 2022-07-06 RX ORDER — PROMETHAZINE HYDROCHLORIDE 6.25 MG/5ML
25 SYRUP ORAL EVERY 6 HOURS PRN
Qty: 240 ML | Refills: 1 | Status: SHIPPED | OUTPATIENT
Start: 2022-07-06 | End: 2022-08-12

## 2022-07-06 RX ORDER — HYDROCODONE BITARTRATE AND ACETAMINOPHEN 10; 325 MG/1; MG/1
1 TABLET ORAL EVERY 8 HOURS PRN
Qty: 90 TABLET | Refills: 0 | Status: SHIPPED | OUTPATIENT
Start: 2022-07-06 | End: 2022-08-05

## 2022-07-06 RX ORDER — CEFTRIAXONE 1 G/1
1 INJECTION, POWDER, FOR SOLUTION INTRAMUSCULAR; INTRAVENOUS
Status: COMPLETED | OUTPATIENT
Start: 2022-07-06 | End: 2022-07-06

## 2022-07-06 RX ORDER — TRAZODONE HYDROCHLORIDE 100 MG/1
100 TABLET ORAL NIGHTLY
Qty: 90 TABLET | Refills: 1 | Status: SHIPPED | OUTPATIENT
Start: 2022-07-06 | End: 2022-10-25 | Stop reason: SDUPTHER

## 2022-07-06 RX ORDER — HYDROCODONE BITARTRATE AND ACETAMINOPHEN 10; 325 MG/1; MG/1
1 TABLET ORAL EVERY 8 HOURS PRN
Qty: 90 TABLET | Refills: 0 | Status: SHIPPED | OUTPATIENT
Start: 2022-09-04 | End: 2022-10-03 | Stop reason: SDUPTHER

## 2022-07-06 RX ORDER — AMOXICILLIN AND CLAVULANATE POTASSIUM 875; 125 MG/1; MG/1
1 TABLET, FILM COATED ORAL 2 TIMES DAILY
Qty: 20 TABLET | Refills: 0 | Status: SHIPPED | OUTPATIENT
Start: 2022-07-06 | End: 2022-07-16

## 2022-07-06 RX ADMIN — CEFTRIAXONE 1 G: 1 INJECTION, POWDER, FOR SOLUTION INTRAMUSCULAR; INTRAVENOUS at 11:07

## 2022-07-06 RX ADMIN — DEXAMETHASONE SODIUM PHOSPHATE 8 MG: 4 INJECTION, SOLUTION INTRA-ARTICULAR; INTRALESIONAL; INTRAMUSCULAR; INTRAVENOUS; SOFT TISSUE at 11:07

## 2022-07-06 ASSESSMENT — ROUTINE ASSESSMENT OF PATIENT INDEX DATA (RAPID3)
PATIENT GLOBAL ASSESSMENT SCORE: 8
FATIGUE SCORE: 1.1
PSYCHOLOGICAL DISTRESS SCORE: 4.4
PAIN SCORE: 8
TOTAL RAPID3 SCORE: 7
MDHAQ FUNCTION SCORE: 1.5

## 2022-07-06 NOTE — PROGRESS NOTES
Patient received 1 gram rocephin IM as ordered into Right Upper Outer Quad Gluteus, and Dexamethasone 8mg IM into left upper outer quad gluteus as ordered per Dr. Beltran. Patient tolerated injections well without adverse reaction. No complaints voiced. Patient left in stable condition.

## 2022-07-06 NOTE — PROGRESS NOTES
Subjective:       Patient ID: Amanda Mcguire is a 56 y.o. female.    Chief Complaint: Disease Management    Follow up:56 year old female who presents to clinic for follow up on psa reactive arthritis, osteoarthritis crohn's on remicade. She presented with ear pain sore throat, cough x 2 days no covid exposure her daughter also had a virus test neg for Covid.  Dr. Whitman to evaluate crohn's disease. She is doing poorly with regards to her arthritis. She has joint pain in her hands, knees, ankles, and low back. Pain is constant and aching. Taking percocet for severe pain with adequate control of her symptoms. She complains of sore throat, productive cough, and chest congestion for 3-4 days.    Current tx:  1. Remicade  And plaquenil  2. norco  3. baclofen        Review of Systems   Constitutional: Positive for fatigue. Negative for activity change, appetite change, chills, diaphoresis, fever and unexpected weight change.   HENT: Positive for ear pain, postnasal drip, rhinorrhea and sore throat. Negative for congestion, dental problem, ear discharge, facial swelling, mouth sores, sinus pressure, sneezing, tinnitus, trouble swallowing and voice change.    Eyes: Negative for photophobia, pain, discharge, redness and itching.   Respiratory: Positive for cough. Negative for apnea, chest tightness, shortness of breath and wheezing.    Cardiovascular: Positive for leg swelling. Negative for chest pain and palpitations.   Gastrointestinal: Positive for abdominal pain. Negative for abdominal distention, constipation, diarrhea, nausea and vomiting.   Endocrine: Negative for cold intolerance, heat intolerance, polydipsia and polyuria.   Genitourinary: Negative for decreased urine volume, difficulty urinating, dysuria, flank pain, frequency, hematuria and urgency.   Musculoskeletal: Positive for arthralgias, back pain, gait problem, joint swelling, myalgias, neck pain and neck stiffness.   Skin: Negative for pallor, rash and  "wound.   Allergic/Immunologic: Negative for immunocompromised state.   Neurological: Positive for weakness. Negative for dizziness, tremors, numbness and headaches.   Hematological: Negative for adenopathy. Does not bruise/bleed easily.   Psychiatric/Behavioral: Negative for sleep disturbance. The patient is not nervous/anxious.          Objective:   BP (!) 150/91   Pulse 75   Ht 5' 3" (1.6 m)   Wt 113.2 kg (249 lb 7.2 oz)   BMI 44.19 kg/m²      Physical Exam   Constitutional: She is oriented to person, place, and time. She appears distressed.   HENT:   Head: Normocephalic and atraumatic.   Right Ear: There is tenderness. Tympanic membrane is erythematous.   Left Ear: There is tenderness. Tympanic membrane is injected, erythematous and bulging.   Mouth/Throat: Oropharyngeal erythema present. Tonsils are 2+ on the right. Tonsils are 2+ on the left.   Eyes: Pupils are equal, round, and reactive to light. Right eye exhibits no discharge. Left eye exhibits no discharge.   Neck: No thyromegaly present.   Cardiovascular: Normal rate, regular rhythm and normal heart sounds. Exam reveals no gallop and no friction rub.   No murmur heard.  Pulmonary/Chest: Breath sounds normal. She has no wheezes. She has no rales. She exhibits no tenderness.   Abdominal: There is no abdominal tenderness. There is no rebound and no guarding.   Musculoskeletal:         General: Tenderness present.      Right shoulder: Tenderness present.      Left shoulder: Tenderness present.      Right elbow: Normal.      Left elbow: Tenderness present.      Right wrist: Tenderness present.      Left wrist: Tenderness present.      Cervical back: Neck supple.      Right knee: Effusion present. Tenderness present.      Left knee: Effusion present. Tenderness present.   Lymphadenopathy:     She has no cervical adenopathy.   Neurological: She is alert and oriented to person, place, and time. Gait normal.   Skin: Skin is dry. No rash noted. No erythema. " There is pallor.   Psychiatric: Mood and affect normal.   Vitals reviewed.      Right Side Rheumatological Exam     Examination finds the elbow normal.    The patient is tender to palpation of the shoulder, wrist, knee, 1st PIP, 1st MCP, 2nd PIP, 2nd MCP, 3rd PIP, 3rd MCP, 4th PIP, 4th MCP, 5th PIP and 5th MCP    She has swelling of the 1st PIP, 1st MCP, 2nd PIP, 2nd MCP, 3rd PIP, 3rd MCP, 4th PIP, 4th MCP, 5th PIP and 5th MCP    Shoulder Exam   Tenderness Location: no tenderness    Range of Motion   Active abduction: abnormal   Adduction: abnormal  Sensation: normal    Knee Exam   Patellofemoral Crepitus: positive  Effusion: positive  Sensation: normal    Hip Exam   Tenderness Location: posterior  Sensation: normal    Elbow/Wrist Exam   Tenderness Location: no tenderness  Sensation: normal    Left Side Rheumatological Exam     The patient is tender to palpation of the shoulder, elbow, wrist, knee, 1st PIP, 1st MCP, 2nd PIP, 2nd MCP, 3rd PIP, 3rd MCP, 4th PIP, 4th MCP, 5th PIP and 5th MCP.    She has swelling of the 1st PIP, 1st MCP, 2nd PIP, 2nd MCP, 3rd PIP, 3rd MCP, 4th PIP, 4th MCP, 5th PIP and 5th MCP    Shoulder Exam   Tenderness Location: no tenderness    Range of Motion   Active abduction: abnormal   Sensation: normal    Knee Exam     Patellofemoral Crepitus: positive  Effusion: positive  Sensation: normal    Hip Exam   Tenderness Location: posterior  Sensation: normal    Elbow/Wrist Exam   Sensation: normal      Back/Neck Exam   General Inspection   Gait: normal               10/14/2019   Tender (HOBBS-28) 25 / 28    Swollen (HOBBS-28) 25 / 28    Provider Global --   Patient Global --   ESR --   CRP 54 mg/L   HOBBS-28 (ESR) --   HOBBS-28 (CRP) --   CDAI Score --     Collected Updated Procedure    07/01/2022 0800 07/01/2022 1109 Sedimentation rate [[id number]]   (Abnormal)   Blood    Component Value Units   Sed Rate 25 High  mm/Hr          07/01/2022 0800 07/01/2022 1720 C4 Complement [[id number]]   Blood    Component  Value Units   Complement (C-4) 40 mg/dL          07/01/2022 0800 07/01/2022 1720 C3 Complement [[id number]]   Blood    Component Value Units   Complement (C-3) 140 mg/dL          07/01/2022 0800 07/05/2022 1234 Anti-DNA Ab, Double-Stranded [[id number]]   Blood    Component Value   ds DNA Ab Negative 1:10           07/01/2022 0800 07/04/2022 1925 Anti-Histone Antibody [[id number]]   Blood    Component Value Units   Anti-Histone Antibody 0.8  Units          07/01/2022 0800 07/01/2022 1811 C-Reactive Protein [[id number]]   (Abnormal)   Blood    Component Value Units   CRP 9.3 High  mg/L          07/01/2022 0800 07/01/2022 1811 Comprehensive Metabolic Panel [[id number]]   Blood    Component Value Units   Sodium 143 mmol/L   Potassium 4.1 mmol/L   Chloride 107 mmol/L   CO2 28 mmol/L   Glucose 99 mg/dL   BUN 14 mg/dL   Creatinine 0.7 mg/dL   Calcium 9.7 mg/dL   Total Protein 7.3 g/dL   Albumin 3.7 g/dL   Total Bilirubin 0.3  mg/dL   Alkaline Phosphatase 101 U/L   AST 27 U/L   ALT 36 U/L   Anion Gap 8 mmol/L   eGFR if African American >60.0 mL/min/1.73 m^2   eGFR if non African American >60.0  mL/min/1.73 m^2          07/01/2022 0800 07/01/2022 1736 CBC Auto Differential [[id number]]   (Abnormal)   Blood    Component Value Units   WBC 6.95 K/uL   RBC 4.86 M/uL   Hemoglobin 13.1 g/dL   Hematocrit 41.8 %   MCV 86 fL   MCH 27.0 pg   MCHC 31.3 Low  g/dL   RDW 18.6 High  %   Platelets 292 K/uL   MPV 10.3 fL   Immature Granulocytes 1.6 High  %   Gran # (ANC) 3.7 K/uL   Immature Grans (Abs) 0.11 High   K/uL   Lymph # 2.3 K/uL   Mono # 0.7 K/uL   Eos # 0.2 K/uL   Baso # 0.04 K/uL   nRBC 0 /100 WBC   Gran % 52.6 %   Lymph % 32.5 %   Mono % 10.5 %   Eosinophil % 2.2 %   Basophil % 0.6 %   Differential Method Automated           07/01/2022 0800 07/05/2022 1530 JONATHAN Screen w/Reflex [[id number]]   (Abnormal)   Blood    Component Value   JONATHAN Screen Positive Abnormal            07/01/2022 0800 07/05/2022 1531 JONATHAN Titer 1 [[id number]] Component  Value   JONATHAN Titer 1 1:320          07/01/2022 0800 07/05/2022 1530 JONATHAN Profile [[id number]] Component Value   ds DNA Ab Negative 1:10           07/01/2022 0800 07/05/2022 1530 JONATHAN Pattern 1 [[id number]] Component Value   JONATHAN PATTERN 1 Homogeneous           Assessment:       1. Chronic cough    2. Chronic pain syndrome    3. Reactive arthritis of multiple sites    4. JONATHAN positive    5. Psoriasis    6. Crohn's disease with other complication, unspecified gastrointestinal tract location    7. Right otitis media, unspecified otitis media type            Plan:       Amanda was seen today for disease management.    Diagnoses and all orders for this visit:    Chronic cough  -     C-Reactive Protein; Future  -     Sedimentation rate; Future  -     Anti-Histone Antibody; Future  -     JONATHAN Screen w/Reflex; Future  -     Anti Sm/RNP Antibody; Future  -     Anti-DNA Ab, Double-Stranded; Future  -     Cyclic Citrullinated Peptide Antibody, IgG; Future  -     CBC Auto Differential; Future  -     Comprehensive Metabolic Panel; Future  -     Vitamin D; Future  -     amoxicillin-clavulanate 875-125mg (AUGMENTIN) 875-125 mg per tablet; Take 1 tablet by mouth 2 (two) times daily. for 10 days  -     promethazine (PHENERGAN) 6.25 mg/5 mL syrup; Take 20 mLs (25 mg total) by mouth every 6 (six) hours as needed for Nausea.  -     cefTRIAXone injection 1 g  -     dexamethasone injection 8 mg  -     HYDROcodone-acetaminophen (NORCO)  mg per tablet; Take 1 tablet by mouth every 8 (eight) hours as needed for Pain.  -     HYDROcodone-acetaminophen (NORCO)  mg per tablet; Take 1 tablet by mouth every 8 (eight) hours as needed for Pain.  -     HYDROcodone-acetaminophen (NORCO)  mg per tablet; Take 1 tablet by mouth every 8 (eight) hours as needed for Pain.    Chronic pain syndrome  -     C-Reactive Protein; Future  -     Sedimentation rate; Future  -     Anti-Histone Antibody; Future  -     JONATHAN Screen w/Reflex; Future  -     Anti  Sm/RNP Antibody; Future  -     Anti-DNA Ab, Double-Stranded; Future  -     Cyclic Citrullinated Peptide Antibody, IgG; Future  -     CBC Auto Differential; Future  -     Comprehensive Metabolic Panel; Future  -     Vitamin D; Future  -     amoxicillin-clavulanate 875-125mg (AUGMENTIN) 875-125 mg per tablet; Take 1 tablet by mouth 2 (two) times daily. for 10 days  -     promethazine (PHENERGAN) 6.25 mg/5 mL syrup; Take 20 mLs (25 mg total) by mouth every 6 (six) hours as needed for Nausea.  -     cefTRIAXone injection 1 g  -     dexamethasone injection 8 mg  -     HYDROcodone-acetaminophen (NORCO)  mg per tablet; Take 1 tablet by mouth every 8 (eight) hours as needed for Pain.  -     HYDROcodone-acetaminophen (NORCO)  mg per tablet; Take 1 tablet by mouth every 8 (eight) hours as needed for Pain.  -     HYDROcodone-acetaminophen (NORCO)  mg per tablet; Take 1 tablet by mouth every 8 (eight) hours as needed for Pain.    Reactive arthritis of multiple sites  -     C-Reactive Protein; Future  -     Sedimentation rate; Future  -     Anti-Histone Antibody; Future  -     JONATHAN Screen w/Reflex; Future  -     Anti Sm/RNP Antibody; Future  -     Anti-DNA Ab, Double-Stranded; Future  -     Cyclic Citrullinated Peptide Antibody, IgG; Future  -     CBC Auto Differential; Future  -     Comprehensive Metabolic Panel; Future  -     Vitamin D; Future  -     amoxicillin-clavulanate 875-125mg (AUGMENTIN) 875-125 mg per tablet; Take 1 tablet by mouth 2 (two) times daily. for 10 days  -     promethazine (PHENERGAN) 6.25 mg/5 mL syrup; Take 20 mLs (25 mg total) by mouth every 6 (six) hours as needed for Nausea.  -     cefTRIAXone injection 1 g  -     dexamethasone injection 8 mg  -     HYDROcodone-acetaminophen (NORCO)  mg per tablet; Take 1 tablet by mouth every 8 (eight) hours as needed for Pain.  -     HYDROcodone-acetaminophen (NORCO)  mg per tablet; Take 1 tablet by mouth every 8 (eight) hours as needed  for Pain.  -     HYDROcodone-acetaminophen (NORCO)  mg per tablet; Take 1 tablet by mouth every 8 (eight) hours as needed for Pain.    JONATHAN positive  -     C-Reactive Protein; Future  -     Sedimentation rate; Future  -     Anti-Histone Antibody; Future  -     JONATHAN Screen w/Reflex; Future  -     Anti Sm/RNP Antibody; Future  -     Anti-DNA Ab, Double-Stranded; Future  -     Cyclic Citrullinated Peptide Antibody, IgG; Future  -     CBC Auto Differential; Future  -     Comprehensive Metabolic Panel; Future  -     Vitamin D; Future  -     amoxicillin-clavulanate 875-125mg (AUGMENTIN) 875-125 mg per tablet; Take 1 tablet by mouth 2 (two) times daily. for 10 days  -     promethazine (PHENERGAN) 6.25 mg/5 mL syrup; Take 20 mLs (25 mg total) by mouth every 6 (six) hours as needed for Nausea.  -     cefTRIAXone injection 1 g  -     dexamethasone injection 8 mg  -     HYDROcodone-acetaminophen (NORCO)  mg per tablet; Take 1 tablet by mouth every 8 (eight) hours as needed for Pain.  -     HYDROcodone-acetaminophen (NORCO)  mg per tablet; Take 1 tablet by mouth every 8 (eight) hours as needed for Pain.  -     HYDROcodone-acetaminophen (NORCO)  mg per tablet; Take 1 tablet by mouth every 8 (eight) hours as needed for Pain.    Psoriasis  -     C-Reactive Protein; Future  -     Sedimentation rate; Future  -     Anti-Histone Antibody; Future  -     JONATHAN Screen w/Reflex; Future  -     Anti Sm/RNP Antibody; Future  -     Anti-DNA Ab, Double-Stranded; Future  -     Cyclic Citrullinated Peptide Antibody, IgG; Future  -     CBC Auto Differential; Future  -     Comprehensive Metabolic Panel; Future  -     Vitamin D; Future  -     amoxicillin-clavulanate 875-125mg (AUGMENTIN) 875-125 mg per tablet; Take 1 tablet by mouth 2 (two) times daily. for 10 days  -     promethazine (PHENERGAN) 6.25 mg/5 mL syrup; Take 20 mLs (25 mg total) by mouth every 6 (six) hours as needed for Nausea.  -     cefTRIAXone injection 1 g  -      dexamethasone injection 8 mg  -     HYDROcodone-acetaminophen (NORCO)  mg per tablet; Take 1 tablet by mouth every 8 (eight) hours as needed for Pain.  -     HYDROcodone-acetaminophen (NORCO)  mg per tablet; Take 1 tablet by mouth every 8 (eight) hours as needed for Pain.  -     HYDROcodone-acetaminophen (NORCO)  mg per tablet; Take 1 tablet by mouth every 8 (eight) hours as needed for Pain.    Crohn's disease with other complication, unspecified gastrointestinal tract location  -     C-Reactive Protein; Future  -     Sedimentation rate; Future  -     Anti-Histone Antibody; Future  -     JONATHAN Screen w/Reflex; Future  -     Anti Sm/RNP Antibody; Future  -     Anti-DNA Ab, Double-Stranded; Future  -     Cyclic Citrullinated Peptide Antibody, IgG; Future  -     CBC Auto Differential; Future  -     Comprehensive Metabolic Panel; Future  -     Vitamin D; Future  -     amoxicillin-clavulanate 875-125mg (AUGMENTIN) 875-125 mg per tablet; Take 1 tablet by mouth 2 (two) times daily. for 10 days  -     promethazine (PHENERGAN) 6.25 mg/5 mL syrup; Take 20 mLs (25 mg total) by mouth every 6 (six) hours as needed for Nausea.  -     cefTRIAXone injection 1 g  -     dexamethasone injection 8 mg  -     HYDROcodone-acetaminophen (NORCO)  mg per tablet; Take 1 tablet by mouth every 8 (eight) hours as needed for Pain.  -     HYDROcodone-acetaminophen (NORCO)  mg per tablet; Take 1 tablet by mouth every 8 (eight) hours as needed for Pain.  -     HYDROcodone-acetaminophen (NORCO)  mg per tablet; Take 1 tablet by mouth every 8 (eight) hours as needed for Pain.    Right otitis media, unspecified otitis media type  -     amoxicillin-clavulanate 875-125mg (AUGMENTIN) 875-125 mg per tablet; Take 1 tablet by mouth 2 (two) times daily. for 10 days  -     promethazine (PHENERGAN) 6.25 mg/5 mL syrup; Take 20 mLs (25 mg total) by mouth every 6 (six) hours as needed for Nausea.  -     cefTRIAXone injection 1  g  -     dexamethasone injection 8 mg  -     HYDROcodone-acetaminophen (NORCO)  mg per tablet; Take 1 tablet by mouth every 8 (eight) hours as needed for Pain.  -     HYDROcodone-acetaminophen (NORCO)  mg per tablet; Take 1 tablet by mouth every 8 (eight) hours as needed for Pain.  -     HYDROcodone-acetaminophen (NORCO)  mg per tablet; Take 1 tablet by mouth every 8 (eight) hours as needed for Pain.    1. Treat otitis media ands uri recommend covid test  2.  Continue  remicade and plaquenil but hold if sick  3 pain meds refilled I have  check [place] prescription monitoring program site and no unusual or abnormal behavior has occurred pt understand the risk and benefits of taking opioid medications and has decided to continue the  medication   4. F/u 3  Beginning of OCT

## 2022-08-12 ENCOUNTER — OFFICE VISIT (OUTPATIENT)
Dept: ORTHOPEDICS | Facility: CLINIC | Age: 57
End: 2022-08-12
Payer: MEDICARE

## 2022-08-12 ENCOUNTER — HOSPITAL ENCOUNTER (OUTPATIENT)
Dept: RADIOLOGY | Facility: HOSPITAL | Age: 57
Discharge: HOME | End: 2022-08-12
Attending: NURSE PRACTITIONER
Payer: MEDICARE

## 2022-08-12 ENCOUNTER — TELEPHONE (OUTPATIENT)
Dept: ORTHOPEDICS | Facility: CLINIC | Age: 57
End: 2022-08-12

## 2022-08-12 VITALS — WEIGHT: 249 LBS | HEIGHT: 63 IN | BODY MASS INDEX: 44.12 KG/M2

## 2022-08-12 DIAGNOSIS — M23.52 RECURRENT LEFT KNEE INSTABILITY: ICD-10-CM

## 2022-08-12 DIAGNOSIS — M25.472 PAIN AND SWELLING OF ANKLE, LEFT: ICD-10-CM

## 2022-08-12 DIAGNOSIS — M25.572 PAIN AND SWELLING OF ANKLE, LEFT: ICD-10-CM

## 2022-08-12 DIAGNOSIS — M25.472 ANKLE SWELLING, LEFT: ICD-10-CM

## 2022-08-12 DIAGNOSIS — M23.52 RECURRENT LEFT KNEE INSTABILITY: Primary | ICD-10-CM

## 2022-08-12 DIAGNOSIS — M70.62 GREATER TROCHANTERIC BURSITIS OF LEFT HIP: ICD-10-CM

## 2022-08-12 DIAGNOSIS — M17.12 PRIMARY OSTEOARTHRITIS OF LEFT KNEE: ICD-10-CM

## 2022-08-12 DIAGNOSIS — J02.0 STREP THROAT: ICD-10-CM

## 2022-08-12 PROCEDURE — 20610 DRAIN/INJ JOINT/BURSA W/O US: CPT | Mod: 51,XS,79,LT | Performed by: NURSE PRACTITIONER

## 2022-08-12 PROCEDURE — 99214 PR OFFICE/OUTPT VISIT, EST, LEVL IV, 30-39 MIN: ICD-10-PCS | Mod: 25,S$GLB,, | Performed by: NURSE PRACTITIONER

## 2022-08-12 PROCEDURE — 73610 X-RAY EXAM OF ANKLE: CPT | Mod: 26,LT,, | Performed by: RADIOLOGY

## 2022-08-12 PROCEDURE — 20610 DRAIN/INJ JOINT/BURSA W/O US: CPT | Mod: 79,LT,S$GLB, | Performed by: NURSE PRACTITIONER

## 2022-08-12 PROCEDURE — 1160F PR REVIEW ALL MEDS BY PRESCRIBER/CLIN PHARMACIST DOCUMENTED: ICD-10-PCS | Mod: CPTII,S$GLB,, | Performed by: NURSE PRACTITIONER

## 2022-08-12 PROCEDURE — 1159F PR MEDICATION LIST DOCUMENTED IN MEDICAL RECORD: ICD-10-PCS | Mod: CPTII,S$GLB,, | Performed by: NURSE PRACTITIONER

## 2022-08-12 PROCEDURE — 4010F PR ACE/ARB THEARPY RXD/TAKEN: ICD-10-PCS | Mod: CPTII,S$GLB,, | Performed by: NURSE PRACTITIONER

## 2022-08-12 PROCEDURE — 73560 XR KNEE ORTHO LEFT: ICD-10-PCS | Mod: 26,RT,, | Performed by: RADIOLOGY

## 2022-08-12 PROCEDURE — 1159F MED LIST DOCD IN RCRD: CPT | Mod: CPTII,S$GLB,, | Performed by: NURSE PRACTITIONER

## 2022-08-12 PROCEDURE — 20610 LARGE JOINT ASPIRATION/INJECTION: L GREATER TROCHANTERIC BURSA: ICD-10-PCS | Mod: 51,XS,79,LT | Performed by: NURSE PRACTITIONER

## 2022-08-12 PROCEDURE — 73610 X-RAY EXAM OF ANKLE: CPT | Mod: TC,PO,LT

## 2022-08-12 PROCEDURE — 1160F RVW MEDS BY RX/DR IN RCRD: CPT | Mod: CPTII,S$GLB,, | Performed by: NURSE PRACTITIONER

## 2022-08-12 PROCEDURE — 3044F HG A1C LEVEL LT 7.0%: CPT | Mod: CPTII,S$GLB,, | Performed by: NURSE PRACTITIONER

## 2022-08-12 PROCEDURE — 73560 X-RAY EXAM OF KNEE 1 OR 2: CPT | Mod: TC,PO,RT

## 2022-08-12 PROCEDURE — 73610 XR ANKLE COMPLETE 3 VIEW LEFT: ICD-10-PCS | Mod: 26,LT,, | Performed by: RADIOLOGY

## 2022-08-12 PROCEDURE — 3008F PR BODY MASS INDEX (BMI) DOCUMENTED: ICD-10-PCS | Mod: CPTII,S$GLB,, | Performed by: NURSE PRACTITIONER

## 2022-08-12 PROCEDURE — 73562 XR KNEE ORTHO LEFT: ICD-10-PCS | Mod: 26,LT,, | Performed by: RADIOLOGY

## 2022-08-12 PROCEDURE — 99999 PR PBB SHADOW E&M-EST. PATIENT-LVL IV: CPT | Mod: PBBFAC,,, | Performed by: NURSE PRACTITIONER

## 2022-08-12 PROCEDURE — 3008F BODY MASS INDEX DOCD: CPT | Mod: CPTII,S$GLB,, | Performed by: NURSE PRACTITIONER

## 2022-08-12 PROCEDURE — 99999 PR PBB SHADOW E&M-EST. PATIENT-LVL IV: ICD-10-PCS | Mod: PBBFAC,,, | Performed by: NURSE PRACTITIONER

## 2022-08-12 PROCEDURE — 4010F ACE/ARB THERAPY RXD/TAKEN: CPT | Mod: CPTII,S$GLB,, | Performed by: NURSE PRACTITIONER

## 2022-08-12 PROCEDURE — 99214 OFFICE O/P EST MOD 30 MIN: CPT | Mod: 25,S$GLB,, | Performed by: NURSE PRACTITIONER

## 2022-08-12 PROCEDURE — 73562 X-RAY EXAM OF KNEE 3: CPT | Mod: 26,LT,, | Performed by: RADIOLOGY

## 2022-08-12 PROCEDURE — 3044F PR MOST RECENT HEMOGLOBIN A1C LEVEL <7.0%: ICD-10-PCS | Mod: CPTII,S$GLB,, | Performed by: NURSE PRACTITIONER

## 2022-08-12 PROCEDURE — 73560 X-RAY EXAM OF KNEE 1 OR 2: CPT | Mod: 26,RT,, | Performed by: RADIOLOGY

## 2022-08-12 RX ORDER — TRIAMCINOLONE ACETONIDE 40 MG/ML
40 INJECTION, SUSPENSION INTRA-ARTICULAR; INTRAMUSCULAR
Status: DISCONTINUED | OUTPATIENT
Start: 2022-08-12 | End: 2022-08-12 | Stop reason: HOSPADM

## 2022-08-12 RX ORDER — PROMETHAZINE HYDROCHLORIDE AND CODEINE PHOSPHATE 6.25; 1 MG/5ML; MG/5ML
5 SOLUTION ORAL EVERY 6 HOURS PRN
Qty: 200 ML | Refills: 0 | Status: SHIPPED | OUTPATIENT
Start: 2022-08-12 | End: 2022-08-22

## 2022-08-12 RX ORDER — ALPRAZOLAM 0.5 MG/1
1 TABLET ORAL NIGHTLY PRN
COMMUNITY
Start: 2022-08-02 | End: 2023-04-04

## 2022-08-12 RX ORDER — BUDESONIDE AND FORMOTEROL FUMARATE DIHYDRATE 160; 4.5 UG/1; UG/1
AEROSOL RESPIRATORY (INHALATION)
COMMUNITY
Start: 2022-08-02 | End: 2023-05-18

## 2022-08-12 RX ORDER — KETOROLAC TROMETHAMINE 10 MG/1
10 TABLET, FILM COATED ORAL EVERY 6 HOURS PRN
Qty: 20 TABLET | Refills: 0 | Status: SHIPPED | OUTPATIENT
Start: 2022-08-12 | End: 2022-08-17

## 2022-08-12 RX ORDER — AMOXICILLIN 500 MG/1
500 TABLET, FILM COATED ORAL 3 TIMES DAILY
Qty: 30 TABLET | Refills: 0 | Status: SHIPPED | OUTPATIENT
Start: 2022-08-12 | End: 2022-08-22

## 2022-08-12 RX ORDER — AZITHROMYCIN 250 MG/1
TABLET, FILM COATED ORAL
COMMUNITY
Start: 2022-08-09 | End: 2022-12-22

## 2022-08-12 RX ADMIN — TRIAMCINOLONE ACETONIDE 40 MG: 40 INJECTION, SUSPENSION INTRA-ARTICULAR; INTRAMUSCULAR at 09:08

## 2022-08-12 NOTE — PROCEDURES
Large Joint Aspiration/Injection: L knee    Date/Time: 8/12/2022 9:20 AM  Performed by: SALVATORE Grey  Authorized by: SALVATORE Grey     Consent Done?:  Yes (Verbal)  Indications:  Pain  Timeout: prior to procedure the correct patient, procedure, and site was verified    Prep: patient was prepped and draped in usual sterile fashion    Local anesthetic:  Lidocaine 1% without epinephrine  Anesthetic total (ml):  5      Details:  Needle Size:  21 G  Approach:  Anterolateral  Location:  Knee  Site:  L knee  Medications:  40 mg triamcinolone acetonide 40 mg/mL  Patient tolerance:  Patient tolerated the procedure well with no immediate complications  Large Joint Aspiration/Injection: L greater trochanteric bursa    Date/Time: 8/12/2022 9:20 AM  Performed by: SALVATORE Grey  Authorized by: SALVATORE Grey     Consent Done?:  Yes (Verbal)  Indications:  Pain  Timeout: prior to procedure the correct patient, procedure, and site was verified    Prep: patient was prepped and draped in usual sterile fashion      Local anesthesia used?: Yes    Local anesthetic:  Lidocaine 1% without epinephrine  Anesthetic total (ml):  5      Details:  Needle Size:  21 G  Approach:  Lateral  Location:  Hip  Site:  L greater trochanteric bursa  Medications:  40 mg triamcinolone acetonide 40 mg/mL  Patient tolerance:  Patient tolerated the procedure well with no immediate complications

## 2022-08-12 NOTE — PROGRESS NOTES
Chief Complaint   Patient presents with    Left Hip - Pain, Swelling    Left Knee - Pain, Swelling         HPI:   This is a 56 y.o. who presents to clinic today complaining of left knee, left hip and left ankle pain for 4 days after fall at home. States she has fell a few times. Every time she has fallen is because her left knee is just giving out on her. Also complaining of sore throat for 1 week. Mild fever reported. Cannot eat anything solid 2/2 pain. Also has bad cough where she is coughing up thick white film.  Pain is progressively worsening. No numbness or tingling. No associated signs or symptoms.    Past Medical History:   Diagnosis Date    Anxiety     Arthritis     Asthma     Crohn's disease     Depression     Encounter for blood transfusion     Hypertension      Past Surgical History:   Procedure Laterality Date    COLONOSCOPY N/A 3/10/2022    Procedure: COLONOSCOPY;  Surgeon: Nilson Wiseman MD;  Location: [facility];  Service: Endoscopy;  Laterality: N/A;    ESOPHAGOGASTRODUODENOSCOPY N/A 3/10/2022    Procedure: EGD (ESOPHAGOGASTRODUODENOSCOPY);  Surgeon: Nilson Wiseman MD;  Location: [facility];  Service: Endoscopy;  Laterality: N/A;     Current Outpatient Medications on File Prior to Visit   Medication Sig Dispense Refill    albuterol (ACCUNEB) 1.25 mg/3 mL Nebu Take 3 mLs (1.25 mg total) by nebulization every 6 (six) hours as needed (cough). Rescue 75 mL 3    albuterol (PROVENTIL/VENTOLIN HFA) 90 mcg/actuation inhaler Inhale 2 puffs into the lungs every 6 (six) hours as needed for Wheezing. Rescue 18 g 6    ALPRAZolam (XANAX) 0.5 MG tablet Take 1 tablet by mouth nightly as needed.      amLODIPine (NORVASC) 10 MG tablet Take 1 tablet (10 mg total) by mouth daily      atorvastatin (LIPITOR) 40 MG tablet Take 1 tablet (40 mg total) by mouth daily      azithromycin (Z-DUONG) 250 MG tablet Take two 250mg tablets PO on day 1, then one 250mg tablet PO daily for 4 days.      budesonide  (ENTOCORT EC) 3 mg capsule TAKE ONE CAPSULE BY MOUTH DAILY 30 capsule 3    budesonide-formoterol 160-4.5 mcg (SYMBICORT) 160-4.5 mcg/actuation HFAA Inhale 2 puffs into the lungs 2 (two) times daily      butalbital-acetaminophen-caffeine -40 mg (FIORICET, ESGIC) -40 mg per tablet Take 1 tablet by mouth every 4 (four) hours as needed.      chlorhexidine (PERIDEX) 0.12 % solution swish and spit 15 MILLILITERS in the mouth or throat TWICE DAILY FOR FOURTEEN DAYS 473 mL 6    diclofenac sodium (VOLTAREN) 1 % Gel APPLY TOPICALLY FOUR TIMES DAILY 300 g 3    dicyclomine (BENTYL) 20 mg tablet Take 20 mg by mouth once daily.       diltiaZEM (CARDIZEM CD) 240 MG 24 hr capsule Take 240 mg by mouth.      diltiaZEM (TIAZAC) 240 MG Cs24 Take 1 capsule (240 mg total) by mouth once daily. 90 capsule 0    doxycycline (VIBRAMYCIN) 100 MG Cap Take 1 capsule (100 mg total) by mouth every 12 (twelve) hours. 20 capsule 0    epinephrine (EPIPEN) 0.3 mg/0.3 mL AtIn Inject 0.3 mg into the muscle.      fluticasone propionate (FLONASE) 50 mcg/actuation nasal spray       fluticasone-salmeterol 500-50 mcg/dose (ADVAIR) 500-50 mcg/dose DsDv diskus inhaler Inhale 1 puff into the lungs.      furosemide (LASIX) 20 MG tablet Take 1 tablet (20 mg total) by mouth daily      gabapentin (NEURONTIN) 800 MG tablet Take 1 tablet (800 mg total) by mouth 3 (three) times daily. 90 tablet 3    hydrochlorothiazide (MICROZIDE) 12.5 mg capsule Take 12.5 mg by mouth every morning.  11    HYDROcodone-acetaminophen (NORCO)  mg per tablet Take 1 tablet by mouth every 8 (eight) hours as needed for Pain. 90 tablet 0    [START ON 9/4/2022] HYDROcodone-acetaminophen (NORCO)  mg per tablet Take 1 tablet by mouth every 8 (eight) hours as needed for Pain. 90 tablet 0    hydrOXYchloroQUINE (PLAQUENIL) 200 mg tablet Take 1 tablet (200 mg total) by mouth 2 (two) times daily. 180 tablet 1    hydrOXYzine pamoate (VISTARIL) 25 MG Cap TAKE  ONE CAPSULE BY MOUTH THREE TIMES DAILY AS NEEDED FOR ITCHING 45 capsule 3    ipratropium-albuteroL (COMBIVENT)  mcg/actuation inhaler Inhale 1 puff into the lungs 4 (four) times daily. Rescue 4 g 12    lidocaine HCl 2% (LIDOCAINE VISCOUS) 2 % Soln by Mucous Membrane route every 8 (eight) hours as needed. 100 mL 0    meloxicam (MOBIC) 15 MG tablet Take 1 tablet (15 mg total) by mouth once daily. 90 tablet 1    montelukast (SINGULAIR) 10 mg tablet Take 10 mg by mouth every evening.      nebulizer and compressor Siomara Use as directed      neomycin-polymyxin-hydrocortisone (CORTISPORIN) otic solution PLACE 2 DROPS IN AFFECTED EAR FOUR TIMES DAILY FOR 5-7 DAYS      ondansetron (ZOFRAN-ODT) 4 MG TbDL Take 4 mg by mouth every 8 (eight) hours as needed.      pantoprazole (PROTONIX) 40 MG tablet Take 1 tablet (40 mg total) by mouth once daily. 30 tablet 3    pantoprazole (PROTONIX) 40 MG tablet Take 1 tablet (40 mg total) by mouth 2 (two) times daily. 60 tablet 11    phentermine (ADIPEX-P) 37.5 mg tablet Take 37.5 mg by mouth once daily.      sertraline (ZOLOFT) 100 MG tablet Take 100 mg by mouth once daily.      sumatriptan (IMITREX) 50 MG tablet Take 50 mg by mouth.       tiZANidine (ZANAFLEX) 4 MG tablet Take 4 mg by mouth every 8 (eight) hours.       traZODone (DESYREL) 100 MG tablet Take 1 tablet (100 mg total) by mouth every evening. 90 tablet 1    [DISCONTINUED] promethazine (PHENERGAN) 50 MG tablet TAKE 1 TABLET BY MOUTH EVERY 6 HOURS AS NEEDED FOR NAUSEA 30 tablet 0    triamcinolone acetonide 0.1% (KENALOG) 0.1 % ointment Apply topically 2 (two) times daily. 80 g 3    [DISCONTINUED] promethazine (PHENERGAN) 6.25 mg/5 mL syrup Take 5 mLs (6.25 mg total) by mouth every 6 (six) hours as needed (cough). 240 mL 1    [DISCONTINUED] promethazine (PHENERGAN) 6.25 mg/5 mL syrup Take 20 mLs (25 mg total) by mouth every 6 (six) hours as needed for Nausea. 240 mL 1     Current Facility-Administered  Medications on File Prior to Visit   Medication Dose Route Frequency Provider Last Rate Last Admin    lactated ringers infusion   Intravenous Continuous Nilson Wiseman MD 75 mL/hr at 03/10/22 0806 New Bag at 03/10/22 0911    sodium chloride 0.9% flush 10 mL  10 mL Intravenous Q6H PRN Nilson Wiseman MD         Review of patient's allergies indicates:   Allergen Reactions    Amlodipine-benazepril Swelling    Ace inhibitors Swelling     Angioedema; was taking Benazepril.    Tramadol Itching     Family History   Problem Relation Age of Onset    Cancer Mother     Diabetes Mother     Arthritis Mother     Hypertension Mother     Cancer Father     Diabetes Father     Arthritis Father     Hypertension Father      Social History     Socioeconomic History    Marital status:    Tobacco Use    Smoking status: Never Smoker    Smokeless tobacco: Never Used   Substance and Sexual Activity    Drug use: No    Sexual activity: Never       Review of Systems:  Constitutional:  Denies fever or chills   Eyes:  Denies change in visual acuity   HENT:  Denies nasal congestion or sore throat   Respiratory:  Denies cough or shortness of breath   Cardiovascular:  Denies chest pain or edema   GI:  Denies abdominal pain, nausea, vomiting, bloody stools or diarrhea   :  Denies dysuria   Integument:  Denies rash   Neurologic:  Denies headache, focal weakness or sensory changes   Endocrine:  Denies polyuria or polydipsia   Lymphatic:  Denies swollen glands   Psychiatric:  Denies depression or anxiety     Physical Exam:   Constitutional:  Well developed, well nourished, no acute distress, non-toxic appearance   HEENT: NC/AT; pharynx redness and purulence noted on exam.  Integument:  Well hydrated, warm and dry.   Lymphatic:  No lymphadenopathy noted   Neurologic:  Alert & oriented x 3  Psychiatric:  Speech and behavior appropriate       Bilateral Knee Exam    left Knee Exam     Tenderness   The patient is  experiencing tenderness in the medial joint line.    Range of Motion   Extension: abnormal   Flexion: abnormal     Muscle Strength     The patient has normal knee strength.    Tests   Ellie:  Medial - positive   Lachman:  Anterior - negative      Varus: negative  Valgus: negative  Patellar Apprehension: negative    Other   Erythema: absent  Sensation: normal  Pulse: present  Swelling: mild      right Knee Exam   right knee exam performed same as contralateral side and is normal.    xrays of left knee 3 views show severe tricompartmental osteoarthritis more to medial compartment.     L ankle  No point TTP  Diffuse swelling.   Normal ROM     3 views of left ankle show old fracture with traumatic appearing arthritis. She does have hx of ankle fx in the past per pt report.      Bilateral Hip Exam Performed    left Hip Exam     Tenderness   The patient is experiencing tenderness in the greater trochanter.    Range of Motion   The patient has normal hip ROM.    Muscle Strength   Abduction: 4/5     Other   Erythema: absent  Sensation: normal  Pulse: present    right Hip Exam   Hip exam performed same as contralateral hip and is normal.           Recurrent left knee instability  -     X-ray Knee Ortho Left; Future; Expected date: 08/12/2022  -     ketorolac (TORADOL) 10 mg tablet; Take 1 tablet (10 mg total) by mouth every 6 (six) hours as needed for Pain.  Dispense: 20 tablet; Refill: 0    Ankle swelling, left    Pain and swelling of ankle, left  -     X-Ray Ankle Complete Left; Future; Expected date: 08/12/2022  -     ketorolac (TORADOL) 10 mg tablet; Take 1 tablet (10 mg total) by mouth every 6 (six) hours as needed for Pain.  Dispense: 20 tablet; Refill: 0    Strep throat  -     amoxicillin (AMOXIL) 500 MG Tab; Take 1 tablet (500 mg total) by mouth 3 (three) times daily. for 10 days  Dispense: 30 tablet; Refill: 0  -     promethazine-codeine 6.25-10 mg/5 ml (PHENERGAN WITH CODEINE) 6.25-10 mg/5 mL syrup; Take 5 mLs  by mouth every 6 (six) hours as needed for Cough.  Dispense: 200 mL; Refill: 0    Primary osteoarthritis of left knee  -     Large Joint Aspiration/Injection: L knee    Greater trochanteric bursitis of left hip  -     Large Joint Aspiration/Injection: L greater trochanteric bursa    Other orders  -     Cancel: Large Joint Aspiration/Injection        Using an aseptic technique, I injected 5 cc of lidocaine 1% without and 1 cc of kenalog 40mg into the left Knee. The patient tolerated this well.     Using an aseptic technique, I injected 5 cc of lidocaine 1% without and 1 cc of kenalog 40mg into the left Hip. The patient tolerated this well.     Hinge knee brace for left knee stability. Instructed her to follow up with PCP concerning strep throat if more tx needed or still having concerns post abx.   RTC in 6 weeks.

## 2022-08-12 NOTE — TELEPHONE ENCOUNTER
"Called [facility] and they are changing script to 7 days only.       ----- Message from Kerline Hernandez sent at 8/12/2022 11:36 AM CDT -----  Contact: Pharmacy [phone]  Additional Information Needed:     The pharmacy needs additional information on the following Rx:    codeine 6.25-10 mg/5 ml (PHENERGAN WITH CODEINE) 6.25-10 mg/5 mL syrup    Does not include correct documentation. Rx must include "Greater than 7 day supply medically necessary" Please resend     Pharmacy:   [facility] - [place] [facility] - [id number] [facility] [address]  Phone: [phone] Fax: [phone]      Thank You              "

## 2022-08-19 ENCOUNTER — TELEPHONE (OUTPATIENT)
Dept: NEUROLOGY | Facility: CLINIC | Age: 57
End: 2022-08-19
Payer: MEDICARE

## 2022-08-19 NOTE — TELEPHONE ENCOUNTER
Pt reports she has had daily headaches x 4 months, memory loss x 2 months.  The pt was prescribed Butalbital and also take Aleve but reports it is not helping with head pain.  Describes her headaches as throbbing pain to her temples. Pt scheduled with [facility], directions provided.

## 2022-08-19 NOTE — TELEPHONE ENCOUNTER
----- Message from Loulou oGmez sent at 8/19/2022  4:05 PM CDT -----  Contact: patient  Type:  Patient Call          Who Called:Patient         Does the patient know what this is regarding?:Requesting a call back  to verify if her referral was received ; please advise           Would the patient rather a call back or a response via [facility]? Call           Best Call Back Number:[phone]             Additional Information:

## 2022-08-31 ENCOUNTER — TELEPHONE (OUTPATIENT)
Dept: NEUROLOGY | Facility: CLINIC | Age: 57
End: 2022-08-31
Payer: MEDICARE

## 2022-08-31 NOTE — TELEPHONE ENCOUNTER
Called pt to schedule appointment, no answer.---- Message from Bella Gilliam sent at 8/31/2022 12:31 PM CDT -----  Contact: pt at [phone]  Type: Needs Medical Advice  Who Called:  pt    Best Call Back Number: [phone]    Additional Information: pt is calling the office to R/S her appt that she missed on yesterday. Please call back and advise.

## 2022-09-09 ENCOUNTER — OFFICE VISIT (OUTPATIENT)
Dept: NEUROLOGY | Facility: CLINIC | Age: 57
End: 2022-09-09
Payer: MEDICARE

## 2022-09-09 VITALS
HEART RATE: 77 BPM | HEIGHT: 63 IN | SYSTOLIC BLOOD PRESSURE: 136 MMHG | WEIGHT: 249.81 LBS | BODY MASS INDEX: 44.26 KG/M2 | DIASTOLIC BLOOD PRESSURE: 86 MMHG | RESPIRATION RATE: 17 BRPM

## 2022-09-09 DIAGNOSIS — R51.9 WORSENING HEADACHES: ICD-10-CM

## 2022-09-09 DIAGNOSIS — M79.18 CERVICAL MYOFASCIAL PAIN SYNDROME: ICD-10-CM

## 2022-09-09 DIAGNOSIS — G44.40 MEDICATION OVERUSE HEADACHE: ICD-10-CM

## 2022-09-09 DIAGNOSIS — G43.719 INTRACTABLE CHRONIC MIGRAINE WITHOUT AURA AND WITHOUT STATUS MIGRAINOSUS: Primary | ICD-10-CM

## 2022-09-09 DIAGNOSIS — R41.3 OTHER AMNESIA: ICD-10-CM

## 2022-09-09 DIAGNOSIS — R11.0 NAUSEA: ICD-10-CM

## 2022-09-09 DIAGNOSIS — R42 DIZZINESS: ICD-10-CM

## 2022-09-09 PROCEDURE — 3044F HG A1C LEVEL LT 7.0%: CPT | Mod: CPTII,S$GLB,, | Performed by: NURSE PRACTITIONER

## 2022-09-09 PROCEDURE — 99999 PR PBB SHADOW E&M-EST. PATIENT-LVL V: ICD-10-PCS | Mod: PBBFAC,,, | Performed by: NURSE PRACTITIONER

## 2022-09-09 PROCEDURE — 3079F DIAST BP 80-89 MM HG: CPT | Mod: CPTII,S$GLB,, | Performed by: NURSE PRACTITIONER

## 2022-09-09 PROCEDURE — 3075F SYST BP GE 130 - 139MM HG: CPT | Mod: CPTII,S$GLB,, | Performed by: NURSE PRACTITIONER

## 2022-09-09 PROCEDURE — 96372 THER/PROPH/DIAG INJ SC/IM: CPT | Mod: S$GLB,,, | Performed by: NURSE PRACTITIONER

## 2022-09-09 PROCEDURE — 1160F PR REVIEW ALL MEDS BY PRESCRIBER/CLIN PHARMACIST DOCUMENTED: ICD-10-PCS | Mod: CPTII,S$GLB,, | Performed by: NURSE PRACTITIONER

## 2022-09-09 PROCEDURE — 3044F PR MOST RECENT HEMOGLOBIN A1C LEVEL <7.0%: ICD-10-PCS | Mod: CPTII,S$GLB,, | Performed by: NURSE PRACTITIONER

## 2022-09-09 PROCEDURE — 3079F PR MOST RECENT DIASTOLIC BLOOD PRESSURE 80-89 MM HG: ICD-10-PCS | Mod: CPTII,S$GLB,, | Performed by: NURSE PRACTITIONER

## 2022-09-09 PROCEDURE — 1160F RVW MEDS BY RX/DR IN RCRD: CPT | Mod: CPTII,S$GLB,, | Performed by: NURSE PRACTITIONER

## 2022-09-09 PROCEDURE — 1159F PR MEDICATION LIST DOCUMENTED IN MEDICAL RECORD: ICD-10-PCS | Mod: CPTII,S$GLB,, | Performed by: NURSE PRACTITIONER

## 2022-09-09 PROCEDURE — 3008F PR BODY MASS INDEX (BMI) DOCUMENTED: ICD-10-PCS | Mod: CPTII,S$GLB,, | Performed by: NURSE PRACTITIONER

## 2022-09-09 PROCEDURE — 1159F MED LIST DOCD IN RCRD: CPT | Mod: CPTII,S$GLB,, | Performed by: NURSE PRACTITIONER

## 2022-09-09 PROCEDURE — 3008F BODY MASS INDEX DOCD: CPT | Mod: CPTII,S$GLB,, | Performed by: NURSE PRACTITIONER

## 2022-09-09 PROCEDURE — 96372 PR INJECTION,THERAP/PROPH/DIAG2ST, IM OR SUBCUT: ICD-10-PCS | Mod: S$GLB,,, | Performed by: NURSE PRACTITIONER

## 2022-09-09 PROCEDURE — 4010F PR ACE/ARB THEARPY RXD/TAKEN: ICD-10-PCS | Mod: CPTII,S$GLB,, | Performed by: NURSE PRACTITIONER

## 2022-09-09 PROCEDURE — 99215 OFFICE O/P EST HI 40 MIN: CPT | Mod: FS,25,S$GLB, | Performed by: NURSE PRACTITIONER

## 2022-09-09 PROCEDURE — 99215 PR OFFICE/OUTPT VISIT, EST, LEVL V, 40-54 MIN: ICD-10-PCS | Mod: FS,25,S$GLB, | Performed by: NURSE PRACTITIONER

## 2022-09-09 PROCEDURE — 3075F PR MOST RECENT SYSTOLIC BLOOD PRESS GE 130-139MM HG: ICD-10-PCS | Mod: CPTII,S$GLB,, | Performed by: NURSE PRACTITIONER

## 2022-09-09 PROCEDURE — 4010F ACE/ARB THERAPY RXD/TAKEN: CPT | Mod: CPTII,S$GLB,, | Performed by: NURSE PRACTITIONER

## 2022-09-09 PROCEDURE — 99999 PR PBB SHADOW E&M-EST. PATIENT-LVL V: CPT | Mod: PBBFAC,,, | Performed by: NURSE PRACTITIONER

## 2022-09-09 RX ORDER — RIZATRIPTAN BENZOATE 10 MG/1
TABLET ORAL
Qty: 10 TABLET | Refills: 11 | Status: SHIPPED | OUTPATIENT
Start: 2022-09-09

## 2022-09-09 RX ORDER — KETOROLAC TROMETHAMINE 30 MG/ML
30 INJECTION, SOLUTION INTRAMUSCULAR; INTRAVENOUS
Status: COMPLETED | OUTPATIENT
Start: 2022-09-09 | End: 2022-09-09

## 2022-09-09 RX ORDER — PROCHLORPERAZINE MALEATE 10 MG
10 TABLET ORAL EVERY 6 HOURS PRN
Qty: 60 TABLET | Refills: 11 | Status: SHIPPED | OUTPATIENT
Start: 2022-09-09 | End: 2024-02-26

## 2022-09-09 RX ORDER — PREDNISONE 10 MG/1
TABLET ORAL
Qty: 20 TABLET | Refills: 0 | Status: SHIPPED | OUTPATIENT
Start: 2022-09-09 | End: 2022-12-22

## 2022-09-09 RX ORDER — ONDANSETRON 2 MG/ML
4 INJECTION INTRAMUSCULAR; INTRAVENOUS
Status: COMPLETED | OUTPATIENT
Start: 2022-09-09 | End: 2022-09-09

## 2022-09-09 RX ADMIN — ONDANSETRON 4 MG: 2 INJECTION INTRAMUSCULAR; INTRAVENOUS at 02:09

## 2022-09-09 RX ADMIN — KETOROLAC TROMETHAMINE 30 MG: 30 INJECTION, SOLUTION INTRAMUSCULAR; INTRAVENOUS at 02:09

## 2022-09-09 NOTE — PROGRESS NOTES
Date of service: 9/9/2022  Referring provider: Imani Conway    Subjective:      Chief complaint: Headache       Patient ID: Amanda Mcguire is a 56 y.o. female with chronic pain, chohn's disease, IBS, RA who presents for new patient evaluation of headache     History of Present Illness    ORIGINAL HEADACHE HISTORY - 9/9/22  Age at onset and course over time: 20 years ago began with fairly frequent migraines. Headaches daily for past two weeks. Prior to that she would also be daily but not as severe.      She also reports memory concerns. She states she will get up to do something, but when she gets up to do it, she cannot remember what she wanted to do.     Family history of migraines - mom  Last eye exam - 3 months ago    Location: both sides of head  Quality:  [] pressure [] tight [] throbbing [x] sharp [] stabbing   Severity: current 10  Duration: days  Frequency: daily  Headaches awaken at night?:  yes  Worst time of day: all day  Associated with: [x] photophobia [x]  phonophobia [x] osmophobia [x] blurred vision  [] double vision [] loss of appetite [x] nausea [] vomiting [x] dizziness [] vertigo  [x] tinnitus [x] irritability [x] sinus pressure [x] problems with concentration   [x] neck tightness   Alleviated by:  [x] sleep [x] darkness [x] massage [x] heat [] ice [x] medication  Exacerbated by:  [] fatigue [] light [x] noise [x] smells [x] coughing [x] sneezing  [x] bending over [] ovulation [] menses [] alcohol [] change in weather [x]  stress  Ipsilateral autonomic: [] nasal congestion [] lacrimation [] ptosis [] injection [] edema [] foreign body sensation [] ear fullness   ICP:  [] transient visual obscurations  [x] tinnitus   [] positional headache  [x] non-positional   Sleep habits: trouble falling asleep, trouble staying asleep - she reports history of normal sleep study    Caffeine intake: none  Gyn status (if female): menopausal    MIDAS: 35    Current acute treatment:  Norco - every 8 hours for  past year  Vistaril  Tizanidine  Fioricet - daily for past month, not effective   Xanax    Current prevention:  (Diltiazem)  Trazodone  (HCTZ)  Zoloft  (Amlodipine)  Gabapentin - 800 mg TID    Previously tried/failed acute treatment:  Phenergan  Baclofen  Ibuprofen  Mobic   Sumatriptan  Tramadol    Previously tried/failed preventative treatment:  Lexapro  Wellbutrin    Review of patient's allergies indicates:   Allergen Reactions    Amlodipine-benazepril Swelling    Ace inhibitors Swelling     Angioedema; was taking Benazepril.    Tramadol Itching     Current Outpatient Medications   Medication Sig Dispense Refill    albuterol (ACCUNEB) 1.25 mg/3 mL Nebu Take 3 mLs (1.25 mg total) by nebulization every 6 (six) hours as needed (cough). Rescue 75 mL 3    albuterol (PROVENTIL/VENTOLIN HFA) 90 mcg/actuation inhaler Inhale 2 puffs into the lungs every 6 (six) hours as needed for Wheezing. Rescue 18 g 6    ALPRAZolam (XANAX) 0.5 MG tablet Take 1 tablet by mouth nightly as needed.      amLODIPine (NORVASC) 10 MG tablet Take 1 tablet (10 mg total) by mouth daily      atorvastatin (LIPITOR) 40 MG tablet Take 1 tablet (40 mg total) by mouth daily      azithromycin (Z-DUONG) 250 MG tablet Take two 250mg tablets PO on day 1, then one 250mg tablet PO daily for 4 days.      budesonide-formoterol 160-4.5 mcg (SYMBICORT) 160-4.5 mcg/actuation HFAA Inhale 2 puffs into the lungs 2 (two) times daily      butalbital-acetaminophen-caffeine -40 mg (FIORICET, ESGIC) -40 mg per tablet Take 1 tablet by mouth every 4 (four) hours as needed.      chlorhexidine (PERIDEX) 0.12 % solution swish and spit 15 MILLILITERS in the mouth or throat TWICE DAILY FOR FOURTEEN DAYS 473 mL 6    diclofenac sodium (VOLTAREN) 1 % Gel APPLY TOPICALLY FOUR TIMES DAILY 300 g 3    dicyclomine (BENTYL) 20 mg tablet Take 20 mg by mouth once daily.       diltiaZEM (CARDIZEM CD) 240 MG 24 hr capsule Take 240 mg by mouth.      diltiaZEM (TIAZAC) 240 MG Cs24  Take 1 capsule (240 mg total) by mouth once daily. 90 capsule 0    doxycycline (VIBRAMYCIN) 100 MG Cap Take 1 capsule (100 mg total) by mouth every 12 (twelve) hours. 20 capsule 0    epinephrine (EPIPEN) 0.3 mg/0.3 mL AtIn Inject 0.3 mg into the muscle.      fluticasone propionate (FLONASE) 50 mcg/actuation nasal spray       fluticasone-salmeterol 500-50 mcg/dose (ADVAIR) 500-50 mcg/dose DsDv diskus inhaler Inhale 1 puff into the lungs.      furosemide (LASIX) 20 MG tablet Take 1 tablet (20 mg total) by mouth daily      gabapentin (NEURONTIN) 800 MG tablet Take 1 tablet (800 mg total) by mouth 3 (three) times daily. 90 tablet 3    hydrochlorothiazide (MICROZIDE) 12.5 mg capsule Take 12.5 mg by mouth every morning.  11    HYDROcodone-acetaminophen (NORCO)  mg per tablet Take 1 tablet by mouth every 8 (eight) hours as needed for Pain. 90 tablet 0    hydrOXYchloroQUINE (PLAQUENIL) 200 mg tablet Take 1 tablet (200 mg total) by mouth 2 (two) times daily. 180 tablet 1    hydrOXYzine pamoate (VISTARIL) 25 MG Cap TAKE ONE CAPSULE BY MOUTH THREE TIMES DAILY AS NEEDED FOR ITCHING 45 capsule 3    ipratropium-albuteroL (COMBIVENT)  mcg/actuation inhaler Inhale 1 puff into the lungs 4 (four) times daily. Rescue 4 g 12    lidocaine HCl 2% (LIDOCAINE VISCOUS) 2 % Soln by Mucous Membrane route every 8 (eight) hours as needed. 100 mL 0    meloxicam (MOBIC) 15 MG tablet Take 1 tablet (15 mg total) by mouth once daily. 90 tablet 1    montelukast (SINGULAIR) 10 mg tablet Take 10 mg by mouth every evening.      nebulizer and compressor Siomara Use as directed      neomycin-polymyxin-hydrocortisone (CORTISPORIN) otic solution PLACE 2 DROPS IN AFFECTED EAR FOUR TIMES DAILY FOR 5-7 DAYS      ondansetron (ZOFRAN-ODT) 4 MG TbDL Take 4 mg by mouth every 8 (eight) hours as needed.      pantoprazole (PROTONIX) 40 MG tablet Take 1 tablet (40 mg total) by mouth once daily. 30 tablet 3    pantoprazole (PROTONIX) 40 MG tablet Take 1 tablet  (40 mg total) by mouth 2 (two) times daily. 60 tablet 11    phentermine (ADIPEX-P) 37.5 mg tablet Take 37.5 mg by mouth once daily.      sertraline (ZOLOFT) 100 MG tablet Take 100 mg by mouth once daily.      tiZANidine (ZANAFLEX) 4 MG tablet Take 4 mg by mouth every 8 (eight) hours.       traZODone (DESYREL) 100 MG tablet Take 1 tablet (100 mg total) by mouth every evening. 90 tablet 1    predniSONE (DELTASONE) 10 MG tablet 4 tab PO in AM x 2 days, 3 tab in AM x2 days, 2 tab in AM x 2 days, 1 tab in AM x 2 days then stop 20 tablet 0    prochlorperazine (COMPAZINE) 10 MG tablet Take 1 tablet (10 mg total) by mouth every 6 (six) hours as needed (migraine or nausea). 60 tablet 11    rizatriptan (MAXALT) 10 MG tablet 1 tab PO PRN migraine. May repeat every 2 hours for max 3 tabs in 24 hours. Use no more than 10 days per month. 10 tablet 11    triamcinolone acetonide 0.1% (KENALOG) 0.1 % ointment Apply topically 2 (two) times daily. 80 g 3     No current facility-administered medications for this visit.     Facility-Administered Medications Ordered in Other Visits   Medication Dose Route Frequency Provider Last Rate Last Admin    lactated ringers infusion   Intravenous Continuous Nilson Wiseman MD 75 mL/hr at 03/10/22 0806 New Bag at 03/10/22 0911    sodium chloride 0.9% flush 10 mL  10 mL Intravenous Q6H PRN Nilson Wiseman MD           Past Medical History  Past Medical History:   Diagnosis Date    Anxiety     Arthritis     Asthma     Crohn's disease     Depression     Encounter for blood transfusion     Headache     Hypertension        Past Surgical History  Past Surgical History:   Procedure Laterality Date    COLONOSCOPY N/A 3/10/2022    Procedure: COLONOSCOPY;  Surgeon: Nilson Wiseman MD;  Location: [facility];  Service: Endoscopy;  Laterality: N/A;    ESOPHAGOGASTRODUODENOSCOPY N/A 3/10/2022    Procedure: EGD (ESOPHAGOGASTRODUODENOSCOPY);  Surgeon: Nilson Wiseman MD;  Location: [facility];  Service:  Endoscopy;  Laterality: N/A;       Family History  Family History   Problem Relation Age of Onset    Cancer Mother     Diabetes Mother     Arthritis Mother     Hypertension Mother     Cancer Father     Diabetes Father     Arthritis Father     Hypertension Father        Social History  Social History     Socioeconomic History    Marital status:    Tobacco Use    Smoking status: Never    Smokeless tobacco: Never   Substance and Sexual Activity    Drug use: No    Sexual activity: Never        Review of Systems  14-point review of systems as follows:   No check nicolas indicates NEGATIVE response   Constitutional: [] weight loss, [] change to appetite   Eyes: [] change in vision, [] double vision   Ears, nose, mouth, throat: [] frequent nose bleeds, [x] ringing in the ears   Respiratory: [] cough, [] wheezing   Cardiovascular: [] chest pain, [] palpitations   Gastrointestinal: [] jaundice, [] nausea/vomiting   Genitourinary: [] incontinence, [] burning with urination   Hematologic/lymphatic: [] easy bruising/bleeding, [] night sweats   Neurological: [] numbness, [] weakness   Endocrine: [] fatigue, [] heat/cold intolerance   Allergy/Immunologic: [] fevers, [] chills   Musculoskeletal: [x] muscle pain, [x] joint pain   Psychiatric: [] thoughts of harming self/others, [] depression   Integumentary: [] rashes, [] sores that do not heal     Objective:        Vitals:    09/09/22 1314   BP: 136/86   Pulse: 77   Resp: 17     Body mass index is 44.25 kg/m².    9/9/22  Constitutional: appears in no acute distress, well-developed, well-nourished, appears in pain, uncomfortable      Eyes: normal conjunctiva, PERRLA    Ears, nose, mouth, throat: external appearance of ears and nose normal, hearing intact     Cardiovascular: regular rate and rhythm, no murmurs appreciated    Respiratory: unlabored respirations, breath sounds normal bilaterally    Gastrointestinal: no visible abdominal masses, no guarding, no visible  hernia    Musculoskeletal: normal tone in all four extremities. No abnormal movements. No pronator drift. No orbit. Symmetric finger tapping. Normal station. Normal regular gait.      Spine:   CERVICAL SPINE:  ROM: restricted    MUSCLE SPASM: bilateral    FACET LOADING: no   SPURLING: no  ZANDER / JARON tender: bilateral JARON     Psychiatric: normal judgment and insight. Oriented to person, place, and time.     Neurologic:   Cortical functions: recent and remote memory intact, normal attention span and concentration, speech fluent, adequate fund of knowledge   Cranial nerves: visual fields full, PERRLA, EOMI, symmetric facial strength, hearing intact, palate elevates symmetrically, shoulder shrug 5/5, tongue protrudes midline   Reflexes: 2+ in the upper and lower extremities, no Joe  Sensation: intact to temperature throughout   Coordination: normal finger to nose, heel to shin    Data Review:     I have personally reviewed the referring provider's notes, labs, & imaging made available to me today.      RADIOLOGY STUDIES:  I have personally reviewed the pertinent images performed.       Results for orders placed or performed during the hospital encounter of 11/24/21   CT Head Without Contrast    Narrative    EXAMINATION:  CT HEAD WITHOUT CONTRAST    CLINICAL HISTORY:  Dizziness, persistent/recurrent, cardiac or vascular cause suspected; Dizziness and giddiness    TECHNIQUE:  Low dose axial images were obtained through the head.  Coronal and sagittal reformations were also performed. Contrast was not administered.    COMPARISON:  None.    FINDINGS:  There is no acute intracranial hemorrhage.  Ventricles are of normal size and morphology.   No mass effect or midline shift is present.  The gray-white matter differentiation is within normal limits. The visualized portions of the mastoids are normal.  The visualized portions of the paranasal sinuses are normal. The visualized portions of the orbits are normal.  No  fractures are identified.      Impression    No evidence of an acute intracranial abnormality.      Electronically signed by: Ankit Molina  Date:    11/24/2021  Time:    15:02       Lab Results   Component Value Date     07/01/2022    K 4.1 07/01/2022     07/01/2022    CO2 28 07/01/2022    BUN 14 07/01/2022    CREATININE 0.7 07/01/2022    GLU 99 07/01/2022    HGBA1C 5.1 02/28/2022    AST 27 07/01/2022    ALT 36 07/01/2022    ALBUMIN 3.7 07/01/2022    PROT 7.3 07/01/2022    BILITOT 0.3 07/01/2022       Lab Results   Component Value Date    WBC 6.95 07/01/2022    HGB 13.1 07/01/2022    HCT 41.8 07/01/2022    MCV 86 07/01/2022     07/01/2022       Lab Results   Component Value Date    TSH 2.921 02/28/2022           Assessment & Plan:       Problem List Items Addressed This Visit          Neuro    Intractable chronic migraine without aura and without status migrainosus - Primary    Overview     Migraine headaches that began in 20's. Headaches are typically unilateral, moderate to severe in intensity, worsen with activity, pounding in quality and associated with sensitivity to smell, light and sound. Given worsening nature of headaches, will get MRI to rule out secondary cause.    The patient has chronic migraines ( G43.719) and suffers from headaches more than 3 months, more than 15 days of headache days per month lasting more than 4 hours with at least 8 attacks that meet criteria for migraine. She has tried multiple medications including but not limited to Lexapro, Wellbutrin, Zoloft. Trazodone, diltiazem, amlodipine, gabapentin  The patient has been unresponsive and refractory.The patient meets criteria for chronic headaches according to the ICHD-II, the patient has more than 15 headaches a month which last for more than 4 hours a day. The patient is an ideal candidate for Botox. After treatment, I expect 50%  improvement in the patient's symptoms. A reduction of at least 7 days per month and the  number of cumulative hours suffering with headaches as well as at least 100 total hours affected with migraine per month.  DESCRIPTION OF PROCEDURE: After obtaining informed consent and under aseptic technique, a total of 155 units of botulinum toxin type A to be injected in the following muscles:      -- Procerus 5 units  --  5 units bilaterally  -- Frontalis 20 units  -- Temporalis 20 units bilaterally  -- Occipitalis 15 units bilaterally  -- Upper cervical paraspinals 10 units bilaterally  -- Trapezius 15 units bilaterally.       Unavoidable waste 45 units    For acute attacks, add rizatriptan. Discussed need to stop Fioricet.         Relevant Medications    rizatriptan (MAXALT) 10 MG tablet    ketorolac injection 30 mg (Completed)    ondansetron injection 4 mg (Completed)    Other Relevant Orders    Prior authorization Order    Ambulatory referral/consult to Physical/Occupational Therapy     Other Visit Diagnoses       Other amnesia        Relevant Orders    MRI Brain W WO Contrast    Worsening headaches        Relevant Orders    MRI Brain W WO Contrast    Dizziness        Relevant Orders    MRI Brain W WO Contrast    Nausea        Relevant Medications    prochlorperazine (COMPAZINE) 10 MG tablet    ondansetron injection 4 mg (Completed)    Medication overuse headache        daily norco and fioricet. add prednisone taper to help stop Fioricet    Cervical myofascial pain syndrome        Relevant Orders    Ambulatory referral/consult to Physical/Occupational Therapy                Please call our clinic at [phone] or send a message on the [facility] portal if there are any changes to the plan described below, for example,if you are not contacted for the requested tests, referral(s) within one week, if you are unable to receive the medications prescribed, or if you feel you need to change the treatment course for any reason.     TESTING:  -- MRI brain    REFERRALS:  --physical therapy    PREVENTION (use  daily regardless of headache):  -- SEEK AUTHORIZATION FOR BOTOX    AS-NEEDED TREATMENT (use total no more than 10 days per month unless otherwise stated):  -- STOP Fioricet  -- START 8-day prednisone course tomorrow morning with food  -- START rizatriptan with next migraine attack. You can repeat 2 hours later if needed. Take no more than 10 days per month  -- continue tizanidine  -- start compazine. This is a nausea medication that also has anti-migraine properties. Can take daily and will not contribute to rebound headaches      No follow-ups on file.    Heavenly Fletcher, NP

## 2022-09-09 NOTE — PATIENT INSTRUCTIONS
Please call our clinic at [phone] or send a message on the [employer] portal if there are any changes to the plan described below, for example,if you are not contacted for the requested tests, referral(s) within one week, if you are unable to receive the medications prescribed, or if you feel you need to change the treatment course for any reason.     TESTING:  -- MRI brain    REFERRALS:  --physical therapy  -- we will assist with memory clinic visit    PREVENTION (use daily regardless of headache):  -- SEEK AUTHORIZATION FOR BOTOX    AS-NEEDED TREATMENT (use total no more than 10 days per month unless otherwise stated):  -- STOP Fioricet  -- START 8-day prednisone course tomorrow morning with food  -- START rizatriptan with next migraine attack. You can repeat 2 hours later if needed. Take no more than 10 days per month  -- continue tizanidine  -- start compazine. This is a nausea medication that also has anti-migraine properties. Can take daily and will not contribute to rebound headaches  -- Toradol and Zofran shot  given in clinic today. Do not take Mobic or Zofran for 8 hours.

## 2022-09-23 ENCOUNTER — HOSPITAL ENCOUNTER (OUTPATIENT)
Dept: RADIOLOGY | Facility: HOSPITAL | Age: 57
Discharge: HOME | End: 2022-09-23
Attending: NURSE PRACTITIONER
Payer: MEDICARE

## 2022-09-23 ENCOUNTER — OFFICE VISIT (OUTPATIENT)
Dept: ORTHOPEDICS | Facility: CLINIC | Age: 57
End: 2022-09-23
Payer: MEDICARE

## 2022-09-23 VITALS — WEIGHT: 249 LBS | HEIGHT: 63 IN | BODY MASS INDEX: 44.12 KG/M2

## 2022-09-23 DIAGNOSIS — S63.631A SPRAIN OF INTERPHALANGEAL JOINT OF LEFT INDEX FINGER, INITIAL ENCOUNTER: ICD-10-CM

## 2022-09-23 DIAGNOSIS — M79.642 LEFT HAND PAIN: Primary | ICD-10-CM

## 2022-09-23 DIAGNOSIS — M70.62 GREATER TROCHANTERIC BURSITIS OF LEFT HIP: Primary | ICD-10-CM

## 2022-09-23 DIAGNOSIS — M79.642 LEFT HAND PAIN: ICD-10-CM

## 2022-09-23 PROCEDURE — 3044F HG A1C LEVEL LT 7.0%: CPT | Mod: CPTII,S$GLB,, | Performed by: NURSE PRACTITIONER

## 2022-09-23 PROCEDURE — 99214 PR OFFICE/OUTPT VISIT, EST, LEVL IV, 30-39 MIN: ICD-10-PCS | Mod: 25,S$GLB,, | Performed by: NURSE PRACTITIONER

## 2022-09-23 PROCEDURE — 3044F PR MOST RECENT HEMOGLOBIN A1C LEVEL <7.0%: ICD-10-PCS | Mod: CPTII,S$GLB,, | Performed by: NURSE PRACTITIONER

## 2022-09-23 PROCEDURE — 99999 PR PBB SHADOW E&M-EST. PATIENT-LVL IV: CPT | Mod: PBBFAC,,, | Performed by: NURSE PRACTITIONER

## 2022-09-23 PROCEDURE — 1160F RVW MEDS BY RX/DR IN RCRD: CPT | Mod: CPTII,S$GLB,, | Performed by: NURSE PRACTITIONER

## 2022-09-23 PROCEDURE — 99214 OFFICE O/P EST MOD 30 MIN: CPT | Mod: 25,S$GLB,, | Performed by: NURSE PRACTITIONER

## 2022-09-23 PROCEDURE — 73130 XR HAND COMPLETE 3 VIEW LEFT: ICD-10-PCS | Mod: 26,LT,, | Performed by: RADIOLOGY

## 2022-09-23 PROCEDURE — 4010F ACE/ARB THERAPY RXD/TAKEN: CPT | Mod: CPTII,S$GLB,, | Performed by: NURSE PRACTITIONER

## 2022-09-23 PROCEDURE — 3008F BODY MASS INDEX DOCD: CPT | Mod: CPTII,S$GLB,, | Performed by: NURSE PRACTITIONER

## 2022-09-23 PROCEDURE — 1159F PR MEDICATION LIST DOCUMENTED IN MEDICAL RECORD: ICD-10-PCS | Mod: CPTII,S$GLB,, | Performed by: NURSE PRACTITIONER

## 2022-09-23 PROCEDURE — 1160F PR REVIEW ALL MEDS BY PRESCRIBER/CLIN PHARMACIST DOCUMENTED: ICD-10-PCS | Mod: CPTII,S$GLB,, | Performed by: NURSE PRACTITIONER

## 2022-09-23 PROCEDURE — 73130 X-RAY EXAM OF HAND: CPT | Mod: 26,LT,, | Performed by: RADIOLOGY

## 2022-09-23 PROCEDURE — 20610 DRAIN/INJ JOINT/BURSA W/O US: CPT | Mod: LT,S$GLB,, | Performed by: NURSE PRACTITIONER

## 2022-09-23 PROCEDURE — 20610 LARGE JOINT ASPIRATION/INJECTION: L GREATER TROCHANTERIC BURSA: ICD-10-PCS | Mod: LT,S$GLB,, | Performed by: NURSE PRACTITIONER

## 2022-09-23 PROCEDURE — 4010F PR ACE/ARB THEARPY RXD/TAKEN: ICD-10-PCS | Mod: CPTII,S$GLB,, | Performed by: NURSE PRACTITIONER

## 2022-09-23 PROCEDURE — 99999 PR PBB SHADOW E&M-EST. PATIENT-LVL IV: ICD-10-PCS | Mod: PBBFAC,,, | Performed by: NURSE PRACTITIONER

## 2022-09-23 PROCEDURE — 3008F PR BODY MASS INDEX (BMI) DOCUMENTED: ICD-10-PCS | Mod: CPTII,S$GLB,, | Performed by: NURSE PRACTITIONER

## 2022-09-23 PROCEDURE — 73130 X-RAY EXAM OF HAND: CPT | Mod: TC,PO,LT

## 2022-09-23 PROCEDURE — 1159F MED LIST DOCD IN RCRD: CPT | Mod: CPTII,S$GLB,, | Performed by: NURSE PRACTITIONER

## 2022-09-23 RX ORDER — TRIAMCINOLONE ACETONIDE 40 MG/ML
40 INJECTION, SUSPENSION INTRA-ARTICULAR; INTRAMUSCULAR
Status: DISCONTINUED | OUTPATIENT
Start: 2022-09-23 | End: 2022-09-23 | Stop reason: HOSPADM

## 2022-09-23 RX ADMIN — TRIAMCINOLONE ACETONIDE 40 MG: 40 INJECTION, SUSPENSION INTRA-ARTICULAR; INTRAMUSCULAR at 08:09

## 2022-09-23 NOTE — PROCEDURES
Large Joint Aspiration/Injection: L greater trochanteric bursa    Date/Time: 9/23/2022 8:40 AM  Performed by: SALVATORE Grey  Authorized by: SALVATORE Grey     Consent Done?:  Yes (Verbal)  Indications:  Pain  Timeout: prior to procedure the correct patient, procedure, and site was verified    Prep: patient was prepped and draped in usual sterile fashion    Local anesthetic:  Lidocaine 1% without epinephrine  Anesthetic total (ml):  5      Details:  Needle Size:  21 G  Approach:  Lateral  Location:  Hip  Site:  L greater trochanteric bursa  Medications:  40 mg triamcinolone acetonide 40 mg/mL  Patient tolerance:  Patient tolerated the procedure well with no immediate complications

## 2022-09-23 NOTE — PROGRESS NOTES
Chief Complaint   Patient presents with    Left Hip - Pain    Left Knee - Pain    Left Hand - Injury      HPI:   This is a 56 y.o. who presents to clinic today for left hip pain for the past 1 weeks after fall; states she tripped. She also landed on her left index finger and is complaining of pain to her finger as well.  Complains of pain while sleeping on side and when descending stairs. Pain is deep. No numbness or tingling. No associated signs or symptoms.      Past Medical History:   Diagnosis Date    Anxiety     Arthritis     Asthma     Crohn's disease     Depression     Encounter for blood transfusion     Headache     Hypertension      Past Surgical History:   Procedure Laterality Date    COLONOSCOPY N/A 3/10/2022    Procedure: COLONOSCOPY;  Surgeon: Nilson Wiseman MD;  Location: [facility] ENDO;  Service: Endoscopy;  Laterality: N/A;    ESOPHAGOGASTRODUODENOSCOPY N/A 3/10/2022    Procedure: EGD (ESOPHAGOGASTRODUODENOSCOPY);  Surgeon: Nilson Wiseman MD;  Location: [facility] ENDO;  Service: Endoscopy;  Laterality: N/A;     Current Outpatient Medications on File Prior to Visit   Medication Sig Dispense Refill    albuterol (ACCUNEB) 1.25 mg/3 mL Nebu Take 3 mLs (1.25 mg total) by nebulization every 6 (six) hours as needed (cough). Rescue 75 mL 3    albuterol (PROVENTIL/VENTOLIN HFA) 90 mcg/actuation inhaler Inhale 2 puffs into the lungs every 6 (six) hours as needed for Wheezing. Rescue 18 g 6    ALPRAZolam (XANAX) 0.5 MG tablet Take 1 tablet by mouth nightly as needed.      amLODIPine (NORVASC) 10 MG tablet Take 1 tablet (10 mg total) by mouth daily      atorvastatin (LIPITOR) 40 MG tablet Take 1 tablet (40 mg total) by mouth daily      azithromycin (Z-DUONG) 250 MG tablet Take two 250mg tablets PO on day 1, then one 250mg tablet PO daily for 4 days.      budesonide-formoterol 160-4.5 mcg (SYMBICORT) 160-4.5 mcg/actuation HFAA Inhale 2 puffs into the lungs 2 (two) times daily      butalbital-acetaminophen-caffeine  -40 mg (FIORICET, ESGIC) -40 mg per tablet Take 1 tablet by mouth every 4 (four) hours as needed.      chlorhexidine (PERIDEX) 0.12 % solution swish and spit 15 MILLILITERS in the mouth or throat TWICE DAILY FOR FOURTEEN DAYS 473 mL 6    diclofenac sodium (VOLTAREN) 1 % Gel APPLY TOPICALLY FOUR TIMES DAILY 300 g 3    dicyclomine (BENTYL) 20 mg tablet Take 20 mg by mouth once daily.       diltiaZEM (CARDIZEM CD) 240 MG 24 hr capsule Take 240 mg by mouth.      diltiaZEM (TIAZAC) 240 MG Cs24 Take 1 capsule (240 mg total) by mouth once daily. 90 capsule 0    doxycycline (VIBRAMYCIN) 100 MG Cap Take 1 capsule (100 mg total) by mouth every 12 (twelve) hours. 20 capsule 0    epinephrine (EPIPEN) 0.3 mg/0.3 mL AtIn Inject 0.3 mg into the muscle.      fluticasone propionate (FLONASE) 50 mcg/actuation nasal spray       fluticasone-salmeterol 500-50 mcg/dose (ADVAIR) 500-50 mcg/dose DsDv diskus inhaler Inhale 1 puff into the lungs.      furosemide (LASIX) 20 MG tablet Take 1 tablet (20 mg total) by mouth daily      gabapentin (NEURONTIN) 800 MG tablet Take 1 tablet (800 mg total) by mouth 3 (three) times daily. 90 tablet 3    hydrochlorothiazide (MICROZIDE) 12.5 mg capsule Take 12.5 mg by mouth every morning.  11    HYDROcodone-acetaminophen (NORCO)  mg per tablet Take 1 tablet by mouth every 8 (eight) hours as needed for Pain. 90 tablet 0    hydrOXYchloroQUINE (PLAQUENIL) 200 mg tablet Take 1 tablet (200 mg total) by mouth 2 (two) times daily. 180 tablet 1    hydrOXYzine pamoate (VISTARIL) 25 MG Cap TAKE ONE CAPSULE BY MOUTH THREE TIMES DAILY AS NEEDED FOR ITCHING 45 capsule 3    ipratropium-albuteroL (COMBIVENT)  mcg/actuation inhaler Inhale 1 puff into the lungs 4 (four) times daily. Rescue 4 g 12    lidocaine HCl 2% (LIDOCAINE VISCOUS) 2 % Soln by Mucous Membrane route every 8 (eight) hours as needed. 100 mL 0    meloxicam (MOBIC) 15 MG tablet Take 1 tablet (15 mg total) by mouth once daily. 90  tablet 1    montelukast (SINGULAIR) 10 mg tablet Take 10 mg by mouth every evening.      nebulizer and compressor Siomara Use as directed      neomycin-polymyxin-hydrocortisone (CORTISPORIN) otic solution PLACE 2 DROPS IN AFFECTED EAR FOUR TIMES DAILY FOR 5-7 DAYS      ondansetron (ZOFRAN-ODT) 4 MG TbDL Take 4 mg by mouth every 8 (eight) hours as needed.      pantoprazole (PROTONIX) 40 MG tablet Take 1 tablet (40 mg total) by mouth once daily. 30 tablet 3    pantoprazole (PROTONIX) 40 MG tablet Take 1 tablet (40 mg total) by mouth 2 (two) times daily. 60 tablet 11    phentermine (ADIPEX-P) 37.5 mg tablet Take 37.5 mg by mouth once daily.      predniSONE (DELTASONE) 10 MG tablet 4 tab PO in AM x 2 days, 3 tab in AM x2 days, 2 tab in AM x 2 days, 1 tab in AM x 2 days then stop 20 tablet 0    prochlorperazine (COMPAZINE) 10 MG tablet Take 1 tablet (10 mg total) by mouth every 6 (six) hours as needed (migraine or nausea). 60 tablet 11    rizatriptan (MAXALT) 10 MG tablet 1 tab PO PRN migraine. May repeat every 2 hours for max 3 tabs in 24 hours. Use no more than 10 days per month. 10 tablet 11    sertraline (ZOLOFT) 100 MG tablet Take 100 mg by mouth once daily.      tiZANidine (ZANAFLEX) 4 MG tablet Take 4 mg by mouth every 8 (eight) hours.       traZODone (DESYREL) 100 MG tablet Take 1 tablet (100 mg total) by mouth every evening. 90 tablet 1    triamcinolone acetonide 0.1% (KENALOG) 0.1 % ointment Apply topically 2 (two) times daily. 80 g 3     Current Facility-Administered Medications on File Prior to Visit   Medication Dose Route Frequency Provider Last Rate Last Admin    lactated ringers infusion   Intravenous Continuous Nilson Wiseman MD 75 mL/hr at 03/10/22 0806 New Bag at 03/10/22 0911    sodium chloride 0.9% flush 10 mL  10 mL Intravenous Q6H PRN Nilson Wiseman MD         Review of patient's allergies indicates:   Allergen Reactions    Amlodipine-benazepril Swelling    Ace inhibitors Swelling      Angioedema; was taking Benazepril.    Tramadol Itching     Family History   Problem Relation Age of Onset    Cancer Mother     Diabetes Mother     Arthritis Mother     Hypertension Mother     Cancer Father     Diabetes Father     Arthritis Father     Hypertension Father      Social History     Socioeconomic History    Marital status:    Tobacco Use    Smoking status: Never    Smokeless tobacco: Never   Substance and Sexual Activity    Drug use: No    Sexual activity: Never       Review of Systems:  Constitutional:  Denies fever or chills   Eyes:  Denies change in visual acuity   HENT:  Denies nasal congestion or sore throat   Respiratory:  Denies cough or shortness of breath   Cardiovascular:  Denies chest pain or edema   GI:  Denies abdominal pain, nausea, vomiting, bloody stools or diarrhea   :  Denies dysuria   Integument:  Denies rash   Neurologic:  Denies headache, focal weakness or sensory changes   Endocrine:  Denies polyuria or polydipsia   Lymphatic:  Denies swollen glands   Psychiatric:  Denies depression or anxiety     Physical Exam:   Constitutional:  Well developed, well nourished, no acute distress, non-toxic appearance   Integument:  Well hydrated  Neurologic:  Alert & oriented x 3  Psychiatric:  Speech and behavior appropriate     Bilateral Hip Exam    Right Hip Exam   Right hip exam performed same as contralateral hip and is normal.     Left Hip Exam   Tenderness   The patient is experiencing tenderness in the greater trochanter.  Range of Motion   The patient has normal hip ROM.  Muscle Strength   Abduction: 4/5   Other   Erythema: absent  Sensation: normal  Pulse: present    L index finger.   Tenderness to distal interphalangeal joint.   Decreased ROM        X-ray of left finger show no acute fractures or findings.     Greater trochanteric bursitis of left hip  -     Large Joint Aspiration/Injection: L greater trochanteric bursa    Sprain of interphalangeal joint of left index finger,  initial encounter         Place L index finger in finger splint. May refer to hand specialist if pain continues.     Using an aseptic technique, I injected 5 cc of lidocaine 1% without and 1 cc of kenalog 40mg into the left hip bursa. The patient tolerated this well. I will have her RTC in 6 weeks for left knee and left hip injection if needed.

## 2022-09-26 DIAGNOSIS — R41.3 OTHER AMNESIA: Primary | ICD-10-CM

## 2022-09-27 ENCOUNTER — HOSPITAL ENCOUNTER (OUTPATIENT)
Dept: RADIOLOGY | Facility: HOSPITAL | Age: 57
Discharge: HOME | End: 2022-09-27
Attending: NURSE PRACTITIONER
Payer: MEDICARE

## 2022-09-27 ENCOUNTER — TELEPHONE (OUTPATIENT)
Dept: RHEUMATOLOGY | Facility: CLINIC | Age: 57
End: 2022-09-27
Payer: MEDICARE

## 2022-09-27 DIAGNOSIS — R51.9 WORSENING HEADACHES: ICD-10-CM

## 2022-09-27 DIAGNOSIS — R41.3 OTHER AMNESIA: ICD-10-CM

## 2022-09-27 DIAGNOSIS — R42 DIZZINESS: ICD-10-CM

## 2022-09-27 PROCEDURE — 70553 MRI BRAIN W WO CONTRAST: ICD-10-PCS | Mod: 26,,, | Performed by: RADIOLOGY

## 2022-09-27 PROCEDURE — 70553 MRI BRAIN STEM W/O & W/DYE: CPT | Mod: 26,,, | Performed by: RADIOLOGY

## 2022-09-27 PROCEDURE — A9585 GADOBUTROL INJECTION: HCPCS | Mod: PO | Performed by: NURSE PRACTITIONER

## 2022-09-27 PROCEDURE — 70553 MRI BRAIN STEM W/O & W/DYE: CPT | Mod: TC,PO

## 2022-09-27 PROCEDURE — 25500020 PHARM REV CODE 255: Mod: PO | Performed by: NURSE PRACTITIONER

## 2022-09-27 RX ORDER — GADOBUTROL 604.72 MG/ML
10 INJECTION INTRAVENOUS
Status: COMPLETED | OUTPATIENT
Start: 2022-09-27 | End: 2022-09-27

## 2022-09-27 RX ADMIN — GADOBUTROL 10 ML: 604.72 INJECTION INTRAVENOUS at 03:09

## 2022-09-27 NOTE — TELEPHONE ENCOUNTER
Patient had previous therapy orders in routed to the location requested by the patient   ----- Message from Lynn Arana sent at 9/26/2022 11:54 AM CDT -----  Who Called: Patient    What is the reqeust in detail: Requesting call back to have referral for therapy sent over to [address] [place] [phone] ph. Patient did not have the name of the facility at the time of this phone call. Please advise.     Can the clinic reply by [facility]? No    Best Call Back Number: [phone]    Additional Information:

## 2022-09-28 ENCOUNTER — TELEPHONE (OUTPATIENT)
Dept: NEUROLOGY | Facility: CLINIC | Age: 57
End: 2022-09-28
Payer: MEDICARE

## 2022-09-28 ENCOUNTER — CLINICAL SUPPORT (OUTPATIENT)
Dept: REHABILITATION | Facility: HOSPITAL | Age: 57
End: 2022-09-28
Payer: MEDICARE

## 2022-09-28 DIAGNOSIS — G43.719 INTRACTABLE CHRONIC MIGRAINE WITHOUT AURA AND WITHOUT STATUS MIGRAINOSUS: ICD-10-CM

## 2022-09-28 DIAGNOSIS — M79.18 MYOFASCIAL PAIN SYNDROME, CERVICAL: ICD-10-CM

## 2022-09-28 DIAGNOSIS — D32.9 MENINGIOMA: ICD-10-CM

## 2022-09-28 DIAGNOSIS — R90.89 ABNORMAL BRAIN MRI: Primary | ICD-10-CM

## 2022-09-28 DIAGNOSIS — M54.2 NECK PAIN: ICD-10-CM

## 2022-09-28 DIAGNOSIS — M79.18 CERVICAL MYOFASCIAL PAIN SYNDROME: ICD-10-CM

## 2022-09-28 PROCEDURE — 97162 PT EVAL MOD COMPLEX 30 MIN: CPT | Mod: PN | Performed by: PHYSICAL THERAPIST

## 2022-09-28 NOTE — TELEPHONE ENCOUNTER
----- Message from Heavenly Fletcher NP sent at 9/28/2022  9:49 AM CDT -----  Brain MRI normal except possible meningioma. These are benign but we can have neurosurgery take a look

## 2022-09-28 NOTE — PLAN OF CARE
[facility]  Physical Therapy Initial Evaluation    Date: 9/28/2022   Name: Amanda Mcguire  Clinic Number: [id number]    Therapy Diagnosis:   Encounter Diagnoses   Name Primary?    Intractable chronic migraine without aura and without status migrainosus     Cervical myofascial pain syndrome      Physician: Heavenly Fletcher NP    Physician Orders: PT Eval and Treat   Medical Diagnosis from Referral: Cervical myofascial pain syndrome   Evaluation Date: 9/28/2022  Authorization Period Expiration: 09/09/23  Plan of Care Expiration: 11/09/22  Progress Note Due: 10/28/22  Visit # / Visits authorized: 1/ 1   FOTO: 1/ 3     Time In: 1:30 PM   Time Out: 2:03 PM   Total Billable Time: 33 minutes    Precautions: Standard    Subjective   Date of onset: 2 months   History of current condition - Amanda reports: An onset of headaches that started after her father passed away. Her headaches are intermittent throughout the day, but she reports daily occurrence of headaches. She localizes the headaches to her temporal region bilaterally. She has recently started a medication regimen and has been approved for botox.        Past Medical History:   Diagnosis Date    Anxiety     Arthritis     Asthma     Crohn's disease     Depression     Encounter for blood transfusion     Headache     Hypertension      Amanda Mcguire  has a past surgical history that includes Esophagogastroduodenoscopy (N/A, 3/10/2022) and Colonoscopy (N/A, 3/10/2022).    Amanda has a current medication list which includes the following prescription(s): albuterol, albuterol, alprazolam, amlodipine, atorvastatin, azithromycin, budesonide-formoterol 160-4.5 mcg, butalbital-acetaminophen-caffeine -40 mg, chlorhexidine, diclofenac sodium, dicyclomine, diltiazem, diltiazem, doxycycline, epinephrine, fluticasone propionate, fluticasone-salmeterol 500-50 mcg/dose, furosemide, gabapentin, hydrochlorothiazide, hydrocodone-acetaminophen,  hydroxychloroquine, hydroxyzine pamoate, ipratropium-albuterol, lidocaine hcl 2%, meloxicam, montelukast, nebulizer and compressor, neomycin-polymyxin-hydrocortisone, ondansetron, pantoprazole, pantoprazole, phentermine, prednisone, prochlorperazine, rizatriptan, sertraline, tizanidine, trazodone, and triamcinolone acetonide 0.1%, and the following Facility-Administered Medications: lactated ringers and sodium chloride 0.9%.    Review of patient's allergies indicates:   Allergen Reactions    Amlodipine-benazepril Swelling    Ace inhibitors Swelling     Angioedema; was taking Benazepril.    Tramadol Itching        Imaging: MRI studies    Prior Therapy: for her ankles   Social History:  lives with their daughter  Occupation: currently on disability   Prior Level of Function: Independent   Current Level of Function: Difficulty perform ADL's when she has a headache     Pain:  Current 8/10, worst 10/10, best 0/10   Location: bilateral head   Description: Throbbing and Tight  Aggravating Factors: stress   Easing Factors: lying down    Pts goals: To get rid of her headaches       Objective   Mental status: alert, oriented x3  Posture/ Alignment: Protruded Head, Protracted Scapula, reduced cervical lordosis, increased thoracic kyphosis with dowager's hump     GAIT DEVIATIONS: Amanda amb with  normal gait mechanics, but decreased speed .    ROM:   AROM  Comment   Flexion: WNL      Extension: Major loss   *   Lat Flex R: Major loss *   Lat Flex L: Major loss   *   Rotation R: Mild loss      Rotation L: Mild loss      *pain  Strength: Dermatomes:   Right Left Comment   C4 intact intact    C5 intact intact    C6 intact intact    C7 intact intact    C8 intact intact    T1 intact intact      Myotomes:   Right Left Comment   Shoulder abduction (C5): 5/5 5/5    Wrist extension (C6): 5/5 5/5    Wrist flexion (C7): 5/5 5/5    Thumb Extension (C8): 5/5 5/5     (T1): 5/5 5/5      Mid trap: 3+/5    DTR:   Right Left Comment   Biceps  (C5-6) 2+ 2+    Triceps (C6-7) 2+ 2+      Special Tests:    Cervical radiculopathy   (-) Cervical rotation < 60 deg ipsilateral side,       Palpation:  Increased tone with TTP in cervical paraspinals, UT, LS     Accessory Motion Assessment     Pt/family was provided educational information, including: role of PT, goals for PT, scheduling - pt verbalized understanding. Discussed insurance plan with pt.     CMS Impairment/Limitation/Restriction for FOTO Neck Survey    Therapist reviewed FOTO scores for Amanda Mcguire on 9/28/2022.   FOTO documents entered into [employer] - see Media section.    Limitation Score: 47%  Category: Body Position    Current : CK = at least 40% but < 60% impaired, limited or restricted             TREATMENT   Treatment Time In: 1:57 PM   Treatment Time Out: 2:03 PM   Total Treatment time separate from Evaluation: 6 minutes    Amanda received therapeutic exercises to develop ROM and posture for 6 minutes including:  HEP setup and instruction; handout issued     Home Exercises and Patient Education Provided    Education provided:   - Pathophysiology of condition   - HEP   - POC    Written Home Exercises Provided: yes.  Exercises were reviewed and Amanda was able to demonstrate them prior to the end of the session.  Amanda demonstrated good  understanding of the education provided.     See EMR under Patient Instructions for exercises provided 9/28/2022.    Assessment     Pt presents with a medical diagnosis of cervical myofascial pain syndrome. Physical exam is consistent with the referring diagnosis. Primary impairments include limited  AROM,  joint mobility, strength, balance, and pain which limits functional mobility. This pt is a good candidate for skilled PT tx and stands to benefit from a combination of manual therapy, therapeutic exercise, neuromuscular re-education, dry needling and modalities Prn. The pt has been educated on their dx/POC and consents to further PT tx.      Pt prognosis is  Fair.   Pt will benefit from skilled outpatient Physical Therapy to address the deficits stated above and in the chart below, provide pt/family education, and to maximize pt's level of independence.     Plan of care discussed with patient: Yes  Pt's spiritual, cultural and educational needs considered and patient is agreeable to the plan of care and goals as stated below:     Anticipated Barriers for therapy: None at this time     Medical Necessity is demonstrated by the following  History  Co-morbidities and personal factors that may impact the plan of care Co-morbidities:   anxiety, COPD/asthma, depression, high BMI, HTN, and Chron's     Personal Factors:   coping style  lifestyle     high   Examination  Body Structures and Functions, activity limitations and participation restrictions that may impact the plan of care Body Regions:   head  neck    Body Systems:    gross symmetry  ROM  motor control    Participation Restrictions:       Activity limitations:   Learning and applying knowledge  no deficits    General Tasks and Commands  no deficits    Communication  no deficits    Mobility  lifting and carrying objects    Self care  no deficits    Domestic Life  cooking  doing house work (cleaning house, washing dishes, laundry)    Interactions/Relationships  no deficits    Life Areas  no deficits    Community and Social Life  no deficits         high   Clinical Presentation evolving clinical presentation with changing clinical characteristics moderate   Decision Making/ Complexity Score: moderate     Goals:  Short Term Goals: 3 weeks   1) Pt will be I with established HEP   2) Pt will have headache intensity no worse than 7/10     Long Term Goals: 6 weeks   1) Pt will have no worse than moderate cervical extension loss   2) Headache intensity will be no worse than 5/10  3) Headache frequency will decrease by 50%      Plan    Plan of care Certification: 9/28/2022 to 11/09/22.    Outpatient Physical Therapy 2 times  weekly for 6 weeks to include the following interventions: Manual Therapy, Neuromuscular Re-ed, Patient Education, and Therapeutic Exercise.     Jeremy Palacios, PT      I CERTIFY THE NEED FOR THESE SERVICES FURNISHED UNDER THIS PLAN OF TREATMENT AND WHILE UNDER MY CARE   Physician's comments:     Physician's Signature: ___________________________________________________

## 2022-09-28 NOTE — TELEPHONE ENCOUNTER
Spoke with patient relayed MRI result note from Heavenly Fletcher NP. Meningioma explained in terms of small benign tumor like, meaning non cancerous may be visible.  Recommendations to watch for any symptoms of growth and/or refer to neurosurgery if needed.  Verbalized understanding.

## 2022-09-30 ENCOUNTER — PROCEDURE VISIT (OUTPATIENT)
Dept: NEUROLOGY | Facility: CLINIC | Age: 57
End: 2022-09-30
Payer: MEDICARE

## 2022-09-30 VITALS
HEIGHT: 63 IN | SYSTOLIC BLOOD PRESSURE: 126 MMHG | DIASTOLIC BLOOD PRESSURE: 80 MMHG | BODY MASS INDEX: 44.12 KG/M2 | WEIGHT: 249 LBS | RESPIRATION RATE: 17 BRPM | HEART RATE: 73 BPM

## 2022-09-30 DIAGNOSIS — G43.719 INTRACTABLE CHRONIC MIGRAINE WITHOUT AURA AND WITHOUT STATUS MIGRAINOSUS: Primary | ICD-10-CM

## 2022-09-30 PROCEDURE — 64615 PR CHEMODENERVATION OF MUSCLE FOR CHRONIC MIGRAINE: ICD-10-PCS | Mod: S$GLB,,, | Performed by: NURSE PRACTITIONER

## 2022-09-30 PROCEDURE — 64615 CHEMODENERV MUSC MIGRAINE: CPT | Mod: S$GLB,,, | Performed by: NURSE PRACTITIONER

## 2022-09-30 NOTE — PROCEDURES

## 2022-10-03 DIAGNOSIS — H66.91 RIGHT OTITIS MEDIA, UNSPECIFIED OTITIS MEDIA TYPE: ICD-10-CM

## 2022-10-03 DIAGNOSIS — R76.8 ANA POSITIVE: ICD-10-CM

## 2022-10-03 DIAGNOSIS — L40.9 PSORIASIS: ICD-10-CM

## 2022-10-03 DIAGNOSIS — G89.4 CHRONIC PAIN SYNDROME: ICD-10-CM

## 2022-10-03 DIAGNOSIS — M02.39 REACTIVE ARTHRITIS OF MULTIPLE SITES: ICD-10-CM

## 2022-10-03 DIAGNOSIS — K50.918 CROHN'S DISEASE WITH OTHER COMPLICATION, UNSPECIFIED GASTROINTESTINAL TRACT LOCATION: ICD-10-CM

## 2022-10-03 DIAGNOSIS — R05.3 CHRONIC COUGH: ICD-10-CM

## 2022-10-03 NOTE — TELEPHONE ENCOUNTER
----- Message from Gloria Bell sent at 10/3/2022 12:46 PM CDT -----  Contact: called at [phone]   Type:  RX Refill Request    Who Called:  Pt  Refill or New Rx: Refill  RX Name and Strength:  HYDROcodone-acetaminophen (NORCO)  mg per tablet  How is the patient currently taking it? 3x day  Is this a 30 day or 90 day RX: 90   Preferred Pharmacy with phone number:   [facility] - [place], [place] - [address] [facility] [address]  Phone: [phone] Fax: [phone]  Local or Mail Order: Local  Ordering Provider: Sukhjinder Guerra Call Back Number: [phone]   Additional Information: Please call back and advice

## 2022-10-05 DIAGNOSIS — G89.4 CHRONIC PAIN SYNDROME: ICD-10-CM

## 2022-10-05 DIAGNOSIS — M02.39 REACTIVE ARTHRITIS OF MULTIPLE SITES: ICD-10-CM

## 2022-10-05 DIAGNOSIS — R05.3 CHRONIC COUGH: ICD-10-CM

## 2022-10-05 DIAGNOSIS — K50.918 CROHN'S DISEASE WITH OTHER COMPLICATION, UNSPECIFIED GASTROINTESTINAL TRACT LOCATION: ICD-10-CM

## 2022-10-05 DIAGNOSIS — L40.9 PSORIASIS: ICD-10-CM

## 2022-10-05 DIAGNOSIS — R76.8 ANA POSITIVE: ICD-10-CM

## 2022-10-05 DIAGNOSIS — H66.91 RIGHT OTITIS MEDIA, UNSPECIFIED OTITIS MEDIA TYPE: ICD-10-CM

## 2022-10-05 RX ORDER — HYDROCODONE BITARTRATE AND ACETAMINOPHEN 10; 325 MG/1; MG/1
1 TABLET ORAL EVERY 8 HOURS PRN
Qty: 90 TABLET | Refills: 0 | Status: SHIPPED | OUTPATIENT
Start: 2022-10-05 | End: 2022-11-04

## 2022-10-05 RX ORDER — HYDROCODONE BITARTRATE AND ACETAMINOPHEN 10; 325 MG/1; MG/1
1 TABLET ORAL EVERY 8 HOURS PRN
Qty: 90 TABLET | Refills: 0 | OUTPATIENT
Start: 2022-10-05 | End: 2022-11-04

## 2022-10-14 ENCOUNTER — CLINICAL SUPPORT (OUTPATIENT)
Dept: REHABILITATION | Facility: HOSPITAL | Age: 57
End: 2022-10-14
Payer: MEDICARE

## 2022-10-14 DIAGNOSIS — M79.18 MYOFASCIAL PAIN SYNDROME, CERVICAL: Primary | ICD-10-CM

## 2022-10-14 DIAGNOSIS — M54.2 NECK PAIN: ICD-10-CM

## 2022-10-14 PROCEDURE — 97140 MANUAL THERAPY 1/> REGIONS: CPT | Mod: PN

## 2022-10-14 PROCEDURE — 97110 THERAPEUTIC EXERCISES: CPT | Mod: PN

## 2022-10-14 NOTE — PROGRESS NOTES
"[facility]   Physical Therapy Treatment Note     Name: mAanda Mcguire  Clinic Number: [id number]    Therapy Diagnosis:   Encounter Diagnoses   Name Primary?    Myofascial pain syndrome, cervical Yes    Neck pain      Physician: Heavenly Fletcher NP    Visit Date: 10/14/2022    Physician Orders: PT Eval and Treat   Medical Diagnosis from Referral: Cervical myofascial pain syndrome   Evaluation Date: 9/28/2022  Authorization Period Expiration: 12/03/2022  Plan of Care Expiration: 11/09/22  Progress Note Due: 10/28/22  Visit # / Visits authorized: 1/ 12  FOTO: 1/ 3   PTA Visit #: 0/5     Precautions: Standard    Time In: 10:15 am  Time Out: 10:55 am  Total Billable Time: 40 minutes      SUBJECTIVE     Pt reports: she is not having headache and only complains of stiffness in neck.  She was compliant with home exercise program.  Response to previous treatment: initial evaluation  Functional change: TBD    Pain: 0/10  Location:  neck and head     OBJECTIVE     Objective Measures updated at progress report unless specified.       TREATMENT       Amanda received the treatments listed below:       therapeutic exercises to develop strength, endurance, ROM, flexibility, posture, and core stabilization for (30)  minutes including:  - UBE x 4 minutes   - upper trap stretch 3 x 20"  - levator scap stretch 3 x 20"  - scapular retractions x 20 with 5" hold  - seated thoracic extension over ball 2 x 10  - seated chin tuck 2 x 10  - seated bilateral shoulder ER 2 x 10, YTB  - seated horizontal abduction with palm up 1 x 10, YTB     manual therapy techniques applied for (10) minutes, including:  - IASTM to bilateral upper trapezius and levator scapulae    neuromuscular re-education activities to improve: Balance, Coordination, Kinesthetic, Sense, Proprioception, and Posture for (0)  minutes., including:      dynamic functional therapeutic activities to improve functional performance for (0)  minutes, " including:        PATIENT EDUCATION AND HOME EXERCISES     Home Exercises Provided and Patient Education Provided     Education/Self-Care provided:  - Pathophysiology of condition   - HEP   - POC    Written Home Exercises Provided: Patient instructed to cont prior HEP. Exercises were reviewed and Amanda was able to demonstrate them prior to the end of the session.  Amanda demonstrated good  understanding of the education provided. See EMR under Patient Instructions for exercises provided during therapy sessions    ASSESSMENT     Amanda has been compliant with home exercises and reporting decreased headache and neck pain since beginning exercises and changing her medication.  She is noted to have tension in bilateral upper trapezius and receives IASTM to decrease pain and improve mobility.  Patient required verbal and tactile cues for proper form and technique of exercises especially to avoid upper trap substitution during resisted ER and horizontal abduction.      Amanda Is progressing well towards her goals.   Pt prognosis is Good.     Pt will continue to benefit from skilled outpatient physical therapy to address the deficits listed in the problem list box on initial evaluation, provide pt/family education and to maximize pt's level of independence in the home and community environment.     Pt's spiritual, cultural and educational needs considered and pt agreeable to plan of care and goals.     Anticipated barriers to physical therapy: none    Goals:   Short Term Goals: 3 weeks   1) Pt will be I with established HEP  In Progress, Not Met  2) Pt will have headache intensity no worse than 7/10 In Progress, Not Met     Long Term Goals: 6 weeks   1) Pt will have no worse than moderate cervical extension loss In Progress, Not Met  2) Headache intensity will be no worse than 5/10 In Progress, Not Met  3) Headache frequency will decrease by 50% In Progress, Not Met    PLAN     Continue with PT POC.    Camilo Joe, PT

## 2022-10-24 ENCOUNTER — TELEPHONE (OUTPATIENT)
Dept: NEUROSURGERY | Facility: CLINIC | Age: 57
End: 2022-10-24
Payer: MEDICARE

## 2022-10-24 NOTE — TELEPHONE ENCOUNTER
Attempted to reach patient to reschedule appt, unable to leave voicemail.     ----- Message from Sury Peralta MA sent at 10/22/2022  7:04 AM CDT -----  Regarding: Referral  Hi All,    This pt did not make their appt w/[facility] per the referral of Chance on 10.11.2022 for meningioma.  Can we see about getting this pt back on the schedule for eval?    Thanks,  May

## 2022-10-25 DIAGNOSIS — G47.00 INSOMNIA, UNSPECIFIED TYPE: ICD-10-CM

## 2022-10-28 RX ORDER — TRAZODONE HYDROCHLORIDE 100 MG/1
100 TABLET ORAL NIGHTLY
Qty: 90 TABLET | Refills: 1 | Status: SHIPPED | OUTPATIENT
Start: 2022-10-28 | End: 2023-07-17 | Stop reason: SDUPTHER

## 2022-10-31 ENCOUNTER — CLINICAL SUPPORT (OUTPATIENT)
Dept: REHABILITATION | Facility: HOSPITAL | Age: 57
End: 2022-10-31
Payer: MEDICARE

## 2022-10-31 DIAGNOSIS — M54.2 NECK PAIN: ICD-10-CM

## 2022-10-31 DIAGNOSIS — M79.18 MYOFASCIAL PAIN SYNDROME, CERVICAL: Primary | ICD-10-CM

## 2022-10-31 PROCEDURE — 97110 THERAPEUTIC EXERCISES: CPT | Mod: PN | Performed by: PHYSICAL THERAPIST

## 2022-10-31 PROCEDURE — 97112 NEUROMUSCULAR REEDUCATION: CPT | Mod: PN | Performed by: PHYSICAL THERAPIST

## 2022-10-31 NOTE — PROGRESS NOTES
"[facility]   Physical Therapy Treatment Note     Name: Amanda Mcguire  Clinic Number: [id number]    Therapy Diagnosis:   No diagnosis found.    Physician: Heavenly Fletcher NP    Visit Date: 10/31/2022    Physician Orders: PT Eval and Treat   Medical Diagnosis from Referral: Cervical myofascial pain syndrome   Evaluation Date: 9/28/2022  Authorization Period Expiration: 12/03/2022  Plan of Care Expiration: 11/09/22  Progress Note Due: 10/28/22  Visit # / Visits authorized: 2/ 12  FOTO: 1/ 3   PTA Visit #: 0/5     Precautions: Standard    Time In: 11:15 am  Time Out: 11:55 am  Total Billable Time: 40 minutes      SUBJECTIVE     Pt reports: that she has been feeling pretty good. I haven't been having any pain besides my knees. I'm not having headaches as often   She was compliant with home exercise program.  Response to previous treatment: No adverse response   Functional change: TBD    Pain: 0/10  Location:  neck and head     OBJECTIVE     Objective Measures updated at progress report unless specified.       TREATMENT       Amanda received the treatments listed below:       therapeutic exercises to develop strength, endurance, ROM, flexibility, posture, and core stabilization for (30)  minutes including:  - UBE x 4 minutes   - upper trap stretch 3 x 20"  - levator scap stretch 3 x 20"  - scapular retractions x 20 with 5" hold  - seated thoracic extension over ball 2 x 10  - seated chin tuck 2 x 10  - seated bilateral shoulder ER 2 x 10, YTB  - seated horizontal abduction with palm up 2 x 10, YTB     manual therapy techniques applied for (10) minutes, including:  - IASTM to bilateral upper trapezius and levator scapulae    neuromuscular re-education activities to improve: Balance, Coordination, Kinesthetic, Sense, Proprioception, and Posture for (0)  minutes., including:      dynamic functional therapeutic activities to improve functional performance for (0)  minutes, " including:        PATIENT EDUCATION AND HOME EXERCISES     Home Exercises Provided and Patient Education Provided     Education/Self-Care provided:  - Pathophysiology of condition   - HEP   - POC    Written Home Exercises Provided: Patient instructed to cont prior HEP. Exercises were reviewed and Amanda was able to demonstrate them prior to the end of the session.  Amanda demonstrated good  understanding of the education provided. See EMR under Patient Instructions for exercises provided during therapy sessions    ASSESSMENT     Amanda with reduced headache frequency and neck pain since onset of physical therapy and following her Botox injections. Required verbal and tactile cues for proper exercise technique. She continues to have increased tone in the upper trapezius and levator. Reduced tone following manual techniques.     Amanda Is progressing well towards her goals.   Pt prognosis is Good.     Pt will continue to benefit from skilled outpatient physical therapy to address the deficits listed in the problem list box on initial evaluation, provide pt/family education and to maximize pt's level of independence in the home and community environment.     Pt's spiritual, cultural and educational needs considered and pt agreeable to plan of care and goals.     Anticipated barriers to physical therapy: none    Goals:   Short Term Goals: 3 weeks   1) Pt will be I with established HEP  In Progress, Not Met  2) Pt will have headache intensity no worse than 7/10 In Progress, Not Met     Long Term Goals: 6 weeks   1) Pt will have no worse than moderate cervical extension loss In Progress, Not Met  2) Headache intensity will be no worse than 5/10 In Progress, Not Met  3) Headache frequency will decrease by 50% In Progress, Not Met    PLAN     Continue with PT POC.    Jeremy Palacios, PT

## 2022-11-02 ENCOUNTER — LAB VISIT (OUTPATIENT)
Dept: LAB | Facility: HOSPITAL | Age: 57
End: 2022-11-02
Attending: INTERNAL MEDICINE
Payer: MEDICARE

## 2022-11-02 DIAGNOSIS — G89.4 CHRONIC PAIN SYNDROME: ICD-10-CM

## 2022-11-02 DIAGNOSIS — L40.9 PSORIASIS: ICD-10-CM

## 2022-11-02 DIAGNOSIS — R05.3 CHRONIC COUGH: ICD-10-CM

## 2022-11-02 DIAGNOSIS — M02.39 REACTIVE ARTHRITIS OF MULTIPLE SITES: ICD-10-CM

## 2022-11-02 DIAGNOSIS — K50.918 CROHN'S DISEASE WITH OTHER COMPLICATION, UNSPECIFIED GASTROINTESTINAL TRACT LOCATION: ICD-10-CM

## 2022-11-02 DIAGNOSIS — R76.8 ANA POSITIVE: ICD-10-CM

## 2022-11-02 LAB
25(OH)D3+25(OH)D2 SERPL-MCNC: 32 NG/ML (ref 30–96)
ALBUMIN SERPL BCP-MCNC: 3.6 G/DL (ref 3.5–5.2)
ALP SERPL-CCNC: 88 U/L (ref 55–135)
ALT SERPL W/O P-5'-P-CCNC: 23 U/L (ref 10–44)
ANION GAP SERPL CALC-SCNC: 11 MMOL/L (ref 8–16)
AST SERPL-CCNC: 19 U/L (ref 10–40)
BASOPHILS # BLD AUTO: 0.04 K/UL (ref 0–0.2)
BASOPHILS NFR BLD: 0.3 % (ref 0–1.9)
BILIRUB SERPL-MCNC: 0.3 MG/DL (ref 0.1–1)
BUN SERPL-MCNC: 15 MG/DL (ref 6–20)
CALCIUM SERPL-MCNC: 9.8 MG/DL (ref 8.7–10.5)
CCP AB SER IA-ACNC: <0.5 U/ML
CHLORIDE SERPL-SCNC: 103 MMOL/L (ref 95–110)
CO2 SERPL-SCNC: 26 MMOL/L (ref 23–29)
CREAT SERPL-MCNC: 0.8 MG/DL (ref 0.5–1.4)
CRP SERPL-MCNC: 24.9 MG/L (ref 0–8.2)
DIFFERENTIAL METHOD: ABNORMAL
EOSINOPHIL # BLD AUTO: 0 K/UL (ref 0–0.5)
EOSINOPHIL NFR BLD: 0.3 % (ref 0–8)
ERYTHROCYTE [DISTWIDTH] IN BLOOD BY AUTOMATED COUNT: 16.1 % (ref 11.5–14.5)
ERYTHROCYTE [SEDIMENTATION RATE] IN BLOOD BY WESTERGREN METHOD: 28 MM/HR (ref 0–20)
EST. GFR  (NO RACE VARIABLE): >60 ML/MIN/1.73 M^2
GLUCOSE SERPL-MCNC: 90 MG/DL (ref 70–110)
HCT VFR BLD AUTO: 43.1 % (ref 37–48.5)
HGB BLD-MCNC: 13.4 G/DL (ref 12–16)
IMM GRANULOCYTES # BLD AUTO: 0.23 K/UL (ref 0–0.04)
IMM GRANULOCYTES NFR BLD AUTO: 1.7 % (ref 0–0.5)
LYMPHOCYTES # BLD AUTO: 2.4 K/UL (ref 1–4.8)
LYMPHOCYTES NFR BLD: 17.3 % (ref 18–48)
MCH RBC QN AUTO: 26.6 PG (ref 27–31)
MCHC RBC AUTO-ENTMCNC: 31.1 G/DL (ref 32–36)
MCV RBC AUTO: 86 FL (ref 82–98)
MONOCYTES # BLD AUTO: 1.2 K/UL (ref 0.3–1)
MONOCYTES NFR BLD: 8.7 % (ref 4–15)
NEUTROPHILS # BLD AUTO: 9.9 K/UL (ref 1.8–7.7)
NEUTROPHILS NFR BLD: 71.7 % (ref 38–73)
NRBC BLD-RTO: 0 /100 WBC
PLATELET # BLD AUTO: 321 K/UL (ref 150–450)
PMV BLD AUTO: 10.8 FL (ref 9.2–12.9)
POTASSIUM SERPL-SCNC: 4.2 MMOL/L (ref 3.5–5.1)
PROT SERPL-MCNC: 7.6 G/DL (ref 6–8.4)
RBC # BLD AUTO: 5.03 M/UL (ref 4–5.4)
SODIUM SERPL-SCNC: 140 MMOL/L (ref 136–145)
WBC # BLD AUTO: 13.74 K/UL (ref 3.9–12.7)

## 2022-11-02 PROCEDURE — 80053 COMPREHEN METABOLIC PANEL: CPT | Performed by: INTERNAL MEDICINE

## 2022-11-02 PROCEDURE — 36415 COLL VENOUS BLD VENIPUNCTURE: CPT | Mod: PO | Performed by: INTERNAL MEDICINE

## 2022-11-02 PROCEDURE — 86039 ANTINUCLEAR ANTIBODIES (ANA): CPT | Performed by: INTERNAL MEDICINE

## 2022-11-02 PROCEDURE — 83516 IMMUNOASSAY NONANTIBODY: CPT | Performed by: INTERNAL MEDICINE

## 2022-11-02 PROCEDURE — 85651 RBC SED RATE NONAUTOMATED: CPT | Mod: PO | Performed by: INTERNAL MEDICINE

## 2022-11-02 PROCEDURE — 86038 ANTINUCLEAR ANTIBODIES: CPT | Performed by: INTERNAL MEDICINE

## 2022-11-02 PROCEDURE — 86200 CCP ANTIBODY: CPT | Performed by: INTERNAL MEDICINE

## 2022-11-02 PROCEDURE — 86235 NUCLEAR ANTIGEN ANTIBODY: CPT | Mod: 59 | Performed by: INTERNAL MEDICINE

## 2022-11-02 PROCEDURE — 85025 COMPLETE CBC W/AUTO DIFF WBC: CPT | Performed by: INTERNAL MEDICINE

## 2022-11-02 PROCEDURE — 82306 VITAMIN D 25 HYDROXY: CPT | Performed by: INTERNAL MEDICINE

## 2022-11-02 PROCEDURE — 86140 C-REACTIVE PROTEIN: CPT | Performed by: INTERNAL MEDICINE

## 2022-11-03 LAB
ANA PATTERN 1: NORMAL
ANA SER QL IF: POSITIVE
ANA TITR SER IF: NORMAL {TITER}
ANTI SM/RNP ANTIBODY: 0.09 RATIO (ref 0–0.99)
ANTI-SM/RNP INTERPRETATION: NEGATIVE
DSDNA AB SER-ACNC: NORMAL [IU]/ML

## 2022-11-05 LAB — HISTONE IGG SER IA-ACNC: 1.1 UNITS (ref 0–0.9)

## 2022-11-07 ENCOUNTER — TELEPHONE (OUTPATIENT)
Dept: ORTHOPEDICS | Facility: CLINIC | Age: 57
End: 2022-11-07
Payer: MEDICARE

## 2022-11-07 LAB
ANTI SM ANTIBODY: 0.07 RATIO (ref 0–0.99)
ANTI SM/RNP ANTIBODY: 0.09 RATIO (ref 0–0.99)
ANTI-SM INTERPRETATION: NEGATIVE
ANTI-SM/RNP INTERPRETATION: NEGATIVE
ANTI-SSA ANTIBODY: 0.05 RATIO (ref 0–0.99)
ANTI-SSA INTERPRETATION: NEGATIVE
ANTI-SSB ANTIBODY: 0.04 RATIO (ref 0–0.99)
ANTI-SSB INTERPRETATION: NEGATIVE
DSDNA AB SER-ACNC: NORMAL [IU]/ML

## 2022-11-07 NOTE — TELEPHONE ENCOUNTER
----- Message from Loulou Gomez sent at 11/7/2022  8:48 AM CST -----  Contact: Patient  Type:  Patient Call          Who Called:  Patient         Does the patient know what this is regarding?: requesting a call back ; Pt said she missed her appt on  11/4 and need to reschedule ;please advise          Would the patient rather a call back or a response via [facility]? Call           Best Call Back Number: [phone]             Additional Information:

## 2022-11-08 ENCOUNTER — OFFICE VISIT (OUTPATIENT)
Dept: RHEUMATOLOGY | Facility: CLINIC | Age: 57
End: 2022-11-08
Payer: MEDICARE

## 2022-11-08 VITALS
WEIGHT: 251 LBS | BODY MASS INDEX: 44.47 KG/M2 | DIASTOLIC BLOOD PRESSURE: 84 MMHG | HEIGHT: 63 IN | SYSTOLIC BLOOD PRESSURE: 124 MMHG | HEART RATE: 80 BPM

## 2022-11-08 DIAGNOSIS — L29.9 ITCHING: ICD-10-CM

## 2022-11-08 DIAGNOSIS — M02.39 REACTIVE ARTHRITIS OF MULTIPLE SITES: ICD-10-CM

## 2022-11-08 DIAGNOSIS — J32.9 SINUSITIS, UNSPECIFIED CHRONICITY, UNSPECIFIED LOCATION: Primary | ICD-10-CM

## 2022-11-08 DIAGNOSIS — G89.4 CHRONIC PAIN SYNDROME: ICD-10-CM

## 2022-11-08 DIAGNOSIS — K50.012 CROHN'S DISEASE OF SMALL INTESTINE WITH INTESTINAL OBSTRUCTION: ICD-10-CM

## 2022-11-08 DIAGNOSIS — K50.90 CROHN'S DISEASE WITHOUT COMPLICATION, UNSPECIFIED GASTROINTESTINAL TRACT LOCATION: Primary | ICD-10-CM

## 2022-11-08 DIAGNOSIS — R05.3 CHRONIC COUGH: ICD-10-CM

## 2022-11-08 DIAGNOSIS — K63.9 ARTHRITIS ASSOCIATED WITH INFLAMMATORY BOWEL DISEASE: ICD-10-CM

## 2022-11-08 DIAGNOSIS — M07.60 ARTHRITIS ASSOCIATED WITH INFLAMMATORY BOWEL DISEASE: ICD-10-CM

## 2022-11-08 DIAGNOSIS — J32.9 SINUSITIS, UNSPECIFIED CHRONICITY, UNSPECIFIED LOCATION: ICD-10-CM

## 2022-11-08 PROCEDURE — 96372 THER/PROPH/DIAG INJ SC/IM: CPT | Mod: S$GLB,,, | Performed by: PHYSICIAN ASSISTANT

## 2022-11-08 PROCEDURE — 4010F PR ACE/ARB THEARPY RXD/TAKEN: ICD-10-PCS | Mod: CPTII,S$GLB,, | Performed by: PHYSICIAN ASSISTANT

## 2022-11-08 PROCEDURE — 99999 PR PBB SHADOW E&M-EST. PATIENT-LVL V: ICD-10-PCS | Mod: PBBFAC,,, | Performed by: PHYSICIAN ASSISTANT

## 2022-11-08 PROCEDURE — 96372 PR INJECTION,THERAP/PROPH/DIAG2ST, IM OR SUBCUT: ICD-10-PCS | Mod: S$GLB,,, | Performed by: PHYSICIAN ASSISTANT

## 2022-11-08 PROCEDURE — 3074F PR MOST RECENT SYSTOLIC BLOOD PRESSURE < 130 MM HG: ICD-10-PCS | Mod: CPTII,S$GLB,, | Performed by: PHYSICIAN ASSISTANT

## 2022-11-08 PROCEDURE — 1160F RVW MEDS BY RX/DR IN RCRD: CPT | Mod: CPTII,S$GLB,, | Performed by: PHYSICIAN ASSISTANT

## 2022-11-08 PROCEDURE — 3079F DIAST BP 80-89 MM HG: CPT | Mod: CPTII,S$GLB,, | Performed by: PHYSICIAN ASSISTANT

## 2022-11-08 PROCEDURE — 99214 PR OFFICE/OUTPT VISIT, EST, LEVL IV, 30-39 MIN: ICD-10-PCS | Mod: 25,S$GLB,, | Performed by: PHYSICIAN ASSISTANT

## 2022-11-08 PROCEDURE — 3044F PR MOST RECENT HEMOGLOBIN A1C LEVEL <7.0%: ICD-10-PCS | Mod: CPTII,S$GLB,, | Performed by: PHYSICIAN ASSISTANT

## 2022-11-08 PROCEDURE — 4010F ACE/ARB THERAPY RXD/TAKEN: CPT | Mod: CPTII,S$GLB,, | Performed by: PHYSICIAN ASSISTANT

## 2022-11-08 PROCEDURE — 99214 OFFICE O/P EST MOD 30 MIN: CPT | Mod: 25,S$GLB,, | Performed by: PHYSICIAN ASSISTANT

## 2022-11-08 PROCEDURE — 1159F PR MEDICATION LIST DOCUMENTED IN MEDICAL RECORD: ICD-10-PCS | Mod: CPTII,S$GLB,, | Performed by: PHYSICIAN ASSISTANT

## 2022-11-08 PROCEDURE — 1160F PR REVIEW ALL MEDS BY PRESCRIBER/CLIN PHARMACIST DOCUMENTED: ICD-10-PCS | Mod: CPTII,S$GLB,, | Performed by: PHYSICIAN ASSISTANT

## 2022-11-08 PROCEDURE — 3008F PR BODY MASS INDEX (BMI) DOCUMENTED: ICD-10-PCS | Mod: CPTII,S$GLB,, | Performed by: PHYSICIAN ASSISTANT

## 2022-11-08 PROCEDURE — 3008F BODY MASS INDEX DOCD: CPT | Mod: CPTII,S$GLB,, | Performed by: PHYSICIAN ASSISTANT

## 2022-11-08 PROCEDURE — 3079F PR MOST RECENT DIASTOLIC BLOOD PRESSURE 80-89 MM HG: ICD-10-PCS | Mod: CPTII,S$GLB,, | Performed by: PHYSICIAN ASSISTANT

## 2022-11-08 PROCEDURE — 3044F HG A1C LEVEL LT 7.0%: CPT | Mod: CPTII,S$GLB,, | Performed by: PHYSICIAN ASSISTANT

## 2022-11-08 PROCEDURE — 3074F SYST BP LT 130 MM HG: CPT | Mod: CPTII,S$GLB,, | Performed by: PHYSICIAN ASSISTANT

## 2022-11-08 PROCEDURE — 99999 PR PBB SHADOW E&M-EST. PATIENT-LVL V: CPT | Mod: PBBFAC,,, | Performed by: PHYSICIAN ASSISTANT

## 2022-11-08 PROCEDURE — 1159F MED LIST DOCD IN RCRD: CPT | Mod: CPTII,S$GLB,, | Performed by: PHYSICIAN ASSISTANT

## 2022-11-08 RX ORDER — HYDROXYZINE PAMOATE 25 MG/1
CAPSULE ORAL
Qty: 45 CAPSULE | Refills: 3 | Status: SHIPPED | OUTPATIENT
Start: 2022-11-08 | End: 2023-07-19 | Stop reason: SDUPTHER

## 2022-11-08 RX ORDER — AMOXICILLIN AND CLAVULANATE POTASSIUM 875; 125 MG/1; MG/1
1 TABLET, FILM COATED ORAL 2 TIMES DAILY
Qty: 20 TABLET | Refills: 0 | Status: SHIPPED | OUTPATIENT
Start: 2022-11-08 | End: 2022-11-18

## 2022-11-08 RX ORDER — PROMETHAZINE HYDROCHLORIDE 6.25 MG/5ML
25 SYRUP ORAL EVERY 8 HOURS PRN
Qty: 240 ML | Refills: 0 | Status: SHIPPED | OUTPATIENT
Start: 2022-11-08 | End: 2022-12-22

## 2022-11-08 RX ORDER — PROMETHAZINE HYDROCHLORIDE AND CODEINE PHOSPHATE 6.25; 1 MG/5ML; MG/5ML
5 SOLUTION ORAL EVERY 6 HOURS PRN
COMMUNITY
Start: 2022-08-12 | End: 2022-12-22

## 2022-11-08 RX ORDER — OMEPRAZOLE 40 MG/1
40 CAPSULE, DELAYED RELEASE ORAL DAILY
COMMUNITY
Start: 2022-08-15 | End: 2022-12-22

## 2022-11-08 RX ORDER — IRBESARTAN 150 MG/1
TABLET ORAL
COMMUNITY
Start: 2022-08-15

## 2022-11-08 RX ORDER — CEFTRIAXONE 1 G/1
1 INJECTION, POWDER, FOR SOLUTION INTRAMUSCULAR; INTRAVENOUS
Status: COMPLETED | OUTPATIENT
Start: 2022-11-08 | End: 2022-11-08

## 2022-11-08 RX ORDER — DEXAMETHASONE SODIUM PHOSPHATE 4 MG/ML
8 INJECTION, SOLUTION INTRA-ARTICULAR; INTRALESIONAL; INTRAMUSCULAR; INTRAVENOUS; SOFT TISSUE
Status: COMPLETED | OUTPATIENT
Start: 2022-11-08 | End: 2022-11-08

## 2022-11-08 RX ORDER — GABAPENTIN 800 MG/1
800 TABLET ORAL 3 TIMES DAILY
Qty: 90 TABLET | Refills: 3 | Status: SHIPPED | OUTPATIENT
Start: 2022-11-08 | End: 2023-07-17 | Stop reason: SDUPTHER

## 2022-11-08 RX ORDER — ZOLPIDEM TARTRATE 10 MG/1
TABLET ORAL
COMMUNITY
Start: 2022-08-15 | End: 2023-05-18

## 2022-11-08 RX ORDER — HYDROXYCHLOROQUINE SULFATE 200 MG/1
TABLET, FILM COATED ORAL
Qty: 180 TABLET | Refills: 1 | Status: SHIPPED | OUTPATIENT
Start: 2022-11-08 | End: 2023-04-04

## 2022-11-08 RX ADMIN — DEXAMETHASONE SODIUM PHOSPHATE 8 MG: 4 INJECTION, SOLUTION INTRA-ARTICULAR; INTRALESIONAL; INTRAMUSCULAR; INTRAVENOUS; SOFT TISSUE at 11:11

## 2022-11-08 RX ADMIN — CEFTRIAXONE 1 G: 1 INJECTION, POWDER, FOR SOLUTION INTRAMUSCULAR; INTRAVENOUS at 11:11

## 2022-11-08 ASSESSMENT — ROUTINE ASSESSMENT OF PATIENT INDEX DATA (RAPID3)
TOTAL RAPID3 SCORE: 8.33
PSYCHOLOGICAL DISTRESS SCORE: 4.4
PAIN SCORE: 10
PATIENT GLOBAL ASSESSMENT SCORE: 10
MDHAQ FUNCTION SCORE: 1.5

## 2022-11-08 NOTE — PROGRESS NOTES
2 Patient ID's verified and allergies reviewed     Administered 2 cc dexamethasone 8mg/cc  to right upper outer gluteal. Pt tolerated well. No acute reaction noted to site. Pt instructed on S/S to report. Pt verbalized understanding.       Administered 1 gram of rocephin mixed with 2.1 ml of lidocaine. Given in the left upper outer gluteal. Patient tolerated injections well. Informed of s/s to report verbalized understanding. No adverse reactions noted. left facility in stable condition

## 2022-11-08 NOTE — Clinical Note
Hi,  This patient requested I reach out to your office to help her get scheduled for her follow up visit for Crohn's.   Thanks, Arabella Ayon PA-C

## 2022-11-08 NOTE — PROGRESS NOTES
Subjective:       Patient ID: Amanda Mcguire is a 57 y.o. female.    Chief Complaint: Disease Management    Mrs. Mcguire is a 57 year old female who presents to clinic for follow up on reactive arthritis, osteoarthritis. She is on remicade infusions every 8 weeks for crohn's disease. No recent f/u with GI. She reports significant improvement in her joint pain following remicade infusions (last completed 2 weeks ago). She reports sinus pressure, nasal drainage, sore throat, and cough x 1 week. She is requesting cough medication.     Labs show +JONATHAN, +histone antibody, elevated ESR/CRP, elevated WBC.    She has joint pain in her hands, knees, ankles, and low back. Pain is constant and aching. Taking percocet for severe pain with adequate control of her symptoms. She is followed by orthopedics for knee and hip injections.    She is followed by neurology for migraines--receiving botox injections. MRI brain found benign meningioma--NSY eval recommended.    Current tx:  1. Remicade  2. norco  3. baclofen      Review of Systems   Constitutional: Negative for activity change, appetite change, fatigue and fever.   Eyes: Negative for visual disturbance.   Cardiovascular: Negative for chest pain, palpitations and leg swelling.   Gastrointestinal: Positive for abdominal pain and nausea. Negative for constipation, diarrhea and vomiting.   Musculoskeletal: Positive for arthralgias, joint swelling and myalgias.   Pulmonary: Positive for cough, shortness of breath  Neurological: Negative for dizziness, weakness, light-headedness and headaches.   Skin: no rash  Psych: No anxiety        Objective:     Vitals:    11/08/22 1031   BP: 124/84   Pulse: 80       Past Medical History:   Diagnosis Date    Anxiety     Arthritis     Asthma     Crohn's disease     Depression     Encounter for blood transfusion     Headache     Hypertension     Myofascial pain syndrome, cervical 9/28/2022     Past Surgical History:   Procedure Laterality  Date    COLONOSCOPY N/A 3/10/2022    Procedure: COLONOSCOPY;  Surgeon: Nilson Wiseman MD;  Location: [facility];  Service: Endoscopy;  Laterality: N/A;    ESOPHAGOGASTRODUODENOSCOPY N/A 3/10/2022    Procedure: EGD (ESOPHAGOGASTRODUODENOSCOPY);  Surgeon: Nilson Wiseman MD;  Location: [facility];  Service: Endoscopy;  Laterality: N/A;     Physical Exam   Constitutional:   Obese body habitus.   Eyes: Right conjunctiva is not injected. Left conjunctiva is not injected.   Neck: No JVD present. No thyromegaly present.   Cardiovascular: Normal rate and regular rhythm.  Exam reveals no decreased pulses.    Pulmonary/Chest: She has no wheezes. She has no rhonchi. She has no rales.         Right Side Rheumatological Exam     Examination finds the shoulder, elbow, wrist, knee, 1st PIP, 1st MCP, 2nd MCP, 3rd MCP, 4th MCP and 5th MCP normal.    The patient is tender to palpation of the 2nd PIP, 3rd PIP, 4th PIP and 5th PIP    She has swelling of the 2nd PIP, 3rd PIP, 4th PIP and 5th PIP     Left Side Rheumatological Exam     Examination finds the shoulder, elbow, wrist, knee, 1st PIP, 1st MCP, 2nd MCP, 3rd MCP, 4th PIP, 4th MCP, 5th PIP and 5th MCP normal.    The patient is tender to palpation of the 2nd PIP and 3rd PIP.    She has swelling of the 2nd PIP and 3rd PIP       Lymphadenopathy:     She has no cervical adenopathy.   Neurological: Gait normal.   Skin: No rash noted.     Psychiatric: Mood and affect normal.   Musculoskeletal: She exhibits tenderness (multiple tender points in her muscles throughout).        Labs:  Component      Latest Ref Rng & Units 11/2/2022 11/2/2022          11:15 AM 11:15 AM   WBC      3.90 - 12.70 K/uL  13.74 (H)   RBC      4.00 - 5.40 M/uL  5.03   Hemoglobin      12.0 - 16.0 g/dL  13.4   Hematocrit      37.0 - 48.5 %  43.1   MCV      82 - 98 fL  86   MCH      27.0 - 31.0 pg  26.6 (L)   MCHC      32.0 - 36.0 g/dL  31.1 (L)   RDW      11.5 - 14.5 %  16.1 (H)   Platelets      150 - 450 K/uL   321   MPV      9.2 - 12.9 fL  10.8   Immature Granulocytes      0.0 - 0.5 %  1.7 (H)   Gran # (ANC)      1.8 - 7.7 K/uL  9.9 (H)   Immature Grans (Abs)      0.00 - 0.04 K/uL  0.23 (H)   Lymph #      1.0 - 4.8 K/uL  2.4   Mono #      0.3 - 1.0 K/uL  1.2 (H)   Eos #      0.0 - 0.5 K/uL  0.0   Baso #      0.00 - 0.20 K/uL  0.04   nRBC      0 /100 WBC  0   Gran %      38.0 - 73.0 %  71.7   Lymph %      18.0 - 48.0 %  17.3 (L)   Mono %      4.0 - 15.0 %  8.7   Eosinophil %      0.0 - 8.0 %  0.3   Basophil %      0.0 - 1.9 %  0.3   Differential Method        Automated   Sodium      136 - 145 mmol/L  140   Potassium      3.5 - 5.1 mmol/L  4.2   Chloride      95 - 110 mmol/L  103   CO2      23 - 29 mmol/L  26   Glucose      70 - 110 mg/dL  90   BUN      6 - 20 mg/dL  15   Creatinine      0.5 - 1.4 mg/dL  0.8   Calcium      8.7 - 10.5 mg/dL  9.8   PROTEIN TOTAL      6.0 - 8.4 g/dL  7.6   Albumin      3.5 - 5.2 g/dL  3.6   BILIRUBIN TOTAL      0.1 - 1.0 mg/dL  0.3   Alkaline Phosphatase      55 - 135 U/L  88   AST      10 - 40 U/L  19   ALT      10 - 44 U/L  23   Anion Gap      8 - 16 mmol/L  11   eGFR      >60 mL/min/1.73 m:2  >60.0   Anti Sm Antibody      0.00 - 0.99 Ratio 0.07    Anti-Sm Interpretation      Negative Negative    Anti-SSA Antibody      0.00 - 0.99 Ratio 0.05    Anti-SSA Interpretation      Negative Negative    Anti-SSB Antibody      0.00 - 0.99 Ratio 0.04    Anti-SSB Interpretation      Negative Negative    ds DNA Ab      Negative 1:10 Negative 1:10 Negative 1:10   Anti Sm/RNP Antibody      0.00 - 0.99 Ratio 0.09 0.09   Anti-Sm/RNP Interpretation      Negative Negative Negative   CRP      0.0 - 8.2 mg/L  24.9 (H)   Sed Rate      0 - 20 mm/Hr  28 (H)   Anti-Histone Antibody      0.0 - 0.9 Units  1.1 (H)   JONATHAN Screen      Negative <1:80  Positive (A)   CCP Antibodies      <5.0 U/mL  <0.5   Vit D, 25-Hydroxy      30 - 96 ng/mL  32   JONATAHN PATTERN 1        Homogeneous   JONATHAN Titer 1        1:320            ASSESSMENT:    1. Crohn's disease without complication, unspecified gastrointestinal tract location    2. Arthritis associated with inflammatory bowel disease    3. Sinusitis, unspecified chronicity, unspecified location    4. Chronic pain syndrome            Plan:       Crohn's disease without complication, unspecified gastrointestinal tract location    Arthritis associated with inflammatory bowel disease    Sinusitis, unspecified chronicity, unspecified location  -     dexAMETHasone injection 8 mg  -     cefTRIAXone injection 1 g    Chronic pain syndrome      Assessment:  57 year old female with   Reactive arthritis, osteoarthritis, erythema nodosum on plaquenil  --chronic pain syndrome on norco 10/325  --HTN  --hx of asthma  --hx of pancreatitis 9/2019  --NEW diagnosis of Crohn's disease 10/2020    Plan:  1. Cont Remicade per GI, Cont plaquenil 200 mg bid  2. Augmentin, promethazine cough syrup sent.   3. Cont. norco per MD.  I have checked [place] prescription monitoring program site and no unusual or abnormal behavior has occurred pt understand the risk and benefits of taking opioid medications and has decided to continue the medication.  4. Dexa 8 mg given today, rochepin for infection  5. NSY f/u for meningioma  6. GI f/u for crohn's   7. Neurology for migraine management      Follow up:  3 months Dr. Beltran w/ labs prior

## 2022-11-09 RX ORDER — HYDROCODONE BITARTRATE AND ACETAMINOPHEN 10; 325 MG/1; MG/1
1 TABLET ORAL EVERY 8 HOURS PRN
Qty: 90 TABLET | Refills: 0 | Status: SHIPPED | OUTPATIENT
Start: 2023-01-07 | End: 2023-02-06

## 2022-11-09 RX ORDER — HYDROCODONE BITARTRATE AND ACETAMINOPHEN 10; 325 MG/1; MG/1
1 TABLET ORAL EVERY 8 HOURS PRN
Qty: 90 TABLET | Refills: 0 | Status: SHIPPED | OUTPATIENT
Start: 2022-12-08 | End: 2023-02-06

## 2022-11-09 RX ORDER — HYDROCODONE BITARTRATE AND ACETAMINOPHEN 10; 325 MG/1; MG/1
1 TABLET ORAL EVERY 8 HOURS PRN
Qty: 90 TABLET | Refills: 0 | Status: SHIPPED | OUTPATIENT
Start: 2022-11-09 | End: 2022-12-22 | Stop reason: SDUPTHER

## 2022-11-09 NOTE — TELEPHONE ENCOUNTER
See previous message  ----- Message from Sara Skinner sent at 11/8/2022  2:51 PM CST -----  Type: Needs Medical Advice  Who Called:  pt  Symptoms (please be specific):  pt said she spoke to the NP this morning and she was told her pain meds would be in the pharmacy--said she been to the pharmacy to get her pain meds said she been without her meds for 4 days and she think the office do not care or trying to kill her-- said she go through this every month and it's sad--please call and advise  [facility] Call Back Number: [phone]     Additional Information: thank you

## 2022-11-09 NOTE — TELEPHONE ENCOUNTER
----- Message from Lynn Arana sent at 11/9/2022 10:34 AM CST -----  Who Called:     What is the reqeust in detail: New Rx    HYDROcodone-acetaminophen (NORCO)  mg per tablet 90 tablet 0 10/5/2022 11/4/2022 No  Sig - Route: Take 1 tablet by mouth every 8 (eight) hours as needed for Pain. - Oral  Sent to pharmacy as: HYDROcodone-acetaminophen (NORCO)  mg per tablet  Class: Normal  Earliest Fill Date: 10/5/2022  Notes to Pharmacy: Quantity prescribed more than 7 day supply? Yes, quantity medically necessary    PHARMACY: [id number] [place] [address]  Phone: [phone] Fax: [phone]    Can the clinic reply by [facility]? No    Best Call Back Number: [phone]    Additional Information:

## 2022-11-09 NOTE — TELEPHONE ENCOUNTER
Spoke to patient and she states she's been out of medication for 2 weeks lost her purse at [facility] seen the provider and still have not received her medication patient extremely upset states she in a lot of pain and she would like her medication patient also states she may need to go back to Dr. Aguirre because she has a problem getting her medication every month from Dr. Beltran patient also requested a call back from supervisor informed the patient I would send the provider and the supervisor the message and someone will be in touch with her understanding verbalized  ----- Message from Bella Gilliam sent at 11/8/2022  4:58 PM CST -----  Contact: pt at [phone]  Type: Needs Medical Advice  Who Called:  pt    Best Call Back Number: [phone]    Additional Information: pt is calling the office to follow up on her pain meds. Please call back and advise.

## 2022-11-11 ENCOUNTER — OFFICE VISIT (OUTPATIENT)
Dept: NEUROLOGY | Facility: CLINIC | Age: 57
End: 2022-11-11
Payer: MEDICARE

## 2022-11-11 VITALS
RESPIRATION RATE: 17 BRPM | WEIGHT: 253.44 LBS | SYSTOLIC BLOOD PRESSURE: 118 MMHG | DIASTOLIC BLOOD PRESSURE: 77 MMHG | BODY MASS INDEX: 44.91 KG/M2 | HEIGHT: 63 IN | HEART RATE: 80 BPM

## 2022-11-11 DIAGNOSIS — R41.3 MEMORY LOSS: ICD-10-CM

## 2022-11-11 DIAGNOSIS — G43.719 INTRACTABLE CHRONIC MIGRAINE WITHOUT AURA AND WITHOUT STATUS MIGRAINOSUS: Primary | ICD-10-CM

## 2022-11-11 PROCEDURE — 99999 PR PBB SHADOW E&M-EST. PATIENT-LVL V: ICD-10-PCS | Mod: PBBFAC,,, | Performed by: NURSE PRACTITIONER

## 2022-11-11 PROCEDURE — 3044F PR MOST RECENT HEMOGLOBIN A1C LEVEL <7.0%: ICD-10-PCS | Mod: CPTII,S$GLB,, | Performed by: NURSE PRACTITIONER

## 2022-11-11 PROCEDURE — 3008F BODY MASS INDEX DOCD: CPT | Mod: CPTII,S$GLB,, | Performed by: NURSE PRACTITIONER

## 2022-11-11 PROCEDURE — 99214 OFFICE O/P EST MOD 30 MIN: CPT | Mod: S$GLB,,, | Performed by: NURSE PRACTITIONER

## 2022-11-11 PROCEDURE — 3008F PR BODY MASS INDEX (BMI) DOCUMENTED: ICD-10-PCS | Mod: CPTII,S$GLB,, | Performed by: NURSE PRACTITIONER

## 2022-11-11 PROCEDURE — 99999 PR PBB SHADOW E&M-EST. PATIENT-LVL V: CPT | Mod: PBBFAC,,, | Performed by: NURSE PRACTITIONER

## 2022-11-11 PROCEDURE — 3078F DIAST BP <80 MM HG: CPT | Mod: CPTII,S$GLB,, | Performed by: NURSE PRACTITIONER

## 2022-11-11 PROCEDURE — 1159F MED LIST DOCD IN RCRD: CPT | Mod: CPTII,S$GLB,, | Performed by: NURSE PRACTITIONER

## 2022-11-11 PROCEDURE — 3044F HG A1C LEVEL LT 7.0%: CPT | Mod: CPTII,S$GLB,, | Performed by: NURSE PRACTITIONER

## 2022-11-11 PROCEDURE — 3078F PR MOST RECENT DIASTOLIC BLOOD PRESSURE < 80 MM HG: ICD-10-PCS | Mod: CPTII,S$GLB,, | Performed by: NURSE PRACTITIONER

## 2022-11-11 PROCEDURE — 99214 PR OFFICE/OUTPT VISIT, EST, LEVL IV, 30-39 MIN: ICD-10-PCS | Mod: S$GLB,,, | Performed by: NURSE PRACTITIONER

## 2022-11-11 PROCEDURE — 3074F SYST BP LT 130 MM HG: CPT | Mod: CPTII,S$GLB,, | Performed by: NURSE PRACTITIONER

## 2022-11-11 PROCEDURE — 4010F ACE/ARB THERAPY RXD/TAKEN: CPT | Mod: CPTII,S$GLB,, | Performed by: NURSE PRACTITIONER

## 2022-11-11 PROCEDURE — 1160F RVW MEDS BY RX/DR IN RCRD: CPT | Mod: CPTII,S$GLB,, | Performed by: NURSE PRACTITIONER

## 2022-11-11 PROCEDURE — 1160F PR REVIEW ALL MEDS BY PRESCRIBER/CLIN PHARMACIST DOCUMENTED: ICD-10-PCS | Mod: CPTII,S$GLB,, | Performed by: NURSE PRACTITIONER

## 2022-11-11 PROCEDURE — 4010F PR ACE/ARB THEARPY RXD/TAKEN: ICD-10-PCS | Mod: CPTII,S$GLB,, | Performed by: NURSE PRACTITIONER

## 2022-11-11 PROCEDURE — 1159F PR MEDICATION LIST DOCUMENTED IN MEDICAL RECORD: ICD-10-PCS | Mod: CPTII,S$GLB,, | Performed by: NURSE PRACTITIONER

## 2022-11-11 PROCEDURE — 3074F PR MOST RECENT SYSTOLIC BLOOD PRESSURE < 130 MM HG: ICD-10-PCS | Mod: CPTII,S$GLB,, | Performed by: NURSE PRACTITIONER

## 2022-11-11 NOTE — PATIENT INSTRUCTIONS
Please call our clinic at [phone] or send a message on the [employer] portal if there are any changes to the plan described below, for example,if you are not contacted for the requested tests, referral(s) within one week, if you are unable to receive the medications prescribed, or if you feel you need to change the treatment course for any reason.     TESTING:  -- none     REFERRALS:  --neuropsych for further memory testing     PREVENTION (use daily regardless of headache):  -- continue BOTOX    AS-NEEDED TREATMENT (use total no more than 10 days per month unless otherwise stated):  -- continue rizatriptan with next migraine attack. You can repeat 2 hours later if needed. Take no more than 10 days per month  -- continue tizanidine  -- continue compazine. This is a nausea medication that also has anti-migraine properties. Can take daily and will not contribute to rebound headaches

## 2022-11-11 NOTE — ASSESSMENT & PLAN NOTE
She is s/p first Botox and is already having fewer and less severe headache days. No side effects. Will continue with planned first three sessions.

## 2022-11-11 NOTE — PROGRESS NOTES
"Date of service: 11/11/2022  Referring provider: No ref. provider found    Subjective:      Chief complaint: Headache       Patient ID: Amanda Mcguire is a 57 y.o. female with chronic pain, chohn's disease, IBS, RA who presents for follow up of headache     History of Present Illness    INTERVAL HISTORY 11/11/22    Last office visit was two months ago and at that time plan was to start Botox. Brain MRI was done and showed possible meningioma. She was referred to neurosurgery. She was in MOH to norco and Fioricet. First Botox session was six weeks ago.     Today she reports she is much better. She reports improvement in headaches but memory "is acting up". Headaches are in the temples. Current pain 0 with range 2-8. She has bout 3 headache days one week. She takes aleve, tyelnol. She denies side effects to the Botox. MOCA score today 13. Otherwise information below is reviewed and verified with no changes made     ORIGINAL HEADACHE HISTORY - 9/9/22  Age at onset and course over time: 21 years ago began with fairly frequent migraines. Headaches daily for past two weeks. Prior to that she would also be daily but not as severe.      She also reports memory concerns. She states she will get up to do something, but when she gets up to do it, she cannot remember what she wanted to do.     Family history of migraines - mom  Last eye exam - 3 months ago    Location: both sides of head  Quality:  [] pressure [] tight [] throbbing [x] sharp [] stabbing   Severity: current 10  Duration: days  Frequency: daily  Headaches awaken at night?:  yes  Worst time of day: all day  Associated with: [x] photophobia [x]  phonophobia [x] osmophobia [x] blurred vision  [] double vision [] loss of appetite [x] nausea [] vomiting [x] dizziness [] vertigo  [x] tinnitus [x] irritability [x] sinus pressure [x] problems with concentration   [x] neck tightness   Alleviated by:  [x] sleep [x] darkness [x] massage [x] heat [] ice [x] " medication  Exacerbated by:  [] fatigue [] light [x] noise [x] smells [x] coughing [x] sneezing  [x] bending over [] ovulation [] menses [] alcohol [] change in weather [x]  stress  Ipsilateral autonomic: [] nasal congestion [] lacrimation [] ptosis [] injection [] edema [] foreign body sensation [] ear fullness   ICP:  [] transient visual obscurations  [x] tinnitus   [] positional headache  [x] non-positional   Sleep habits: trouble falling asleep, trouble staying asleep - she reports history of normal sleep study    Caffeine intake: none  Gyn status (if female): menopausal    MIDAS: 35    Current acute treatment:  Norco - every 8 hours for past year  Vistaril  Tizanidine  Fioricet - daily for past month, not effective   Xanax  Compazine  Rizatriptan    Current prevention:  (Diltiazem)  Trazodone  (HCTZ)  Zoloft  (Amlodipine)  Gabapentin - 800 mg TID  Botox - first 9/30/22    Previously tried/failed acute treatment:  Phenergan  Baclofen  Ibuprofen  Mobic   Sumatriptan  Tramadol    Previously tried/failed preventative treatment:  Lexapro  Wellbutrin    Review of patient's allergies indicates:   Allergen Reactions    Amlodipine-benazepril Swelling    Ace inhibitors Swelling     Angioedema; was taking Benazepril.    Tramadol Itching     Current Outpatient Medications   Medication Sig Dispense Refill    ALPRAZolam (XANAX) 0.5 MG tablet Take 1 tablet by mouth nightly as needed.      amLODIPine (NORVASC) 10 MG tablet Take 1 tablet (10 mg total) by mouth daily      amoxicillin-clavulanate 875-125mg (AUGMENTIN) 875-125 mg per tablet Take 1 tablet by mouth 2 (two) times daily. for 10 days 20 tablet 0    atorvastatin (LIPITOR) 40 MG tablet Take 1 tablet (40 mg total) by mouth daily      azithromycin (Z-DUONG) 250 MG tablet Take two 250mg tablets PO on day 1, then one 250mg tablet PO daily for 4 days.      budesonide-formoterol 160-4.5 mcg (SYMBICORT) 160-4.5 mcg/actuation HFAA Inhale 2 puffs into the lungs 2 (two) times daily       butalbital-acetaminophen-caffeine -40 mg (FIORICET, ESGIC) -40 mg per tablet Take 1 tablet by mouth every 4 (four) hours as needed.      chlorhexidine (PERIDEX) 0.12 % solution swish and spit 15 MILLILITERS in the mouth or throat TWICE DAILY FOR FOURTEEN DAYS 473 mL 6    diclofenac sodium (VOLTAREN) 1 % Gel APPLY TOPICALLY FOUR TIMES DAILY 300 g 3    dicyclomine (BENTYL) 20 mg tablet Take 20 mg by mouth once daily.       diltiaZEM (CARDIZEM CD) 240 MG 24 hr capsule Take 240 mg by mouth.      diltiaZEM (TIAZAC) 240 MG Cs24 Take 1 capsule (240 mg total) by mouth once daily. 90 capsule 0    doxycycline (VIBRAMYCIN) 100 MG Cap Take 1 capsule (100 mg total) by mouth every 12 (twelve) hours. 20 capsule 0    epinephrine (EPIPEN) 0.3 mg/0.3 mL AtIn Inject 0.3 mg into the muscle.      fluticasone propionate (FLONASE) 50 mcg/actuation nasal spray       fluticasone-salmeterol 500-50 mcg/dose (ADVAIR) 500-50 mcg/dose DsDv diskus inhaler Inhale 1 puff into the lungs.      furosemide (LASIX) 20 MG tablet Take 1 tablet (20 mg total) by mouth daily      gabapentin (NEURONTIN) 800 MG tablet Take 1 tablet (800 mg total) by mouth 3 (three) times daily. 90 tablet 3    hydrochlorothiazide (MICROZIDE) 12.5 mg capsule Take 12.5 mg by mouth every morning.  11    [START ON 1/7/2023] HYDROcodone-acetaminophen (NORCO)  mg per tablet Take 1 tablet by mouth every 8 (eight) hours as needed for Pain. 90 tablet 0    [START ON 12/8/2022] HYDROcodone-acetaminophen (NORCO)  mg per tablet Take 1 tablet by mouth every 8 (eight) hours as needed for Pain. 90 tablet 0    HYDROcodone-acetaminophen (NORCO)  mg per tablet Take 1 tablet by mouth every 8 (eight) hours as needed for Pain. 90 tablet 0    hydrOXYchloroQUINE (PLAQUENIL) 200 mg tablet Take 1 tablet (200 mg total) by mouth 2 (two) times daily. 180 tablet 1    hydrOXYzine pamoate (VISTARIL) 25 MG Cap TAKE ONE CAPSULE BY MOUTH THREE TIMES DAILY AS NEEDED FOR ITCHING  45 capsule 3    ipratropium-albuteroL (COMBIVENT)  mcg/actuation inhaler Inhale 1 puff into the lungs 4 (four) times daily. Rescue 4 g 12    irbesartan (AVAPRO) 150 MG tablet TAKE 1 TABLET BY MOUTH EVERY NIGHT AT BEDTIME FOR BLOOD PRESSURE      lidocaine HCl 2% (LIDOCAINE VISCOUS) 2 % Soln by Mucous Membrane route every 8 (eight) hours as needed. 100 mL 0    montelukast (SINGULAIR) 10 mg tablet Take 10 mg by mouth every evening.      nebulizer and compressor Siomara Use as directed      neomycin-polymyxin-hydrocortisone (CORTISPORIN) otic solution PLACE 2 DROPS IN AFFECTED EAR FOUR TIMES DAILY FOR 5-7 DAYS      omeprazole (PRILOSEC) 40 MG capsule Take 40 mg by mouth once daily.      ondansetron (ZOFRAN-ODT) 4 MG TbDL Take 4 mg by mouth every 8 (eight) hours as needed.      phentermine (ADIPEX-P) 37.5 mg tablet Take 37.5 mg by mouth once daily.      predniSONE (DELTASONE) 10 MG tablet 4 tab PO in AM x 2 days, 3 tab in AM x2 days, 2 tab in AM x 2 days, 1 tab in AM x 2 days then stop 20 tablet 0    prochlorperazine (COMPAZINE) 10 MG tablet Take 1 tablet (10 mg total) by mouth every 6 (six) hours as needed (migraine or nausea). 60 tablet 11    promethazine (PHENERGAN) 6.25 mg/5 mL syrup Take 20 mLs (25 mg total) by mouth every 8 (eight) hours as needed (cough). 240 mL 0    promethazine-codeine 6.25-10 mg/5 ml (PHENERGAN WITH CODEINE) 6.25-10 mg/5 mL syrup Take 5 mLs by mouth every 6 (six) hours as needed.      rizatriptan (MAXALT) 10 MG tablet 1 tab PO PRN migraine. May repeat every 2 hours for max 3 tabs in 24 hours. Use no more than 10 days per month. 10 tablet 11    sertraline (ZOLOFT) 100 MG tablet Take 100 mg by mouth once daily.      tiZANidine (ZANAFLEX) 4 MG tablet Take 4 mg by mouth every 8 (eight) hours.       traZODone (DESYREL) 100 MG tablet Take 1 tablet (100 mg total) by mouth every evening. 90 tablet 1    zolpidem (AMBIEN) 10 mg Tab TAKE ONE-HALF - ONE TABLET BY MOUTH EVERY NIGHT AT BEDTIME AS NEEDED  FOR SLEEP      albuterol (PROVENTIL/VENTOLIN HFA) 90 mcg/actuation inhaler Inhale 2 puffs into the lungs every 6 (six) hours as needed for Wheezing. Rescue 18 g 6    triamcinolone acetonide 0.1% (KENALOG) 0.1 % ointment Apply topically 2 (two) times daily. 80 g 3     Current Facility-Administered Medications   Medication Dose Route Frequency Provider Last Rate Last Admin    onabotulinumtoxina injection 200 Units  200 Units Intramuscular q12 weeks Heavenly Fletcher NP   200 Units at 09/30/22 0958     Facility-Administered Medications Ordered in Other Visits   Medication Dose Route Frequency Provider Last Rate Last Admin    lactated ringers infusion   Intravenous Continuous Nilson Wiseman MD 75 mL/hr at 03/10/22 0806 New Bag at 03/10/22 0911    sodium chloride 0.9% flush 10 mL  10 mL Intravenous Q6H PRN Nilson Wiseman MD           Past Medical History  Past Medical History:   Diagnosis Date    Anxiety     Arthritis     Asthma     Crohn's disease     Depression     Encounter for blood transfusion     Headache     Hypertension     Myofascial pain syndrome, cervical 9/28/2022       Past Surgical History  Past Surgical History:   Procedure Laterality Date    COLONOSCOPY N/A 3/10/2022    Procedure: COLONOSCOPY;  Surgeon: Nilson Wiseman MD;  Location: [facility];  Service: Endoscopy;  Laterality: N/A;    ESOPHAGOGASTRODUODENOSCOPY N/A 3/10/2022    Procedure: EGD (ESOPHAGOGASTRODUODENOSCOPY);  Surgeon: Nilson Wiseman MD;  Location: [facility];  Service: Endoscopy;  Laterality: N/A;       Family History  Family History   Problem Relation Age of Onset    Cancer Mother     Diabetes Mother     Arthritis Mother     Hypertension Mother     Cancer Father     Diabetes Father     Arthritis Father     Hypertension Father        Social History  Social History     Socioeconomic History    Marital status:    Tobacco Use    Smoking status: Never    Smokeless tobacco: Never   Substance and Sexual Activity    Drug  use: No    Sexual activity: Never        Review of Systems  14-point review of systems as follows:   No check nicolas indicates NEGATIVE response   Constitutional: [] weight loss, [] change to appetite   Eyes: [] change in vision, [] double vision   Ears, nose, mouth, throat: [] frequent nose bleeds, [] ringing in the ears   Respiratory: [] cough, [] wheezing   Cardiovascular: [] chest pain, [] palpitations   Gastrointestinal: [] jaundice, [] nausea/vomiting   Genitourinary: [] incontinence, [] burning with urination   Hematologic/lymphatic: [] easy bruising/bleeding, [] night sweats   Neurological: [] numbness, [] weakness   Endocrine: [] fatigue, [] heat/cold intolerance   Allergy/Immunologic: [] fevers, [] chills   Musculoskeletal: [x] muscle pain, [x] joint pain   Psychiatric: [] thoughts of harming self/others, [] depression   Integumentary: [] rashes, [] sores that do not heal     Objective:        Vitals:    11/11/22 0924   BP: 118/77   Pulse: 80   Resp: 17       Body mass index is 44.89 kg/m².    11/11/22  Constitutional:   She appears well-developed and well-nourished. She is well groomed     Neurological Exam:  General: well-developed, well-nourished, no distress  Mental status: Awake and alert  Speech language: No dysarthria or aphasia on conversation  Cranial nerves: Face symmetric  Motor: Moves all extremities well  Coordination: No ataxia. No tremor.    9/9/22  Constitutional: appears in no acute distress, well-developed, well-nourished, appears in pain, uncomfortable      Eyes: normal conjunctiva, PERRLA    Ears, nose, mouth, throat: external appearance of ears and nose normal, hearing intact     Cardiovascular: regular rate and rhythm, no murmurs appreciated    Respiratory: unlabored respirations, breath sounds normal bilaterally    Gastrointestinal: no visible abdominal masses, no guarding, no visible hernia    Musculoskeletal: normal tone in all four extremities. No abnormal movements. No pronator  drift. No orbit. Symmetric finger tapping. Normal station. Normal regular gait.      Spine:   CERVICAL SPINE:  ROM: restricted    MUSCLE SPASM: bilateral    FACET LOADING: no   SPURLING: no  ZANDER / JARON tender: bilateral JARON     Psychiatric: normal judgment and insight. Oriented to person, place, and time.     Neurologic:   Cortical functions: recent and remote memory intact, normal attention span and concentration, speech fluent, adequate fund of knowledge   Cranial nerves: visual fields full, PERRLA, EOMI, symmetric facial strength, hearing intact, palate elevates symmetrically, shoulder shrug 5/5, tongue protrudes midline   Reflexes: 2+ in the upper and lower extremities, no Joe  Sensation: intact to temperature throughout   Coordination: normal finger to nose, heel to shin    Data Review:     I have personally reviewed the referring provider's notes, labs, & imaging made available to me today.      RADIOLOGY STUDIES:  I have personally reviewed the pertinent images performed.       Results for orders placed or performed during the hospital encounter of 11/24/21   CT Head Without Contrast    Narrative    EXAMINATION:  CT HEAD WITHOUT CONTRAST    CLINICAL HISTORY:  Dizziness, persistent/recurrent, cardiac or vascular cause suspected; Dizziness and giddiness    TECHNIQUE:  Low dose axial images were obtained through the head.  Coronal and sagittal reformations were also performed. Contrast was not administered.    COMPARISON:  None.    FINDINGS:  There is no acute intracranial hemorrhage.  Ventricles are of normal size and morphology.   No mass effect or midline shift is present.  The gray-white matter differentiation is within normal limits. The visualized portions of the mastoids are normal.  The visualized portions of the paranasal sinuses are normal. The visualized portions of the orbits are normal.  No fractures are identified.      Impression    No evidence of an acute intracranial  abnormality.      Electronically signed by: Ankit Molina  Date:    11/24/2021  Time:    15:02       Lab Results   Component Value Date     11/02/2022    K 4.2 11/02/2022     11/02/2022    CO2 26 11/02/2022    BUN 15 11/02/2022    CREATININE 0.8 11/02/2022    GLU 90 11/02/2022    HGBA1C 5.1 02/28/2022    AST 19 11/02/2022    ALT 23 11/02/2022    ALBUMIN 3.6 11/02/2022    PROT 7.6 11/02/2022    BILITOT 0.3 11/02/2022       Lab Results   Component Value Date    WBC 13.74 (H) 11/02/2022    HGB 13.4 11/02/2022    HCT 43.1 11/02/2022    MCV 86 11/02/2022     11/02/2022       Lab Results   Component Value Date    TSH 2.921 02/28/2022             Assessment & Plan:       Problem List Items Addressed This Visit          Neuro    Intractable chronic migraine without aura and without status migrainosus - Primary    Overview     Migraine headaches that began in 20's. Headaches are typically unilateral, moderate to severe in intensity, worsen with activity, pounding in quality and associated with sensitivity to smell, light and sound. Given worsening nature of headaches, will get MRI to rule out secondary cause.    The patient has chronic migraines ( G43.719) and suffers from headaches more than 3 months, more than 15 days of headache days per month lasting more than 4 hours with at least 8 attacks that meet criteria for migraine. She has tried multiple medications including but not limited to Lexapro, Wellbutrin, Zoloft. Trazodone, diltiazem, amlodipine, gabapentin  The patient has been unresponsive and refractory.The patient meets criteria for chronic headaches according to the ICHD-II, the patient has more than 15 headaches a month which last for more than 4 hours a day. The patient is an ideal candidate for Botox. After treatment, I expect 50%  improvement in the patient's symptoms. A reduction of at least 7 days per month and the number of cumulative hours suffering with headaches as well as at least 100  total hours affected with migraine per month.  DESCRIPTION OF PROCEDURE: After obtaining informed consent and under aseptic technique, a total of 155 units of botulinum toxin type A to be injected in the following muscles:      -- Procerus 5 units  --  5 units bilaterally  -- Frontalis 20 units  -- Temporalis 20 units bilaterally  -- Occipitalis 15 units bilaterally  -- Upper cervical paraspinals 10 units bilaterally  -- Trapezius 15 units bilaterally.       Unavoidable waste 45 units    For acute attacks, add rizatriptan. Discussed need to stop Fioricet.         Current Assessment & Plan     She is s/p first Botox and is already having fewer and less severe headache days. No side effects. Will continue with planned first three sessions.           Other Visit Diagnoses       Memory loss        Previously referred to memory clinic. MOCA today abnormal. Will also refer for further testing.     Relevant Orders    Ambulatory referral/consult to Adult Neuropsychology                  Please call our clinic at [phone] or send a message on the [facility] portal if there are any changes to the plan described below, for example,if you are not contacted for the requested tests, referral(s) within one week, if you are unable to receive the medications prescribed, or if you feel you need to change the treatment course for any reason.     TESTING:  -- none     REFERRALS:  --neuropsych for further memory testing     PREVENTION (use daily regardless of headache):  -- continue BOTOX    AS-NEEDED TREATMENT (use total no more than 10 days per month unless otherwise stated):  -- continue rizatriptan with next migraine attack. You can repeat 2 hours later if needed. Take no more than 10 days per month  -- continue tizanidine  -- continue compazine. This is a nausea medication that also has anti-migraine properties. Can take daily and will not contribute to rebound headaches      Follow up in 6 weeks (on 12/23/2022) for  botox.    Face to Face time with patient: 25  34 minutes of total time spent on the encounter, which includes face to face time and non-face to face time on day of visit preparing to see the patient (eg, review of tests), Obtaining and/or reviewing separately obtained history, Documenting clinical information in the electronic or other health record, Independently interpreting results (not separately reported) and communicating results to the patient/family/caregiver, or Care coordination (not separately reported).     Heavenly Fletcher, NP

## 2022-11-21 ENCOUNTER — TELEPHONE (OUTPATIENT)
Dept: ORTHOPEDICS | Facility: CLINIC | Age: 57
End: 2022-11-21
Payer: MEDICARE

## 2022-11-21 ENCOUNTER — TELEPHONE (OUTPATIENT)
Dept: GASTROENTEROLOGY | Facility: CLINIC | Age: 57
End: 2022-11-21
Payer: MEDICARE

## 2022-11-21 NOTE — TELEPHONE ENCOUNTER
----- Message from Terrence Billingsley sent at 11/21/2022  2:10 PM CST -----  Regarding: states she is in a emergency situation because you could not see her for her last appt,  Contact: pt   states she is in a emergency situation because you could not see her for her last appt, she wants a same day appt, call pt

## 2022-11-21 NOTE — TELEPHONE ENCOUNTER
----- Message from Sara Skinner sent at 11/21/2022  2:04 PM CST -----  Type:  Sooner Appointment Request    Caller is requesting a sooner appointment.  Caller declined first available appointment listed below.  Caller will not accept being placed on the waitlist and is requesting a message be sent to doctor.    Name of Caller:  pt  When is the first available appointment?  12/12  Symptoms:  Crohn's disease and pt said she bleeding bad out her rectum  Best Call Back Number: [phone]    Additional Information:  thank you

## 2022-11-21 NOTE — TELEPHONE ENCOUNTER
Called patient in regards to scheduling appointment. Scheduled pt next available and added appt to wait list. Recommended patient to go to ER if she is actively bleeding. Pt verbalized understanding

## 2022-12-13 ENCOUNTER — TELEPHONE (OUTPATIENT)
Dept: GASTROENTEROLOGY | Facility: CLINIC | Age: 57
End: 2022-12-13
Payer: MEDICARE

## 2022-12-13 NOTE — TELEPHONE ENCOUNTER
Returned patient call. Informed patient she had missed her scheduled apt with GI on 12/12 and we will need to reschedule her appt. Next available apt scheduled. Pt verbalized understanding    to clinically evaluate pt's nishant-pharyngeal swallow mechanism and r/o dysphagia.

## 2022-12-13 NOTE — TELEPHONE ENCOUNTER
----- Message from Seth Mcguire sent at 12/13/2022 11:48 AM CST -----      Name of Who is Calling:PT          What is the request in detail:PT is requesting a call back to discuss possibly getting her botox tomorrow since she will be there on tomorrow. She knows about her appointment on 12/23 but would really like to be seen tomorrow after the appointment she has with another provider on tomorrow.          Can the clinic reply by [facility]:no          What Number to Call Back if not in [phone] or [phone]

## 2022-12-19 ENCOUNTER — TELEPHONE (OUTPATIENT)
Dept: ORTHOPEDICS | Facility: CLINIC | Age: 57
End: 2022-12-19
Payer: MEDICARE

## 2022-12-19 NOTE — TELEPHONE ENCOUNTER
*voicemail box not set up/trying to reschedule appt for another date/time, provider out/jf 12/19/22*

## 2022-12-22 ENCOUNTER — OFFICE VISIT (OUTPATIENT)
Dept: GASTROENTEROLOGY | Facility: CLINIC | Age: 57
End: 2022-12-22
Payer: MEDICARE

## 2022-12-22 ENCOUNTER — TELEPHONE (OUTPATIENT)
Dept: RHEUMATOLOGY | Facility: CLINIC | Age: 57
End: 2022-12-22
Payer: MEDICARE

## 2022-12-22 VITALS — HEIGHT: 63 IN | WEIGHT: 246.69 LBS | BODY MASS INDEX: 43.71 KG/M2

## 2022-12-22 DIAGNOSIS — D64.9 NORMOCYTIC ANEMIA: ICD-10-CM

## 2022-12-22 DIAGNOSIS — R13.19 INTERMITTENT DYSPHAGIA: ICD-10-CM

## 2022-12-22 DIAGNOSIS — K50.011 CROHN'S DISEASE OF SMALL INTESTINE WITH RECTAL BLEEDING: Primary | ICD-10-CM

## 2022-12-22 DIAGNOSIS — K92.1 BLACK STOOL: ICD-10-CM

## 2022-12-22 DIAGNOSIS — K76.9 LIVER LESION: ICD-10-CM

## 2022-12-22 DIAGNOSIS — R16.0 HEPATOMEGALY: ICD-10-CM

## 2022-12-22 DIAGNOSIS — Z87.19 HISTORY OF GASTRITIS: ICD-10-CM

## 2022-12-22 DIAGNOSIS — Z87.898 HISTORY OF DIARRHEA: ICD-10-CM

## 2022-12-22 DIAGNOSIS — R93.2 ABNORMAL LIVER DIAGNOSTIC IMAGING: ICD-10-CM

## 2022-12-22 PROCEDURE — 1159F MED LIST DOCD IN RCRD: CPT | Mod: CPTII,S$GLB,, | Performed by: NURSE PRACTITIONER

## 2022-12-22 PROCEDURE — 1159F PR MEDICATION LIST DOCUMENTED IN MEDICAL RECORD: ICD-10-PCS | Mod: CPTII,S$GLB,, | Performed by: NURSE PRACTITIONER

## 2022-12-22 PROCEDURE — 4010F ACE/ARB THERAPY RXD/TAKEN: CPT | Mod: CPTII,S$GLB,, | Performed by: NURSE PRACTITIONER

## 2022-12-22 PROCEDURE — 3044F PR MOST RECENT HEMOGLOBIN A1C LEVEL <7.0%: ICD-10-PCS | Mod: CPTII,S$GLB,, | Performed by: NURSE PRACTITIONER

## 2022-12-22 PROCEDURE — 99215 OFFICE O/P EST HI 40 MIN: CPT | Mod: S$GLB,,, | Performed by: NURSE PRACTITIONER

## 2022-12-22 PROCEDURE — 3008F PR BODY MASS INDEX (BMI) DOCUMENTED: ICD-10-PCS | Mod: CPTII,S$GLB,, | Performed by: NURSE PRACTITIONER

## 2022-12-22 PROCEDURE — 99999 PR PBB SHADOW E&M-EST. PATIENT-LVL III: ICD-10-PCS | Mod: PBBFAC,,, | Performed by: NURSE PRACTITIONER

## 2022-12-22 PROCEDURE — 4010F PR ACE/ARB THEARPY RXD/TAKEN: ICD-10-PCS | Mod: CPTII,S$GLB,, | Performed by: NURSE PRACTITIONER

## 2022-12-22 PROCEDURE — 3008F BODY MASS INDEX DOCD: CPT | Mod: CPTII,S$GLB,, | Performed by: NURSE PRACTITIONER

## 2022-12-22 PROCEDURE — 99215 PR OFFICE/OUTPT VISIT, EST, LEVL V, 40-54 MIN: ICD-10-PCS | Mod: S$GLB,,, | Performed by: NURSE PRACTITIONER

## 2022-12-22 PROCEDURE — 99999 PR PBB SHADOW E&M-EST. PATIENT-LVL III: CPT | Mod: PBBFAC,,, | Performed by: NURSE PRACTITIONER

## 2022-12-22 PROCEDURE — 1160F PR REVIEW ALL MEDS BY PRESCRIBER/CLIN PHARMACIST DOCUMENTED: ICD-10-PCS | Mod: CPTII,S$GLB,, | Performed by: NURSE PRACTITIONER

## 2022-12-22 PROCEDURE — 1160F RVW MEDS BY RX/DR IN RCRD: CPT | Mod: CPTII,S$GLB,, | Performed by: NURSE PRACTITIONER

## 2022-12-22 PROCEDURE — 3044F HG A1C LEVEL LT 7.0%: CPT | Mod: CPTII,S$GLB,, | Performed by: NURSE PRACTITIONER

## 2022-12-22 RX ORDER — METHYLPREDNISOLONE 4 MG/1
TABLET ORAL
COMMUNITY
Start: 2022-12-21 | End: 2023-05-18

## 2022-12-22 RX ORDER — PANTOPRAZOLE SODIUM 40 MG/1
40 TABLET, DELAYED RELEASE ORAL
Qty: 30 TABLET | Refills: 1 | Status: SHIPPED | OUTPATIENT
Start: 2022-12-22 | End: 2023-05-18

## 2022-12-22 NOTE — TELEPHONE ENCOUNTER
Returned patient call regarding something for her cough. Nurse informed chart was reviewed prior to calling. Informed that nurse saw in chart where she had a visit with her GI doctor today. Asked if she mentioned it to her. Stated no I was more worried about the bleeding I was having. Nurse also informed there is a phone call in her chart for today that she called her PCP for ear ache, sore throat. Nurse informed PCP sent in steroids and a zpac to the pharmacy. Patient stated has not went to pharmacy to pick them up yet. Nurse informed patient that Dr Beltran is out of the office until next Thursday. Advised to contact PCP for something for cough since they were the ones who sent in other medications. Verbalized understanding.

## 2022-12-22 NOTE — TELEPHONE ENCOUNTER
----- Message from Mauricio Sheridan sent at 12/22/2022 12:05 PM CST -----  Contact: Self  Type: Needs Medical Advice  Who Called:  Patient  Symptoms (please be specific):  flu-positive  How long has patient had these symptoms:  4d  Pharmacy name and phone #:    [facility] - [place] - [address]  [address]  [place] [address]  Phone: [phone] Fax: [phone]    Best Call Back Number: [phone]   Additional Information: States UC didn't prescribe anything, would like cough Rx called in

## 2022-12-22 NOTE — PROGRESS NOTES
Subjective:       Patient ID: Amanda Mcguire is a 57 y.o. female Body mass index is 43.7 kg/m².    Chief Complaint: Rectal Bleeding (No rectal pain, no active bleeding but lasted for a week. States when she would urinate blood would come out. ) and Diarrhea (States BM are a 6 on Stool scale. )    This patient is new to me.  Established patient of Dr. Wiseman.     Went to the ER for it at [facility]    GI Problem  The primary symptoms include nausea (occasional; taking compazine PRN for nausea with  migraines), diarrhea, melena (black stools when bleeding was occurring) and hematochezia. Primary symptoms do not include fever, weight loss (taking adipex currently), fatigue, abdominal pain, vomiting, hematemesis or dysuria.   The diarrhea began more than 1 week ago (started ~2 weeks ago with diarrhea, diarrhea  lasted ~4 days and has resolved; bowel movements are currently twice daily of formed stool). Risk factors: recent antibiotic use; about to start another one along with medrol dose pack for an ear infection.   The hematochezia began more than 1 week ago (started ~2 weeks ago, reports everytime she went to urinate she would get rectal bleeding of dark red blood; lasted for 4 days; has resolved).   The illness is also significant for dysphagia (occasional with food; feels like it gets stuck; denies needing heimlich maneuver). The illness does not include chills or constipation. Associated symptoms comments: TREATMENT: bentyl 20 mg once daily. Significant associated medical issues include inflammatory bowel disease (history of crohn's; on remicade infusions done once every 3 months) and GERD (denies any recently; PAST TREATMENT: prilosec).     Review of Systems   Constitutional:  Negative for appetite change, chills, fatigue, fever and weight loss (taking adipex currently).   HENT:  Positive for trouble swallowing. Negative for sore throat.    Respiratory:  Negative for cough, choking and shortness of breath.     Cardiovascular:  Negative for chest pain.   Gastrointestinal:  Positive for anal bleeding, diarrhea, dysphagia (occasional with food; feels like it gets stuck; denies needing heimlich maneuver), hematochezia, melena (black stools when bleeding was occurring) and nausea (occasional; taking compazine PRN for nausea with  migraines). Negative for abdominal pain, constipation, hematemesis, rectal pain and vomiting.   Genitourinary:  Negative for difficulty urinating, dysuria and flank pain.   Neurological:  Negative for weakness.       No LMP recorded. Patient is postmenopausal.  Past Medical History:   Diagnosis Date    Anxiety     Arthritis     Asthma     Crohn's disease     Depression     Encounter for blood transfusion     Headache     Hypertension     Myofascial pain syndrome, cervical 9/28/2022     Past Surgical History:   Procedure Laterality Date    COLONOSCOPY N/A 03/10/2022    Procedure: COLONOSCOPY;  Surgeon: Nilson Wiseman MD;  Location: [facility];  Service: Endoscopy;  Laterality: N/A; Repeat colonoscopy in 6 months because the bowel prep was poor    ESOPHAGOGASTRODUODENOSCOPY N/A 03/10/2022    Procedure: EGD (ESOPHAGOGASTRODUODENOSCOPY);  Surgeon: Nilson Wiseman MD;  Location: [facility];  Service: Endoscopy;  Laterality: N/A; Repeat upper endoscopy in 8 weeks for surveillance     Family History   Problem Relation Age of Onset    Cancer Mother     Diabetes Mother     Arthritis Mother     Hypertension Mother     Cancer Father     Diabetes Father     Arthritis Father     Hypertension Father     Celiac disease Neg Hx     Colon cancer Neg Hx     Crohn's disease Neg Hx     Esophageal cancer Neg Hx     Stomach cancer Neg Hx     Ulcerative colitis Neg Hx      Social History     Tobacco Use    Smoking status: Never    Smokeless tobacco: Never   Substance Use Topics    Alcohol use: Yes     Alcohol/week: 0.0 - 2.0 standard drinks    Drug use: Yes     Types: Hydrocodone     Wt Readings from Last 10  Encounters:   12/22/22 111.9 kg (246 lb 11.1 oz)   11/11/22 114.9 kg (253 lb 6.7 oz)   11/08/22 113.9 kg (251 lb)   09/30/22 112.9 kg (249 lb)   09/23/22 112.9 kg (249 lb)   09/09/22 113.3 kg (249 lb 12.5 oz)   08/12/22 112.9 kg (249 lb)   07/06/22 113.2 kg (249 lb 7.2 oz)   05/20/22 107.5 kg (236 lb 15.9 oz)   04/08/22 107.5 kg (236 lb 15.9 oz)     Lab Results   Component Value Date    WBC 13.74 (H) 11/02/2022    HGB 13.4 11/02/2022    HCT 43.1 11/02/2022    MCV 86 11/02/2022     11/02/2022     CMP  Sodium   Date Value Ref Range Status   11/02/2022 140 136 - 145 mmol/L Final     Potassium   Date Value Ref Range Status   11/02/2022 4.2 3.5 - 5.1 mmol/L Final     Chloride   Date Value Ref Range Status   11/02/2022 103 95 - 110 mmol/L Final     CO2   Date Value Ref Range Status   11/02/2022 26 23 - 29 mmol/L Final     Glucose   Date Value Ref Range Status   11/02/2022 90 70 - 110 mg/dL Final     BUN   Date Value Ref Range Status   11/02/2022 15 6 - 20 mg/dL Final     Creatinine   Date Value Ref Range Status   11/02/2022 0.8 0.5 - 1.4 mg/dL Final     Calcium   Date Value Ref Range Status   11/02/2022 9.8 8.7 - 10.5 mg/dL Final     Total Protein   Date Value Ref Range Status   11/02/2022 7.6 6.0 - 8.4 g/dL Final     Albumin   Date Value Ref Range Status   11/02/2022 3.6 3.5 - 5.2 g/dL Final     Total Bilirubin   Date Value Ref Range Status   11/02/2022 0.3 0.1 - 1.0 mg/dL Final     Comment:     For infants and newborns, interpretation of results should be based  on gestational age, weight and in agreement with clinical  observations.    Premature Infant recommended reference ranges:  Up to 24 hours.............<8.0 mg/dL  Up to 48 hours............<12.0 mg/dL  3-5 days..................<15.0 mg/dL  6-29 days.................<15.0 mg/dL       Alkaline Phosphatase   Date Value Ref Range Status   11/02/2022 88 55 - 135 U/L Final     AST   Date Value Ref Range Status   11/02/2022 19 10 - 40 U/L Final     ALT   Date  Value Ref Range Status   11/02/2022 23 10 - 44 U/L Final     Anion Gap   Date Value Ref Range Status   11/02/2022 11 8 - 16 mmol/L Final     eGFR if    Date Value Ref Range Status   07/01/2022 >60.0 >60 mL/min/1.73 m^2 Final     eGFR if non    Date Value Ref Range Status   07/01/2022 >60.0 >60 mL/min/1.73 m^2 Final     Comment:     Calculation used to obtain the estimated glomerular filtration  rate (eGFR) is the CKD-EPI equation.        Lab Results   Component Value Date    TSH 2.921 02/28/2022     Lab Results   Component Value Date    CRP 24.9 (H) 11/02/2022 12/16/2021 calprotectin stool 339.1 (H)    Reviewed prior medical records including 12/16/2021 visit note with Dr. Wiseman; & endoscopy history (see surgical history/procedures).    Reviewed prior medical records in care everywhere including radiology report of 11/28/2022 abdominal ultrasound; 11/27/2022 CT abdomen pelvis; 9/26/2020 small bowel follow through; 6/7/2018 UGI; and labs 11/30/2022 CBC with WBC 13.2 (H), H&H 10.7 & 32.9 (both low); 11/28/2022 hepatitis panel acute negative; 11/27/2022 ER visit note.    Objective:      Physical Exam  Vitals and nursing note reviewed.   Constitutional:       General: She is not in acute distress.     Appearance: Normal appearance. She is well-developed. She is not diaphoretic.   HENT:      Mouth/Throat:      Lips: Pink. No lesions.      Mouth: Mucous membranes are moist. No oral lesions.      Tongue: No lesions.      Pharynx: Oropharynx is clear. No pharyngeal swelling or posterior oropharyngeal erythema.   Eyes:      General: No scleral icterus.     Conjunctiva/sclera: Conjunctivae normal.   Pulmonary:      Effort: Pulmonary effort is normal. No respiratory distress.      Breath sounds: Normal breath sounds. No wheezing.   Abdominal:      General: Bowel sounds are normal. There is no distension or abdominal bruit.      Palpations: Abdomen is soft. Abdomen is not rigid. There is  no mass.      Tenderness: There is no abdominal tenderness. There is no guarding or rebound. Negative signs include Long's sign and McBurney's sign.   Skin:     General: Skin is warm and dry.      Coloration: Skin is not jaundiced or pale.      Findings: No erythema or rash.   Neurological:      Mental Status: She is alert and oriented to person, place, and time.   Psychiatric:         Behavior: Behavior normal.         Thought Content: Thought content normal.         Judgment: Judgment normal.       Assessment:       1. Crohn's disease of small intestine with rectal bleeding    2. History of diarrhea    3. History of gastritis    4. Black stool    5. Intermittent dysphagia    6. Abnormal liver diagnostic imaging    7. Hepatomegaly    8. Liver lesion    9. Normocytic anemia        Plan:       Crohn's disease of small intestine with rectal bleeding  -     Calprotectin, Stool; Future; Expected date: 12/22/2022  -     CBC Without Differential; Future; Expected date: 12/22/2022  - schedule Colonoscopy, discussed procedure with the patient, including risks and benefits, patient verbalized understanding  - Recommend follow-up with Dr. Wiseman for continued evaluation and management.    History of diarrhea  - schedule Colonoscopy, discussed procedure with the patient, including risks and benefits, patient verbalized understanding  - if diarrhea recurs, recommend follow-up for continued evaluation and management, to include stool studies    History of gastritis & Black stool  -   START  pantoprazole (PROTONIX) 40 MG tablet; Take 1 tablet (40 mg total) by mouth before breakfast.  Dispense: 30 tablet; Refill: 1  - schedule EGD, discussed procedure with patient, including risks and benefits, patient verbalized understanding  - avoid/minimize use of NSAIDs- since they can cause GI upset, bleeding and/or ulcers. If NSAID must be taken, recommend take with food.    Intermittent dysphagia  - schedule EGD, discussed procedure  with patient and possible esophageal dilation may be performed during procedure if indicated, patient verbalized understanding  - educated patient to eat smaller more frequent meals and to eat slowly and advised to eat a soft diet.  - possible UGI/esophagram/esophageal manometry if symptoms persist    Abnormal liver diagnostic imaging, Hepatomegaly, & Liver lesion  -     Ambulatory referral/consult to Hepatology; Future; Expected date: 12/29/2022    Normocytic anemia  Recommend follow-up with Primary Care Provider for continued evaluation and management.    Follow up in about 1 month (around 1/22/2023), or if symptoms worsen or fail to improve, for follow-up with Dr. Wiseman for continued evaluation and management.      If no improvement in symptoms or symptoms worsen, call/follow-up at clinic or go to ER.        45 minutes of total time spent on the encounter, which includes face to face time and non-face to face time preparing to see the patient (e.g., review of tests), Obtaining and/or reviewing separately obtained history, Documenting clinical information in the electronic or other health record, Independently interpreting results (not separately reported) and communicating results to the patient/family/caregiver, or Care coordination (not separately reported).

## 2022-12-22 NOTE — PATIENT INSTRUCTIONS
"  Crohns Disease    Crohns disease is inflammation of the intestinal tract that comes and goes in flare-ups. This is a chronic (long-term) illness. During a flare-up, intense abdominal pain and fever may be felt. Mucus, blood, or pus may appear in the stool. Between flare ups, inflammation lessens and there usually are no symptoms. Crohns disease is a form of inflammatory bowel disease (IBD).   Symptoms of Crohn's disease may include:  Abdominal cramps and pain  Diarrhea, usually bloody, alternating with constipation  Mucus in stools  Rectal bleeding  Rectal pain  Fever  Low energy  Decreased appetite and weight loss  No one knows what exactly causes Crohn's disease, and there is no cure. The goal of treatment is to control and relieve symptoms and prevent complications, so you can lead a full and active life. No single treatment works for everyone, but many things can be done to help.  Diet  Foods did not cause your Crohn's, but they can affect it. Unfortunately, there is no one diet that works for everyone. Below are some things to try. Keep a food log to figure out what you are sensitive to.  Eat more slowly and smaller amounts at a time, but more often. Remember, you can always eat more, but cannot eat less once you've eaten too much.  High fiber foods are complicated. While they may help constipation they can make the bloating, cramping, gas, and diarrhea worse.  Try avoiding dairy products, sometimes this helps  Try cutting out foods that are high in fat and fatty meats  Eat less sugar  Bloating or passing excess gas may be controlled. Be careful with "gassy" vegetables and fruits like beans, cabbage, broccoli, and cauliflower.  Be careful of carbonated beverages and fruit juices. They can make the bloating and diarrhea worse.  Caffeine, alcohol, and stimulants may worsen symptoms. These include coffee, tea, sodas, energy drinks, and chocolate.  Lifestyle  Although stress does not cause Crohn's, it is often " a factor in flare-ups. It can also affect how you feel about and cope with your condition.  Look for factors that seem to worsen your symptoms such as stress and emotions.  Counseling can often help relieve stress. So can self-help measures such as exercise, yoga, and meditation.  Depression can be a part of this illness and antidepressants may be prescribed. This may actually help with diarrhea, constipation, and cramping, as well as depression.  Smoking doesn't cause Crohn's, but can make the symptoms worse.  Medicines  Your healthcare provider may prescribe medicines. If so, take them as directed. For acute flare-ups, prescription medicines can be prescribed. Contact your provider if you need this.  Ask your healthcare provider before taking any antidiarrheal medicines.  Avoid anti-inflammatory medicines like ibuprofen or naproxen.  Consider nutritional supplements. This is especially true if the diarrhea is prolonged, or you aren't eating or are losing weight.  Follow-up care  Follow up with your healthcare provider, or as advised. If a stool sample was taken or cultures were done, you will be told if your treatment needs to change. Call as directed for the results.  Support  Support groups for persons Crohns disease can be a source of useful information on how others are coping with this illness. They are available in person, on the phone, or via the Internet. Contact the following resources for more information.  Crohns and Colitis [employer] [place] 607.869.8162 www.ccfa.org  National Digestive Diseases Information Clearinghouse (NDDIC) 595.545.8240 www.digestive.niddk.nih.gov  When to seek medical advice  Call your healthcare provider right away if any of these occur:  Fever of 100.4ºF (38ºC) or higher, or as directed by your healthcare provider  Abdominal pain that doesn't get better when you take the usual measures  Mucus, pus, or blood in the stool (dark or bright red)  Repeated  vomiting  Abdominal swelling and pain that doesn't go away after a few hours  Call 911  Call 911 if any of these occur:  Trouble breathing  Confusion  Extreme sleepiness or trouble waking up  Fainting or loss of consciousness  Rapid heart rate  Date Last Reviewed: 8/31/2015 © 2000-2017 [employer]. [address] [place] [address]. All rights reserved. This information is not intended as a substitute for professional medical care. Always follow your healthcare professional's instructions.         Uncertain Causes of Diarrhea (Adult)    Diarrhea is when stools are loose and watery. This can be caused by:  Viral infections  Bacterial infections  Food poisoning  Parasites  Irritable bowel syndrome (IBS)  Inflammatory bowel diseases such as ulcerative colitis, Crohn's disease, and celiac disease  Food intolerance, such as to lactose, the sugar found in milk and milk products  Reaction to medicines like antibiotics, laxatives, cancer drugs, and antacids  Along with diarrhea, you may also have:  Abdominal pain and cramping  Nausea and vomiting  Loss of bowel control  Fever and chills  Bloody stools  In some cases, antibiotics may help to treat diarrhea. You may have a stool sample test. This is done to see what is causing your diarrhea, and if antibiotics will help treat it. The results of a stool sample test may take up to 2 days. The healthcare provider may not give you antibiotics until he or she has the stool test results.  Diarrhea can cause dehydration. This is the loss of too much water and other fluids from the body. When this occurs, body fluid must be replaced. This can be done with oral rehydration solutions. Oral rehydration solutions are available at drugstores and grocery stores without a prescription.  Home care  Follow all instructions given by your healthcare provider. Rest at home for the next 24 hours, or until you feel better. Avoid caffeine, tobacco, and alcohol. These can make  diarrhea, cramping, and pain worse.  If taking medicines:  Dont take over-the-counter diarrhea or nausea medicines unless your healthcare provider tells you to.  You may use acetaminophen or NSAID medicines like ibuprofen or naproxen to reduce pain and fever. Dont use these if you have chronic liver or kidney disease, or ever had a stomach ulcer or gastrointestinal bleeding. Don't use NSAID medicines if you are already taking one for another condition (like arthritis) or are on daily aspirin therapy (such as for heart disease or after a stroke). Talk with your healthcare provider first.  If antibiotics were prescribed, be sure you take them until they are finished. Dont stop taking them even when you feel better. Antibiotics must be taken as a full course.  To prevent the spread of illness:  Remember that washing with soap and water and using alcohol-based  is the best way to prevent the spread of infection.  Clean the toilet after each use.  Wash your hands before eating.  Wash your hands before and after preparing food. Keep in mind that people with diarrhea or vomiting should not prepare food for others.  Wash your hands after using cutting boards, countertops, and knives that have been in contact with raw foods.  Wash and then peel fruits and vegetables.  Keep uncooked meats away from cooked and ready-to-eat foods.  Use a food thermometer when cooking. Cook poultry to at least 165°F (74°C). Cook ground meat (beef, veal, pork, lamb) to at least 160°F (71°C). Cook fresh beef, veal, lamb, and pork to at least 145°F (63°C).  Dont eat raw or undercooked eggs (poached or constantino side up), poultry, meat, or unpasteurized milk and juices.  Food and drinks  The main goal while treating vomiting or diarrhea is to prevent dehydration. This is done by taking small amounts of liquids often.  Keep in mind that liquids are more important than food right now.  Drink only small amounts of liquids at a time.  Dont  force yourself to eat, especially if you are having cramping, vomiting, or diarrhea. Dont eat large amounts at a time, even if you are hungry.  If you eat, avoid fatty, greasy, spicy, or fried foods.  Dont eat dairy foods or drink milk if you have diarrhea. These can make diarrhea worse.  During the first 24 hours you can try:  Oral rehydration solutions. Do not use sports drinks. They have too much sugar and not enough electrolytes.  Soft drinks without caffeine  Ginger ale  Water (plain or flavored)  Decaf tea or coffee  Clear broth, consommé, or bouillon  Gelatin, popsicles, or frozen fruit juice bars  The second 24 hours, if you are feeling better, you can add:  Hot cereal, plain toast, bread, rolls, or crackers  Plain noodles, rice, mashed potatoes, chicken noodle soup, or rice soup  Unsweetened canned fruit (no pineapple)  Bananas  As you recover:  Limit fat intake to less than 15 grams per day. Dont eat margarine, butter, oils, mayonnaise, sauces, gravies, fried foods, peanut butter, meat, poultry, or fish.  Limit fiber. Dont eat raw or cooked vegetables, fresh fruits except bananas, or bran cereals.  Limit caffeine and chocolate.  Limit dairy.  Dont use spices or seasonings except salt.  Go back to your normal diet over time, as you feel better and your symptoms improve.  If the symptoms come back, go back to a simple diet or clear liquids.  Follow-up care  Follow up with your healthcare provider, or as advised. If a stool sample was taken or cultures were done, call the healthcare provider for the results as instructed.  Call 911  Call 911 if you have any of these symptoms:  Trouble breathing  Confusion  Extreme drowsiness or trouble walking  Loss of consciousness  Rapid heart rate  Chest pain  Stiff neck  Seizure  When to seek medical advice  Call your healthcare provider right away if any of these occur:  Abdominal pain that gets worse  Constant lower right abdominal pain  Continued vomiting and  inability to keep liquids down  Diarrhea more than 5 times a day  Blood in vomit or stool  Dark urine or no urine for 8 hours, dry mouth and tongue, tiredness, weakness, or dizziness  Drowsiness  New rash  You dont get better in 2 to 3 days  Fever of 100.4°F (38°C) or higher that doesnt get lower with medicine  Date Last Reviewed: 1/3/2016  © 2654-9728 [employer]. [address] [place] [address], [place] [address]. All rights reserved. This information is not intended as a substitute for professional medical care. Always follow your healthcare professional's instructions.         Lower GI Bleeding (Stable)  You have signs of blood in your stool. This is called rectal bleeding. The bleeding may have begun in another part of your gastrointestinal (GI) tract. If the blood is bright red, it is likely coming from the lower part of the GI tract. If the blood is black or dark, it might be coming from higher up in the GI tract. Very small amounts of GI bleeding may not be visible and can only be discovered during a test on your stool. Possible causes of lower GI bleeding include:  Hemorrhoids  Anal fissures  Diverticulitis  Inflammatory bowel disease (Crohn's disease or ulcerative colitis)  Polyps (growths) in the intestine  Note: Iron supplements and medicines for diarrhea or upset stomach can cause black stools. Foods such as licorice and red beets can also discolor the stool and be mistaken for bleeding. These are not bleeding and are not a cause for alarm.  Home care  You have not lost a large amount of blood and your condition appears stable at this time. You may resume normal activity as long as you feel well.  Avoid NSAIDs, such as aspirin, ibuprofen, or naproxen. They can irritate the stomach and cause further bleeding. If you are taking these medicines for other medical reasons, talk to your healthcare provider before you stop them.   Follow-up care  Follow up with your healthcare provider as advised.  Further tests may be needed to find the cause of your bleeding.  When to seek medical advice  Call your healthcare provider for any of the following:  Large amount of rectal bleeding   Increasing abdominal pain  Weakness, dizziness  Call 911  Get emergency medical care if any of the following occur:  Loss of consciousness  Vomiting blood  Date Last Reviewed: 6/24/2015  © 6058-6485 [employer]. [address] [place] [address]. All rights reserved. This information is not intended as a substitute for professional medical care. Always follow your healthcare professional's instructions.

## 2023-01-04 DIAGNOSIS — H66.90 OTITIS MEDIA, UNSPECIFIED LATERALITY, UNSPECIFIED OTITIS MEDIA TYPE: Primary | ICD-10-CM

## 2023-01-04 RX ORDER — AMOXICILLIN AND CLAVULANATE POTASSIUM 875; 125 MG/1; MG/1
1 TABLET, FILM COATED ORAL EVERY 12 HOURS
Qty: 20 TABLET | Refills: 0 | Status: SHIPPED | OUTPATIENT
Start: 2023-01-04 | End: 2023-01-14

## 2023-01-04 NOTE — TELEPHONE ENCOUNTER
----- Message from Aixa Dubuque sent at 1/4/2023  3:19 PM CST -----  Type: Needs Medical Advice         Who Called: pt  Best Call Back Number: [phone]  Additional Information: Requesting a call back regarding  pt said she has a bad ear infection for the past 3 days and asking if office can call in a antibiotic.        Yaritza Josephine - DIANELYS Arias - [address] [facility]  [address] [facility] [address]  Phone: [phone] Fax: [phone]         Please Advise- Thank you

## 2023-01-05 ENCOUNTER — TELEPHONE (OUTPATIENT)
Dept: HEPATOLOGY | Facility: CLINIC | Age: 58
End: 2023-01-05
Payer: MEDICARE

## 2023-01-06 NOTE — TELEPHONE ENCOUNTER
"11/28/2022 abdominal ultrasound in care everywhere "  Abdomen -- Ultrasound     Impression    Impression:  Mild hepatomegaly. Multiple hyperechoic foci in the liver. Findings nonspecific, can indicate acute hepatitis, pneumobilia or portal venous air. "  "

## 2023-01-10 ENCOUNTER — TELEPHONE (OUTPATIENT)
Dept: NEUROLOGY | Facility: CLINIC | Age: 58
End: 2023-01-10
Payer: MEDICARE

## 2023-01-10 DIAGNOSIS — G43.719 INTRACTABLE CHRONIC MIGRAINE WITHOUT AURA AND WITHOUT STATUS MIGRAINOSUS: Primary | ICD-10-CM

## 2023-01-12 NOTE — TELEPHONE ENCOUNTER
Unable to reach patient x2 mailbox full on both home and mobile phone.     Discussed with hepatologists and they simply recommend repeat imaging in the future as cross-sectional was normal. Not necessarily needed to have appointment with hep specialist.

## 2023-01-17 ENCOUNTER — TELEPHONE (OUTPATIENT)
Dept: ORTHOPEDICS | Facility: CLINIC | Age: 58
End: 2023-01-17
Payer: MEDICARE

## 2023-01-17 NOTE — TELEPHONE ENCOUNTER
----- Message from Natalie Christian sent at 1/17/2023  1:36 PM CST -----  Regarding: pt called  Name of Who is Calling: KALA MOTT [[id number]]      What is the request in detail: pt needs to rescheduled her hip injections. Please advise       Can the clinic reply by [facility]: No      What Number to Call Back if not in [facility]: [phone]

## 2023-01-23 ENCOUNTER — TELEPHONE (OUTPATIENT)
Dept: NEUROLOGY | Facility: CLINIC | Age: 58
End: 2023-01-23
Payer: MEDICARE

## 2023-01-23 NOTE — TELEPHONE ENCOUNTER
Called pt to let pt know provider has no sooner appointment before appointment on 1/30. NO answer.----- Message from Sarah Beth Parsons sent at 1/23/2023 12:28 PM CST -----  Regarding: appt  Contact: patient  Type:  Sooner Appointment Request    Caller is requesting a sooner appointment.  Caller declined first available appointment listed below.  Caller will not accept being placed on the waitlist and is requesting a message be sent to doctor.    Name of Caller:  Patient  When is the first available appointment?    Symptoms:  botox  Best Call Back Number:  [phone] (home)     Additional Information:  Please call to schedule she would like to be seen tomrorrow. Thanks!

## 2023-01-24 ENCOUNTER — TELEPHONE (OUTPATIENT)
Dept: ORTHOPEDICS | Facility: CLINIC | Age: 58
End: 2023-01-24
Payer: MEDICARE

## 2023-01-24 ENCOUNTER — LAB VISIT (OUTPATIENT)
Dept: LAB | Facility: HOSPITAL | Age: 58
End: 2023-01-24
Payer: MEDICARE

## 2023-01-24 ENCOUNTER — OFFICE VISIT (OUTPATIENT)
Dept: ORTHOPEDICS | Facility: CLINIC | Age: 58
End: 2023-01-24
Payer: MEDICARE

## 2023-01-24 DIAGNOSIS — M70.62 GREATER TROCHANTERIC BURSITIS OF LEFT HIP: ICD-10-CM

## 2023-01-24 DIAGNOSIS — K50.011 CROHN'S DISEASE OF SMALL INTESTINE WITH RECTAL BLEEDING: ICD-10-CM

## 2023-01-24 DIAGNOSIS — M17.12 PRIMARY OSTEOARTHRITIS OF LEFT KNEE: Primary | ICD-10-CM

## 2023-01-24 DIAGNOSIS — G89.29 CHRONIC PAIN OF LEFT ANKLE: ICD-10-CM

## 2023-01-24 DIAGNOSIS — M25.572 CHRONIC PAIN OF LEFT ANKLE: ICD-10-CM

## 2023-01-24 PROCEDURE — 20610 LARGE JOINT ASPIRATION/INJECTION: L GREATER TROCHANTERIC BURSA: ICD-10-PCS | Mod: 79,LT,S$GLB, | Performed by: NURSE PRACTITIONER

## 2023-01-24 PROCEDURE — 1160F PR REVIEW ALL MEDS BY PRESCRIBER/CLIN PHARMACIST DOCUMENTED: ICD-10-PCS | Mod: CPTII,S$GLB,, | Performed by: NURSE PRACTITIONER

## 2023-01-24 PROCEDURE — 1159F PR MEDICATION LIST DOCUMENTED IN MEDICAL RECORD: ICD-10-PCS | Mod: CPTII,S$GLB,, | Performed by: NURSE PRACTITIONER

## 2023-01-24 PROCEDURE — 99214 OFFICE O/P EST MOD 30 MIN: CPT | Mod: 25,S$GLB,, | Performed by: NURSE PRACTITIONER

## 2023-01-24 PROCEDURE — 99999 PR PBB SHADOW E&M-EST. PATIENT-LVL IV: CPT | Mod: PBBFAC,,, | Performed by: NURSE PRACTITIONER

## 2023-01-24 PROCEDURE — 4010F PR ACE/ARB THEARPY RXD/TAKEN: ICD-10-PCS | Mod: CPTII,S$GLB,, | Performed by: NURSE PRACTITIONER

## 2023-01-24 PROCEDURE — 99214 PR OFFICE/OUTPT VISIT, EST, LEVL IV, 30-39 MIN: ICD-10-PCS | Mod: 25,S$GLB,, | Performed by: NURSE PRACTITIONER

## 2023-01-24 PROCEDURE — 99999 PR PBB SHADOW E&M-EST. PATIENT-LVL IV: ICD-10-PCS | Mod: PBBFAC,,, | Performed by: NURSE PRACTITIONER

## 2023-01-24 PROCEDURE — 4010F ACE/ARB THERAPY RXD/TAKEN: CPT | Mod: CPTII,S$GLB,, | Performed by: NURSE PRACTITIONER

## 2023-01-24 PROCEDURE — 20610 DRAIN/INJ JOINT/BURSA W/O US: CPT | Mod: 79,LT,S$GLB, | Performed by: NURSE PRACTITIONER

## 2023-01-24 PROCEDURE — 83993 ASSAY FOR CALPROTECTIN FECAL: CPT | Performed by: NURSE PRACTITIONER

## 2023-01-24 PROCEDURE — 20610 DRAIN/INJ JOINT/BURSA W/O US: CPT | Mod: 51,79,LT,S$GLB | Performed by: NURSE PRACTITIONER

## 2023-01-24 PROCEDURE — 1159F MED LIST DOCD IN RCRD: CPT | Mod: CPTII,S$GLB,, | Performed by: NURSE PRACTITIONER

## 2023-01-24 PROCEDURE — 1160F RVW MEDS BY RX/DR IN RCRD: CPT | Mod: CPTII,S$GLB,, | Performed by: NURSE PRACTITIONER

## 2023-01-24 RX ORDER — AMOXICILLIN AND CLAVULANATE POTASSIUM 875; 125 MG/1; MG/1
1 TABLET, FILM COATED ORAL EVERY 12 HOURS
Qty: 20 TABLET | Refills: 0 | Status: SHIPPED | OUTPATIENT
Start: 2023-01-24 | End: 2023-02-03

## 2023-01-24 RX ADMIN — TRIAMCINOLONE ACETONIDE 40 MG: 40 INJECTION, SUSPENSION INTRA-ARTICULAR; INTRAMUSCULAR at 09:01

## 2023-01-24 NOTE — PROGRESS NOTES
Chief Complaint   Patient presents with    Left Hip - Pain    Right Hip - Pain    Left Ankle - Pain         HPI:   This is a 57 y.o. female who presents to clinic today complaining of left knee pain for 2 weeks after no known trauma, c/o left ankle pain and increased swelling. Reports she broke her ankle by stepping in a hole several years ago and was seeing an ortho in [place] many years ago that was injecting her ankle when she was having a lot of pain. Her left hip is also bothering her today and we usually inject her bursa with steroid. Pain is progressively worsening. No numbness or tingling. No associated signs or symptoms.    Past Medical History:   Diagnosis Date    Anxiety     Arthritis     Asthma     Crohn's disease     Depression     Encounter for blood transfusion     Headache     Hypertension     Myofascial pain syndrome, cervical 9/28/2022     Past Surgical History:   Procedure Laterality Date    COLONOSCOPY N/A 03/10/2022    Procedure: COLONOSCOPY;  Surgeon: Nilson Wiseman MD;  Location: [facility];  Service: Endoscopy;  Laterality: N/A; Repeat colonoscopy in 6 months because the bowel prep was poor    ESOPHAGOGASTRODUODENOSCOPY N/A 03/10/2022    Procedure: EGD (ESOPHAGOGASTRODUODENOSCOPY);  Surgeon: Nilson Wiseman MD;  Location: [facility];  Service: Endoscopy;  Laterality: N/A; Repeat upper endoscopy in 8 weeks for surveillance     Current Outpatient Medications on File Prior to Visit   Medication Sig Dispense Refill    albuterol (PROVENTIL/VENTOLIN HFA) 90 mcg/actuation inhaler Inhale 2 puffs into the lungs every 6 (six) hours as needed for Wheezing. Rescue 18 g 6    ALPRAZolam (XANAX) 0.5 MG tablet Take 1 tablet by mouth nightly as needed.      amLODIPine (NORVASC) 10 MG tablet Take 1 tablet (10 mg total) by mouth daily      atorvastatin (LIPITOR) 40 MG tablet Take 1 tablet (40 mg total) by mouth daily      budesonide-formoterol 160-4.5 mcg (SYMBICORT) 160-4.5 mcg/actuation HFAA Inhale 2  puffs into the lungs 2 (two) times daily      butalbital-acetaminophen-caffeine -40 mg (FIORICET, ESGIC) -40 mg per tablet Take 1 tablet by mouth every 4 (four) hours as needed.      chlorhexidine (PERIDEX) 0.12 % solution swish and spit 15 MILLILITERS in the mouth or throat TWICE DAILY FOR FOURTEEN DAYS 473 mL 6    diclofenac sodium (VOLTAREN) 1 % Gel APPLY TOPICALLY FOUR TIMES DAILY 300 g 3    dicyclomine (BENTYL) 20 mg tablet Take 20 mg by mouth once daily.       diltiaZEM (CARDIZEM CD) 240 MG 24 hr capsule Take 240 mg by mouth.      diltiaZEM (TIAZAC) 240 MG Cs24 Take 1 capsule (240 mg total) by mouth once daily. 90 capsule 0    epinephrine (EPIPEN) 0.3 mg/0.3 mL AtIn Inject 0.3 mg into the muscle.      fluticasone propionate (FLONASE) 50 mcg/actuation nasal spray       fluticasone-salmeterol 500-50 mcg/dose (ADVAIR) 500-50 mcg/dose DsDv diskus inhaler Inhale 1 puff into the lungs.      furosemide (LASIX) 20 MG tablet Take 1 tablet (20 mg total) by mouth daily      gabapentin (NEURONTIN) 800 MG tablet Take 1 tablet (800 mg total) by mouth 3 (three) times daily. 90 tablet 3    hydrochlorothiazide (MICROZIDE) 12.5 mg capsule Take 12.5 mg by mouth every morning.  11    HYDROcodone-acetaminophen (NORCO)  mg per tablet Take 1 tablet by mouth every 8 (eight) hours as needed for Pain. (Patient not taking: Reported on 12/22/2022) 90 tablet 0    HYDROcodone-acetaminophen (NORCO)  mg per tablet Take 1 tablet by mouth every 8 (eight) hours as needed for Pain. 90 tablet 0    hydrOXYchloroQUINE (PLAQUENIL) 200 mg tablet Take 1 tablet (200 mg total) by mouth 2 (two) times daily. 180 tablet 1    hydrOXYzine pamoate (VISTARIL) 25 MG Cap TAKE ONE CAPSULE BY MOUTH THREE TIMES DAILY AS NEEDED FOR ITCHING 45 capsule 3    ipratropium-albuteroL (COMBIVENT)  mcg/actuation inhaler Inhale 1 puff into the lungs 4 (four) times daily. Rescue 4 g 12    irbesartan (AVAPRO) 150 MG tablet TAKE 1 TABLET BY MOUTH  EVERY NIGHT AT BEDTIME FOR BLOOD PRESSURE      lidocaine HCl 2% (LIDOCAINE VISCOUS) 2 % Soln by Mucous Membrane route every 8 (eight) hours as needed. 100 mL 0    methylPREDNISolone (MEDROL DOSEPACK) 4 mg tablet follow package directions      montelukast (SINGULAIR) 10 mg tablet Take 10 mg by mouth every evening.      nebulizer and compressor Siomara Use as directed      neomycin-polymyxin-hydrocortisone (CORTISPORIN) otic solution PLACE 2 DROPS IN AFFECTED EAR FOUR TIMES DAILY FOR 5-7 DAYS      pantoprazole (PROTONIX) 40 MG tablet Take 1 tablet (40 mg total) by mouth before breakfast. 30 tablet 1    phentermine (ADIPEX-P) 37.5 mg tablet Take 37.5 mg by mouth once daily.      prochlorperazine (COMPAZINE) 10 MG tablet Take 1 tablet (10 mg total) by mouth every 6 (six) hours as needed (migraine or nausea). (Patient not taking: Reported on 12/22/2022) 60 tablet 11    rizatriptan (MAXALT) 10 MG tablet 1 tab PO PRN migraine. May repeat every 2 hours for max 3 tabs in 24 hours. Use no more than 10 days per month. 10 tablet 11    sertraline (ZOLOFT) 100 MG tablet Take 100 mg by mouth once daily.      tiZANidine (ZANAFLEX) 4 MG tablet Take 4 mg by mouth every 8 (eight) hours.       traZODone (DESYREL) 100 MG tablet Take 1 tablet (100 mg total) by mouth every evening. 90 tablet 1    triamcinolone acetonide 0.1% (KENALOG) 0.1 % ointment Apply topically 2 (two) times daily. 80 g 3    zolpidem (AMBIEN) 10 mg Tab TAKE ONE-HALF - ONE TABLET BY MOUTH EVERY NIGHT AT BEDTIME AS NEEDED FOR SLEEP       Current Facility-Administered Medications on File Prior to Visit   Medication Dose Route Frequency Provider Last Rate Last Admin    onabotulinumtoxina injection 200 Units  200 Units Intramuscular q12 weeks Heavenly Fletcher NP   200 Units at 09/30/22 0958     Review of patient's allergies indicates:   Allergen Reactions    Amlodipine-benazepril Swelling    Ace inhibitors Swelling     Angioedema; was taking Benazepril.    Tramadol Itching      Family History   Problem Relation Age of Onset    Cancer Mother     Diabetes Mother     Arthritis Mother     Hypertension Mother     Cancer Father     Diabetes Father     Arthritis Father     Hypertension Father     Celiac disease Neg Hx     Colon cancer Neg Hx     Crohn's disease Neg Hx     Esophageal cancer Neg Hx     Stomach cancer Neg Hx     Ulcerative colitis Neg Hx      Social History     Socioeconomic History    Marital status:    Tobacco Use    Smoking status: Never    Smokeless tobacco: Never   Substance and Sexual Activity    Alcohol use: Yes     Alcohol/week: 0.0 - 2.0 standard drinks    Drug use: Yes     Types: Hydrocodone    Sexual activity: Never       Review of Systems:  Constitutional:  Denies fever or chills   Eyes:  Denies change in visual acuity   HENT:  Denies nasal congestion or sore throat   Respiratory:  Denies cough or shortness of breath   Cardiovascular:  Denies chest pain or edema   GI:  Denies abdominal pain, nausea, vomiting, bloody stools or diarrhea   :  Denies dysuria   Integument:  Denies rash   Neurologic:  Denies headache, focal weakness or sensory changes   Endocrine:  Denies polyuria or polydipsia   Lymphatic:  Denies swollen glands   Psychiatric:  Denies depression or anxiety     Physical Exam:   Constitutional:  Well developed, well nourished, no acute distress, non-toxic appearance   Integument: warm and dry.   Neurologic:  Alert & oriented x 3  Psychiatric:  Speech and behavior appropriate     Bilateral Knee Exam    Left Knee Exam     Tenderness   The patient is experiencing tenderness in the medial joint line.    Range of Motion   Extension: abnormal   Flexion: abnormal     Muscle Strength     The patient has normal knee strength.    Tests   Ellie:  Medial - positive   Lachman:  Anterior - negative      Varus: negative  Valgus: negative  Patellar Apprehension: negative    Other   Erythema: absent  Sensation: normal  Pulse: present  Swelling: mild      Right  Knee Exam   Right knee exam performed same as contralateral side and is normal.      Bilateral Hip Exam Performed    Left Hip Exam     Tenderness   The patient is experiencing tenderness in the greater trochanter.    Range of Motion   The patient has normal hip ROM.    Muscle Strength   Abduction: 4/5     Other   Erythema: absent  Sensation: normal  Pulse: present    Right Hip Exam   Hip exam performed same as contralateral hip and is normal.           Primary osteoarthritis of left knee  -     Large Joint Aspiration/Injection: L knee    Chronic pain of left ankle  -     Ambulatory referral/consult to Orthopedics; Future; Expected date: 01/31/2023    Greater trochanteric bursitis of left hip  -     Large Joint Aspiration/Injection: L greater trochanteric bursa    Other orders  -     amoxicillin-clavulanate 875-125mg (AUGMENTIN) 875-125 mg per tablet; Take 1 tablet by mouth every 12 (twelve) hours. for 10 days  Dispense: 20 tablet; Refill: 0      Using an aseptic technique, I injected 5 cc of lidocaine 1% without and 1 cc of kenalog 40mg into the left Hip and left knee. The patient tolerated this well.       Will refer to MD Eri for her left ankle pain.  RTC in 3 months.

## 2023-01-25 ENCOUNTER — TELEPHONE (OUTPATIENT)
Dept: NEUROLOGY | Facility: CLINIC | Age: 58
End: 2023-01-25
Payer: MEDICARE

## 2023-01-25 ENCOUNTER — TELEPHONE (OUTPATIENT)
Dept: ORTHOPEDICS | Facility: CLINIC | Age: 58
End: 2023-01-25
Payer: MEDICARE

## 2023-01-25 RX ORDER — TRIAMCINOLONE ACETONIDE 40 MG/ML
40 INJECTION, SUSPENSION INTRA-ARTICULAR; INTRAMUSCULAR
Status: DISCONTINUED | OUTPATIENT
Start: 2023-01-24 | End: 2023-01-25 | Stop reason: HOSPADM

## 2023-01-25 NOTE — TELEPHONE ENCOUNTER
Spoke with patient and explained that her appointment on 1/30/23 is with Heavenly Fletcher and insurance will not pay for her to see 2 neurologists on the same day. Patient voiced understanding.

## 2023-01-25 NOTE — TELEPHONE ENCOUNTER
----- Message from Jaquelin Greer sent at 1/25/2023 12:01 PM CST -----  Regarding: Reschedule Appointment  Contact: Patient  Type:  Sooner Appointment Request    Caller is requesting a sooner appointment.  Caller declined first available appointment listed below.  Caller will not accept being placed on the waitlist and is requesting a message be sent to doctor.    Name of Caller:  Patient  When is the first available appointment?  02/17/23  Symptoms:  Memory  Best Call Back Number:cell)[phone]    Additional Information:  Patient does not have transportation and asking to be seen on 01/30/23. She has another appointment so she will have transportation for both. Please call patient to advise. Thanks!

## 2023-01-25 NOTE — PROCEDURES
Large Joint Aspiration/Injection: L greater trochanteric bursa    Date/Time: 1/24/2023 9:40 AM  Performed by: SALVATORE Grey  Authorized by: SALVATORE Grey     Consent Done?:  Yes (Verbal)  Indications:  Pain  Timeout: prior to procedure the correct patient, procedure, and site was verified    Prep: patient was prepped and draped in usual sterile fashion      Local anesthesia used?: Yes    Local anesthetic:  Lidocaine 1% without epinephrine  Anesthetic total (ml):  5      Details:  Needle Size:  22 G  Approach:  Lateral  Location:  Hip  Site:  L greater trochanteric bursa  Medications:  40 mg triamcinolone acetonide 40 mg/mL  Patient tolerance:  Patient tolerated the procedure well with no immediate complications  Large Joint Aspiration/Injection: L knee    Date/Time: 1/24/2023 9:40 AM  Performed by: SALVATORE Grey  Authorized by: SALVATORE Grey     Consent Done?:  Yes (Verbal)  Indications:  Pain  Timeout: prior to procedure the correct patient, procedure, and site was verified    Prep: patient was prepped and draped in usual sterile fashion    Local anesthetic:  Lidocaine 1% without epinephrine  Anesthetic total (ml):  5      Details:  Needle Size:  21 G  Approach:  Anterolateral  Location:  Knee  Site:  L knee  Medications:  40 mg triamcinolone acetonide 40 mg/mL  Patient tolerance:  Patient tolerated the procedure well with no immediate complications

## 2023-01-26 ENCOUNTER — TELEPHONE (OUTPATIENT)
Dept: ORTHOPEDICS | Facility: CLINIC | Age: 58
End: 2023-01-26
Payer: MEDICARE

## 2023-01-31 LAB — CALPROTECTIN STL-MCNT: 213.8 MCG/G

## 2023-02-02 DIAGNOSIS — K50.012 CROHN'S DISEASE OF SMALL INTESTINE WITH INTESTINAL OBSTRUCTION: ICD-10-CM

## 2023-02-02 DIAGNOSIS — M02.39 REACTIVE ARTHRITIS OF MULTIPLE SITES: ICD-10-CM

## 2023-02-02 DIAGNOSIS — G89.4 CHRONIC PAIN SYNDROME: ICD-10-CM

## 2023-02-06 RX ORDER — HYDROCODONE BITARTRATE AND ACETAMINOPHEN 10; 325 MG/1; MG/1
1 TABLET ORAL EVERY 8 HOURS PRN
Qty: 90 TABLET | Refills: 0 | Status: SHIPPED | OUTPATIENT
Start: 2023-02-06 | End: 2023-03-06 | Stop reason: SDUPTHER

## 2023-02-13 ENCOUNTER — TELEPHONE (OUTPATIENT)
Dept: RHEUMATOLOGY | Facility: CLINIC | Age: 58
End: 2023-02-13
Payer: MEDICARE

## 2023-02-13 ENCOUNTER — TELEPHONE (OUTPATIENT)
Dept: NEUROLOGY | Facility: CLINIC | Age: 58
End: 2023-02-13
Payer: MEDICARE

## 2023-02-13 NOTE — TELEPHONE ENCOUNTER
----- Message from Anny Wallis sent at 2/11/2023  7:52 AM CST -----  Type:  Sooner Appointment Request    Caller is requesting a sooner appointment.  Caller declined first available appointment listed below.  Caller will not accept being placed on the waitlist and is requesting a message be sent to doctor.    Name of Caller:  pt   When is the first available appointment?  Unk   Symptoms:  Botox #2   Best Call Back Number:  [phone] (home)     Additional Information:  pt would like a appt , but would need a 3 day notice . Please advise thank you

## 2023-02-13 NOTE — TELEPHONE ENCOUNTER
Called patient to let her know she needs to be seen by her pcp or urgent care patient did not answer unable to leave voicemail  ----- Message from Anny Wallis sent at 2/11/2023  7:48 AM CST -----  Type: Needs Medical Advice  Who Called:  pt   Symptoms (please be specific):  cough, sore throat, back cold   How long has patient had these symptoms:  1 week   Pharmacy name and phone #:.  [facility] - [place] - [address] [address] [facility] [address]  Phone: [phone] Fax: [phone]        Best Call Back Number: [phone] (home) \    Additional Information: pt was wondering if you can call something like a rx to the pharmacy so she can feel better please advise thank you

## 2023-02-17 ENCOUNTER — TELEPHONE (OUTPATIENT)
Dept: NEUROLOGY | Facility: CLINIC | Age: 58
End: 2023-02-17
Payer: MEDICARE

## 2023-02-17 NOTE — TELEPHONE ENCOUNTER
----- Message from Soheila Robin sent at 2/17/2023  8:27 AM CST -----  .Type:  Patient Call Back    Who Called: PT       Does the patient know what this is regarding?: PT CALLED STATING SHE'S STILL WAITING ON TRANSPORTATION. PLEASE REACH OUT TO PT      Would the patient rather a call back YES     Best Call Back Number: [phone]    Additional Information: Thank You

## 2023-03-02 ENCOUNTER — HOSPITAL ENCOUNTER (OUTPATIENT)
Facility: HOSPITAL | Age: 58
Discharge: HOME | End: 2023-03-02
Attending: INTERNAL MEDICINE | Admitting: INTERNAL MEDICINE
Payer: MEDICARE

## 2023-03-02 ENCOUNTER — ANESTHESIA (OUTPATIENT)
Dept: ENDOSCOPY | Facility: HOSPITAL | Age: 58
End: 2023-03-02
Payer: MEDICARE

## 2023-03-02 ENCOUNTER — ANESTHESIA EVENT (OUTPATIENT)
Dept: ENDOSCOPY | Facility: HOSPITAL | Age: 58
End: 2023-03-02
Payer: MEDICARE

## 2023-03-02 DIAGNOSIS — K62.5 RECTAL BLEED: ICD-10-CM

## 2023-03-02 PROCEDURE — 45380 COLONOSCOPY AND BIOPSY: CPT | Mod: ,,, | Performed by: INTERNAL MEDICINE

## 2023-03-02 PROCEDURE — 63600175 PHARM REV CODE 636 W HCPCS: Mod: PO | Performed by: INTERNAL MEDICINE

## 2023-03-02 PROCEDURE — 88305 TISSUE EXAM BY PATHOLOGIST: CPT | Mod: 26,,, | Performed by: PATHOLOGY

## 2023-03-02 PROCEDURE — 37000009 HC ANESTHESIA EA ADD 15 MINS: Mod: PO | Performed by: INTERNAL MEDICINE

## 2023-03-02 PROCEDURE — D9220A PRA ANESTHESIA: Mod: ANES,,, | Performed by: ANESTHESIOLOGY

## 2023-03-02 PROCEDURE — 25000003 PHARM REV CODE 250: Mod: PO | Performed by: NURSE ANESTHETIST, CERTIFIED REGISTERED

## 2023-03-02 PROCEDURE — 27201012 HC FORCEPS, HOT/COLD, DISP: Mod: PO | Performed by: INTERNAL MEDICINE

## 2023-03-02 PROCEDURE — 88305 TISSUE EXAM BY PATHOLOGIST: ICD-10-PCS | Mod: 26,,, | Performed by: PATHOLOGY

## 2023-03-02 PROCEDURE — D9220A PRA ANESTHESIA: ICD-10-PCS | Mod: ANES,,, | Performed by: ANESTHESIOLOGY

## 2023-03-02 PROCEDURE — D9220A PRA ANESTHESIA: Mod: CRNA,,, | Performed by: NURSE ANESTHETIST, CERTIFIED REGISTERED

## 2023-03-02 PROCEDURE — 88305 TISSUE EXAM BY PATHOLOGIST: CPT | Performed by: PATHOLOGY

## 2023-03-02 PROCEDURE — 45380 COLONOSCOPY AND BIOPSY: CPT | Mod: PO | Performed by: INTERNAL MEDICINE

## 2023-03-02 PROCEDURE — 45380 PR COLONOSCOPY,BIOPSY: ICD-10-PCS | Mod: ,,, | Performed by: INTERNAL MEDICINE

## 2023-03-02 PROCEDURE — 37000008 HC ANESTHESIA 1ST 15 MINUTES: Mod: PO | Performed by: INTERNAL MEDICINE

## 2023-03-02 PROCEDURE — D9220A PRA ANESTHESIA: ICD-10-PCS | Mod: CRNA,,, | Performed by: NURSE ANESTHETIST, CERTIFIED REGISTERED

## 2023-03-02 PROCEDURE — 63600175 PHARM REV CODE 636 W HCPCS: Mod: PO | Performed by: NURSE ANESTHETIST, CERTIFIED REGISTERED

## 2023-03-02 RX ORDER — PROPOFOL 10 MG/ML
VIAL (ML) INTRAVENOUS
Status: DISCONTINUED | OUTPATIENT
Start: 2023-03-02 | End: 2023-03-02

## 2023-03-02 RX ORDER — SODIUM CHLORIDE, SODIUM LACTATE, POTASSIUM CHLORIDE, CALCIUM CHLORIDE 600; 310; 30; 20 MG/100ML; MG/100ML; MG/100ML; MG/100ML
INJECTION, SOLUTION INTRAVENOUS CONTINUOUS
Status: DISCONTINUED | OUTPATIENT
Start: 2023-03-02 | End: 2023-03-02 | Stop reason: HOSPADM

## 2023-03-02 RX ORDER — LIDOCAINE HYDROCHLORIDE 20 MG/ML
INJECTION INTRAVENOUS
Status: DISCONTINUED | OUTPATIENT
Start: 2023-03-02 | End: 2023-03-02

## 2023-03-02 RX ORDER — SODIUM CHLORIDE 0.9 % (FLUSH) 0.9 %
10 SYRINGE (ML) INJECTION
Status: DISCONTINUED | OUTPATIENT
Start: 2023-03-02 | End: 2023-03-02 | Stop reason: HOSPADM

## 2023-03-02 RX ADMIN — PROPOFOL 50 MG: 10 INJECTION, EMULSION INTRAVENOUS at 10:03

## 2023-03-02 RX ADMIN — PROPOFOL 200 MG: 10 INJECTION, EMULSION INTRAVENOUS at 10:03

## 2023-03-02 RX ADMIN — SODIUM CHLORIDE, POTASSIUM CHLORIDE, SODIUM LACTATE AND CALCIUM CHLORIDE: 600; 310; 30; 20 INJECTION, SOLUTION INTRAVENOUS at 10:03

## 2023-03-02 RX ADMIN — LIDOCAINE HYDROCHLORIDE 100 MG: 20 INJECTION INTRAVENOUS at 10:03

## 2023-03-02 NOTE — ANESTHESIA POSTPROCEDURE EVALUATION
Anesthesia Post Evaluation    Patient: Amanda Mcguire    Procedure(s) Performed: Procedure(s) (LRB):  COLONOSCOPY (N/A)    Final Anesthesia Type: general      Patient location during evaluation: PACU  Patient participation: Yes- Able to Participate  Level of consciousness: sedated and awake  Post-procedure vital signs: reviewed and stable  Pain management: adequate  Airway patency: patent    PONV status at discharge: No PONV  Anesthetic complications: no      Cardiovascular status: hypertensive and blood pressure returned to baseline  Respiratory status: spontaneous ventilation  Hydration status: euvolemic  Follow-up not needed.          Vitals Value Taken Time   /81 03/02/23 1053   Temp  03/02/23 1306   Pulse 79 03/02/23 1053   Resp 17 03/02/23 1053   SpO2 100 % 03/02/23 1053         Event Time   Out of Recovery 10:55:00         Pain/Beny Score: Beny Score: 10 (3/2/2023 10:59 AM)

## 2023-03-02 NOTE — H&P
History & Physical - Short Stay  Gastroenterology      SUBJECTIVE:     Procedure: Colonoscopy    Chief Complaint/Indication for Procedure: Change in Bowel Habits and Rectal Bleeding    Facility-Administered Medications Prior to Admission   Medication    onabotulinumtoxina injection 200 Units     PTA Medications   Medication Sig    amLODIPine (NORVASC) 10 MG tablet Take 1 tablet (10 mg total) by mouth daily    butalbital-acetaminophen-caffeine -40 mg (FIORICET, ESGIC) -40 mg per tablet Take 1 tablet by mouth every 4 (four) hours as needed.    diclofenac sodium (VOLTAREN) 1 % Gel APPLY TOPICALLY FOUR TIMES DAILY    dicyclomine (BENTYL) 20 mg tablet Take 20 mg by mouth once daily.     diltiaZEM (TIAZAC) 240 MG Cs24 Take 1 capsule (240 mg total) by mouth once daily.    furosemide (LASIX) 20 MG tablet Take 1 tablet (20 mg total) by mouth daily    gabapentin (NEURONTIN) 800 MG tablet Take 1 tablet (800 mg total) by mouth 3 (three) times daily.    HYDROcodone-acetaminophen (NORCO)  mg per tablet Take 1 tablet by mouth every 8 (eight) hours as needed for Pain.    hydrOXYchloroQUINE (PLAQUENIL) 200 mg tablet Take 1 tablet (200 mg total) by mouth 2 (two) times daily.    irbesartan (AVAPRO) 150 MG tablet TAKE 1 TABLET BY MOUTH EVERY NIGHT AT BEDTIME FOR BLOOD PRESSURE    pantoprazole (PROTONIX) 40 MG tablet Take 1 tablet (40 mg total) by mouth before breakfast.    phentermine (ADIPEX-P) 37.5 mg tablet Take 37.5 mg by mouth once daily.    rizatriptan (MAXALT) 10 MG tablet 1 tab PO PRN migraine. May repeat every 2 hours for max 3 tabs in 24 hours. Use no more than 10 days per month.    tiZANidine (ZANAFLEX) 4 MG tablet Take 4 mg by mouth every 8 (eight) hours.     traZODone (DESYREL) 100 MG tablet Take 1 tablet (100 mg total) by mouth every evening.    albuterol (PROVENTIL/VENTOLIN HFA) 90 mcg/actuation inhaler Inhale 2 puffs into the lungs every 6 (six) hours as needed for Wheezing. Rescue    ALPRAZolam  (XANAX) 0.5 MG tablet Take 1 tablet by mouth nightly as needed.    atorvastatin (LIPITOR) 40 MG tablet Take 1 tablet (40 mg total) by mouth daily    budesonide-formoterol 160-4.5 mcg (SYMBICORT) 160-4.5 mcg/actuation HFAA Inhale 2 puffs into the lungs 2 (two) times daily    chlorhexidine (PERIDEX) 0.12 % solution swish and spit 15 MILLILITERS in the mouth or throat TWICE DAILY FOR FOURTEEN DAYS (Patient not taking: Reported on 2/27/2023)    diltiaZEM (CARDIZEM CD) 240 MG 24 hr capsule Take 240 mg by mouth.    epinephrine (EPIPEN) 0.3 mg/0.3 mL AtIn Inject 0.3 mg into the muscle.    fluticasone propionate (FLONASE) 50 mcg/actuation nasal spray     fluticasone-salmeterol 500-50 mcg/dose (ADVAIR) 500-50 mcg/dose DsDv diskus inhaler Inhale 1 puff into the lungs.    hydrochlorothiazide (MICROZIDE) 12.5 mg capsule Take 12.5 mg by mouth every morning.    hydrOXYzine pamoate (VISTARIL) 25 MG Cap TAKE ONE CAPSULE BY MOUTH THREE TIMES DAILY AS NEEDED FOR ITCHING (Patient not taking: Reported on 2/27/2023)    ipratropium-albuteroL (COMBIVENT)  mcg/actuation inhaler Inhale 1 puff into the lungs 4 (four) times daily. Rescue (Patient not taking: Reported on 2/27/2023)    lidocaine HCl 2% (LIDOCAINE VISCOUS) 2 % Soln by Mucous Membrane route every 8 (eight) hours as needed. (Patient not taking: Reported on 2/27/2023)    methylPREDNISolone (MEDROL DOSEPACK) 4 mg tablet follow package directions    montelukast (SINGULAIR) 10 mg tablet Take 10 mg by mouth every evening.    nebulizer and compressor Siomara Use as directed    neomycin-polymyxin-hydrocortisone (CORTISPORIN) otic solution PLACE 2 DROPS IN AFFECTED EAR FOUR TIMES DAILY FOR 5-7 DAYS    prochlorperazine (COMPAZINE) 10 MG tablet Take 1 tablet (10 mg total) by mouth every 6 (six) hours as needed (migraine or nausea). (Patient not taking: Reported on 12/22/2022)    sertraline (ZOLOFT) 100 MG tablet Take 100 mg by mouth once daily.    triamcinolone acetonide 0.1% (KENALOG)  0.1 % ointment Apply topically 2 (two) times daily.    zolpidem (AMBIEN) 10 mg Tab TAKE ONE-HALF - ONE TABLET BY MOUTH EVERY NIGHT AT BEDTIME AS NEEDED FOR SLEEP       Review of patient's allergies indicates:   Allergen Reactions    Amlodipine-benazepril Swelling    Ace inhibitors Swelling     Angioedema; was taking Benazepril.    Tramadol Itching        Past Medical History:   Diagnosis Date    Anxiety     Arthritis     Asthma     Crohn's disease     Depression     Encounter for blood transfusion     Headache     Hypertension     Myofascial pain syndrome, cervical 9/28/2022     Past Surgical History:   Procedure Laterality Date    COLONOSCOPY N/A 03/10/2022    Procedure: COLONOSCOPY;  Surgeon: Nilson Wiseman MD;  Location: [facility] ENDO;  Service: Endoscopy;  Laterality: N/A; Repeat colonoscopy in 6 months because the bowel prep was poor    ESOPHAGOGASTRODUODENOSCOPY N/A 03/10/2022    Procedure: EGD (ESOPHAGOGASTRODUODENOSCOPY);  Surgeon: Nilson Wiseman MD;  Location: [facility] ENDO;  Service: Endoscopy;  Laterality: N/A; Repeat upper endoscopy in 8 weeks for surveillance     Family History   Problem Relation Age of Onset    Cancer Mother     Diabetes Mother     Arthritis Mother     Hypertension Mother     Cancer Father     Diabetes Father     Arthritis Father     Hypertension Father     Celiac disease Neg Hx     Colon cancer Neg Hx     Crohn's disease Neg Hx     Esophageal cancer Neg Hx     Stomach cancer Neg Hx     Ulcerative colitis Neg Hx      Social History     Tobacco Use    Smoking status: Never    Smokeless tobacco: Never   Substance Use Topics    Alcohol use: Yes     Alcohol/week: 0.0 - 2.0 standard drinks    Drug use: Yes     Types: Hydrocodone         OBJECTIVE:     Vital Signs (Most Recent)  Temp: 98.2 °F (36.8 °C) (03/02/23 1004)  Pulse: 85 (03/02/23 1004)  Resp: 16 (03/02/23 1004)  BP: (!) 164/91 (03/02/23 1004)  SpO2: 100 % (03/02/23 1004)    Physical Exam:                                                        GENERAL:  Comfortable, in no acute distress.                                 HEENT EXAM:  Nonicteric.  No adenopathy.  Oropharynx is clear.               NECK:  Supple.                                                               LUNGS:  Clear.                                                               CARDIAC:  Regular rate and rhythm.  S1, S2.  No murmur.                      ABDOMEN:  Soft, positive bowel sounds, nontender.  No hepatosplenomegaly or masses.  No rebound or guarding.                                             EXTREMITIES:  No edema.     MENTAL STATUS:  Normal, alert and oriented.      ASSESSMENT/PLAN:     Assessment: Change in Bowel Habits and Rectal Bleeding    Plan: Colonoscopy    Anesthesia Plan: General    ASA Grade: ASA 2 - Patient with mild systemic disease with no functional limitations    MALLAMPATI SCORE:  I (soft palate, uvula, fauces, and tonsillar pillars visible)

## 2023-03-02 NOTE — ANESTHESIA PREPROCEDURE EVALUATION
03/02/2023  Amanda Mcguire is a 57 y.o., female.      Pre-op Assessment    I have reviewed the Patient Summary Reports.     I have reviewed the Nursing Notes. I have reviewed the NPO Status.   I have reviewed the Medications.     Review of Systems  Anesthesia Hx:  No problems with previous Anesthesia  Denies Family Hx of Anesthesia complications.   Denies Personal Hx of Anesthesia complications.   Social:  Non-Smoker    Hematology/Oncology:         -- Anemia:   Cardiovascular:   Hypertension ECG has been reviewed. 9/20  Normal Cardiac ST   Pulmonary:   Asthma moderate    Hepatic/GI:   Bowel Prep. GERD Crohn's disease  Elevated Calprotectin   Musculoskeletal:   Arthritis   Spine Disorders: lumbar Chronic Pain    Neurological:   Headaches   Chronic Pain Syndrome   Endocrine:  Morbid Obesity / BMI > 40  Dermatological:   Psoriasis    Psych:   anxiety depression          Physical Exam  General: Anxious    Airway:  Mallampati: III / II  Neck: Girth Increased    Chest/Lungs:  Normal Respiratory Rate    Heart:  Rate: Normal  Rhythm: Regular Rhythm        Anesthesia Plan  Type of Anesthesia, risks & benefits discussed:    Anesthesia Type: Gen Natural Airway  Intra-op Monitoring Plan: Standard ASA Monitors  Induction:  IV  Informed Consent: Informed consent signed with the Patient and all parties understand the risks and agree with anesthesia plan.  All questions answered.   ASA Score: 3  Day of Surgery Review of History & Physical: H&P Update referred to the surgeon/provider.    Ready For Surgery From Anesthesia Perspective.     .

## 2023-03-02 NOTE — TRANSFER OF CARE
"Anesthesia Transfer of Care Note    Patient: Amanda Mcguire    Procedure(s) Performed: Procedure(s) (LRB):  COLONOSCOPY (N/A)    Patient location: PACU    Anesthesia Type: general    Transport from OR: Transported from OR on room air with adequate spontaneous ventilation    Post pain: adequate analgesia    Post assessment: no apparent anesthetic complications and tolerated procedure well    Post vital signs: stable    Level of consciousness: sedated    Nausea/Vomiting: no nausea/vomiting    Complications: none    Transfer of care protocol was followed      Last vitals:   Visit Vitals  BP (!) 164/91 (BP Location: Right arm, Patient Position: Lying)   Pulse 85   Temp 36.8 °C (98.2 °F) (Skin)   Resp 16   Ht 5' 4" (1.626 m)   Wt 111.1 kg (245 lb)   SpO2 100%   Breastfeeding No   BMI 42.05 kg/m²     "

## 2023-03-02 NOTE — PROVATION PATIENT INSTRUCTIONS
Discharge Summary/Instructions after an Endoscopic Procedure  Patient Name: Amanda Mcguire  Patient MRN: [id number]  Patient YOB: 1965  Thursday, March 2, 2023  Erik Garcia MD  Dear patient,  As a result of recent federal legislation (The Federal Cures Act), you may   receive lab or pathology results from your procedure in your [facility]   account before your physician is able to contact you. Your physician or   their representative will relay the results to you with their   recommendations at their soonest availability.  Thank you,  RESTRICTIONS:  During your procedure today, you received medications for sedation.  These   medications may affect your judgment, balance and coordination.  Therefore,   for 24 hours, you have the following restrictions:   - DO NOT drive a car, operate machinery, make legal/financial decisions,   sign important papers or drink alcohol.    ACTIVITY:  Today: no heavy lifting, straining or running due to procedural   sedation/anesthesia.  The following day: return to full activity including work.  DIET:  Eat and drink normally unless instructed otherwise.     TREATMENT FOR COMMON SIDE EFFECTS:  - Mild abdominal pain, nausea, belching, bloating or excessive gas:  rest,   eat lightly and use a heating pad.  - Sore Throat: treat with throat lozenges and/or gargle with warm salt   water.  - Because air was used during the procedure, expelling large amounts of air   from your rectum or belching is normal.  - If a bowel prep was taken, you may not have a bowel movement for 1-3 days.    This is normal.  SYMPTOMS TO WATCH FOR AND REPORT TO YOUR PHYSICIAN:  1. Abdominal pain or bloating, other than gas cramps.  2. Chest pain.  3. Back pain.  4. Signs of infection such as: chills or fever occurring within 24 hours   after the procedure.  5. Rectal bleeding, which would show as bright red, maroon, or black stools.   (A tablespoon of blood from the rectum is not serious, especially  if   hemorrhoids are present.)  6. Vomiting.  7. Weakness or dizziness.  GO DIRECTLY TO THE NEAREST EMERGENCY ROOM IF YOU HAVE ANY OF THE FOLLOWING:      Difficulty breathing              Chills and/or fever over 101 F   Persistent vomiting and/or vomiting blood   Severe abdominal pain   Severe chest pain   Black, tarry stools   Bleeding- more than one tablespoon   Any other symptom or condition that you feel may need urgent attention  Your doctor recommends these additional instructions:  If any biopsies were taken, your doctors clinic will contact you in 1 to 2   weeks with any results.  We are waiting for your pathology results.   Your physician has recommended a repeat colonoscopy in one year because the   bowel preparation was poor.   You are being discharged to home.  For questions, problems or results please call your physician - Erik Garcia MD at Work:  [phone].  EMERGENCY PHONE NUMBER: [phone], LAB RESULTS: [phone]  IF A COMPLICATION OR EMERGENCY SITUATION ARISES AND YOU ARE UNABLE TO REACH   YOUR PHYSICIAN - GO DIRECTLY TO THE EMERGENCY ROOM.  ___________________________________________  Nurse Signature  ___________________________________________  Patient/Designated Responsible Party Signature  Erik Garcia MD  3/2/2023 10:44:04 AM  This report has been verified and signed electronically.  Dear patient,  As a result of recent federal legislation (The Federal Cures Act), you may   receive lab or pathology results from your procedure in your [facility]   account before your physician is able to contact you. Your physician or   their representative will relay the results to you with their   recommendations at their soonest availability.  Thank you.  PROVATION

## 2023-03-02 NOTE — DISCHARGE SUMMARY
[place] - Endoscopy  Discharge Note  Short Stay  Discharge Note  Short Stay      SUMMARY     Admit Date: 3/2/2023    Attending Physician: Erik Garcia MD     Discharge Physician: Erik Garcia MD    Discharge Date: 3/2/2023 10:45 AM    Final Diagnosis: Crohn's disease with complication, unspecified gastrointestinal tract location [K50.919]  Hematochezia [K92.1]  Diarrhea, unspecified type [R19.7]    Disposition: HOME OR SELF CARE    Patient Instructions:   Current Discharge Medication List        CONTINUE these medications which have NOT CHANGED    Details   albuterol (PROVENTIL/VENTOLIN HFA) 90 mcg/actuation inhaler Inhale 2 puffs into the lungs every 6 (six) hours as needed for Wheezing. Rescue  Qty: 18 g, Refills: 6    Associated Diagnoses: Asthma, unspecified asthma severity, unspecified whether complicated, unspecified whether persistent      ALPRAZolam (XANAX) 0.5 MG tablet Take 1 tablet by mouth nightly as needed.      amLODIPine (NORVASC) 10 MG tablet Take 1 tablet (10 mg total) by mouth daily    Comments: .      atorvastatin (LIPITOR) 40 MG tablet Take 1 tablet (40 mg total) by mouth daily      budesonide-formoterol 160-4.5 mcg (SYMBICORT) 160-4.5 mcg/actuation HFAA Inhale 2 puffs into the lungs 2 (two) times daily      butalbital-acetaminophen-caffeine -40 mg (FIORICET, ESGIC) -40 mg per tablet Take 1 tablet by mouth every 4 (four) hours as needed.      diclofenac sodium (VOLTAREN) 1 % Gel APPLY TOPICALLY FOUR TIMES DAILY  Qty: 300 g, Refills: 3    Associated Diagnoses: Reactive arthritis; Chronic pain syndrome      dicyclomine (BENTYL) 20 mg tablet Take 20 mg by mouth once daily.       diltiaZEM (CARDIZEM CD) 240 MG 24 hr capsule Take 240 mg by mouth.      diltiaZEM (TIAZAC) 240 MG Cs24 Take 1 capsule (240 mg total) by mouth once daily.  Qty: 90 capsule, Refills: 0    Associated Diagnoses: Hypertension, unspecified type      fluticasone propionate (FLONASE) 50 mcg/actuation  nasal spray       fluticasone-salmeterol 500-50 mcg/dose (ADVAIR) 500-50 mcg/dose DsDv diskus inhaler Inhale 1 puff into the lungs.      furosemide (LASIX) 20 MG tablet Take 1 tablet (20 mg total) by mouth daily      gabapentin (NEURONTIN) 800 MG tablet Take 1 tablet (800 mg total) by mouth 3 (three) times daily.  Qty: 90 tablet, Refills: 3    Associated Diagnoses: Reactive arthritis of multiple sites      hydrochlorothiazide (MICROZIDE) 12.5 mg capsule Take 12.5 mg by mouth every morning.  Refills: 11      HYDROcodone-acetaminophen (NORCO)  mg per tablet Take 1 tablet by mouth every 8 (eight) hours as needed for Pain.  Qty: 90 tablet, Refills: 0    Comments: Chronic pain >7 days medically necessary override dx code G89.4  Associated Diagnoses: Reactive arthritis of multiple sites; Chronic pain syndrome; Crohn's disease of small intestine with intestinal obstruction      hydrOXYchloroQUINE (PLAQUENIL) 200 mg tablet Take 1 tablet (200 mg total) by mouth 2 (two) times daily.  Qty: 180 tablet, Refills: 1    Associated Diagnoses: Reactive arthritis of multiple sites      hydrOXYzine pamoate (VISTARIL) 25 MG Cap TAKE ONE CAPSULE BY MOUTH THREE TIMES DAILY AS NEEDED FOR ITCHING  Qty: 45 capsule, Refills: 3    Associated Diagnoses: Itching      irbesartan (AVAPRO) 150 MG tablet TAKE 1 TABLET BY MOUTH EVERY NIGHT AT BEDTIME FOR BLOOD PRESSURE      montelukast (SINGULAIR) 10 mg tablet Take 10 mg by mouth every evening.      pantoprazole (PROTONIX) 40 MG tablet Take 1 tablet (40 mg total) by mouth before breakfast.  Qty: 30 tablet, Refills: 1    Associated Diagnoses: History of gastritis; Black stool      phentermine (ADIPEX-P) 37.5 mg tablet Take 37.5 mg by mouth once daily.      rizatriptan (MAXALT) 10 MG tablet 1 tab PO PRN migraine. May repeat every 2 hours for max 3 tabs in 24 hours. Use no more than 10 days per month.  Qty: 10 tablet, Refills: 11    Associated Diagnoses: Intractable chronic migraine without aura  and without status migrainosus      tiZANidine (ZANAFLEX) 4 MG tablet Take 4 mg by mouth every 8 (eight) hours.       traZODone (DESYREL) 100 MG tablet Take 1 tablet (100 mg total) by mouth every evening.  Qty: 90 tablet, Refills: 1    Associated Diagnoses: Insomnia, unspecified type      chlorhexidine (PERIDEX) 0.12 % solution swish and spit 15 MILLILITERS in the mouth or throat TWICE DAILY FOR FOURTEEN DAYS  Qty: 473 mL, Refills: 6      epinephrine (EPIPEN) 0.3 mg/0.3 mL AtIn Inject 0.3 mg into the muscle.      ipratropium-albuteroL (COMBIVENT)  mcg/actuation inhaler Inhale 1 puff into the lungs 4 (four) times daily. Rescue  Qty: 4 g, Refills: 12    Associated Diagnoses: Asthma, unspecified asthma severity, unspecified whether complicated, unspecified whether persistent      lidocaine HCl 2% (LIDOCAINE VISCOUS) 2 % Soln by Mucous Membrane route every 8 (eight) hours as needed.  Qty: 100 mL, Refills: 0    Associated Diagnoses: Oral ulcer      methylPREDNISolone (MEDROL DOSEPACK) 4 mg tablet follow package directions      nebulizer and compressor Siomara Use as directed      neomycin-polymyxin-hydrocortisone (CORTISPORIN) otic solution PLACE 2 DROPS IN AFFECTED EAR FOUR TIMES DAILY FOR 5-7 DAYS      prochlorperazine (COMPAZINE) 10 MG tablet Take 1 tablet (10 mg total) by mouth every 6 (six) hours as needed (migraine or nausea).  Qty: 60 tablet, Refills: 11    Associated Diagnoses: Nausea      sertraline (ZOLOFT) 100 MG tablet Take 100 mg by mouth once daily.      triamcinolone acetonide 0.1% (KENALOG) 0.1 % ointment Apply topically 2 (two) times daily.  Qty: 80 g, Refills: 3    Associated Diagnoses: Psoriasis      zolpidem (AMBIEN) 10 mg Tab TAKE ONE-HALF - ONE TABLET BY MOUTH EVERY NIGHT AT BEDTIME AS NEEDED FOR SLEEP             Discharge Procedure Orders (must include Diet, Follow-up, Activity)    Follow Up:  Follow up with PCP as previously scheduled  Resume routine diet.  Activity as tolerated.    No driving  day of procedure.

## 2023-03-03 VITALS
OXYGEN SATURATION: 100 % | SYSTOLIC BLOOD PRESSURE: 134 MMHG | BODY MASS INDEX: 41.83 KG/M2 | HEART RATE: 79 BPM | RESPIRATION RATE: 17 BRPM | HEIGHT: 64 IN | TEMPERATURE: 98 F | WEIGHT: 245 LBS | DIASTOLIC BLOOD PRESSURE: 81 MMHG

## 2023-03-06 DIAGNOSIS — G89.4 CHRONIC PAIN SYNDROME: ICD-10-CM

## 2023-03-06 DIAGNOSIS — K50.012 CROHN'S DISEASE OF SMALL INTESTINE WITH INTESTINAL OBSTRUCTION: ICD-10-CM

## 2023-03-06 DIAGNOSIS — M02.39 REACTIVE ARTHRITIS OF MULTIPLE SITES: ICD-10-CM

## 2023-03-06 NOTE — TELEPHONE ENCOUNTER
----- Message from Bella Sherwood sent at 3/6/2023  3:11 PM CST -----  Contact: Self  Type:  RX Refill Request    Who Called:  Patient  Refill or New Rx:  refill  RX Name and Strength:  HYDROcodone-acetaminophen (NORCO)  mg per tablet  How is the patient currently taking it? (ex. 1XDay):  Take 1 tablet by mouth every 8 (eight) hours as needed for Pain. - Oral  Is this a 30 day or 90 day RX:  90  Preferred Pharmacy with phone number:    [facility] [place] - [address] [address]  Phone: [phone] Fax: [phone]  Local or Mail Order:  Local  Ordering Provider:  melody Guerra Call Back Number:  [phone]  Additional Information:  Please call the patient back at the phone number listed above. Thanks!

## 2023-03-06 NOTE — TELEPHONE ENCOUNTER
Returned patient call regarding appointment today needs to be rescheduled. Stated her brother passed away and his service was today. Patient stated she fainted and hurt her knees and hands. Nurse informed to contact PCP to be seen due to fall. Nurse informed refill request for pain medication  was sent to Dr Beltran. Nurse was able to reschedule appointment to March 29th at 330 pm patient aware of date and time of appointment.

## 2023-03-06 NOTE — TELEPHONE ENCOUNTER
----- Message from Bella Sherwood sent at 3/6/2023  3:06 PM CST -----  Contact: Self  Type:  Patient Needs to reschedule apt today (she fell on knees at brother's )  Who Called:  Patient  Best Call Back Number:  [phone]  Additional Information:  Please call the patient back at the phone number listed above. Thanks!

## 2023-03-06 NOTE — TELEPHONE ENCOUNTER
----- Message from Darby Saldana sent at 3/6/2023 11:44 AM CST -----  Contact: patient  Type:  Sooner Apoointment Request    Name of Caller: patient     When is the first available appointment?     Symptoms: 4 month f/u     Would the patient rather a call back or a response via [facility]? Call     Best Call Back Number: [phone]    Additional Information: Patient had an appointment today but her brother passed away.

## 2023-03-07 NOTE — TELEPHONE ENCOUNTER
----- Message from Sarah Beth Parsons sent at 3/7/2023 10:22 AM CST -----  Regarding: refill  Contact: patient  Type:  RX Refill Request    Who Called:  Patient  Refill or New Rx:  refill  RX Name and Strength:  HYDROcodone-acetaminophen (NORCO)  mg per tablet/ and cough medicine   How is the patient currently taking it? (ex. 1XDay):    Is this a 30 day or 90 day RX:    Preferred Pharmacy with phone number:        [facility] - [address] [address] [facility] [address]  Phone: [phone] Fax: [phone]      Local or Mail Order:    Ordering Provider:    Best Call Back Number:  [phone]    Additional Information: Also needs cough medicine has a bad cold. Please notify once sent thanks!

## 2023-03-08 LAB
FINAL PATHOLOGIC DIAGNOSIS: NORMAL
GROSS: NORMAL
Lab: NORMAL

## 2023-03-08 NOTE — TELEPHONE ENCOUNTER
----- Message from Cleo Fernandez sent at 3/8/2023  9:00 AM CST -----  Contact: Pt @ [phone]  Type:  RX Refill Request    Who Called: Pt   Refill or New Rx:Refill  RX Name and Strength:HYDROcodone-acetaminophen (NORCO)  mg per tablet  How is the patient currently taking it? (ex. 1XDay):3XDay  Is this a 30 day or 90 day RX:30 day  Preferred Pharmacy with phone number:  [facility] - [place] - [address] [address]  Phone: [phone] Fax: [phone]    Local or Mail Order:Local  Ordering Provider:Sukhjinder Beltran   Would the patient rather a call back or a response via [facility]? call  Best Call Back Number:.[phone]    Additional Information: Pt would like a refill called in to her pharmacy.

## 2023-03-09 RX ORDER — HYDROCODONE BITARTRATE AND ACETAMINOPHEN 10; 325 MG/1; MG/1
1 TABLET ORAL EVERY 8 HOURS PRN
Qty: 90 TABLET | Refills: 0 | Status: SHIPPED | OUTPATIENT
Start: 2023-03-09 | End: 2023-04-08

## 2023-03-09 NOTE — TELEPHONE ENCOUNTER
----- Message from Willow Gilliam sent at 3/9/2023 12:14 PM CST -----  Who Called: Pt    What is the request in detail: Requesting call back to discuss New Rx    HYDROcodone-acetaminophen (NORCO)  mg per tablet 90 tablet 0 2/6/2023 3/8/2023 No  Sig - Route: Take 1 tablet by mouth every 8 (eight) hours as needed for Pain. - Oral  Sent to pharmacy as: HYDROcodone-acetaminophen (NORCO)  mg per tablet  Class: Normal  Earliest Fill Date: 2/6/2023  Notes to Pharmacy: Chronic pain >7 days medically necessary override dx code G89.4  Order: [id number]      [facility] [place] - [address] [facility]  [place] [address]  Phone: [phone] Fax: [phone]      Can the clinic reply by [facility]? No    Best Call Back Number:[phone]       Additional Information:

## 2023-03-09 NOTE — TELEPHONE ENCOUNTER
----- Message from [facility] Robin sent at 3/9/2023  4:45 PM CST -----  .Type:  RX Refill Request    Who Called:  PT     Refill or New Rx: REFILL     RX Name and Strength: HYDROcodone-acetaminophen (NORCO)  mg per tablet    Preferred Pharmacy with phone number: [facility] [phone]     Pt Call Back Number: [phone]    Additional Information: Thank You

## 2023-03-09 NOTE — TELEPHONE ENCOUNTER
----- Message from No Dong sent at 3/9/2023  9:32 AM CST -----  Contact: pt  Type:  RX Refill Request    Who Called:  pt   Refill or New Rx:  refill  RX Name and Strength:  HYDROcodone-acetaminophen (NORCO)  mg per tablet  How is the patient currently taking it? (ex. 1XDay):  as needed  Is this a 30 day or 90 day RX:  90  Preferred Pharmacy with phone number:    [facility] - [place] - [address] [address] [facility] [address]  Phone: [phone] Fax: [phone]      Local or Mail Order:  local  Ordering Provider:  Dr. Devin Guerra Call Back Number:  [phone]    Additional Information:  pt needs a refill. Please advise.

## 2023-03-13 ENCOUNTER — TELEPHONE (OUTPATIENT)
Dept: NEUROLOGY | Facility: CLINIC | Age: 58
End: 2023-03-13
Payer: MEDICARE

## 2023-03-13 NOTE — TELEPHONE ENCOUNTER
Called patient and scheduled Botox appointment. Date/Time confirmed. Verbalized understanding. Referral attached.

## 2023-03-13 NOTE — TELEPHONE ENCOUNTER
----- Message from Erik Puckett sent at 3/13/2023  2:34 PM CDT -----  Regarding: reschedule Botox  Contact: KALA MOTT [[id number]]  Type:  Sooner Appointment Request    Caller is requesting a sooner appointment.      Name of Caller:  Kala    When is the first available appointment?  Dept book    Symptoms:  Botox    Best Call Back Number:  [phone]    Additional Information:  na

## 2023-03-16 ENCOUNTER — HOSPITAL ENCOUNTER (OUTPATIENT)
Facility: HOSPITAL | Age: 58
Discharge: HOME | End: 2023-03-16
Attending: INTERNAL MEDICINE | Admitting: INTERNAL MEDICINE
Payer: MEDICARE

## 2023-03-16 ENCOUNTER — ANESTHESIA EVENT (OUTPATIENT)
Dept: ENDOSCOPY | Facility: HOSPITAL | Age: 58
End: 2023-03-16
Payer: MEDICARE

## 2023-03-16 ENCOUNTER — ANESTHESIA (OUTPATIENT)
Dept: ENDOSCOPY | Facility: HOSPITAL | Age: 58
End: 2023-03-16
Payer: MEDICARE

## 2023-03-16 DIAGNOSIS — R13.10 DYSPHAGIA: ICD-10-CM

## 2023-03-16 PROCEDURE — D9220A PRA ANESTHESIA: ICD-10-PCS | Mod: CRNA,,, | Performed by: NURSE ANESTHETIST, CERTIFIED REGISTERED

## 2023-03-16 PROCEDURE — 88305 TISSUE EXAM BY PATHOLOGIST: CPT | Mod: 59 | Performed by: STUDENT IN AN ORGANIZED HEALTH CARE EDUCATION/TRAINING PROGRAM

## 2023-03-16 PROCEDURE — 25000003 PHARM REV CODE 250: Mod: PO | Performed by: NURSE ANESTHETIST, CERTIFIED REGISTERED

## 2023-03-16 PROCEDURE — 37000008 HC ANESTHESIA 1ST 15 MINUTES: Mod: PO | Performed by: INTERNAL MEDICINE

## 2023-03-16 PROCEDURE — 63600175 PHARM REV CODE 636 W HCPCS: Mod: PO | Performed by: INTERNAL MEDICINE

## 2023-03-16 PROCEDURE — 88342 IMHCHEM/IMCYTCHM 1ST ANTB: CPT | Performed by: STUDENT IN AN ORGANIZED HEALTH CARE EDUCATION/TRAINING PROGRAM

## 2023-03-16 PROCEDURE — 88305 TISSUE EXAM BY PATHOLOGIST: ICD-10-PCS | Mod: 26,,, | Performed by: STUDENT IN AN ORGANIZED HEALTH CARE EDUCATION/TRAINING PROGRAM

## 2023-03-16 PROCEDURE — 25000003 PHARM REV CODE 250: Mod: PO | Performed by: INTERNAL MEDICINE

## 2023-03-16 PROCEDURE — 88342 IMHCHEM/IMCYTCHM 1ST ANTB: CPT | Mod: 26,,, | Performed by: STUDENT IN AN ORGANIZED HEALTH CARE EDUCATION/TRAINING PROGRAM

## 2023-03-16 PROCEDURE — D9220A PRA ANESTHESIA: Mod: ANES,,, | Performed by: ANESTHESIOLOGY

## 2023-03-16 PROCEDURE — 88342 CHG IMMUNOCYTOCHEMISTRY: ICD-10-PCS | Mod: 26,,, | Performed by: STUDENT IN AN ORGANIZED HEALTH CARE EDUCATION/TRAINING PROGRAM

## 2023-03-16 PROCEDURE — 43239 EGD BIOPSY SINGLE/MULTIPLE: CPT | Mod: PO | Performed by: INTERNAL MEDICINE

## 2023-03-16 PROCEDURE — 43239 EGD BIOPSY SINGLE/MULTIPLE: CPT | Mod: ,,, | Performed by: INTERNAL MEDICINE

## 2023-03-16 PROCEDURE — 37000009 HC ANESTHESIA EA ADD 15 MINS: Mod: PO | Performed by: INTERNAL MEDICINE

## 2023-03-16 PROCEDURE — D9220A PRA ANESTHESIA: Mod: CRNA,,, | Performed by: NURSE ANESTHETIST, CERTIFIED REGISTERED

## 2023-03-16 PROCEDURE — D9220A PRA ANESTHESIA: ICD-10-PCS | Mod: ANES,,, | Performed by: ANESTHESIOLOGY

## 2023-03-16 PROCEDURE — 43239 PR EGD, FLEX, W/BIOPSY, SGL/MULTI: ICD-10-PCS | Mod: ,,, | Performed by: INTERNAL MEDICINE

## 2023-03-16 PROCEDURE — 88305 TISSUE EXAM BY PATHOLOGIST: CPT | Mod: 26,,, | Performed by: STUDENT IN AN ORGANIZED HEALTH CARE EDUCATION/TRAINING PROGRAM

## 2023-03-16 PROCEDURE — 63600175 PHARM REV CODE 636 W HCPCS: Mod: PO | Performed by: NURSE ANESTHETIST, CERTIFIED REGISTERED

## 2023-03-16 PROCEDURE — 27201012 HC FORCEPS, HOT/COLD, DISP: Mod: PO | Performed by: INTERNAL MEDICINE

## 2023-03-16 RX ORDER — SODIUM CHLORIDE 0.9 % (FLUSH) 0.9 %
10 SYRINGE (ML) INJECTION
Status: DISCONTINUED | OUTPATIENT
Start: 2023-03-16 | End: 2023-03-16 | Stop reason: HOSPADM

## 2023-03-16 RX ORDER — FAMOTIDINE 40 MG/1
40 TABLET, FILM COATED ORAL NIGHTLY
Qty: 30 TABLET | Refills: 2 | Status: SHIPPED | OUTPATIENT
Start: 2023-03-16 | End: 2023-05-18

## 2023-03-16 RX ORDER — LIDOCAINE HYDROCHLORIDE 10 MG/ML
1 INJECTION, SOLUTION EPIDURAL; INFILTRATION; INTRACAUDAL; PERINEURAL ONCE
Status: COMPLETED | OUTPATIENT
Start: 2023-03-16 | End: 2023-03-16

## 2023-03-16 RX ORDER — SODIUM CHLORIDE, SODIUM LACTATE, POTASSIUM CHLORIDE, CALCIUM CHLORIDE 600; 310; 30; 20 MG/100ML; MG/100ML; MG/100ML; MG/100ML
INJECTION, SOLUTION INTRAVENOUS CONTINUOUS
Status: DISCONTINUED | OUTPATIENT
Start: 2023-03-16 | End: 2023-03-16 | Stop reason: HOSPADM

## 2023-03-16 RX ORDER — LIDOCAINE HCL/PF 100 MG/5ML
SYRINGE (ML) INTRAVENOUS
Status: DISCONTINUED | OUTPATIENT
Start: 2023-03-16 | End: 2023-03-16

## 2023-03-16 RX ORDER — PROPOFOL 10 MG/ML
VIAL (ML) INTRAVENOUS
Status: DISCONTINUED | OUTPATIENT
Start: 2023-03-16 | End: 2023-03-16

## 2023-03-16 RX ADMIN — PROPOFOL 75 MG: 10 INJECTION, EMULSION INTRAVENOUS at 08:03

## 2023-03-16 RX ADMIN — SODIUM CHLORIDE, POTASSIUM CHLORIDE, SODIUM LACTATE AND CALCIUM CHLORIDE: 600; 310; 30; 20 INJECTION, SOLUTION INTRAVENOUS at 07:03

## 2023-03-16 RX ADMIN — LIDOCAINE HYDROCHLORIDE 1 MG: 10 INJECTION, SOLUTION EPIDURAL; INFILTRATION; INTRACAUDAL; PERINEURAL at 07:03

## 2023-03-16 RX ADMIN — PROPOFOL 50 MG: 10 INJECTION, EMULSION INTRAVENOUS at 08:03

## 2023-03-16 RX ADMIN — LIDOCAINE HYDROCHLORIDE 100 MG: 20 INJECTION, SOLUTION INTRAVENOUS at 08:03

## 2023-03-16 RX ADMIN — PROPOFOL 150 MG: 10 INJECTION, EMULSION INTRAVENOUS at 08:03

## 2023-03-16 NOTE — H&P
History & Physical - Short Stay  Gastroenterology      SUBJECTIVE:     Procedure: EGD    Chief Complaint/Indication for Procedure: Dysphagia, Epigastric Pain, and Reflux    Facility-Administered Medications Prior to Admission   Medication    onabotulinumtoxina injection 200 Units     PTA Medications   Medication Sig    ALPRAZolam (XANAX) 0.5 MG tablet Take 1 tablet by mouth nightly as needed.    amLODIPine (NORVASC) 10 MG tablet Take 1 tablet (10 mg total) by mouth daily    atorvastatin (LIPITOR) 40 MG tablet Take 1 tablet (40 mg total) by mouth daily    budesonide-formoterol 160-4.5 mcg (SYMBICORT) 160-4.5 mcg/actuation HFAA Inhale 2 puffs into the lungs 2 (two) times daily    butalbital-acetaminophen-caffeine -40 mg (FIORICET, ESGIC) -40 mg per tablet Take 1 tablet by mouth every 4 (four) hours as needed.    diclofenac sodium (VOLTAREN) 1 % Gel APPLY TOPICALLY FOUR TIMES DAILY    dicyclomine (BENTYL) 20 mg tablet Take 20 mg by mouth once daily.     diltiaZEM (CARDIZEM CD) 240 MG 24 hr capsule Take 240 mg by mouth.    diltiaZEM (TIAZAC) 240 MG Cs24 Take 1 capsule (240 mg total) by mouth once daily.    fluticasone propionate (FLONASE) 50 mcg/actuation nasal spray     furosemide (LASIX) 20 MG tablet Take 1 tablet (20 mg total) by mouth daily    gabapentin (NEURONTIN) 800 MG tablet Take 1 tablet (800 mg total) by mouth 3 (three) times daily.    hydrochlorothiazide (MICROZIDE) 12.5 mg capsule Take 12.5 mg by mouth every morning.    HYDROcodone-acetaminophen (NORCO)  mg per tablet Take 1 tablet by mouth every 8 (eight) hours as needed for Pain.    hydrOXYzine pamoate (VISTARIL) 25 MG Cap TAKE ONE CAPSULE BY MOUTH THREE TIMES DAILY AS NEEDED FOR ITCHING    irbesartan (AVAPRO) 150 MG tablet TAKE 1 TABLET BY MOUTH EVERY NIGHT AT BEDTIME FOR BLOOD PRESSURE    montelukast (SINGULAIR) 10 mg tablet Take 10 mg by mouth every evening.    phentermine (ADIPEX-P) 37.5 mg tablet Take 37.5 mg by mouth once daily.     sertraline (ZOLOFT) 100 MG tablet Take 100 mg by mouth once daily.    tiZANidine (ZANAFLEX) 4 MG tablet Take 4 mg by mouth every 8 (eight) hours.     traZODone (DESYREL) 100 MG tablet Take 1 tablet (100 mg total) by mouth every evening.    albuterol (PROVENTIL/VENTOLIN HFA) 90 mcg/actuation inhaler Inhale 2 puffs into the lungs every 6 (six) hours as needed for Wheezing. Rescue    chlorhexidine (PERIDEX) 0.12 % solution swish and spit 15 MILLILITERS in the mouth or throat TWICE DAILY FOR FOURTEEN DAYS (Patient not taking: Reported on 2/27/2023)    epinephrine (EPIPEN) 0.3 mg/0.3 mL AtIn Inject 0.3 mg into the muscle.    fluticasone-salmeterol 500-50 mcg/dose (ADVAIR) 500-50 mcg/dose DsDv diskus inhaler Inhale 1 puff into the lungs.    hydrOXYchloroQUINE (PLAQUENIL) 200 mg tablet Take 1 tablet (200 mg total) by mouth 2 (two) times daily. (Patient not taking: Reported on 3/13/2023)    ipratropium-albuteroL (COMBIVENT)  mcg/actuation inhaler Inhale 1 puff into the lungs 4 (four) times daily. Rescue (Patient not taking: Reported on 2/27/2023)    lidocaine HCl 2% (LIDOCAINE VISCOUS) 2 % Soln by Mucous Membrane route every 8 (eight) hours as needed. (Patient not taking: Reported on 2/27/2023)    methylPREDNISolone (MEDROL DOSEPACK) 4 mg tablet follow package directions    nebulizer and compressor Siomara Use as directed    neomycin-polymyxin-hydrocortisone (CORTISPORIN) otic solution PLACE 2 DROPS IN AFFECTED EAR FOUR TIMES DAILY FOR 5-7 DAYS    pantoprazole (PROTONIX) 40 MG tablet Take 1 tablet (40 mg total) by mouth before breakfast. (Patient not taking: Reported on 3/13/2023)    prochlorperazine (COMPAZINE) 10 MG tablet Take 1 tablet (10 mg total) by mouth every 6 (six) hours as needed (migraine or nausea). (Patient not taking: Reported on 12/22/2022)    rizatriptan (MAXALT) 10 MG tablet 1 tab PO PRN migraine. May repeat every 2 hours for max 3 tabs in 24 hours. Use no more than 10 days per month. (Patient not  taking: Reported on 3/13/2023)    triamcinolone acetonide 0.1% (KENALOG) 0.1 % ointment Apply topically 2 (two) times daily.    zolpidem (AMBIEN) 10 mg Tab TAKE ONE-HALF - ONE TABLET BY MOUTH EVERY NIGHT AT BEDTIME AS NEEDED FOR SLEEP       Review of patient's allergies indicates:   Allergen Reactions    Amlodipine-benazepril Swelling    Ace inhibitors Swelling     Angioedema; was taking Benazepril.    Tramadol Itching        Past Medical History:   Diagnosis Date    Anxiety     Arthritis     Asthma     Crohn's disease     Depression     Encounter for blood transfusion     Headache     Hypertension     Myofascial pain syndrome, cervical 9/28/2022     Past Surgical History:   Procedure Laterality Date    COLONOSCOPY N/A 03/10/2022    Procedure: COLONOSCOPY;  Surgeon: Nilson Wiseman MD;  Location: [facility];  Service: Endoscopy;  Laterality: N/A; Repeat colonoscopy in 6 months because the bowel prep was poor    COLONOSCOPY N/A 3/2/2023    Procedure: COLONOSCOPY;  Surgeon: Erik Garcia MD;  Location: [facility];  Service: Endoscopy;  Laterality: N/A;    ESOPHAGOGASTRODUODENOSCOPY N/A 03/10/2022    Procedure: EGD (ESOPHAGOGASTRODUODENOSCOPY);  Surgeon: Nilson Wiseman MD;  Location: [facility];  Service: Endoscopy;  Laterality: N/A; Repeat upper endoscopy in 8 weeks for surveillance     Family History   Problem Relation Age of Onset    Cancer Mother     Diabetes Mother     Arthritis Mother     Hypertension Mother     Cancer Father     Diabetes Father     Arthritis Father     Hypertension Father     Celiac disease Neg Hx     Colon cancer Neg Hx     Crohn's disease Neg Hx     Esophageal cancer Neg Hx     Stomach cancer Neg Hx     Ulcerative colitis Neg Hx      Social History     Tobacco Use    Smoking status: Never    Smokeless tobacco: Never   Substance Use Topics    Alcohol use: Yes     Alcohol/week: 0.0 - 2.0 standard drinks    Drug use: Yes     Types: Hydrocodone         OBJECTIVE:     Vital Signs (Most  Recent)  Temp: 98.2 °F (36.8 °C) (03/16/23 0743)  Pulse: 75 (03/16/23 0743)  Resp: 18 (03/16/23 0743)  BP: 119/71 (03/16/23 0743)  SpO2: (!) 94 % (03/16/23 0743)    Physical Exam:                                                       GENERAL:  Comfortable, in no acute distress.                                 HEENT EXAM:  Nonicteric.  No adenopathy.  Oropharynx is clear.               NECK:  Supple.                                                               LUNGS:  Clear.                                                               CARDIAC:  Regular rate and rhythm.  S1, S2.  No murmur.                      ABDOMEN:  Soft, positive bowel sounds, nontender.  No hepatosplenomegaly or masses.  No rebound or guarding.                                             EXTREMITIES:  No edema.     MENTAL STATUS:  Normal, alert and oriented.      ASSESSMENT/PLAN:     Assessment: Dysphagia, Epigastric Pain, and Reflux    Plan: EGD    Anesthesia Plan: General    ASA Grade: ASA 2 - Patient with mild systemic disease with no functional limitations    MALLAMPATI SCORE:  I (soft palate, uvula, fauces, and tonsillar pillars visible)

## 2023-03-16 NOTE — TRANSFER OF CARE
"Anesthesia Transfer of Care Note    Patient: Amanda Mcguire    Procedure(s) Performed: Procedure(s) (LRB):  EGD (ESOPHAGOGASTRODUODENOSCOPY) (N/A)    Patient location: PACU    Anesthesia Type: general    Transport from OR: Transported from OR on room air with adequate spontaneous ventilation    Post pain: adequate analgesia    Post assessment: no apparent anesthetic complications and tolerated procedure well    Post vital signs: stable    Level of consciousness: sedated and awake    Nausea/Vomiting: no nausea/vomiting    Complications: none    Transfer of care protocol was followed      Last vitals:   Visit Vitals  /71 (BP Location: Right arm, Patient Position: Lying)   Pulse 75   Temp 36.8 °C (98.2 °F) (Skin)   Resp 18   Ht 5' 3" (1.6 m)   Wt 110.2 kg (243 lb)   SpO2 (!) 94%   Breastfeeding No   BMI 43.05 kg/m²     "

## 2023-03-16 NOTE — ANESTHESIA PREPROCEDURE EVALUATION
03/16/2023  Amanda Mcguire is a 57 y.o., female.      Pre-op Assessment    I have reviewed the Patient Summary Reports.     I have reviewed the Nursing Notes. I have reviewed the NPO Status.   I have reviewed the Medications.     Review of Systems  Anesthesia Hx:  No problems with previous Anesthesia  Denies Family Hx of Anesthesia complications.   Denies Personal Hx of Anesthesia complications.   Social:  Non-Smoker    Hematology/Oncology:         -- Anemia:   Cardiovascular:   Hypertension ECG has been reviewed. 9/20  Normal Cardiac ST   Pulmonary:   Asthma moderate    Hepatic/GI:   GERD Crohn's disease  Elevated Calprotectin   Musculoskeletal:   Arthritis   Spine Disorders: lumbar Chronic Pain    Neurological:   Headaches   Chronic Pain Syndrome   Endocrine:  Morbid Obesity / BMI > 40  Dermatological:   Psoriasis    Psych:   anxiety depression          Physical Exam  General: Anxious    Airway:  Mallampati: III / II  Neck: Girth Increased    Chest/Lungs:  Normal Respiratory Rate    Heart:  Rate: Normal  Rhythm: Regular Rhythm        Anesthesia Plan  Type of Anesthesia, risks & benefits discussed:    Anesthesia Type: Gen Natural Airway  Intra-op Monitoring Plan: Standard ASA Monitors  Induction:  IV  Informed Consent: Informed consent signed with the Patient and all parties understand the risks and agree with anesthesia plan.  All questions answered.   ASA Score: 3    Ready For Surgery From Anesthesia Perspective.     .

## 2023-03-16 NOTE — DISCHARGE SUMMARY
[facility] - Endoscopy  Discharge Note  Short Stay  Discharge Note  Short Stay      SUMMARY     Admit Date: 3/16/2023    Attending Physician: Erik Garcia MD     Discharge Physician: Erik Garcia MD    Discharge Date: 3/16/2023 8:19 AM    Final Diagnosis: History of gastroesophageal reflux (GERD) [Z87.19]  Black stools [K92.1]  Dysphagia, unspecified type [R13.10]    Disposition: HOME OR SELF CARE    Patient Instructions:   Current Discharge Medication List        START taking these medications    Details   famotidine (PEPCID) 40 MG tablet Take 1 tablet (40 mg total) by mouth every evening.  Qty: 30 tablet, Refills: 2           CONTINUE these medications which have NOT CHANGED    Details   ALPRAZolam (XANAX) 0.5 MG tablet Take 1 tablet by mouth nightly as needed.      amLODIPine (NORVASC) 10 MG tablet Take 1 tablet (10 mg total) by mouth daily    Comments: .      atorvastatin (LIPITOR) 40 MG tablet Take 1 tablet (40 mg total) by mouth daily      budesonide-formoterol 160-4.5 mcg (SYMBICORT) 160-4.5 mcg/actuation HFAA Inhale 2 puffs into the lungs 2 (two) times daily      butalbital-acetaminophen-caffeine -40 mg (FIORICET, ESGIC) -40 mg per tablet Take 1 tablet by mouth every 4 (four) hours as needed.      diclofenac sodium (VOLTAREN) 1 % Gel APPLY TOPICALLY FOUR TIMES DAILY  Qty: 300 g, Refills: 3    Associated Diagnoses: Reactive arthritis; Chronic pain syndrome      dicyclomine (BENTYL) 20 mg tablet Take 20 mg by mouth once daily.       diltiaZEM (CARDIZEM CD) 240 MG 24 hr capsule Take 240 mg by mouth.      diltiaZEM (TIAZAC) 240 MG Cs24 Take 1 capsule (240 mg total) by mouth once daily.  Qty: 90 capsule, Refills: 0    Associated Diagnoses: Hypertension, unspecified type      fluticasone propionate (FLONASE) 50 mcg/actuation nasal spray       furosemide (LASIX) 20 MG tablet Take 1 tablet (20 mg total) by mouth daily      gabapentin (NEURONTIN) 800 MG tablet Take 1 tablet (800 mg total) by  mouth 3 (three) times daily.  Qty: 90 tablet, Refills: 3    Associated Diagnoses: Reactive arthritis of multiple sites      hydrochlorothiazide (MICROZIDE) 12.5 mg capsule Take 12.5 mg by mouth every morning.  Refills: 11      HYDROcodone-acetaminophen (NORCO)  mg per tablet Take 1 tablet by mouth every 8 (eight) hours as needed for Pain.  Qty: 90 tablet, Refills: 0    Comments: Chronic pain >7 days medically necessary override dx code G89.4  Associated Diagnoses: Reactive arthritis of multiple sites; Chronic pain syndrome; Crohn's disease of small intestine with intestinal obstruction      hydrOXYzine pamoate (VISTARIL) 25 MG Cap TAKE ONE CAPSULE BY MOUTH THREE TIMES DAILY AS NEEDED FOR ITCHING  Qty: 45 capsule, Refills: 3    Associated Diagnoses: Itching      irbesartan (AVAPRO) 150 MG tablet TAKE 1 TABLET BY MOUTH EVERY NIGHT AT BEDTIME FOR BLOOD PRESSURE      montelukast (SINGULAIR) 10 mg tablet Take 10 mg by mouth every evening.      phentermine (ADIPEX-P) 37.5 mg tablet Take 37.5 mg by mouth once daily.      sertraline (ZOLOFT) 100 MG tablet Take 100 mg by mouth once daily.      tiZANidine (ZANAFLEX) 4 MG tablet Take 4 mg by mouth every 8 (eight) hours.       traZODone (DESYREL) 100 MG tablet Take 1 tablet (100 mg total) by mouth every evening.  Qty: 90 tablet, Refills: 1    Associated Diagnoses: Insomnia, unspecified type      albuterol (PROVENTIL/VENTOLIN HFA) 90 mcg/actuation inhaler Inhale 2 puffs into the lungs every 6 (six) hours as needed for Wheezing. Rescue  Qty: 18 g, Refills: 6    Associated Diagnoses: Asthma, unspecified asthma severity, unspecified whether complicated, unspecified whether persistent      chlorhexidine (PERIDEX) 0.12 % solution swish and spit 15 MILLILITERS in the mouth or throat TWICE DAILY FOR FOURTEEN DAYS  Qty: 473 mL, Refills: 6      epinephrine (EPIPEN) 0.3 mg/0.3 mL AtIn Inject 0.3 mg into the muscle.      fluticasone-salmeterol 500-50 mcg/dose (ADVAIR) 500-50 mcg/dose  DsDv diskus inhaler Inhale 1 puff into the lungs.      hydrOXYchloroQUINE (PLAQUENIL) 200 mg tablet Take 1 tablet (200 mg total) by mouth 2 (two) times daily.  Qty: 180 tablet, Refills: 1    Associated Diagnoses: Reactive arthritis of multiple sites      ipratropium-albuteroL (COMBIVENT)  mcg/actuation inhaler Inhale 1 puff into the lungs 4 (four) times daily. Rescue  Qty: 4 g, Refills: 12    Associated Diagnoses: Asthma, unspecified asthma severity, unspecified whether complicated, unspecified whether persistent      lidocaine HCl 2% (LIDOCAINE VISCOUS) 2 % Soln by Mucous Membrane route every 8 (eight) hours as needed.  Qty: 100 mL, Refills: 0    Associated Diagnoses: Oral ulcer      methylPREDNISolone (MEDROL DOSEPACK) 4 mg tablet follow package directions      nebulizer and compressor Siomara Use as directed      neomycin-polymyxin-hydrocortisone (CORTISPORIN) otic solution PLACE 2 DROPS IN AFFECTED EAR FOUR TIMES DAILY FOR 5-7 DAYS      pantoprazole (PROTONIX) 40 MG tablet Take 1 tablet (40 mg total) by mouth before breakfast.  Qty: 30 tablet, Refills: 1    Associated Diagnoses: History of gastritis; Black stool      prochlorperazine (COMPAZINE) 10 MG tablet Take 1 tablet (10 mg total) by mouth every 6 (six) hours as needed (migraine or nausea).  Qty: 60 tablet, Refills: 11    Associated Diagnoses: Nausea      rizatriptan (MAXALT) 10 MG tablet 1 tab PO PRN migraine. May repeat every 2 hours for max 3 tabs in 24 hours. Use no more than 10 days per month.  Qty: 10 tablet, Refills: 11    Associated Diagnoses: Intractable chronic migraine without aura and without status migrainosus      triamcinolone acetonide 0.1% (KENALOG) 0.1 % ointment Apply topically 2 (two) times daily.  Qty: 80 g, Refills: 3    Associated Diagnoses: Psoriasis      zolpidem (AMBIEN) 10 mg Tab TAKE ONE-HALF - ONE TABLET BY MOUTH EVERY NIGHT AT BEDTIME AS NEEDED FOR SLEEP             Discharge Procedure Orders (must include Diet, Follow-up,  Activity)    Follow Up:  Follow up with PCP as previously scheduled  Resume routine diet.  Activity as tolerated.    No driving day of procedure.

## 2023-03-16 NOTE — ANESTHESIA POSTPROCEDURE EVALUATION
Anesthesia Post Evaluation    Patient: Amanda Mcguire    Procedure(s) Performed: Procedure(s) (LRB):  EGD (ESOPHAGOGASTRODUODENOSCOPY) (N/A)    Final Anesthesia Type: general      Patient location during evaluation: PACU  Patient participation: Yes- Able to Participate  Level of consciousness: awake and alert  Post-procedure vital signs: reviewed and stable  Pain management: adequate  Airway patency: patent    PONV status at discharge: No PONV  Anesthetic complications: no      Cardiovascular status: blood pressure returned to baseline  Respiratory status: unassisted  Hydration status: euvolemic  Follow-up not needed.          Vitals Value Taken Time   /78 03/16/23 0845   Temp 36.6 °C (97.8 °F) 03/16/23 0815   Pulse 74 03/16/23 0845   Resp 16 03/16/23 0845   SpO2 97 % 03/16/23 0845         Event Time   Out of Recovery 08:47:00         Pain/Beny Score: Beny Score: 10 (3/16/2023  8:31 AM)

## 2023-03-16 NOTE — PROVATION PATIENT INSTRUCTIONS
Discharge Summary/Instructions after an Endoscopic Procedure  Patient Name: Amanda Mcguire  Patient MRN: [id number]  Patient YOB: 1965  Thursday, March 16, 2023  Erik Garcia MD  Dear patient,  As a result of recent federal legislation (The Federal Cures Act), you may   receive lab or pathology results from your procedure in your [facility]   account before your physician is able to contact you. Your physician or   their representative will relay the results to you with their   recommendations at their soonest availability.  Thank you,  RESTRICTIONS:  During your procedure today, you received medications for sedation.  These   medications may affect your judgment, balance and coordination.  Therefore,   for 24 hours, you have the following restrictions:   - DO NOT drive a car, operate machinery, make legal/financial decisions,   sign important papers or drink alcohol.    ACTIVITY:  Today: no heavy lifting, straining or running due to procedural   sedation/anesthesia.  The following day: return to full activity including work.  DIET:  Eat and drink normally unless instructed otherwise.     TREATMENT FOR COMMON SIDE EFFECTS:  - Mild abdominal pain, nausea, belching, bloating or excessive gas:  rest,   eat lightly and use a heating pad.  - Sore Throat: treat with throat lozenges and/or gargle with warm salt   water.  - Because air was used during the procedure, expelling large amounts of air   from your rectum or belching is normal.  - If a bowel prep was taken, you may not have a bowel movement for 1-3 days.    This is normal.  SYMPTOMS TO WATCH FOR AND REPORT TO YOUR PHYSICIAN:  1. Abdominal pain or bloating, other than gas cramps.  2. Chest pain.  3. Back pain.  4. Signs of infection such as: chills or fever occurring within 24 hours   after the procedure.  5. Rectal bleeding, which would show as bright red, maroon, or black stools.   (A tablespoon of blood from the rectum is not serious, especially  if   hemorrhoids are present.)  6. Vomiting.  7. Weakness or dizziness.  GO DIRECTLY TO THE NEAREST EMERGENCY ROOM IF YOU HAVE ANY OF THE FOLLOWING:      Difficulty breathing              Chills and/or fever over 101 F   Persistent vomiting and/or vomiting blood   Severe abdominal pain   Severe chest pain   Black, tarry stools   Bleeding- more than one tablespoon   Any other symptom or condition that you feel may need urgent attention  Your doctor recommends these additional instructions:  If any biopsies were taken, your doctors clinic will contact you in 1 to 2   weeks with any results.  We are waiting for your pathology results.   Continue your present medications.   You are being discharged to home.  For questions, problems or results please call your physician - Erik Garcia MD at Work:  [phone].  EMERGENCY PHONE NUMBER: [phone], LAB RESULTS: [phone]  IF A COMPLICATION OR EMERGENCY SITUATION ARISES AND YOU ARE UNABLE TO REACH   YOUR PHYSICIAN - GO DIRECTLY TO THE EMERGENCY ROOM.  ___________________________________________  Nurse Signature  ___________________________________________  Patient/Designated Responsible Party Signature  Erik Garcia MD  3/16/2023 8:17:45 AM  This report has been verified and signed electronically.  Dear patient,  As a result of recent federal legislation (The Federal Cures Act), you may   receive lab or pathology results from your procedure in your [facility]   account before your physician is able to contact you. Your physician or   their representative will relay the results to you with their   recommendations at their soonest availability.  Thank you.  PROVATION

## 2023-03-17 VITALS
HEART RATE: 74 BPM | HEIGHT: 63 IN | TEMPERATURE: 98 F | OXYGEN SATURATION: 97 % | BODY MASS INDEX: 43.05 KG/M2 | SYSTOLIC BLOOD PRESSURE: 125 MMHG | WEIGHT: 243 LBS | RESPIRATION RATE: 16 BRPM | DIASTOLIC BLOOD PRESSURE: 78 MMHG

## 2023-03-20 ENCOUNTER — LAB VISIT (OUTPATIENT)
Dept: LAB | Facility: HOSPITAL | Age: 58
End: 2023-03-20
Payer: MEDICARE

## 2023-03-20 DIAGNOSIS — M07.60 ARTHRITIS ASSOCIATED WITH INFLAMMATORY BOWEL DISEASE: ICD-10-CM

## 2023-03-20 DIAGNOSIS — K63.9 ARTHRITIS ASSOCIATED WITH INFLAMMATORY BOWEL DISEASE: ICD-10-CM

## 2023-03-20 LAB
ALBUMIN SERPL BCP-MCNC: 3.2 G/DL (ref 3.5–5.2)
ALP SERPL-CCNC: 94 U/L (ref 55–135)
ALT SERPL W/O P-5'-P-CCNC: 20 U/L (ref 10–44)
ANION GAP SERPL CALC-SCNC: 9 MMOL/L (ref 8–16)
AST SERPL-CCNC: 18 U/L (ref 10–40)
BASOPHILS # BLD AUTO: 0.06 K/UL (ref 0–0.2)
BASOPHILS NFR BLD: 0.6 % (ref 0–1.9)
BILIRUB SERPL-MCNC: 0.3 MG/DL (ref 0.1–1)
BUN SERPL-MCNC: 16 MG/DL (ref 6–20)
CALCIUM SERPL-MCNC: 9.3 MG/DL (ref 8.7–10.5)
CHLORIDE SERPL-SCNC: 110 MMOL/L (ref 95–110)
CO2 SERPL-SCNC: 23 MMOL/L (ref 23–29)
CREAT SERPL-MCNC: 0.8 MG/DL (ref 0.5–1.4)
CRP SERPL-MCNC: 25.2 MG/L (ref 0–8.2)
DIFFERENTIAL METHOD: ABNORMAL
EOSINOPHIL # BLD AUTO: 0.1 K/UL (ref 0–0.5)
EOSINOPHIL NFR BLD: 1.4 % (ref 0–8)
ERYTHROCYTE [DISTWIDTH] IN BLOOD BY AUTOMATED COUNT: 17.5 % (ref 11.5–14.5)
ERYTHROCYTE [SEDIMENTATION RATE] IN BLOOD BY WESTERGREN METHOD: 38 MM/HR (ref 0–20)
EST. GFR  (NO RACE VARIABLE): >60 ML/MIN/1.73 M^2
GLUCOSE SERPL-MCNC: 77 MG/DL (ref 70–110)
HCT VFR BLD AUTO: 39.2 % (ref 37–48.5)
HGB BLD-MCNC: 11.7 G/DL (ref 12–16)
IMM GRANULOCYTES # BLD AUTO: 0.13 K/UL (ref 0–0.04)
IMM GRANULOCYTES NFR BLD AUTO: 1.3 % (ref 0–0.5)
LYMPHOCYTES # BLD AUTO: 2.1 K/UL (ref 1–4.8)
LYMPHOCYTES NFR BLD: 21.4 % (ref 18–48)
MCH RBC QN AUTO: 25 PG (ref 27–31)
MCHC RBC AUTO-ENTMCNC: 29.8 G/DL (ref 32–36)
MCV RBC AUTO: 84 FL (ref 82–98)
MONOCYTES # BLD AUTO: 0.9 K/UL (ref 0.3–1)
MONOCYTES NFR BLD: 9.5 % (ref 4–15)
NEUTROPHILS # BLD AUTO: 6.4 K/UL (ref 1.8–7.7)
NEUTROPHILS NFR BLD: 65.8 % (ref 38–73)
NRBC BLD-RTO: 0 /100 WBC
PLATELET # BLD AUTO: 287 K/UL (ref 150–450)
PMV BLD AUTO: 9.9 FL (ref 9.2–12.9)
POTASSIUM SERPL-SCNC: 3.9 MMOL/L (ref 3.5–5.1)
PROT SERPL-MCNC: 7 G/DL (ref 6–8.4)
RBC # BLD AUTO: 4.68 M/UL (ref 4–5.4)
SODIUM SERPL-SCNC: 142 MMOL/L (ref 136–145)
WBC # BLD AUTO: 9.75 K/UL (ref 3.9–12.7)

## 2023-03-20 PROCEDURE — 85025 COMPLETE CBC W/AUTO DIFF WBC: CPT | Performed by: PHYSICIAN ASSISTANT

## 2023-03-20 PROCEDURE — 80053 COMPREHEN METABOLIC PANEL: CPT | Performed by: PHYSICIAN ASSISTANT

## 2023-03-20 PROCEDURE — 36415 COLL VENOUS BLD VENIPUNCTURE: CPT | Mod: PO | Performed by: PHYSICIAN ASSISTANT

## 2023-03-20 PROCEDURE — 85651 RBC SED RATE NONAUTOMATED: CPT | Mod: PO | Performed by: PHYSICIAN ASSISTANT

## 2023-03-20 PROCEDURE — 86140 C-REACTIVE PROTEIN: CPT | Performed by: PHYSICIAN ASSISTANT

## 2023-03-24 LAB
FINAL PATHOLOGIC DIAGNOSIS: NORMAL
GROSS: NORMAL
Lab: NORMAL
SUPPLEMENTAL DIAGNOSIS: NORMAL

## 2023-04-04 ENCOUNTER — OFFICE VISIT (OUTPATIENT)
Dept: RHEUMATOLOGY | Facility: CLINIC | Age: 58
End: 2023-04-04
Payer: MEDICARE

## 2023-04-04 DIAGNOSIS — D84.9 IMMUNOCOMPROMISED: Primary | ICD-10-CM

## 2023-04-04 DIAGNOSIS — R76.8 ANA POSITIVE: ICD-10-CM

## 2023-04-04 DIAGNOSIS — E66.01 MORBID (SEVERE) OBESITY DUE TO EXCESS CALORIES: ICD-10-CM

## 2023-04-04 DIAGNOSIS — M02.39 REACTIVE ARTHRITIS OF MULTIPLE SITES: ICD-10-CM

## 2023-04-04 DIAGNOSIS — G89.4 CHRONIC PAIN SYNDROME: ICD-10-CM

## 2023-04-04 DIAGNOSIS — R09.89 SINUS COMPLAINT: ICD-10-CM

## 2023-04-04 DIAGNOSIS — K50.011 CROHN'S DISEASE OF SMALL INTESTINE WITH RECTAL BLEEDING: ICD-10-CM

## 2023-04-04 DIAGNOSIS — K50.90 CROHN'S DISEASE WITHOUT COMPLICATION, UNSPECIFIED GASTROINTESTINAL TRACT LOCATION: ICD-10-CM

## 2023-04-04 DIAGNOSIS — L40.9 PSORIASIS: ICD-10-CM

## 2023-04-04 DIAGNOSIS — R41.3 MEMORY LOSS: ICD-10-CM

## 2023-04-04 PROCEDURE — 4010F PR ACE/ARB THEARPY RXD/TAKEN: ICD-10-PCS | Mod: CPTII,95,, | Performed by: INTERNAL MEDICINE

## 2023-04-04 PROCEDURE — 99215 OFFICE O/P EST HI 40 MIN: CPT | Mod: 95,,, | Performed by: INTERNAL MEDICINE

## 2023-04-04 PROCEDURE — 99215 PR OFFICE/OUTPT VISIT, EST, LEVL V, 40-54 MIN: ICD-10-PCS | Mod: 95,,, | Performed by: INTERNAL MEDICINE

## 2023-04-04 PROCEDURE — 4010F ACE/ARB THERAPY RXD/TAKEN: CPT | Mod: CPTII,95,, | Performed by: INTERNAL MEDICINE

## 2023-04-04 RX ORDER — FLUCONAZOLE 150 MG/1
150 TABLET ORAL DAILY
Qty: 7 TABLET | Refills: 3 | Status: SHIPPED | OUTPATIENT
Start: 2023-04-04 | End: 2023-04-11

## 2023-04-04 RX ORDER — OXYCODONE AND ACETAMINOPHEN 10; 325 MG/1; MG/1
1 TABLET ORAL EVERY 8 HOURS PRN
Qty: 90 TABLET | Refills: 0 | Status: SHIPPED | OUTPATIENT
Start: 2023-05-04 | End: 2023-06-03

## 2023-04-04 RX ORDER — MEMANTINE HYDROCHLORIDE 5 MG/1
5 TABLET ORAL 2 TIMES DAILY
Qty: 60 TABLET | Refills: 11 | Status: SHIPPED | OUTPATIENT
Start: 2023-04-04 | End: 2024-04-03

## 2023-04-04 RX ORDER — OXYCODONE AND ACETAMINOPHEN 10; 325 MG/1; MG/1
1 TABLET ORAL EVERY 8 HOURS PRN
Qty: 90 TABLET | Refills: 0 | Status: SHIPPED | OUTPATIENT
Start: 2023-06-02 | End: 2023-07-02

## 2023-04-04 RX ORDER — OXYCODONE AND ACETAMINOPHEN 10; 325 MG/1; MG/1
1 TABLET ORAL EVERY 8 HOURS PRN
Qty: 90 TABLET | Refills: 0 | Status: SHIPPED | OUTPATIENT
Start: 2023-04-04 | End: 2023-05-04

## 2023-04-04 RX ORDER — AMOXICILLIN AND CLAVULANATE POTASSIUM 875; 125 MG/1; MG/1
1 TABLET, FILM COATED ORAL 2 TIMES DAILY
Qty: 20 TABLET | Refills: 0 | Status: SHIPPED | OUTPATIENT
Start: 2023-04-04 | End: 2023-04-14

## 2023-04-04 RX ORDER — MEMANTINE HYDROCHLORIDE 5 MG/1
5 TABLET ORAL 2 TIMES DAILY
Qty: 60 TABLET | Refills: 11 | Status: SHIPPED | OUTPATIENT
Start: 2023-04-04 | End: 2023-04-04

## 2023-04-04 NOTE — PROGRESS NOTES
Subjective:      Patient ID: Amanda Mcguire is a 57 y.o. female.    Chief Complaint: No chief complaint on file.    Follow up: 57 year old female who presents to clinic for follow up on reactive arthritis, osteoarthritis.crohn's dz she has a sore throat and  drainage. She is on remicade infusions every 8 weeks for crohn's disease. No recent f/u with GI. She reports significant improvement in her joint pain following remicade infusions (last completed 2 weeks ago).  She is requesting cough medication.   Labs show +JONATHAN, +histone antibody, elevated ESR/CRP, elevated WBC.  She has joint pain in her hands, knees, ankles, and low back. Pain is constant and aching. Taking norco for severe pain with adequate control of her symptoms.   She is followed by neurology for migraines--receiving botox injections. MRI brain found benign meningioma--ROSA M cardenas recommended.    Current tx:  1. Remicade  2. norco  3. baclofen          Review of Systems   Constitutional:  Positive for activity change, fatigue and unexpected weight change. Negative for fever.   HENT:  Positive for trouble swallowing. Negative for mouth sores.    Eyes:  Negative for redness.   Respiratory:  Positive for cough and shortness of breath.    Cardiovascular:  Positive for chest pain.   Gastrointestinal:  Negative for constipation and diarrhea.   Genitourinary:  Negative for dysuria and genital sores.   Musculoskeletal:  Positive for arthralgias.   Skin:  Negative for rash.   Neurological:  Negative for headaches.   Hematological:  Does not bruise/bleed easily.      Objective:   There were no vitals taken for this visit.  Physical Exam   Constitutional: She is oriented to person, place, and time.   Neurological: She is alert and oriented to person, place, and time.   Psychiatric: Mood, affect and judgment normal.      10/14/2019   Tender (HOBBS-28) 25 / 28    Swollen (HOBBS-28) 25 / 28    Provider Global --   Patient Global --   ESR --   CRP 54 mg/L   HOBBS-28 (ESR) --    HOBBS-28 (CRP) --   CDAI Score --        Assessment:     1. Immunocompromised    2. Reactive arthritis of multiple sites    3. Morbid (severe) obesity due to excess calories    4. Crohn's disease of small intestine with rectal bleeding    5. Chronic pain syndrome    6. Psoriasis    7. Crohn's disease without complication, unspecified gastrointestinal tract location    8. JONATHAN positive          Plan:     Diagnoses and all orders for this visit:    Immunocompromised  -     X-Ray Lumbar Spine AP And Lateral; Future  -     budesonide 4 mg CpDR; Take 4 mg by mouth 2 (two) times daily as needed (crohn's  flare).  -     memantine (NAMENDA) 5 MG Tab; Take 1 tablet (5 mg total) by mouth 2 (two) times daily.  -     oxyCODONE-acetaminophen (PERCOCET)  mg per tablet; Take 1 tablet by mouth every 8 (eight) hours as needed for Pain.  -     oxyCODONE-acetaminophen (PERCOCET)  mg per tablet; Take 1 tablet by mouth every 8 (eight) hours as needed for Pain.  -     oxyCODONE-acetaminophen (PERCOCET)  mg per tablet; Take 1 tablet by mouth every 8 (eight) hours as needed for Pain.  -     Complement, Total; Future  -     C4 Complement; Future  -     C3 Complement; Future  -     Sedimentation rate; Future  -     Sjogrens syndrome-B extractable nuclear antibody; Future  -     Sjogrens syndrome-A extractable nuclear antibody; Future  -     Comprehensive Metabolic Panel; Future  -     CBC Auto Differential; Future  -     Anti-Smith Antibody; Future  -     Anti-Scleroderma Antibody; Future  -     Anti-Histone Antibody; Future  -     Anti-DNA Ab, Double-Stranded; Future  -     Anti Sm/RNP Antibody; Future  -     JONATHAN Screen w/Reflex; Future    Reactive arthritis of multiple sites  -     X-Ray Lumbar Spine AP And Lateral; Future  -     budesonide 4 mg CpDR; Take 4 mg by mouth 2 (two) times daily as needed (crohn's  flare).  -     memantine (NAMENDA) 5 MG Tab; Take 1 tablet (5 mg total) by mouth 2 (two) times daily.  -      oxyCODONE-acetaminophen (PERCOCET)  mg per tablet; Take 1 tablet by mouth every 8 (eight) hours as needed for Pain.  -     oxyCODONE-acetaminophen (PERCOCET)  mg per tablet; Take 1 tablet by mouth every 8 (eight) hours as needed for Pain.  -     oxyCODONE-acetaminophen (PERCOCET)  mg per tablet; Take 1 tablet by mouth every 8 (eight) hours as needed for Pain.  -     Complement, Total; Future  -     C4 Complement; Future  -     C3 Complement; Future  -     Sedimentation rate; Future  -     Sjogrens syndrome-B extractable nuclear antibody; Future  -     Sjogrens syndrome-A extractable nuclear antibody; Future  -     Comprehensive Metabolic Panel; Future  -     CBC Auto Differential; Future  -     Anti-Smith Antibody; Future  -     Anti-Scleroderma Antibody; Future  -     Anti-Histone Antibody; Future  -     Anti-DNA Ab, Double-Stranded; Future  -     Anti Sm/RNP Antibody; Future  -     JONATHAN Screen w/Reflex; Future    Morbid (severe) obesity due to excess calories  -     X-Ray Lumbar Spine AP And Lateral; Future  -     budesonide 4 mg CpDR; Take 4 mg by mouth 2 (two) times daily as needed (crohn's  flare).  -     memantine (NAMENDA) 5 MG Tab; Take 1 tablet (5 mg total) by mouth 2 (two) times daily.  -     oxyCODONE-acetaminophen (PERCOCET)  mg per tablet; Take 1 tablet by mouth every 8 (eight) hours as needed for Pain.  -     oxyCODONE-acetaminophen (PERCOCET)  mg per tablet; Take 1 tablet by mouth every 8 (eight) hours as needed for Pain.  -     oxyCODONE-acetaminophen (PERCOCET)  mg per tablet; Take 1 tablet by mouth every 8 (eight) hours as needed for Pain.  -     Complement, Total; Future  -     C4 Complement; Future  -     C3 Complement; Future  -     Sedimentation rate; Future  -     Sjogrens syndrome-B extractable nuclear antibody; Future  -     Sjogrens syndrome-A extractable nuclear antibody; Future  -     Comprehensive Metabolic Panel; Future  -     CBC Auto Differential;  Future  -     Anti-Smith Antibody; Future  -     Anti-Scleroderma Antibody; Future  -     Anti-Histone Antibody; Future  -     Anti-DNA Ab, Double-Stranded; Future  -     Anti Sm/RNP Antibody; Future  -     JONATHAN Screen w/Reflex; Future    Crohn's disease of small intestine with rectal bleeding  -     X-Ray Lumbar Spine AP And Lateral; Future  -     budesonide 4 mg CpDR; Take 4 mg by mouth 2 (two) times daily as needed (crohn's  flare).  -     memantine (NAMENDA) 5 MG Tab; Take 1 tablet (5 mg total) by mouth 2 (two) times daily.  -     oxyCODONE-acetaminophen (PERCOCET)  mg per tablet; Take 1 tablet by mouth every 8 (eight) hours as needed for Pain.  -     oxyCODONE-acetaminophen (PERCOCET)  mg per tablet; Take 1 tablet by mouth every 8 (eight) hours as needed for Pain.  -     oxyCODONE-acetaminophen (PERCOCET)  mg per tablet; Take 1 tablet by mouth every 8 (eight) hours as needed for Pain.  -     Complement, Total; Future  -     C4 Complement; Future  -     C3 Complement; Future  -     Sedimentation rate; Future  -     Sjogrens syndrome-B extractable nuclear antibody; Future  -     Sjogrens syndrome-A extractable nuclear antibody; Future  -     Comprehensive Metabolic Panel; Future  -     CBC Auto Differential; Future  -     Anti-Smith Antibody; Future  -     Anti-Scleroderma Antibody; Future  -     Anti-Histone Antibody; Future  -     Anti-DNA Ab, Double-Stranded; Future  -     Anti Sm/RNP Antibody; Future  -     JONATHAN Screen w/Reflex; Future    Chronic pain syndrome  -     X-Ray Lumbar Spine AP And Lateral; Future  -     budesonide 4 mg CpDR; Take 4 mg by mouth 2 (two) times daily as needed (crohn's  flare).  -     memantine (NAMENDA) 5 MG Tab; Take 1 tablet (5 mg total) by mouth 2 (two) times daily.  -     oxyCODONE-acetaminophen (PERCOCET)  mg per tablet; Take 1 tablet by mouth every 8 (eight) hours as needed for Pain.  -     oxyCODONE-acetaminophen (PERCOCET)  mg per tablet; Take 1  tablet by mouth every 8 (eight) hours as needed for Pain.  -     oxyCODONE-acetaminophen (PERCOCET)  mg per tablet; Take 1 tablet by mouth every 8 (eight) hours as needed for Pain.  -     Complement, Total; Future  -     C4 Complement; Future  -     C3 Complement; Future  -     Sedimentation rate; Future  -     Sjogrens syndrome-B extractable nuclear antibody; Future  -     Sjogrens syndrome-A extractable nuclear antibody; Future  -     Comprehensive Metabolic Panel; Future  -     CBC Auto Differential; Future  -     Anti-Smith Antibody; Future  -     Anti-Scleroderma Antibody; Future  -     Anti-Histone Antibody; Future  -     Anti-DNA Ab, Double-Stranded; Future  -     Anti Sm/RNP Antibody; Future  -     JONATHAN Screen w/Reflex; Future    Psoriasis  -     X-Ray Lumbar Spine AP And Lateral; Future  -     budesonide 4 mg CpDR; Take 4 mg by mouth 2 (two) times daily as needed (crohn's  flare).  -     memantine (NAMENDA) 5 MG Tab; Take 1 tablet (5 mg total) by mouth 2 (two) times daily.  -     oxyCODONE-acetaminophen (PERCOCET)  mg per tablet; Take 1 tablet by mouth every 8 (eight) hours as needed for Pain.  -     oxyCODONE-acetaminophen (PERCOCET)  mg per tablet; Take 1 tablet by mouth every 8 (eight) hours as needed for Pain.  -     oxyCODONE-acetaminophen (PERCOCET)  mg per tablet; Take 1 tablet by mouth every 8 (eight) hours as needed for Pain.  -     Complement, Total; Future  -     C4 Complement; Future  -     C3 Complement; Future  -     Sedimentation rate; Future  -     Sjogrens syndrome-B extractable nuclear antibody; Future  -     Sjogrens syndrome-A extractable nuclear antibody; Future  -     Comprehensive Metabolic Panel; Future  -     CBC Auto Differential; Future  -     Anti-Smith Antibody; Future  -     Anti-Scleroderma Antibody; Future  -     Anti-Histone Antibody; Future  -     Anti-DNA Ab, Double-Stranded; Future  -     Anti Sm/RNP Antibody; Future  -     JONATHAN Screen w/Reflex;  Future    Crohn's disease without complication, unspecified gastrointestinal tract location  -     X-Ray Lumbar Spine AP And Lateral; Future  -     budesonide 4 mg CpDR; Take 4 mg by mouth 2 (two) times daily as needed (crohn's  flare).  -     memantine (NAMENDA) 5 MG Tab; Take 1 tablet (5 mg total) by mouth 2 (two) times daily.  -     oxyCODONE-acetaminophen (PERCOCET)  mg per tablet; Take 1 tablet by mouth every 8 (eight) hours as needed for Pain.  -     oxyCODONE-acetaminophen (PERCOCET)  mg per tablet; Take 1 tablet by mouth every 8 (eight) hours as needed for Pain.  -     oxyCODONE-acetaminophen (PERCOCET)  mg per tablet; Take 1 tablet by mouth every 8 (eight) hours as needed for Pain.  -     Complement, Total; Future  -     C4 Complement; Future  -     C3 Complement; Future  -     Sedimentation rate; Future  -     Sjogrens syndrome-B extractable nuclear antibody; Future  -     Sjogrens syndrome-A extractable nuclear antibody; Future  -     Comprehensive Metabolic Panel; Future  -     CBC Auto Differential; Future  -     Anti-Smith Antibody; Future  -     Anti-Scleroderma Antibody; Future  -     Anti-Histone Antibody; Future  -     Anti-DNA Ab, Double-Stranded; Future  -     Anti Sm/RNP Antibody; Future  -     JONATHAN Screen w/Reflex; Future    JONATHAN positive  -     Complement, Total; Future  -     C4 Complement; Future  -     C3 Complement; Future  -     Sedimentation rate; Future  -     Sjogrens syndrome-B extractable nuclear antibody; Future  -     Sjogrens syndrome-A extractable nuclear antibody; Future  -     Comprehensive Metabolic Panel; Future  -     CBC Auto Differential; Future  -     Anti-Smith Antibody; Future  -     Anti-Scleroderma Antibody; Future  -     Anti-Histone Antibody; Future  -     Anti-DNA Ab, Double-Stranded; Future  -     Anti Sm/RNP Antibody; Future  -     JONATHAN Screen w/Reflex; Future     1. CHECK LABS  2. ADD PLAQUENIL IF JONATHAN IS POS  3. DC NORCO ADD PEROCET  4. F/U LAST  July    The patient location is: HOME  The chief complaint leading to consultation is: CROHN'S REACTIVE ARTHRITIS    Visit type: audiovisual    Face to Face time with patient: 41   minutes of total time spent on the encounter, which includes face to face time and non-face to face time preparing to see the patient (eg, review of tests), Obtaining and/or reviewing separately obtained history, Documenting clinical information in the electronic or other health record, Independently interpreting results (not separately reported) and communicating results to the patient/family/caregiver, or Care coordination (not separately reported).         Each patient to whom he or she provides medical services by telemedicine is:  (1) informed of the relationship between the physician and patient and the respective role of any other health care provider with respect to management of the patient; and (2) notified that he or she may decline to receive medical services by telemedicine and may withdraw from such care at any time.    Notes:

## 2023-04-11 ENCOUNTER — PATIENT MESSAGE (OUTPATIENT)
Dept: RESEARCH | Facility: HOSPITAL | Age: 58
End: 2023-04-11
Payer: MEDICARE

## 2023-05-01 ENCOUNTER — TELEPHONE (OUTPATIENT)
Dept: NEUROLOGY | Facility: CLINIC | Age: 58
End: 2023-05-01
Payer: MEDICARE

## 2023-05-01 NOTE — TELEPHONE ENCOUNTER
----- Message from Marie Mcclain sent at 5/1/2023  4:55 PM CDT -----  Contact: Self  Type:  Sooner Appointment Request    Caller is requesting a sooner appointment.  Caller declined first available appointment listed below.  Caller will not accept being placed on the waitlist and is requesting a message be sent to doctor.    Name of Caller:  Patient  When is the first available appointment?  Pts scheduled 06/21  Symptoms:  Memory - referred by Heavenly Fletcher NP  Best Call Back Number:  [phone]  Additional Information:  Pt is on the wait list and wanted to reach out to see if there is anything we can do to get her in sooner. Thank You.

## 2023-05-02 ENCOUNTER — TELEPHONE (OUTPATIENT)
Dept: GASTROENTEROLOGY | Facility: CLINIC | Age: 58
End: 2023-05-02
Payer: MEDICARE

## 2023-05-02 NOTE — TELEPHONE ENCOUNTER
----- Message from Erik Puckett sent at 5/2/2023 12:11 PM CDT -----  Regarding: appt  Contact: KALA MOTT [[id number]]  Type:  Sooner Appointment Request    Caller is requesting a sooner appointment.  Caller declined first available appointment listed below.  Caller will not accept being placed on the waitlist and is requesting a message be sent to doctor.    Name of Caller:  aKla    When is the first available appointment?  6/21    Symptoms:  na    Best Call Back Number:  [phone]    Additional Information:  Please call to advise.

## 2023-05-02 NOTE — TELEPHONE ENCOUNTER
Advised patient that we cannot order her infusion until she is seen in clinic on 5/11.   She states that her abd has been getting worse; she is only having about 2- 3 BM's a day.   I advised her that if he pain is moderate to severe, continues to worsen, of  if she starts having frequent episodes of bloody diarrhea then she soul go to the ER to be evaluated.

## 2023-05-02 NOTE — TELEPHONE ENCOUNTER
----- Message from Stacie Tolbert sent at 5/2/2023 11:33 AM CDT -----  Contact: self  Type: Needs Medical Advice  Who Called:  pt  Symptoms (please be specific):  pain    Best Call Back Number: [phone]    Additional Information: Pt states she needs an infusion. Thank you

## 2023-05-02 NOTE — TELEPHONE ENCOUNTER
Spoke with patient and informed Dr. Kim does not have any sooner appointments than 6/21. Confirmed patient is on the wait list.

## 2023-05-11 ENCOUNTER — TELEPHONE (OUTPATIENT)
Dept: GASTROENTEROLOGY | Facility: CLINIC | Age: 58
End: 2023-05-11
Payer: MEDICARE

## 2023-05-11 NOTE — TELEPHONE ENCOUNTER
----- Message from Bella Sherwood sent at 5/11/2023  1:01 PM CDT -----  Contact: Patient  Type:  Patient Needs Advice    Who Called:  Patient  What is this regarding?:  Pt needs to reschedule her appt today as she had a death a family.  Best Call Back Number:  [phone]  Additional Information:  Please call the patient back at the phone number listed above to advise. Thank you!

## 2023-05-12 ENCOUNTER — OFFICE VISIT (OUTPATIENT)
Dept: ORTHOPEDICS | Facility: CLINIC | Age: 58
End: 2023-05-12
Payer: MEDICARE

## 2023-05-12 VITALS — WEIGHT: 242.94 LBS | BODY MASS INDEX: 43.05 KG/M2 | HEIGHT: 63 IN

## 2023-05-12 DIAGNOSIS — M17.11 PRIMARY OSTEOARTHRITIS OF RIGHT KNEE: ICD-10-CM

## 2023-05-12 DIAGNOSIS — M70.62 TROCHANTERIC BURSITIS OF BOTH HIPS: Primary | ICD-10-CM

## 2023-05-12 DIAGNOSIS — M70.61 TROCHANTERIC BURSITIS OF BOTH HIPS: Primary | ICD-10-CM

## 2023-05-12 PROCEDURE — 99213 PR OFFICE/OUTPT VISIT, EST, LEVL III, 20-29 MIN: ICD-10-PCS | Mod: 25,S$GLB,, | Performed by: NURSE PRACTITIONER

## 2023-05-12 PROCEDURE — 99999 PR PBB SHADOW E&M-EST. PATIENT-LVL IV: ICD-10-PCS | Mod: PBBFAC,,, | Performed by: NURSE PRACTITIONER

## 2023-05-12 PROCEDURE — 20610 DRAIN/INJ JOINT/BURSA W/O US: CPT | Mod: 51,RT,S$GLB, | Performed by: NURSE PRACTITIONER

## 2023-05-12 PROCEDURE — 3008F PR BODY MASS INDEX (BMI) DOCUMENTED: ICD-10-PCS | Mod: CPTII,S$GLB,, | Performed by: NURSE PRACTITIONER

## 2023-05-12 PROCEDURE — 99999 PR PBB SHADOW E&M-EST. PATIENT-LVL IV: CPT | Mod: PBBFAC,,, | Performed by: NURSE PRACTITIONER

## 2023-05-12 PROCEDURE — 3008F BODY MASS INDEX DOCD: CPT | Mod: CPTII,S$GLB,, | Performed by: NURSE PRACTITIONER

## 2023-05-12 PROCEDURE — 99213 OFFICE O/P EST LOW 20 MIN: CPT | Mod: 25,S$GLB,, | Performed by: NURSE PRACTITIONER

## 2023-05-12 PROCEDURE — 1160F PR REVIEW ALL MEDS BY PRESCRIBER/CLIN PHARMACIST DOCUMENTED: ICD-10-PCS | Mod: CPTII,S$GLB,, | Performed by: NURSE PRACTITIONER

## 2023-05-12 PROCEDURE — 4010F PR ACE/ARB THEARPY RXD/TAKEN: ICD-10-PCS | Mod: CPTII,S$GLB,, | Performed by: NURSE PRACTITIONER

## 2023-05-12 PROCEDURE — 20610 DRAIN/INJ JOINT/BURSA W/O US: CPT | Mod: 50,S$GLB,, | Performed by: NURSE PRACTITIONER

## 2023-05-12 PROCEDURE — 20610 LARGE JOINT ASPIRATION/INJECTION: BILATERAL GREATER TROCHANTERIC BURSA: ICD-10-PCS | Mod: 50,S$GLB,, | Performed by: NURSE PRACTITIONER

## 2023-05-12 PROCEDURE — 4010F ACE/ARB THERAPY RXD/TAKEN: CPT | Mod: CPTII,S$GLB,, | Performed by: NURSE PRACTITIONER

## 2023-05-12 PROCEDURE — 1159F MED LIST DOCD IN RCRD: CPT | Mod: CPTII,S$GLB,, | Performed by: NURSE PRACTITIONER

## 2023-05-12 PROCEDURE — 1159F PR MEDICATION LIST DOCUMENTED IN MEDICAL RECORD: ICD-10-PCS | Mod: CPTII,S$GLB,, | Performed by: NURSE PRACTITIONER

## 2023-05-12 PROCEDURE — 1160F RVW MEDS BY RX/DR IN RCRD: CPT | Mod: CPTII,S$GLB,, | Performed by: NURSE PRACTITIONER

## 2023-05-12 RX ORDER — TRIAMCINOLONE ACETONIDE 40 MG/ML
40 INJECTION, SUSPENSION INTRA-ARTICULAR; INTRAMUSCULAR
Status: DISCONTINUED | OUTPATIENT
Start: 2023-05-12 | End: 2023-05-12 | Stop reason: HOSPADM

## 2023-05-12 RX ADMIN — TRIAMCINOLONE ACETONIDE 40 MG: 40 INJECTION, SUSPENSION INTRA-ARTICULAR; INTRAMUSCULAR at 08:05

## 2023-05-12 NOTE — PROCEDURES
Large Joint Aspiration/Injection: R knee    Date/Time: 5/12/2023 8:40 AM  Performed by: SALVATORE Grey  Authorized by: SALVATORE Grey     Consent Done?:  Yes (Verbal)  Indications:  Pain  Timeout: prior to procedure the correct patient, procedure, and site was verified    Prep: patient was prepped and draped in usual sterile fashion    Local anesthetic:  Lidocaine 1% without epinephrine  Anesthetic total (ml):  5      Details:  Needle Size:  21 G  Approach:  Anterolateral  Location:  Knee  Site:  R knee  Medications:  40 mg triamcinolone acetonide 40 mg/mL  Patient tolerance:  Patient tolerated the procedure well with no immediate complications  Large Joint Aspiration/Injection: bilateral greater trochanteric bursa    Date/Time: 5/12/2023 8:40 AM  Performed by: SALVATORE Grey  Authorized by: SALVATORE Grey     Consent Done?:  Yes (Verbal)  Indications:  Pain  Timeout: prior to procedure the correct patient, procedure, and site was verified    Prep: patient was prepped and draped in usual sterile fashion      Local anesthesia used?: Yes    Local anesthetic:  Lidocaine 1% without epinephrine  Anesthetic total (ml):  5      Details:  Needle Size:  21 G  Approach:  Lateral  Location:  Hip  Laterality:  Bilateral  Site:  Bilateral greater trochanteric bursa  Medications (Right):  40 mg triamcinolone acetonide 40 mg/mL  Medications (Left):  40 mg triamcinolone acetonide 40 mg/mL  Patient tolerance:  Patient tolerated the procedure well with no immediate complications   Suicide attempt

## 2023-05-12 NOTE — PROGRESS NOTES
Chief Complaint   Patient presents with    Left Knee - Pain    Right Hip - Pain    Left Hip - Pain         HPI:   This is a 57 y.o. who presents to clinic today complaining of right knee pain and bilateral hip pain for 3 weeks after no known trauma. Pain is progressively worsening. No numbness or tingling. No associated signs or symptoms.    Past Medical History:   Diagnosis Date    Anxiety     Arthritis     Asthma     Crohn's disease     Depression     Encounter for blood transfusion     Headache     Hypertension     Myofascial pain syndrome, cervical 9/28/2022     Past Surgical History:   Procedure Laterality Date    COLONOSCOPY N/A 03/10/2022    Procedure: COLONOSCOPY;  Surgeon: Nilson Wiseman MD;  Location: [facility];  Service: Endoscopy;  Laterality: N/A; Repeat colonoscopy in 6 months because the bowel prep was poor    COLONOSCOPY N/A 3/2/2023    Procedure: COLONOSCOPY;  Surgeon: Erik Garcia MD;  Location: [facility];  Service: Endoscopy;  Laterality: N/A;    ESOPHAGOGASTRODUODENOSCOPY N/A 03/10/2022    Procedure: EGD (ESOPHAGOGASTRODUODENOSCOPY);  Surgeon: Nilson Wiseman MD;  Location: [facility];  Service: Endoscopy;  Laterality: N/A; Repeat upper endoscopy in 8 weeks for surveillance    ESOPHAGOGASTRODUODENOSCOPY N/A 3/16/2023    Procedure: EGD (ESOPHAGOGASTRODUODENOSCOPY);  Surgeon: Erik Garcia MD;  Location: [facility];  Service: Endoscopy;  Laterality: N/A;     Current Outpatient Medications on File Prior to Visit   Medication Sig Dispense Refill    albuterol (PROVENTIL/VENTOLIN HFA) 90 mcg/actuation inhaler Inhale 2 puffs into the lungs every 6 (six) hours as needed for Wheezing. Rescue 18 g 6    amLODIPine (NORVASC) 10 MG tablet Take 1 tablet (10 mg total) by mouth daily      atorvastatin (LIPITOR) 40 MG tablet Take 1 tablet (40 mg total) by mouth daily      budesonide 4 mg CpDR Take 4 mg by mouth 2 (two) times daily as needed (crohn's  flare). 30 capsule 3    budesonide-formoterol  160-4.5 mcg (SYMBICORT) 160-4.5 mcg/actuation HFAA Inhale 2 puffs into the lungs 2 (two) times daily      butalbital-acetaminophen-caffeine -40 mg (FIORICET, ESGIC) -40 mg per tablet Take 1 tablet by mouth every 4 (four) hours as needed.      chlorhexidine (PERIDEX) 0.12 % solution swish and spit 15 MILLILITERS in the mouth or throat TWICE DAILY FOR FOURTEEN DAYS (Patient not taking: Reported on 2/27/2023) 473 mL 6    diclofenac sodium (VOLTAREN) 1 % Gel APPLY TOPICALLY FOUR TIMES DAILY 300 g 3    dicyclomine (BENTYL) 20 mg tablet Take 20 mg by mouth once daily.       diltiaZEM (CARDIZEM CD) 240 MG 24 hr capsule Take 240 mg by mouth.      diltiaZEM (TIAZAC) 240 MG Cs24 Take 1 capsule (240 mg total) by mouth once daily. 90 capsule 0    epinephrine (EPIPEN) 0.3 mg/0.3 mL AtIn Inject 0.3 mg into the muscle.      famotidine (PEPCID) 40 MG tablet Take 1 tablet (40 mg total) by mouth every evening. 30 tablet 2    fluticasone propionate (FLONASE) 50 mcg/actuation nasal spray       fluticasone-salmeterol 500-50 mcg/dose (ADVAIR) 500-50 mcg/dose DsDv diskus inhaler Inhale 1 puff into the lungs.      furosemide (LASIX) 20 MG tablet Take 1 tablet (20 mg total) by mouth daily      gabapentin (NEURONTIN) 800 MG tablet Take 1 tablet (800 mg total) by mouth 3 (three) times daily. 90 tablet 3    hydrochlorothiazide (MICROZIDE) 12.5 mg capsule Take 12.5 mg by mouth every morning.  11    hydrOXYzine pamoate (VISTARIL) 25 MG Cap TAKE ONE CAPSULE BY MOUTH THREE TIMES DAILY AS NEEDED FOR ITCHING 45 capsule 3    ipratropium-albuteroL (COMBIVENT)  mcg/actuation inhaler Inhale 1 puff into the lungs 4 (four) times daily. Rescue (Patient not taking: Reported on 2/27/2023) 4 g 12    irbesartan (AVAPRO) 150 MG tablet TAKE 1 TABLET BY MOUTH EVERY NIGHT AT BEDTIME FOR BLOOD PRESSURE      lidocaine HCl 2% (LIDOCAINE VISCOUS) 2 % Soln by Mucous Membrane route every 8 (eight) hours as needed. (Patient not taking: Reported on  2/27/2023) 100 mL 0    memantine (NAMENDA) 5 MG Tab Take 1 tablet (5 mg total) by mouth 2 (two) times daily. 60 tablet 11    methylPREDNISolone (MEDROL DOSEPACK) 4 mg tablet follow package directions      montelukast (SINGULAIR) 10 mg tablet Take 10 mg by mouth every evening.      nebulizer and compressor Siomara Use as directed      neomycin-polymyxin-hydrocortisone (CORTISPORIN) otic solution PLACE 2 DROPS IN AFFECTED EAR FOUR TIMES DAILY FOR 5-7 DAYS      oxyCODONE-acetaminophen (PERCOCET)  mg per tablet Take 1 tablet by mouth every 8 (eight) hours as needed for Pain. 90 tablet 0    [START ON 6/2/2023] oxyCODONE-acetaminophen (PERCOCET)  mg per tablet Take 1 tablet by mouth every 8 (eight) hours as needed for Pain. 90 tablet 0    pantoprazole (PROTONIX) 40 MG tablet Take 1 tablet (40 mg total) by mouth before breakfast. (Patient not taking: Reported on 3/13/2023) 30 tablet 1    phentermine (ADIPEX-P) 37.5 mg tablet Take 37.5 mg by mouth once daily.      prochlorperazine (COMPAZINE) 10 MG tablet Take 1 tablet (10 mg total) by mouth every 6 (six) hours as needed (migraine or nausea). (Patient not taking: Reported on 12/22/2022) 60 tablet 11    rizatriptan (MAXALT) 10 MG tablet 1 tab PO PRN migraine. May repeat every 2 hours for max 3 tabs in 24 hours. Use no more than 10 days per month. (Patient not taking: Reported on 3/13/2023) 10 tablet 11    sertraline (ZOLOFT) 100 MG tablet Take 100 mg by mouth once daily.      tiZANidine (ZANAFLEX) 4 MG tablet Take 4 mg by mouth every 8 (eight) hours.       traZODone (DESYREL) 100 MG tablet Take 1 tablet (100 mg total) by mouth every evening. 90 tablet 1    triamcinolone acetonide 0.1% (KENALOG) 0.1 % ointment Apply topically 2 (two) times daily. 80 g 3    zolpidem (AMBIEN) 10 mg Tab TAKE ONE-HALF - ONE TABLET BY MOUTH EVERY NIGHT AT BEDTIME AS NEEDED FOR SLEEP       Current Facility-Administered Medications on File Prior to Visit   Medication Dose Route Frequency  Provider Last Rate Last Admin    onabotulinumtoxina injection 200 Units  200 Units Intramuscular q12 weeks Heavenly Fletcher, NP   200 Units at 09/30/22 0958     Review of patient's allergies indicates:   Allergen Reactions    Amlodipine-benazepril Swelling    Ace inhibitors Swelling     Angioedema; was taking Benazepril.    Tramadol Itching     Family History   Problem Relation Age of Onset    Cancer Mother     Diabetes Mother     Arthritis Mother     Hypertension Mother     Cancer Father     Diabetes Father     Arthritis Father     Hypertension Father     Celiac disease Neg Hx     Colon cancer Neg Hx     Crohn's disease Neg Hx     Esophageal cancer Neg Hx     Stomach cancer Neg Hx     Ulcerative colitis Neg Hx      Social History     Socioeconomic History    Marital status:    Tobacco Use    Smoking status: Never    Smokeless tobacco: Never   Substance and Sexual Activity    Alcohol use: Yes     Alcohol/week: 0.0 - 2.0 standard drinks    Drug use: Yes     Types: Hydrocodone    Sexual activity: Never       Review of Systems:  Constitutional:  Denies fever or chills   Eyes:  Denies change in visual acuity   HENT:  Denies nasal congestion or sore throat   Respiratory:  Denies cough or shortness of breath   Cardiovascular:  Denies chest pain or edema   GI:  Denies abdominal pain, nausea, vomiting, bloody stools or diarrhea   :  Denies dysuria   Integument:  Denies rash   Neurologic:  Denies headache, focal weakness or sensory changes   Endocrine:  Denies polyuria or polydipsia   Lymphatic:  Denies swollen glands   Psychiatric:  Denies depression or anxiety     Physical Exam:   Constitutional:  Well developed, well nourished, no acute distress, non-toxic appearance   Integument:  Well hydrated  Neurologic:  Alert & oriented x 3  Psychiatric:  Speech and behavior appropriate     Bilateral Knee Exam    Right Knee Exam     Tenderness   The patient is experiencing tenderness in the medial joint line.    Range of  Motion   Extension: abnormal   Flexion: abnormal     Muscle Strength     The patient has normal knee strength.    Tests   Ellie:  Medial - positive   Lachman:  Anterior - negative      Varus: negative  Valgus: negative  Patellar Apprehension: negative    Other   Erythema: absent  Sensation: normal  Pulse: present  Swelling: mild      Left Knee Exam   Left knee exam performed same as contralateral side and is normal.          Trochanteric bursitis of both hips  -     Large Joint Aspiration/Injection: bilateral greater trochanteric bursa    Primary osteoarthritis of right knee  -     Large Joint Aspiration/Injection: R knee      Using an aseptic technique, I injected 5 cc of lidocaine 1% without and 1 cc of kenalog 40mg into the bilateral Hip and right knee The patient tolerated this well.     Rtc in 3 months or prn.

## 2023-05-18 ENCOUNTER — OFFICE VISIT (OUTPATIENT)
Dept: GASTROENTEROLOGY | Facility: CLINIC | Age: 58
End: 2023-05-18
Payer: MEDICARE

## 2023-05-18 VITALS — WEIGHT: 248.69 LBS | HEIGHT: 63 IN | BODY MASS INDEX: 44.06 KG/M2

## 2023-05-18 DIAGNOSIS — K50.819 CROHN'S DISEASE OF SMALL AND LARGE INTESTINES WITH COMPLICATION: Primary | ICD-10-CM

## 2023-05-18 PROCEDURE — 1159F MED LIST DOCD IN RCRD: CPT | Mod: CPTII,S$GLB,, | Performed by: INTERNAL MEDICINE

## 2023-05-18 PROCEDURE — 99215 PR OFFICE/OUTPT VISIT, EST, LEVL V, 40-54 MIN: ICD-10-PCS | Mod: S$GLB,,, | Performed by: INTERNAL MEDICINE

## 2023-05-18 PROCEDURE — 99999 PR PBB SHADOW E&M-EST. PATIENT-LVL II: ICD-10-PCS | Mod: PBBFAC,,, | Performed by: INTERNAL MEDICINE

## 2023-05-18 PROCEDURE — 3008F PR BODY MASS INDEX (BMI) DOCUMENTED: ICD-10-PCS | Mod: CPTII,S$GLB,, | Performed by: INTERNAL MEDICINE

## 2023-05-18 PROCEDURE — 99215 OFFICE O/P EST HI 40 MIN: CPT | Mod: S$GLB,,, | Performed by: INTERNAL MEDICINE

## 2023-05-18 PROCEDURE — 3008F BODY MASS INDEX DOCD: CPT | Mod: CPTII,S$GLB,, | Performed by: INTERNAL MEDICINE

## 2023-05-18 PROCEDURE — 99999 PR PBB SHADOW E&M-EST. PATIENT-LVL II: CPT | Mod: PBBFAC,,, | Performed by: INTERNAL MEDICINE

## 2023-05-18 PROCEDURE — 4010F ACE/ARB THERAPY RXD/TAKEN: CPT | Mod: CPTII,S$GLB,, | Performed by: INTERNAL MEDICINE

## 2023-05-18 PROCEDURE — 4010F PR ACE/ARB THEARPY RXD/TAKEN: ICD-10-PCS | Mod: CPTII,S$GLB,, | Performed by: INTERNAL MEDICINE

## 2023-05-18 PROCEDURE — 1159F PR MEDICATION LIST DOCUMENTED IN MEDICAL RECORD: ICD-10-PCS | Mod: CPTII,S$GLB,, | Performed by: INTERNAL MEDICINE

## 2023-05-18 RX ORDER — IPRATROPIUM BROMIDE AND ALBUTEROL SULFATE 2.5; .5 MG/3ML; MG/3ML
3 SOLUTION RESPIRATORY (INHALATION)
Status: CANCELLED | OUTPATIENT
Start: 2023-05-18

## 2023-05-18 RX ORDER — DIPHENHYDRAMINE HYDROCHLORIDE 50 MG/ML
25 INJECTION INTRAMUSCULAR; INTRAVENOUS
Status: CANCELLED | OUTPATIENT
Start: 2023-05-18

## 2023-05-18 RX ORDER — PANTOPRAZOLE SODIUM 40 MG/1
40 TABLET, DELAYED RELEASE ORAL DAILY
Qty: 30 TABLET | Refills: 3 | Status: SHIPPED | OUTPATIENT
Start: 2023-05-18 | End: 2024-02-26 | Stop reason: SDUPTHER

## 2023-05-18 RX ORDER — METHYLPREDNISOLONE SOD SUCC 125 MG
40 VIAL (EA) INJECTION
Status: CANCELLED | OUTPATIENT
Start: 2023-05-18

## 2023-05-18 RX ORDER — CETIRIZINE HYDROCHLORIDE 10 MG/1
10 TABLET ORAL
Status: CANCELLED | OUTPATIENT
Start: 2023-05-18

## 2023-05-18 RX ORDER — SODIUM CHLORIDE 0.9 % (FLUSH) 0.9 %
10 SYRINGE (ML) INJECTION
Status: CANCELLED | OUTPATIENT
Start: 2023-05-18

## 2023-05-18 RX ORDER — ACETAMINOPHEN 325 MG/1
650 TABLET ORAL
Status: CANCELLED | OUTPATIENT
Start: 2023-05-18

## 2023-05-18 RX ORDER — HEPARIN 100 UNIT/ML
500 SYRINGE INTRAVENOUS
Status: CANCELLED | OUTPATIENT
Start: 2023-05-18

## 2023-05-18 RX ORDER — EPINEPHRINE 1 MG/ML
0.3 INJECTION, SOLUTION, CONCENTRATE INTRAVENOUS
Status: CANCELLED | OUTPATIENT
Start: 2023-05-18

## 2023-05-21 NOTE — PROGRESS NOTES
"CC: Crohn's disease    HPI 57 y.o. female with ileocolonic Crohn's disease, HTN, asthma, reactive arthritis and osteoarthritis who presents for follow up. She was admitted to [place] in  and had small bowel obstruction at that time. She was treated conservatively. Colonoscopy at the time showed areas of the colon consistent with Crohn's disease. Started on Remicade IV at that time. In addition she has AVN and has discontinued steroids since even during infusions. She has reactive arthritis and osteoarthritis for which she takes opiates chronically. Her rheumatologist has put her on budesonide intermittently for diarrhea in past. She wants to follow at [facility]. She is needing infliximab orders to be sent in given hers have . She has been without medication for several months and is due.    Past Medical History:   Diagnosis Date    Anxiety     Arthritis     Asthma     Crohn's disease     Depression     Encounter for blood transfusion     Headache     Hypertension     Myofascial pain syndrome, cervical 2022     Review of Systems  General ROS: negative for chills, fever or weight loss  Gastrointestinal ROS: no abdominal pain, change in bowel habits, or black/ bloody stools  Genito-Urinary ROS: no dysuria, trouble voiding, or hematuria  Musculoskeletal ROS: +joint pain, negative for gait disturbance or muscular weakness    Physical Examination  Ht 5' 3" (1.6 m)   Wt 112.8 kg (248 lb 10.9 oz)   BMI 44.05 kg/m²   General appearance: alert, cooperative, no distress  HENT: Normocephalic, atraumatic, neck symmetrical  Eyes: conjunctivae clear  Lungs: No labored breathing  Skin: No jaundice  Neurologic: Alert and oriented X 3    Labs:  Lab Results   Component Value Date    WBC 9.75 2023    HGB 11.7 (L) 2023    HCT 39.2 2023    MCV 84 2023     2023     CMP  Sodium   Date Value Ref Range Status   2023 142 136 - 145 mmol/L Final     Potassium   Date Value Ref Range " Status   03/20/2023 3.9 3.5 - 5.1 mmol/L Final     Chloride   Date Value Ref Range Status   03/20/2023 110 95 - 110 mmol/L Final     CO2   Date Value Ref Range Status   03/20/2023 23 23 - 29 mmol/L Final     Glucose   Date Value Ref Range Status   03/20/2023 77 70 - 110 mg/dL Final     BUN   Date Value Ref Range Status   03/20/2023 16 6 - 20 mg/dL Final     Creatinine   Date Value Ref Range Status   03/20/2023 0.8 0.5 - 1.4 mg/dL Final     Calcium   Date Value Ref Range Status   03/20/2023 9.3 8.7 - 10.5 mg/dL Final     Total Protein   Date Value Ref Range Status   03/20/2023 7.0 6.0 - 8.4 g/dL Final     Albumin   Date Value Ref Range Status   03/20/2023 3.2 (L) 3.5 - 5.2 g/dL Final     Total Bilirubin   Date Value Ref Range Status   03/20/2023 0.3 0.1 - 1.0 mg/dL Final     Comment:     For infants and newborns, interpretation of results should be based  on gestational age, weight and in agreement with clinical  observations.    Premature Infant recommended reference ranges:  Up to 24 hours.............<8.0 mg/dL  Up to 48 hours............<12.0 mg/dL  3-5 days..................<15.0 mg/dL  6-29 days.................<15.0 mg/dL       Alkaline Phosphatase   Date Value Ref Range Status   03/20/2023 94 55 - 135 U/L Final     AST   Date Value Ref Range Status   03/20/2023 18 10 - 40 U/L Final     ALT   Date Value Ref Range Status   03/20/2023 20 10 - 44 U/L Final     Anion Gap   Date Value Ref Range Status   03/20/2023 9 8 - 16 mmol/L Final     eGFR if    Date Value Ref Range Status   07/01/2022 >60.0 >60 mL/min/1.73 m^2 Final     eGFR if non    Date Value Ref Range Status   07/01/2022 >60.0 >60 mL/min/1.73 m^2 Final     Comment:     Calculation used to obtain the estimated glomerular filtration  rate (eGFR) is the CKD-EPI equation.        Imaging:  CT Head: No evidence of an acute intracranial abnormality.    Assessment:   1. Crohn's disease- ileocolonic   2. On Remicade  5 mg/kg every 8  weeks  3. History of small bowel obstruction  4. History of avascular necrosis of bilateral hips  5. Reactive arthritis  6. Osteoarthritis  7. Chronic pain      Plan:   -Re-start infliximab infusion, would plan re-induction if able given she has been without medication for several months  -Plan fecal calprotectin for baseline  -Recent colonoscopy with endoscopic remission  -TB testing and hepatitis testing needs updating in system  -Protonix due to history of reflux was refilled today  -Follow up 6 months    40 minutes of total time spent on the encounter, which includes face to face time and non-face to face time preparing to see the patient (eg, review of tests), obtaining and/or reviewing separately obtained history, documenting clinical information in the electronic or other health record, Independently interpreting results (not separately reported) and communicating results to the patient/family/caregiver, or care coordination (not separately reported).          Nilson Wiseman MD  [facility]  [address] [place] [address]  Office: [phone]  Fax: [phone]

## 2023-05-23 ENCOUNTER — TELEPHONE (OUTPATIENT)
Dept: GASTROENTEROLOGY | Facility: CLINIC | Age: 58
End: 2023-05-23
Payer: MEDICARE

## 2023-05-23 NOTE — TELEPHONE ENCOUNTER
----- Message from Nilson Wiseman MD sent at 5/22/2023  5:45 PM CDT -----  Can we get this patient's colonoscopy from Dr. Medina's office please

## 2023-06-02 ENCOUNTER — TELEPHONE (OUTPATIENT)
Dept: INFUSION THERAPY | Facility: HOSPITAL | Age: 58
End: 2023-06-02
Payer: MEDICARE

## 2023-06-02 NOTE — TELEPHONE ENCOUNTER
----- Message from Khoa Sullivan sent at 6/1/2023 12:14 PM CDT -----  Type: Need Medical Advice   Who Called: Patient  Best callback number: [phone]  Additional Information: Patient missed today's appointment due to death in the family,  Please call to further assist, Thanks

## 2023-06-06 ENCOUNTER — TELEPHONE (OUTPATIENT)
Dept: RHEUMATOLOGY | Facility: CLINIC | Age: 58
End: 2023-06-06
Payer: MEDICARE

## 2023-06-06 NOTE — TELEPHONE ENCOUNTER
----- Message from Didi Pearce LPN sent at 6/5/2023  4:13 PM CDT -----    ----- Message -----  From: Nicole Hoff  Sent: 6/5/2023   2:20 PM CDT  To: Nany MULLIGAN Staff    Type: Needs Medical Advice  Who Called:  pt     Best Call Back Number: [phone]    Additional Information: pt is requesting to have blood work orders in system please advise

## 2023-06-08 ENCOUNTER — LAB VISIT (OUTPATIENT)
Dept: LAB | Facility: HOSPITAL | Age: 58
End: 2023-06-08
Attending: PHYSICIAN ASSISTANT
Payer: MEDICARE

## 2023-06-08 DIAGNOSIS — L40.9 PSORIASIS: ICD-10-CM

## 2023-06-08 DIAGNOSIS — M02.39 REACTIVE ARTHRITIS OF MULTIPLE SITES: ICD-10-CM

## 2023-06-08 DIAGNOSIS — E66.01 MORBID (SEVERE) OBESITY DUE TO EXCESS CALORIES: ICD-10-CM

## 2023-06-08 DIAGNOSIS — K50.90 CROHN'S DISEASE WITHOUT COMPLICATION, UNSPECIFIED GASTROINTESTINAL TRACT LOCATION: ICD-10-CM

## 2023-06-08 DIAGNOSIS — D84.9 IMMUNOCOMPROMISED: ICD-10-CM

## 2023-06-08 DIAGNOSIS — R76.8 ANA POSITIVE: ICD-10-CM

## 2023-06-08 DIAGNOSIS — G89.4 CHRONIC PAIN SYNDROME: ICD-10-CM

## 2023-06-08 DIAGNOSIS — K50.011 CROHN'S DISEASE OF SMALL INTESTINE WITH RECTAL BLEEDING: ICD-10-CM

## 2023-06-08 LAB
ALBUMIN SERPL BCP-MCNC: 3.2 G/DL (ref 3.5–5.2)
ALP SERPL-CCNC: 115 U/L (ref 55–135)
ALT SERPL W/O P-5'-P-CCNC: 22 U/L (ref 10–44)
ANION GAP SERPL CALC-SCNC: 9 MMOL/L (ref 8–16)
AST SERPL-CCNC: 18 U/L (ref 10–40)
BASOPHILS # BLD AUTO: 0.04 K/UL (ref 0–0.2)
BASOPHILS NFR BLD: 0.5 % (ref 0–1.9)
BILIRUB SERPL-MCNC: 0.3 MG/DL (ref 0.1–1)
BUN SERPL-MCNC: 21 MG/DL (ref 6–20)
C3 SERPL-MCNC: 163 MG/DL (ref 50–180)
C4 SERPL-MCNC: 42 MG/DL (ref 11–44)
CALCIUM SERPL-MCNC: 9.2 MG/DL (ref 8.7–10.5)
CHLORIDE SERPL-SCNC: 110 MMOL/L (ref 95–110)
CO2 SERPL-SCNC: 23 MMOL/L (ref 23–29)
CREAT SERPL-MCNC: 0.8 MG/DL (ref 0.5–1.4)
DIFFERENTIAL METHOD: ABNORMAL
EOSINOPHIL # BLD AUTO: 0.2 K/UL (ref 0–0.5)
EOSINOPHIL NFR BLD: 1.8 % (ref 0–8)
ERYTHROCYTE [DISTWIDTH] IN BLOOD BY AUTOMATED COUNT: 20.7 % (ref 11.5–14.5)
ERYTHROCYTE [SEDIMENTATION RATE] IN BLOOD BY WESTERGREN METHOD: 27 MM/HR (ref 0–20)
EST. GFR  (NO RACE VARIABLE): >60 ML/MIN/1.73 M^2
GLUCOSE SERPL-MCNC: 95 MG/DL (ref 70–110)
HCT VFR BLD AUTO: 38.6 % (ref 37–48.5)
HGB BLD-MCNC: 11.9 G/DL (ref 12–16)
IMM GRANULOCYTES # BLD AUTO: 0.1 K/UL (ref 0–0.04)
IMM GRANULOCYTES NFR BLD AUTO: 1.1 % (ref 0–0.5)
LYMPHOCYTES # BLD AUTO: 1.9 K/UL (ref 1–4.8)
LYMPHOCYTES NFR BLD: 21.7 % (ref 18–48)
MCH RBC QN AUTO: 24.9 PG (ref 27–31)
MCHC RBC AUTO-ENTMCNC: 30.8 G/DL (ref 32–36)
MCV RBC AUTO: 81 FL (ref 82–98)
MONOCYTES # BLD AUTO: 0.9 K/UL (ref 0.3–1)
MONOCYTES NFR BLD: 9.8 % (ref 4–15)
NEUTROPHILS # BLD AUTO: 5.8 K/UL (ref 1.8–7.7)
NEUTROPHILS NFR BLD: 65.1 % (ref 38–73)
NRBC BLD-RTO: 0 /100 WBC
PLATELET # BLD AUTO: 268 K/UL (ref 150–450)
PMV BLD AUTO: 10.5 FL (ref 9.2–12.9)
POTASSIUM SERPL-SCNC: 3.8 MMOL/L (ref 3.5–5.1)
PROT SERPL-MCNC: 6.6 G/DL (ref 6–8.4)
RBC # BLD AUTO: 4.78 M/UL (ref 4–5.4)
SODIUM SERPL-SCNC: 142 MMOL/L (ref 136–145)
WBC # BLD AUTO: 8.86 K/UL (ref 3.9–12.7)

## 2023-06-08 PROCEDURE — 86235 NUCLEAR ANTIGEN ANTIBODY: CPT | Performed by: INTERNAL MEDICINE

## 2023-06-08 PROCEDURE — 86160 COMPLEMENT ANTIGEN: CPT | Mod: 59 | Performed by: INTERNAL MEDICINE

## 2023-06-08 PROCEDURE — 86235 NUCLEAR ANTIGEN ANTIBODY: CPT | Mod: 59 | Performed by: INTERNAL MEDICINE

## 2023-06-08 PROCEDURE — 86162 COMPLEMENT TOTAL (CH50): CPT | Performed by: INTERNAL MEDICINE

## 2023-06-08 PROCEDURE — 83516 IMMUNOASSAY NONANTIBODY: CPT | Performed by: INTERNAL MEDICINE

## 2023-06-08 PROCEDURE — 86160 COMPLEMENT ANTIGEN: CPT | Performed by: INTERNAL MEDICINE

## 2023-06-08 PROCEDURE — 36415 COLL VENOUS BLD VENIPUNCTURE: CPT | Mod: PO | Performed by: INTERNAL MEDICINE

## 2023-06-08 PROCEDURE — 85025 COMPLETE CBC W/AUTO DIFF WBC: CPT | Performed by: INTERNAL MEDICINE

## 2023-06-08 PROCEDURE — 85651 RBC SED RATE NONAUTOMATED: CPT | Mod: PO | Performed by: INTERNAL MEDICINE

## 2023-06-08 PROCEDURE — 86038 ANTINUCLEAR ANTIBODIES: CPT | Performed by: INTERNAL MEDICINE

## 2023-06-08 PROCEDURE — 80053 COMPREHEN METABOLIC PANEL: CPT | Performed by: INTERNAL MEDICINE

## 2023-06-08 PROCEDURE — 86225 DNA ANTIBODY NATIVE: CPT | Performed by: INTERNAL MEDICINE

## 2023-06-08 PROCEDURE — 86039 ANTINUCLEAR ANTIBODIES (ANA): CPT | Performed by: INTERNAL MEDICINE

## 2023-06-09 ENCOUNTER — INFUSION (OUTPATIENT)
Dept: INFUSION THERAPY | Facility: HOSPITAL | Age: 58
End: 2023-06-09
Attending: INTERNAL MEDICINE
Payer: MEDICARE

## 2023-06-09 VITALS
HEIGHT: 63 IN | DIASTOLIC BLOOD PRESSURE: 75 MMHG | TEMPERATURE: 98 F | BODY MASS INDEX: 45.78 KG/M2 | HEART RATE: 70 BPM | SYSTOLIC BLOOD PRESSURE: 119 MMHG | WEIGHT: 258.38 LBS | RESPIRATION RATE: 16 BRPM

## 2023-06-09 DIAGNOSIS — K50.819 CROHN'S DISEASE OF SMALL AND LARGE INTESTINES WITH COMPLICATION: Primary | ICD-10-CM

## 2023-06-09 PROCEDURE — 96413 CHEMO IV INFUSION 1 HR: CPT | Mod: PN

## 2023-06-09 PROCEDURE — 25000003 PHARM REV CODE 250: Mod: PN | Performed by: INTERNAL MEDICINE

## 2023-06-09 PROCEDURE — 63600175 PHARM REV CODE 636 W HCPCS: Mod: PN | Performed by: INTERNAL MEDICINE

## 2023-06-09 PROCEDURE — 96415 CHEMO IV INFUSION ADDL HR: CPT | Mod: PN

## 2023-06-09 PROCEDURE — 96375 TX/PRO/DX INJ NEW DRUG ADDON: CPT | Mod: PN

## 2023-06-09 PROCEDURE — A4216 STERILE WATER/SALINE, 10 ML: HCPCS | Mod: PN | Performed by: INTERNAL MEDICINE

## 2023-06-09 RX ORDER — HEPARIN 100 UNIT/ML
500 SYRINGE INTRAVENOUS
Status: CANCELLED | OUTPATIENT
Start: 2023-08-04

## 2023-06-09 RX ORDER — DIPHENHYDRAMINE HYDROCHLORIDE 50 MG/ML
25 INJECTION INTRAMUSCULAR; INTRAVENOUS
Status: COMPLETED | OUTPATIENT
Start: 2023-06-09 | End: 2023-06-09

## 2023-06-09 RX ORDER — IPRATROPIUM BROMIDE AND ALBUTEROL SULFATE 2.5; .5 MG/3ML; MG/3ML
3 SOLUTION RESPIRATORY (INHALATION)
Status: CANCELLED | OUTPATIENT
Start: 2023-08-04

## 2023-06-09 RX ORDER — EPINEPHRINE 1 MG/ML
0.3 INJECTION, SOLUTION, CONCENTRATE INTRAVENOUS
Status: CANCELLED | OUTPATIENT
Start: 2023-08-04

## 2023-06-09 RX ORDER — ACETAMINOPHEN 325 MG/1
650 TABLET ORAL
Status: CANCELLED | OUTPATIENT
Start: 2023-08-04

## 2023-06-09 RX ORDER — SODIUM CHLORIDE 0.9 % (FLUSH) 0.9 %
10 SYRINGE (ML) INJECTION
Status: DISCONTINUED | OUTPATIENT
Start: 2023-06-09 | End: 2023-06-09 | Stop reason: HOSPADM

## 2023-06-09 RX ORDER — SODIUM CHLORIDE 0.9 % (FLUSH) 0.9 %
10 SYRINGE (ML) INJECTION
Status: CANCELLED | OUTPATIENT
Start: 2023-08-04

## 2023-06-09 RX ORDER — DIPHENHYDRAMINE HYDROCHLORIDE 50 MG/ML
25 INJECTION INTRAMUSCULAR; INTRAVENOUS
Status: ACTIVE | OUTPATIENT
Start: 2023-06-09 | End: 2023-06-09

## 2023-06-09 RX ORDER — METHYLPREDNISOLONE SOD SUCC 125 MG
40 VIAL (EA) INJECTION
Status: CANCELLED | OUTPATIENT
Start: 2023-08-04

## 2023-06-09 RX ORDER — DIPHENHYDRAMINE HYDROCHLORIDE 50 MG/ML
25 INJECTION INTRAMUSCULAR; INTRAVENOUS
Status: CANCELLED | OUTPATIENT
Start: 2023-08-04

## 2023-06-09 RX ORDER — ACETAMINOPHEN 325 MG/1
650 TABLET ORAL
Status: COMPLETED | OUTPATIENT
Start: 2023-06-09 | End: 2023-06-09

## 2023-06-09 RX ORDER — ACETAMINOPHEN 325 MG/1
650 TABLET ORAL
Status: ACTIVE | OUTPATIENT
Start: 2023-06-09 | End: 2023-06-09

## 2023-06-09 RX ADMIN — SODIUM CHLORIDE: 9 INJECTION, SOLUTION INTRAVENOUS at 09:06

## 2023-06-09 RX ADMIN — DIPHENHYDRAMINE HYDROCHLORIDE 25 MG: 50 INJECTION INTRAMUSCULAR; INTRAVENOUS at 09:06

## 2023-06-09 RX ADMIN — Medication 10 ML: at 09:06

## 2023-06-09 RX ADMIN — SODIUM CHLORIDE 590 MG: 9 INJECTION, SOLUTION INTRAVENOUS at 10:06

## 2023-06-09 RX ADMIN — ACETAMINOPHEN 650 MG: 325 TABLET, FILM COATED ORAL at 09:06

## 2023-06-09 NOTE — PLAN OF CARE
Problem: Adult Inpatient Plan of Care  Goal: Plan of Care Review  6/9/2023 1442 by Malina Sanford, RN  Outcome: Ongoing, Progressing  Flowsheets (Taken 6/9/2023 1220)  Plan of Care Reviewed With: patient   Pt tolerated her Inflectra infusion well, NAD. No new c/o voiced. Pt given a schedule and reviewed, pt verbalized understanding. Pt ambulated out of the clinic without difficulty.

## 2023-06-09 NOTE — PLAN OF CARE
Problem: Fatigue  Goal: Improved Activity Tolerance  Intervention: Promote Improved Energy  Flowsheets (Taken 6/9/2023 1031)  Fatigue Management:   activity schedule adjusted   frequent rest breaks encouraged   paced activity encouraged   fatigue-related activity identified  Sleep/Rest Enhancement:   relaxation techniques promoted   natural light exposure provided   regular sleep/rest pattern promoted  Activity Management:   Ambulated -L4   Ambulated to bathroom - L4   Ambulated in riley - L4   Ambulated in room - L4   Up in lounge/playroom - L4   Up in stretcher chair - L1   Up in chair - L3     Problem: Adult Inpatient Plan of Care  Goal: Patient-Specific Goal (Individualized)  Outcome: Ongoing, Progressing  Flowsheets (Taken 6/9/2023 1031)  Anxieties, Fears or Concerns: none  Individualized Care Needs: recliner, warm blanket, pillow, tv, snacks, orientation to the clinic

## 2023-06-12 LAB
ANTI SM ANTIBODY: 0.08 RATIO (ref 0–0.99)
ANTI-SM INTERPRETATION: NEGATIVE
ANTI-SSA ANTIBODY: 0.07 RATIO (ref 0–0.99)
ANTI-SSA INTERPRETATION: NEGATIVE
ANTI-SSB ANTIBODY: 0.06 RATIO (ref 0–0.99)
ANTI-SSB INTERPRETATION: NEGATIVE
CH50 SERPL-ACNC: 85 U/ML (ref 42–95)
HISTONE IGG SER IA-ACNC: 0.8 UNITS (ref 0–0.9)

## 2023-06-13 LAB
ANTI SM/RNP ANTIBODY: 0.76 RATIO (ref 0–0.99)
ANTI-SM/RNP INTERPRETATION: NEGATIVE
ENA SCL70 AB SER-ACNC: <0.6 U/ML

## 2023-06-21 ENCOUNTER — OFFICE VISIT (OUTPATIENT)
Dept: NEUROLOGY | Facility: CLINIC | Age: 58
End: 2023-06-21
Payer: MEDICARE

## 2023-06-21 ENCOUNTER — TELEPHONE (OUTPATIENT)
Dept: GASTROENTEROLOGY | Facility: CLINIC | Age: 58
End: 2023-06-21
Payer: MEDICARE

## 2023-06-21 ENCOUNTER — TELEPHONE (OUTPATIENT)
Dept: NEUROLOGY | Facility: CLINIC | Age: 58
End: 2023-06-21

## 2023-06-21 VITALS
BODY MASS INDEX: 45.27 KG/M2 | DIASTOLIC BLOOD PRESSURE: 80 MMHG | HEART RATE: 78 BPM | SYSTOLIC BLOOD PRESSURE: 129 MMHG | HEIGHT: 63 IN | WEIGHT: 255.5 LBS

## 2023-06-21 DIAGNOSIS — G47.33 OSA (OBSTRUCTIVE SLEEP APNEA): ICD-10-CM

## 2023-06-21 DIAGNOSIS — F32.A DEPRESSION, UNSPECIFIED DEPRESSION TYPE: ICD-10-CM

## 2023-06-21 DIAGNOSIS — R41.3 MEMORY LOSS: Primary | ICD-10-CM

## 2023-06-21 DIAGNOSIS — D32.9 MENINGIOMA: ICD-10-CM

## 2023-06-21 PROCEDURE — 3079F DIAST BP 80-89 MM HG: CPT | Mod: CPTII,S$GLB,, | Performed by: PSYCHIATRY & NEUROLOGY

## 2023-06-21 PROCEDURE — 3079F PR MOST RECENT DIASTOLIC BLOOD PRESSURE 80-89 MM HG: ICD-10-PCS | Mod: CPTII,S$GLB,, | Performed by: PSYCHIATRY & NEUROLOGY

## 2023-06-21 PROCEDURE — 4010F PR ACE/ARB THEARPY RXD/TAKEN: ICD-10-PCS | Mod: CPTII,S$GLB,, | Performed by: PSYCHIATRY & NEUROLOGY

## 2023-06-21 PROCEDURE — 3074F PR MOST RECENT SYSTOLIC BLOOD PRESSURE < 130 MM HG: ICD-10-PCS | Mod: CPTII,S$GLB,, | Performed by: PSYCHIATRY & NEUROLOGY

## 2023-06-21 PROCEDURE — 3074F SYST BP LT 130 MM HG: CPT | Mod: CPTII,S$GLB,, | Performed by: PSYCHIATRY & NEUROLOGY

## 2023-06-21 PROCEDURE — 99215 OFFICE O/P EST HI 40 MIN: CPT | Mod: S$GLB,,, | Performed by: PSYCHIATRY & NEUROLOGY

## 2023-06-21 PROCEDURE — 99999 PR PBB SHADOW E&M-EST. PATIENT-LVL V: ICD-10-PCS | Mod: PBBFAC,,, | Performed by: PSYCHIATRY & NEUROLOGY

## 2023-06-21 PROCEDURE — 99999 PR PBB SHADOW E&M-EST. PATIENT-LVL V: CPT | Mod: PBBFAC,,, | Performed by: PSYCHIATRY & NEUROLOGY

## 2023-06-21 PROCEDURE — 1160F PR REVIEW ALL MEDS BY PRESCRIBER/CLIN PHARMACIST DOCUMENTED: ICD-10-PCS | Mod: CPTII,S$GLB,, | Performed by: PSYCHIATRY & NEUROLOGY

## 2023-06-21 PROCEDURE — 1160F RVW MEDS BY RX/DR IN RCRD: CPT | Mod: CPTII,S$GLB,, | Performed by: PSYCHIATRY & NEUROLOGY

## 2023-06-21 PROCEDURE — 1159F PR MEDICATION LIST DOCUMENTED IN MEDICAL RECORD: ICD-10-PCS | Mod: CPTII,S$GLB,, | Performed by: PSYCHIATRY & NEUROLOGY

## 2023-06-21 PROCEDURE — 1159F MED LIST DOCD IN RCRD: CPT | Mod: CPTII,S$GLB,, | Performed by: PSYCHIATRY & NEUROLOGY

## 2023-06-21 PROCEDURE — 3008F BODY MASS INDEX DOCD: CPT | Mod: CPTII,S$GLB,, | Performed by: PSYCHIATRY & NEUROLOGY

## 2023-06-21 PROCEDURE — 4010F ACE/ARB THERAPY RXD/TAKEN: CPT | Mod: CPTII,S$GLB,, | Performed by: PSYCHIATRY & NEUROLOGY

## 2023-06-21 PROCEDURE — 99215 PR OFFICE/OUTPT VISIT, EST, LEVL V, 40-54 MIN: ICD-10-PCS | Mod: S$GLB,,, | Performed by: PSYCHIATRY & NEUROLOGY

## 2023-06-21 PROCEDURE — 3008F PR BODY MASS INDEX (BMI) DOCUMENTED: ICD-10-PCS | Mod: CPTII,S$GLB,, | Performed by: PSYCHIATRY & NEUROLOGY

## 2023-06-21 RX ORDER — MELOXICAM 15 MG/1
15 TABLET ORAL
COMMUNITY
Start: 2023-06-20 | End: 2023-07-17

## 2023-06-21 RX ORDER — HYDROXYCHLOROQUINE SULFATE 200 MG/1
200 TABLET, FILM COATED ORAL 2 TIMES DAILY
COMMUNITY
Start: 2023-05-29 | End: 2023-07-17 | Stop reason: SDUPTHER

## 2023-06-21 RX ORDER — POTASSIUM CHLORIDE 750 MG/1
10 TABLET, EXTENDED RELEASE ORAL 2 TIMES DAILY
COMMUNITY
Start: 2023-03-29 | End: 2023-11-03 | Stop reason: SDUPTHER

## 2023-06-21 NOTE — TELEPHONE ENCOUNTER
----- Message from Nicolle Nevarez, Patient Care Assistant sent at 6/21/2023  3:18 PM CDT -----  Contact: Pt  Type: Needs Medical Advice    Who Called: Pt  Best Call Back Number: [phone]  Inquiry/Question: Pt is calling to get clarity on her diagnosis of Hep A. Please advise. Thank you~

## 2023-06-21 NOTE — PROGRESS NOTES
"    Date: 2023    Patient ID: Amanda Mcguire is a 57 y.o. female.    Chief Complaint: Memory Loss      History of Present Illness:  Ms. Mcguire is a 57 y.o. female who presents today for evaluation of memory concerns.     She has noticed memory concerns for the past year. She will go to do something and then forget what she is going to do. She will forget what people said to her. She will ask repetitive questions to people and they frequently tell her "I already told you that". She does not drive due to stress. She cooks and sometimes forgets ingredients. She left the stove on last week and smoked the house up. Long term memory seems intact, it is more trouble with short term memory.     She notes that when she is falling asleep she will see her dreams and she can physically see people, etc. She goes to sleep at 11pm - 4am and wakes up and can't go back to sleep. She had sleep study at [facility] and was told she had sleep apnea but never got a CPAP.     She has migraines and is followed in headache clinic. MOCA was 13/30 in 2022. Neuropsych testing has been ordered in 2022 but not completed.     MRI brain showed a small left parietal meningioma.     She does have some depression.     Her dad had dementia and  at age 90. She lost her brother this year from cancer.     Allergies:  Review of patient's allergies indicates:   Allergen Reactions    Amlodipine-benazepril Swelling    Ace inhibitors Swelling     Angioedema; was taking Benazepril.    Tramadol Itching       Current Medications:  Current Outpatient Medications   Medication Sig Dispense Refill    albuterol (PROVENTIL/VENTOLIN HFA) 90 mcg/actuation inhaler Inhale 2 puffs into the lungs every 6 (six) hours as needed for Wheezing. Rescue 18 g 6    amLODIPine (NORVASC) 10 MG tablet Take 1 tablet (10 mg total) by mouth daily      budesonide 4 mg CpDR Take 4 mg by mouth 2 (two) times daily as needed (crohn's  flare). 30 capsule 3    chlorhexidine " (PERIDEX) 0.12 % solution swish and spit 15 MILLILITERS in the mouth or throat TWICE DAILY FOR FOURTEEN DAYS 473 mL 6    diclofenac sodium (VOLTAREN) 1 % Gel APPLY TOPICALLY FOUR TIMES DAILY 300 g 3    dicyclomine (BENTYL) 20 mg tablet Take 20 mg by mouth once daily.       diltiaZEM (CARDIZEM CD) 240 MG 24 hr capsule Take 240 mg by mouth.      epinephrine (EPIPEN) 0.3 mg/0.3 mL AtIn Inject 0.3 mg into the muscle.      fluticasone-salmeterol 500-50 mcg/dose (ADVAIR) 500-50 mcg/dose DsDv diskus inhaler Inhale 1 puff into the lungs.      furosemide (LASIX) 20 MG tablet Take 1 tablet (20 mg total) by mouth daily      gabapentin (NEURONTIN) 800 MG tablet Take 1 tablet (800 mg total) by mouth 3 (three) times daily. 90 tablet 3    hydrOXYchloroQUINE (PLAQUENIL) 200 mg tablet Take 200 mg by mouth 2 (two) times daily.      hydrOXYzine pamoate (VISTARIL) 25 MG Cap TAKE ONE CAPSULE BY MOUTH THREE TIMES DAILY AS NEEDED FOR ITCHING 45 capsule 3    ipratropium-albuteroL (COMBIVENT)  mcg/actuation inhaler Inhale 1 puff into the lungs 4 (four) times daily. Rescue 4 g 12    irbesartan (AVAPRO) 150 MG tablet TAKE 1 TABLET BY MOUTH EVERY NIGHT AT BEDTIME FOR BLOOD PRESSURE      lidocaine HCl 2% (LIDOCAINE VISCOUS) 2 % Soln by Mucous Membrane route every 8 (eight) hours as needed. 100 mL 0    meloxicam (MOBIC) 15 MG tablet Take 15 mg by mouth.      memantine (NAMENDA) 5 MG Tab Take 1 tablet (5 mg total) by mouth 2 (two) times daily. 60 tablet 11    montelukast (SINGULAIR) 10 mg tablet Take 10 mg by mouth every evening.      nebulizer and compressor Siomara Use as directed      neomycin-polymyxin-hydrocortisone (CORTISPORIN) otic solution PLACE 2 DROPS IN AFFECTED EAR FOUR TIMES DAILY FOR 5-7 DAYS      oxyCODONE-acetaminophen (PERCOCET)  mg per tablet Take 1 tablet by mouth every 8 (eight) hours as needed for Pain. 90 tablet 0    pantoprazole (PROTONIX) 40 MG tablet Take 1 tablet (40 mg total) by mouth once daily. 30 tablet 3     phentermine (ADIPEX-P) 37.5 mg tablet Take 37.5 mg by mouth once daily.      potassium chloride SA (K-DUR,KLOR-CON M) 10 MEQ tablet Take 10 mEq by mouth 2 (two) times daily.      prochlorperazine (COMPAZINE) 10 MG tablet Take 1 tablet (10 mg total) by mouth every 6 (six) hours as needed (migraine or nausea). 60 tablet 11    rizatriptan (MAXALT) 10 MG tablet 1 tab PO PRN migraine. May repeat every 2 hours for max 3 tabs in 24 hours. Use no more than 10 days per month. 10 tablet 11    tiZANidine (ZANAFLEX) 4 MG tablet Take 4 mg by mouth every 8 (eight) hours.       traZODone (DESYREL) 100 MG tablet Take 1 tablet (100 mg total) by mouth every evening. 90 tablet 1    triamcinolone acetonide 0.1% (KENALOG) 0.1 % ointment Apply topically 2 (two) times daily. 80 g 3    atorvastatin (LIPITOR) 40 MG tablet Take 1 tablet (40 mg total) by mouth daily       Current Facility-Administered Medications   Medication Dose Route Frequency Provider Last Rate Last Admin    onabotulinumtoxina injection 200 Units  200 Units Intramuscular q12 weeks Heavenly lFetcher, NP   200 Units at 09/30/22 0958       Past Medical History:  Past Medical History:   Diagnosis Date    Anxiety     Arthritis     Asthma     Crohn's disease     Depression     Encounter for blood transfusion     Headache     Hypertension     Myofascial pain syndrome, cervical 9/28/2022       Past Surgical History:  Past Surgical History:   Procedure Laterality Date    COLONOSCOPY N/A 03/10/2022    Procedure: COLONOSCOPY;  Surgeon: Nilson Wiseman MD;  Location: [facility];  Service: Endoscopy;  Laterality: N/A; Repeat colonoscopy in 6 months because the bowel prep was poor    COLONOSCOPY N/A 3/2/2023    Procedure: COLONOSCOPY;  Surgeon: Erik Garcia MD;  Location: [facility];  Service: Endoscopy;  Laterality: N/A;    ESOPHAGOGASTRODUODENOSCOPY N/A 03/10/2022    Procedure: EGD (ESOPHAGOGASTRODUODENOSCOPY);  Surgeon: Nilson Wiseman MD;  Location: [facility];   "Service: Endoscopy;  Laterality: N/A; Repeat upper endoscopy in 8 weeks for surveillance    ESOPHAGOGASTRODUODENOSCOPY N/A 3/16/2023    Procedure: EGD (ESOPHAGOGASTRODUODENOSCOPY);  Surgeon: Erik Garcia MD;  Location: [facility];  Service: Endoscopy;  Laterality: N/A;       Family History:  family history includes Arthritis in her father and mother; Cancer in her father and mother; Diabetes in her father and mother; Hypertension in her father and mother.    Social History:   reports that she has never smoked. She has never used smokeless tobacco. She reports current alcohol use. She reports current drug use. Drug: Hydrocodone.    Physical Exam:  Vitals:    06/21/23 0839   BP: 129/80   Pulse: 78   Weight: 115.9 kg (255 lb 8.2 oz)   Height: 5' 3" (1.6 m)   PainSc:   8   PainLoc: Head     Body mass index is 45.26 kg/m².    Neurological Exam:  Mental status: Awake, alert. MMSE-2 brief was 13/16 (prior MOCA 13/30)  Speech/Language: No dysarthria or aphasia on conversation.   Cranial nerves: Pupils equal round and reactive to light, extraocular movements intact, facial strength and sensation intact bilaterally, tongue midline, hearing grossly intact bilaterally. Shoulder shrug normal bilaterally.   Motor: 5 out of 5 strength throughout the upper and lower extremities bilaterally. Normal bulk and tone.   Sensation: Intact to light touch and vibration bilaterally.  DTR: 2+ at the knees and biceps bilaterally.  Coordination: Finger-nose-finger testing and rapid alternating movements normal bilaterally. No tremor.     Data:  I have personally reviewed the referring provider's notes, labs, & imaging made available to me today.     Labs:  CBC:   Lab Results   Component Value Date    WBC 8.86 06/08/2023    HGB 11.9 (L) 06/08/2023    HCT 38.6 06/08/2023     06/08/2023    MCV 81 (L) 06/08/2023    RDW 20.7 (H) 06/08/2023     BMP:   Lab Results   Component Value Date     06/08/2023    K 3.8 06/08/2023     " 06/08/2023    CO2 23 06/08/2023    BUN 21 (H) 06/08/2023    CREATININE 0.8 06/08/2023    GLU 95 06/08/2023    CALCIUM 9.2 06/08/2023     LFTS;   Lab Results   Component Value Date    PROT 6.6 06/08/2023    ALBUMIN 3.2 (L) 06/08/2023    BILITOT 0.3 06/08/2023    AST 18 06/08/2023    ALKPHOS 115 06/08/2023    ALT 22 06/08/2023     COAGS: No results found for: INR, PROTIME, PTT  FLP: No results found for: CHOL, HDL, LDLCALC, TRIG, CHOLHDL    Imaging:  I have personally reviewed the imaging, MRI brain shows small left parietal meningioma.     Assessment and Plan:  Ms. Mcguire is a 57 y.o. female here for memory concerns. Her MOCA and brief MMSE scores are low. She had trouble with drawing and recall. Discussed that dementia is very rare at her age but does occur so we need to evaluate further. More likely etiologies at her age would be her untreated sleep apnea, chronic pain, medications, and uncontrolled depression. Will obtain labs looking for other secondary etiologies and agree with neuropsych referral. Will also refer to sleep medicine for treatment of EAN.     Meningioma is incidental and she has been referred to neurosurgery for this. MRI could be repeated in 12 months from previous for monitoring. She does not describe any seizure like episodes. Could consider EEG in the future if concern for that.     Memory loss  -     TSH; Future; Expected date: 06/21/2023  -     Vitamin B12; Future; Expected date: 06/21/2023  -     VITAMIN B1; Future; Expected date: 06/21/2023  -     FOLATE; Future; Expected date: 06/21/2023  -     RPR; Future; Expected date: 06/21/2023  -     VITAMIN E; Future; Expected date: 06/21/2023  -     Ambulatory referral/consult to Sleep Disorders; Future; Expected date: 06/28/2023    EAN (obstructive sleep apnea)  -     Ambulatory referral/consult to Sleep Disorders; Future; Expected date: 06/28/2023    Meningioma    Depression, unspecified depression type         I spent a total of 42 minutes on  the day of the visit.This includes face to face time and non-face to face time preparing to see the patient (eg, review of tests), Obtaining and/or reviewing separately obtained history, Documenting clinical information in the electronic or other health record, Independently interpreting results and communicating results to the patient/family/caregiver, or Care coordination.

## 2023-06-21 NOTE — TELEPHONE ENCOUNTER
Spoke with patient and clarified her lab results with her. Advised her that they did not mean she has hepatitis, but that she needs to get the hepatitis B vaccines. Patient verbalized understanding.

## 2023-06-23 ENCOUNTER — INFUSION (OUTPATIENT)
Dept: INFUSION THERAPY | Facility: HOSPITAL | Age: 58
End: 2023-06-23
Attending: INTERNAL MEDICINE
Payer: MEDICARE

## 2023-06-23 VITALS
DIASTOLIC BLOOD PRESSURE: 72 MMHG | RESPIRATION RATE: 16 BRPM | BODY MASS INDEX: 45.35 KG/M2 | HEART RATE: 74 BPM | HEIGHT: 63 IN | WEIGHT: 255.94 LBS | SYSTOLIC BLOOD PRESSURE: 123 MMHG | TEMPERATURE: 98 F

## 2023-06-23 DIAGNOSIS — K50.819 CROHN'S DISEASE OF SMALL AND LARGE INTESTINES WITH COMPLICATION: Primary | ICD-10-CM

## 2023-06-23 LAB
ALBUMIN SERPL BCP-MCNC: 3.4 G/DL (ref 3.5–5.2)
ALP SERPL-CCNC: 85 U/L (ref 55–135)
ALT SERPL W/O P-5'-P-CCNC: 31 U/L (ref 10–44)
ANION GAP SERPL CALC-SCNC: 11 MMOL/L (ref 8–16)
AST SERPL-CCNC: 21 U/L (ref 10–40)
BASOPHILS # BLD AUTO: 0.06 K/UL (ref 0–0.2)
BASOPHILS NFR BLD: 0.5 % (ref 0–1.9)
BILIRUB SERPL-MCNC: 0.4 MG/DL (ref 0.1–1)
BUN SERPL-MCNC: 21 MG/DL (ref 6–20)
CALCIUM SERPL-MCNC: 9.3 MG/DL (ref 8.7–10.5)
CHLORIDE SERPL-SCNC: 110 MMOL/L (ref 95–110)
CO2 SERPL-SCNC: 24 MMOL/L (ref 23–29)
CREAT SERPL-MCNC: 0.8 MG/DL (ref 0.5–1.4)
DIFFERENTIAL METHOD: ABNORMAL
EOSINOPHIL # BLD AUTO: 0.1 K/UL (ref 0–0.5)
EOSINOPHIL NFR BLD: 1 % (ref 0–8)
ERYTHROCYTE [DISTWIDTH] IN BLOOD BY AUTOMATED COUNT: 21.4 % (ref 11.5–14.5)
EST. GFR  (NO RACE VARIABLE): >60 ML/MIN/1.73 M^2
GLUCOSE SERPL-MCNC: 92 MG/DL (ref 70–110)
HCT VFR BLD AUTO: 40 % (ref 37–48.5)
HGB BLD-MCNC: 12.9 G/DL (ref 12–16)
IMM GRANULOCYTES # BLD AUTO: 0.18 K/UL (ref 0–0.04)
IMM GRANULOCYTES NFR BLD AUTO: 1.6 % (ref 0–0.5)
LYMPHOCYTES # BLD AUTO: 2.6 K/UL (ref 1–4.8)
LYMPHOCYTES NFR BLD: 23 % (ref 18–48)
MCH RBC QN AUTO: 25.4 PG (ref 27–31)
MCHC RBC AUTO-ENTMCNC: 32.3 G/DL (ref 32–36)
MCV RBC AUTO: 79 FL (ref 82–98)
MONOCYTES # BLD AUTO: 1 K/UL (ref 0.3–1)
MONOCYTES NFR BLD: 9 % (ref 4–15)
NEUTROPHILS # BLD AUTO: 7.4 K/UL (ref 1.8–7.7)
NEUTROPHILS NFR BLD: 64.9 % (ref 38–73)
NRBC BLD-RTO: 0 /100 WBC
PLATELET # BLD AUTO: 320 K/UL (ref 150–450)
PMV BLD AUTO: 10.6 FL (ref 9.2–12.9)
POTASSIUM SERPL-SCNC: 3.6 MMOL/L (ref 3.5–5.1)
PROT SERPL-MCNC: 7.1 G/DL (ref 6–8.4)
RBC # BLD AUTO: 5.07 M/UL (ref 4–5.4)
SODIUM SERPL-SCNC: 145 MMOL/L (ref 136–145)
WBC # BLD AUTO: 11.41 K/UL (ref 3.9–12.7)

## 2023-06-23 PROCEDURE — 25000003 PHARM REV CODE 250: Mod: PN | Performed by: INTERNAL MEDICINE

## 2023-06-23 PROCEDURE — 96375 TX/PRO/DX INJ NEW DRUG ADDON: CPT | Mod: PN

## 2023-06-23 PROCEDURE — 63600175 PHARM REV CODE 636 W HCPCS: Mod: PN | Performed by: INTERNAL MEDICINE

## 2023-06-23 PROCEDURE — 96415 CHEMO IV INFUSION ADDL HR: CPT | Mod: PN

## 2023-06-23 PROCEDURE — 80053 COMPREHEN METABOLIC PANEL: CPT | Mod: PN | Performed by: INTERNAL MEDICINE

## 2023-06-23 PROCEDURE — 85025 COMPLETE CBC W/AUTO DIFF WBC: CPT | Mod: PN | Performed by: INTERNAL MEDICINE

## 2023-06-23 PROCEDURE — 96413 CHEMO IV INFUSION 1 HR: CPT | Mod: PN

## 2023-06-23 RX ORDER — HEPARIN 100 UNIT/ML
500 SYRINGE INTRAVENOUS
Status: DISCONTINUED | OUTPATIENT
Start: 2023-06-23 | End: 2023-06-23 | Stop reason: HOSPADM

## 2023-06-23 RX ORDER — EPINEPHRINE 1 MG/ML
0.3 INJECTION, SOLUTION, CONCENTRATE INTRAVENOUS
Status: CANCELLED | OUTPATIENT
Start: 2023-08-04

## 2023-06-23 RX ORDER — IPRATROPIUM BROMIDE AND ALBUTEROL SULFATE 2.5; .5 MG/3ML; MG/3ML
3 SOLUTION RESPIRATORY (INHALATION)
Status: CANCELLED | OUTPATIENT
Start: 2023-08-04

## 2023-06-23 RX ORDER — SODIUM CHLORIDE 0.9 % (FLUSH) 0.9 %
10 SYRINGE (ML) INJECTION
Status: CANCELLED | OUTPATIENT
Start: 2023-08-04

## 2023-06-23 RX ORDER — SODIUM CHLORIDE 0.9 % (FLUSH) 0.9 %
10 SYRINGE (ML) INJECTION
Status: DISCONTINUED | OUTPATIENT
Start: 2023-06-23 | End: 2023-06-23 | Stop reason: HOSPADM

## 2023-06-23 RX ORDER — DIPHENHYDRAMINE HYDROCHLORIDE 50 MG/ML
25 INJECTION INTRAMUSCULAR; INTRAVENOUS
Status: COMPLETED | OUTPATIENT
Start: 2023-06-23 | End: 2023-06-23

## 2023-06-23 RX ORDER — DIPHENHYDRAMINE HYDROCHLORIDE 50 MG/ML
25 INJECTION INTRAMUSCULAR; INTRAVENOUS
Status: CANCELLED | OUTPATIENT
Start: 2023-08-04

## 2023-06-23 RX ORDER — ACETAMINOPHEN 325 MG/1
650 TABLET ORAL
Status: COMPLETED | OUTPATIENT
Start: 2023-06-23 | End: 2023-06-23

## 2023-06-23 RX ORDER — ACETAMINOPHEN 325 MG/1
650 TABLET ORAL
Status: CANCELLED | OUTPATIENT
Start: 2023-08-04

## 2023-06-23 RX ORDER — METHYLPREDNISOLONE SOD SUCC 125 MG
40 VIAL (EA) INJECTION
Status: CANCELLED | OUTPATIENT
Start: 2023-08-04

## 2023-06-23 RX ORDER — HEPARIN 100 UNIT/ML
500 SYRINGE INTRAVENOUS
Status: CANCELLED | OUTPATIENT
Start: 2023-08-04

## 2023-06-23 RX ADMIN — SODIUM CHLORIDE: 9 INJECTION, SOLUTION INTRAVENOUS at 09:06

## 2023-06-23 RX ADMIN — ACETAMINOPHEN 650 MG: 325 TABLET, FILM COATED ORAL at 09:06

## 2023-06-23 RX ADMIN — SODIUM CHLORIDE 580 MG: 9 INJECTION, SOLUTION INTRAVENOUS at 10:06

## 2023-06-23 RX ADMIN — DIPHENHYDRAMINE HYDROCHLORIDE 25 MG: 50 INJECTION INTRAMUSCULAR; INTRAVENOUS at 09:06

## 2023-06-23 NOTE — PLAN OF CARE
Pt tolerated Inflectra infusion well.  No s/s of infusion reaction noted.  Instructed to call MD with any problems

## 2023-07-05 DIAGNOSIS — G89.4 CHRONIC PAIN SYNDROME: Primary | ICD-10-CM

## 2023-07-05 DIAGNOSIS — K50.011 CROHN'S DISEASE OF SMALL INTESTINE WITH RECTAL BLEEDING: ICD-10-CM

## 2023-07-05 DIAGNOSIS — D84.9 IMMUNOCOMPROMISED: ICD-10-CM

## 2023-07-05 NOTE — TELEPHONE ENCOUNTER
----- Message from Nicole Hoff sent at 7/5/2023  4:18 PM CDT -----  Type:  RX Refill Request    Who Called:  pt   Refill or New Rx:  refill   RX Name and Strength:  norco 10 mg   How is the patient currently taking it? (ex. 1XDay):  as directed   Is this a 30 day or 90 day RX:  90   Preferred Pharmacy with phone number:    [facility] [place] - [address]  [address] [facility] [address]  Phone: [phone] Fax: [phone]     Local or Mail Order:  local   Ordering Provider:  melody Guerra Call Back Number:  [phone]    Additional Information:  please advise

## 2023-07-05 NOTE — TELEPHONE ENCOUNTER
----- Message from Jeff Moy sent at 7/5/2023 10:01 AM CDT -----  Contact: pt at [phone]  Type:  RX Refill Request    Who Called:  pt  Refill or New Rx:  refill  RX Name and Strength:  Norco 10 mg  How is the patient currently taking it? (ex. 1XDay):  3XDay  Is this a 30 day or 90 day RX:  90  Preferred Pharmacy with phone number:    [facility] - [place] - [address] [facility] [address]  Phone: [phone] Fax: [phone]  Local or Mail Order:  Local  Ordering Provider:  Nany Guerra Call Back Number:  [phone]  Additional Information:  pt is calling the office to get her Norco 10 mg refilled due to her being out and in a lot of pain.

## 2023-07-06 RX ORDER — HYDROCODONE BITARTRATE AND ACETAMINOPHEN 10; 325 MG/1; MG/1
1 TABLET ORAL EVERY 8 HOURS PRN
Qty: 90 TABLET | Refills: 0 | Status: SHIPPED | OUTPATIENT
Start: 2023-07-06 | End: 2023-07-10

## 2023-07-06 NOTE — TELEPHONE ENCOUNTER
----- Message from Delbert Soto sent at 7/6/2023 10:36 AM CDT -----  Regarding: refill  Type:  RX Refill Request    Who Called: Pt  Refill or New Rx:refill    RX Name and Strength:HYDROcodone-acetaminophen (NORCO)  mg per tablet  How is the patient currently taking it? (ex. 1XDay): as directed  Is this a 30 day or 90 day RX:90    Preferred Pharmacy with phone number:  [facility] [place] - [address] [facility] [address]  Phone: [phone] Fax: [phone]      Local or Mail Order:local  Ordering Provider:Devin  Would the patient rather a call back or a response via [facility]? Call back  Best Call Back Number:[phone]    Additional Information: Sts she just got out the hospital they didn't give her anything and she is out.  Please advise -- Thank you

## 2023-07-07 ENCOUNTER — TELEPHONE (OUTPATIENT)
Dept: RHEUMATOLOGY | Facility: CLINIC | Age: 58
End: 2023-07-07
Payer: MEDICARE

## 2023-07-07 NOTE — TELEPHONE ENCOUNTER
----- Message from Nazia Kim sent at 7/7/2023  8:46 AM CDT -----  Type: Needs Medical Advice  Who Called:  Pt  Best Call Back Number: [phone]  Additional Information: Pt states she do not take Norco, she have been getting percocet, states she is in a lot of pain, pl call bk to advise thanks

## 2023-07-07 NOTE — TELEPHONE ENCOUNTER
----- Message from Cathleen Smalls sent at 7/7/2023  3:51 PM CDT -----  Type:  Patient Returning Call    Who Called:  Pt    Who Left Message for Patient:  Maria T Perez    Does the patient know what this is regarding?:  Medication    Best Call Back Number:  [phone]    Additional Information:  Pt is calling back to speak with someone. Please call back to advise. Thanks!

## 2023-07-07 NOTE — TELEPHONE ENCOUNTER
----- Message from Nazia Kim sent at 7/7/2023  2:34 PM CDT -----  Type: Needs Medical Advice  Who Called:  Pt  Best Call Back Number: [phone]  Additional Information: Pt states the Dr sent in the wrong pain medicine she is not suppose to get RX HYDROcodone-acetaminophen (NORCO)  mg per tablet, she is suppose to get Percocet, pl call bk to advise thanks

## 2023-07-07 NOTE — TELEPHONE ENCOUNTER
----- Message from Leo Leach sent at 7/7/2023 12:21 PM CDT -----  Regarding: Refill  Contact: patient  Type:  RX Refill Request    Who Called: patient  Refill or New Rx:new order  RX Name and Strength:HYDROcodone-acetaminophen (NORCO)  mg per tablet/ Patient cannot take this medication  How is the patient currently taking it? (ex. 1XDay):  Is this a 30 day or 90 day RX:  Preferred Pharmacy with phone number:  [facility] [place] - [address] [address]  Phone: [phone] Fax: [phone]  Local or Mail Order:local  Ordering Provider:Shahnaz Beltran  Would the patient rather a call back or a response via [facility]? call  Best Call Back Number:[phone]  Additional Information: new order for percocet

## 2023-07-07 NOTE — TELEPHONE ENCOUNTER
Attempted to return patient call regarding the pain medication that was sent to the pharmacy. Unable to reach no answer.

## 2023-07-10 DIAGNOSIS — G89.4 CHRONIC PAIN SYNDROME: Primary | ICD-10-CM

## 2023-07-10 RX ORDER — OXYCODONE AND ACETAMINOPHEN 10; 325 MG/1; MG/1
1 TABLET ORAL EVERY 8 HOURS PRN
Qty: 90 TABLET | Refills: 0 | Status: SHIPPED | OUTPATIENT
Start: 2023-07-10 | End: 2023-07-18 | Stop reason: SDUPTHER

## 2023-07-10 NOTE — TELEPHONE ENCOUNTER
----- Message from Dejah Borregounders sent at 7/10/2023  8:17 AM CDT -----  Regarding: Medication  Contact: Pt  Type: Needs Medical Advice  Who Called:  Pt  Symptoms (please be specific):    How long has patient had these symptoms:    Pharmacy name and phone #:    [facility] [place] - [address] [facility]  [address] [facility]  [place] [address]  Phone: [phone] Fax: [phone]    Best Call Back Number: [phone]    Additional Information: Pt states she has the wrong prescription of Percocet that was sent to her pharmacy. She states she received HYDROcodone-acetaminophen (NORCO)  mg per tablet instead of her usually prescription. Please call patient to advise. Thanks!

## 2023-07-10 NOTE — TELEPHONE ENCOUNTER
Returned patient call regarding her pain medication. Patient stated she has not been on Norco since April that Dr Beltran changed her to percocet. Spoke with Clay and cancelled the norco script. Confirmed that [facility] does have percocet 10 mg on hand to fill patient script.----- Message from Lili Rodrigez sent at 7/8/2023  8:26 AM CDT -----  Contact: pt [phone]  Type: Needs Medical Advice  Who Called:  Pt     Best Call Back Number: [phone]    Additional Information: Pt stated that HYDROcodone-acetaminophen (NORCO)  mg per tablet was called into her pharmacy but the only pain meds that help her are percocet. Pt requesting percocet be called in to pharmacy. Pls call back and advise    [facility] [place] - [address] [facility]  [address] [facility]  [place] [address]  Phone: [phone] Fax: [phone]

## 2023-07-17 ENCOUNTER — OFFICE VISIT (OUTPATIENT)
Dept: RHEUMATOLOGY | Facility: CLINIC | Age: 58
End: 2023-07-17
Payer: MEDICARE

## 2023-07-17 VITALS
DIASTOLIC BLOOD PRESSURE: 75 MMHG | BODY MASS INDEX: 45.18 KG/M2 | HEIGHT: 63 IN | WEIGHT: 255 LBS | SYSTOLIC BLOOD PRESSURE: 119 MMHG | HEART RATE: 87 BPM

## 2023-07-17 DIAGNOSIS — K50.90 CROHN'S DISEASE WITHOUT COMPLICATION, UNSPECIFIED GASTROINTESTINAL TRACT LOCATION: ICD-10-CM

## 2023-07-17 DIAGNOSIS — D84.9 IMMUNOCOMPROMISED: ICD-10-CM

## 2023-07-17 DIAGNOSIS — J32.9 SINUSITIS, UNSPECIFIED CHRONICITY, UNSPECIFIED LOCATION: ICD-10-CM

## 2023-07-17 DIAGNOSIS — M02.39 REACTIVE ARTHRITIS OF MULTIPLE SITES: Primary | ICD-10-CM

## 2023-07-17 DIAGNOSIS — G47.00 INSOMNIA, UNSPECIFIED TYPE: ICD-10-CM

## 2023-07-17 DIAGNOSIS — G89.4 CHRONIC PAIN SYNDROME: ICD-10-CM

## 2023-07-17 PROCEDURE — 1160F RVW MEDS BY RX/DR IN RCRD: CPT | Mod: CPTII,S$GLB,, | Performed by: PHYSICIAN ASSISTANT

## 2023-07-17 PROCEDURE — 1159F MED LIST DOCD IN RCRD: CPT | Mod: CPTII,S$GLB,, | Performed by: PHYSICIAN ASSISTANT

## 2023-07-17 PROCEDURE — 3078F PR MOST RECENT DIASTOLIC BLOOD PRESSURE < 80 MM HG: ICD-10-PCS | Mod: CPTII,S$GLB,, | Performed by: PHYSICIAN ASSISTANT

## 2023-07-17 PROCEDURE — 3074F PR MOST RECENT SYSTOLIC BLOOD PRESSURE < 130 MM HG: ICD-10-PCS | Mod: CPTII,S$GLB,, | Performed by: PHYSICIAN ASSISTANT

## 2023-07-17 PROCEDURE — 99214 PR OFFICE/OUTPT VISIT, EST, LEVL IV, 30-39 MIN: ICD-10-PCS | Mod: S$GLB,,, | Performed by: PHYSICIAN ASSISTANT

## 2023-07-17 PROCEDURE — 4010F ACE/ARB THERAPY RXD/TAKEN: CPT | Mod: CPTII,S$GLB,, | Performed by: PHYSICIAN ASSISTANT

## 2023-07-17 PROCEDURE — 3074F SYST BP LT 130 MM HG: CPT | Mod: CPTII,S$GLB,, | Performed by: PHYSICIAN ASSISTANT

## 2023-07-17 PROCEDURE — 3008F BODY MASS INDEX DOCD: CPT | Mod: CPTII,S$GLB,, | Performed by: PHYSICIAN ASSISTANT

## 2023-07-17 PROCEDURE — 1159F PR MEDICATION LIST DOCUMENTED IN MEDICAL RECORD: ICD-10-PCS | Mod: CPTII,S$GLB,, | Performed by: PHYSICIAN ASSISTANT

## 2023-07-17 PROCEDURE — 99999 PR PBB SHADOW E&M-EST. PATIENT-LVL V: ICD-10-PCS | Mod: PBBFAC,,, | Performed by: PHYSICIAN ASSISTANT

## 2023-07-17 PROCEDURE — 3078F DIAST BP <80 MM HG: CPT | Mod: CPTII,S$GLB,, | Performed by: PHYSICIAN ASSISTANT

## 2023-07-17 PROCEDURE — 1160F PR REVIEW ALL MEDS BY PRESCRIBER/CLIN PHARMACIST DOCUMENTED: ICD-10-PCS | Mod: CPTII,S$GLB,, | Performed by: PHYSICIAN ASSISTANT

## 2023-07-17 PROCEDURE — 99999 PR PBB SHADOW E&M-EST. PATIENT-LVL V: CPT | Mod: PBBFAC,,, | Performed by: PHYSICIAN ASSISTANT

## 2023-07-17 PROCEDURE — 3008F PR BODY MASS INDEX (BMI) DOCUMENTED: ICD-10-PCS | Mod: CPTII,S$GLB,, | Performed by: PHYSICIAN ASSISTANT

## 2023-07-17 PROCEDURE — 4010F PR ACE/ARB THEARPY RXD/TAKEN: ICD-10-PCS | Mod: CPTII,S$GLB,, | Performed by: PHYSICIAN ASSISTANT

## 2023-07-17 PROCEDURE — 99214 OFFICE O/P EST MOD 30 MIN: CPT | Mod: S$GLB,,, | Performed by: PHYSICIAN ASSISTANT

## 2023-07-17 RX ORDER — SERTRALINE HYDROCHLORIDE 100 MG/1
150 TABLET, FILM COATED ORAL
COMMUNITY
Start: 2023-07-05

## 2023-07-17 RX ORDER — GABAPENTIN 800 MG/1
800 TABLET ORAL 3 TIMES DAILY
Qty: 270 TABLET | Refills: 1 | Status: SHIPPED | OUTPATIENT
Start: 2023-07-17 | End: 2024-07-16

## 2023-07-17 RX ORDER — HYDROXYCHLOROQUINE SULFATE 200 MG/1
200 TABLET, FILM COATED ORAL 2 TIMES DAILY
Qty: 180 TABLET | Refills: 3 | Status: SHIPPED | OUTPATIENT
Start: 2023-07-17 | End: 2024-02-26 | Stop reason: SDUPTHER

## 2023-07-17 RX ORDER — TIZANIDINE 4 MG/1
4 TABLET ORAL NIGHTLY PRN
Qty: 90 TABLET | Refills: 3 | Status: SHIPPED | OUTPATIENT
Start: 2023-07-17

## 2023-07-17 RX ORDER — PROMETHAZINE HYDROCHLORIDE AND DEXTROMETHORPHAN HYDROBROMIDE 6.25; 15 MG/5ML; MG/5ML
5 SYRUP ORAL EVERY 8 HOURS PRN
Qty: 118 ML | Refills: 0 | Status: SHIPPED | OUTPATIENT
Start: 2023-07-17 | End: 2023-07-27

## 2023-07-17 RX ORDER — AMOXICILLIN AND CLAVULANATE POTASSIUM 875; 125 MG/1; MG/1
1 TABLET, FILM COATED ORAL 2 TIMES DAILY
Qty: 20 TABLET | Refills: 0 | Status: SHIPPED | OUTPATIENT
Start: 2023-07-17 | End: 2023-07-27

## 2023-07-17 RX ORDER — TRAZODONE HYDROCHLORIDE 100 MG/1
100 TABLET ORAL NIGHTLY
Qty: 90 TABLET | Refills: 1 | Status: SHIPPED | OUTPATIENT
Start: 2023-07-17

## 2023-07-17 RX ORDER — ALPRAZOLAM 0.5 MG/1
0.5 TABLET ORAL 2 TIMES DAILY PRN
COMMUNITY
Start: 2023-07-06

## 2023-07-17 ASSESSMENT — ROUTINE ASSESSMENT OF PATIENT INDEX DATA (RAPID3)
PSYCHOLOGICAL DISTRESS SCORE: 4.4
MDHAQ FUNCTION SCORE: 1.1
PATIENT GLOBAL ASSESSMENT SCORE: 10
PAIN SCORE: 10
TOTAL RAPID3 SCORE: 7.89
FATIGUE SCORE: 1.1

## 2023-07-17 NOTE — PROGRESS NOTES
Subjective:       Patient ID: Amanda Mcguire is a 57 y.o. female.    Chief Complaint: Disease Management      Mrs. Mcguire is a 57 year old female who presents to clinic for follow up on reactive arthritis, osteoarthritis. She recently established care with Dr. Whitman for crohn's disease and is due for her 3rd infusion remicade induction later this week. She reports Remicade has been working well to keep GI sx under control. She complains of increased pain in her hands and knees since she has been off treatment.     She reports sinus pressure, nasal drainage, sore throat, and cough x 1 week. She is requesting cough medication and antibiotics.     Taking percocet for severe pain with adequate control of her symptoms. She is followed by orthopedics for knee and hip injections.    Current tx:  1. Remicade  2. norco  3. baclofen      Review of Systems   Constitutional: Negative for activity change, appetite change, fatigue and fever.   Eyes: Negative for visual disturbance.   Cardiovascular: Negative for chest pain, palpitations and leg swelling.   Gastrointestinal:  Negative for constipation, diarrhea and vomiting.   Musculoskeletal: Positive for arthralgias, joint swelling and myalgias.   Pulmonary: Positive shortness of breath, +cough and PND  Neurological: Negative for dizziness, weakness, light-headedness and headaches.   Skin: no rash  Psych: No anxiety        Objective:     Vitals:    07/17/23 1506   BP: 119/75   Pulse: 87       Past Medical History:   Diagnosis Date    Anxiety     Arthritis     Asthma     Crohn's disease     Depression     Encounter for blood transfusion     Headache     Hypertension     Myofascial pain syndrome, cervical 9/28/2022     Past Surgical History:   Procedure Laterality Date    COLONOSCOPY N/A 03/10/2022    Procedure: COLONOSCOPY;  Surgeon: Nilson Wiseman MD;  Location: [facility];  Service: Endoscopy;  Laterality: N/A; Repeat colonoscopy in 6 months because the bowel prep  was poor    COLONOSCOPY N/A 3/2/2023    Procedure: COLONOSCOPY;  Surgeon: Erik Garcia MD;  Location: [facility];  Service: Endoscopy;  Laterality: N/A;    ESOPHAGOGASTRODUODENOSCOPY N/A 03/10/2022    Procedure: EGD (ESOPHAGOGASTRODUODENOSCOPY);  Surgeon: Nilson Wiseman MD;  Location: [facility];  Service: Endoscopy;  Laterality: N/A; Repeat upper endoscopy in 8 weeks for surveillance    ESOPHAGOGASTRODUODENOSCOPY N/A 3/16/2023    Procedure: EGD (ESOPHAGOGASTRODUODENOSCOPY);  Surgeon: Erik Garcia MD;  Location: [facility];  Service: Endoscopy;  Laterality: N/A;     Physical Exam   Constitutional:   Obese body habitus.   Eyes: Right conjunctiva is not injected. Left conjunctiva is not injected.   Neck: No JVD present. No thyromegaly present.   Cardiovascular: Normal rate and regular rhythm.  Exam reveals no decreased pulses.    Pulmonary/Chest: She has no wheezes. She has no rhonchi. She has no rales.         Right Side Rheumatological Exam     Examination finds the shoulder, elbow, wrist, knee, 1st PIP, 1st MCP, 2nd MCP, 3rd MCP, 4th MCP and 5th MCP normal.    The patient is tender to palpation of the 2nd PIP, 3rd PIP, 4th PIP and 5th PIP    She has swelling of the 2nd PIP, 3rd PIP, 4th PIP and 5th PIP     Left Side Rheumatological Exam     Examination finds the shoulder, elbow, wrist, knee, 1st PIP, 1st MCP, 2nd MCP, 3rd MCP, 4th PIP, 4th MCP, 5th PIP and 5th MCP normal.    The patient is tender to palpation of the 2nd PIP and 3rd PIP.    She has swelling of the 2nd PIP and 3rd PIP       Lymphadenopathy:     She has no cervical adenopathy.   Neurological: Gait normal.   Skin: No rash noted.     Psychiatric: Mood and affect normal.   Musculoskeletal: She exhibits tenderness (multiple tender points in her muscles throughout).        Labs:  Component      Latest Ref Rng & Units 6/23/2023   WBC      3.90 - 12.70 K/uL 11.41   RBC      4.00 - 5.40 M/uL 5.07   Hemoglobin      12.0 - 16.0 g/dL 12.9    Hematocrit      37.0 - 48.5 % 40.0   MCV      82 - 98 fL 79 (L)   MCH      27.0 - 31.0 pg 25.4 (L)   MCHC      32.0 - 36.0 g/dL 32.3   RDW      11.5 - 14.5 % 21.4 (H)   Platelets      150 - 450 K/uL 320   MPV      9.2 - 12.9 fL 10.6   Immature Granulocytes      0.0 - 0.5 % 1.6 (H)   Gran # (ANC)      1.8 - 7.7 K/uL 7.4   Immature Grans (Abs)      0.00 - 0.04 K/uL 0.18 (H)   Lymph #      1.0 - 4.8 K/uL 2.6   Mono #      0.3 - 1.0 K/uL 1.0   Eos #      0.0 - 0.5 K/uL 0.1   Baso #      0.00 - 0.20 K/uL 0.06   nRBC      0 /100 WBC 0   Gran %      38.0 - 73.0 % 64.9   Lymph %      18.0 - 48.0 % 23.0   Mono %      4.0 - 15.0 % 9.0   Eosinophil %      0.0 - 8.0 % 1.0   Basophil %      0.0 - 1.9 % 0.5   Differential Method       Automated   Sodium      136 - 145 mmol/L 145   Potassium      3.5 - 5.1 mmol/L 3.6   Chloride      95 - 110 mmol/L 110   CO2      23 - 29 mmol/L 24   Glucose      70 - 110 mg/dL 92   BUN      6 - 20 mg/dL 21 (H)   Creatinine      0.5 - 1.4 mg/dL 0.8   Calcium      8.7 - 10.5 mg/dL 9.3   PROTEIN TOTAL      6.0 - 8.4 g/dL 7.1   Albumin      3.5 - 5.2 g/dL 3.4 (L)   BILIRUBIN TOTAL      0.1 - 1.0 mg/dL 0.4   Alkaline Phosphatase      55 - 135 U/L 85   AST      10 - 40 U/L 21   ALT      10 - 44 U/L 31   eGFR      >60 mL/min/1.73 m:2 >60.0   Anion Gap      8 - 16 mmol/L 11         ASSESSMENT:    1. Reactive arthritis of multiple sites    2. Crohn's disease without complication, unspecified gastrointestinal tract location    3. Insomnia, unspecified type    4. Sinusitis, unspecified chronicity, unspecified location    5. Immunocompromised    6. Chronic pain syndrome              Plan:       Reactive arthritis of multiple sites  -     hydrOXYchloroQUINE (PLAQUENIL) 200 mg tablet; Take 1 tablet (200 mg total) by mouth 2 (two) times daily.  Dispense: 180 tablet; Refill: 3  -     gabapentin (NEURONTIN) 800 MG tablet; Take 1 tablet (800 mg total) by mouth 3 (three) times daily.  Dispense: 270 tablet; Refill:  1  -     tiZANidine (ZANAFLEX) 4 MG tablet; Take 1 tablet (4 mg total) by mouth nightly as needed.  Dispense: 90 tablet; Refill: 3  -     CBC Auto Differential; Future; Expected date: 07/17/2023  -     Comprehensive Metabolic Panel; Future; Expected date: 07/17/2023  -     C-Reactive Protein; Future; Expected date: 07/17/2023  -     Sedimentation rate; Future; Expected date: 07/17/2023    Crohn's disease without complication, unspecified gastrointestinal tract location    Insomnia, unspecified type  -     traZODone (DESYREL) 100 MG tablet; Take 1 tablet (100 mg total) by mouth every evening.  Dispense: 90 tablet; Refill: 1  -     tiZANidine (ZANAFLEX) 4 MG tablet; Take 1 tablet (4 mg total) by mouth nightly as needed.  Dispense: 90 tablet; Refill: 3    Sinusitis, unspecified chronicity, unspecified location  -     promethazine-dextromethorphan (PROMETHAZINE-DM) 6.25-15 mg/5 mL Syrp; Take 5 mLs by mouth every 8 (eight) hours as needed (cough).  Dispense: 118 mL; Refill: 0  -     amoxicillin-clavulanate 875-125mg (AUGMENTIN) 875-125 mg per tablet; Take 1 tablet by mouth 2 (two) times daily. for 10 days  Dispense: 20 tablet; Refill: 0    Immunocompromised    Chronic pain syndrome          Assessment:  57 year old female with   Reactive arthritis, osteoarthritis, erythema nodosum on plaquenil  --chronic pain syndrome on norco 10/325  --HTN  --hx of asthma  --hx of pancreatitis 9/2019  --NEW diagnosis of Crohn's disease 10/2020    Plan:  1. Cont Remicade per GI, Cont plaquenil 200 mg bid  2. Augmentin, promethazine cough syrup sent.   3. Cont. percocet per MD.  I have checked [place] prescription monitoring program site and no unusual or abnormal behavior has occurred pt understand the risk and benefits of taking opioid medications and has decided to continue the medication.  4. Cont gabapentin, cont tizanidine  5. Cont trazodone    Follow up:  3 with me, 6 mo Dr. Devin hodgson 1 week prior

## 2023-07-19 DIAGNOSIS — L29.9 ITCHING: ICD-10-CM

## 2023-07-19 RX ORDER — OXYCODONE AND ACETAMINOPHEN 10; 325 MG/1; MG/1
1 TABLET ORAL EVERY 8 HOURS PRN
Qty: 90 TABLET | Refills: 0 | Status: SHIPPED | OUTPATIENT
Start: 2023-08-10 | End: 2023-08-15

## 2023-07-19 RX ORDER — OXYCODONE AND ACETAMINOPHEN 10; 325 MG/1; MG/1
1 TABLET ORAL EVERY 8 HOURS PRN
Qty: 90 TABLET | Refills: 0 | Status: SHIPPED | OUTPATIENT
Start: 2023-10-09 | End: 2023-11-03 | Stop reason: SDUPTHER

## 2023-07-19 RX ORDER — OXYCODONE AND ACETAMINOPHEN 10; 325 MG/1; MG/1
1 TABLET ORAL EVERY 8 HOURS PRN
Qty: 90 TABLET | Refills: 0 | Status: SHIPPED | OUTPATIENT
Start: 2023-09-09 | End: 2023-08-15

## 2023-07-20 RX ORDER — HYDROXYZINE PAMOATE 25 MG/1
CAPSULE ORAL
Qty: 45 CAPSULE | Refills: 3 | Status: SHIPPED | OUTPATIENT
Start: 2023-07-20

## 2023-07-21 ENCOUNTER — INFUSION (OUTPATIENT)
Dept: INFUSION THERAPY | Facility: HOSPITAL | Age: 58
End: 2023-07-21
Attending: INTERNAL MEDICINE
Payer: MEDICARE

## 2023-07-21 VITALS
BODY MASS INDEX: 45.18 KG/M2 | HEIGHT: 63 IN | DIASTOLIC BLOOD PRESSURE: 75 MMHG | HEART RATE: 74 BPM | TEMPERATURE: 98 F | SYSTOLIC BLOOD PRESSURE: 122 MMHG | RESPIRATION RATE: 18 BRPM | WEIGHT: 255 LBS

## 2023-07-21 DIAGNOSIS — K50.819 CROHN'S DISEASE OF SMALL AND LARGE INTESTINES WITH COMPLICATION: Primary | ICD-10-CM

## 2023-07-21 LAB
ALBUMIN SERPL BCP-MCNC: 3.4 G/DL (ref 3.5–5.2)
ALP SERPL-CCNC: 89 U/L (ref 55–135)
ALT SERPL W/O P-5'-P-CCNC: 21 U/L (ref 10–44)
ANION GAP SERPL CALC-SCNC: 12 MMOL/L (ref 8–16)
AST SERPL-CCNC: 18 U/L (ref 10–40)
BASOPHILS # BLD AUTO: 0.05 K/UL (ref 0–0.2)
BASOPHILS NFR BLD: 0.5 % (ref 0–1.9)
BILIRUB SERPL-MCNC: 0.3 MG/DL (ref 0.1–1)
BUN SERPL-MCNC: 17 MG/DL (ref 6–20)
CALCIUM SERPL-MCNC: 9.4 MG/DL (ref 8.7–10.5)
CHLORIDE SERPL-SCNC: 110 MMOL/L (ref 95–110)
CO2 SERPL-SCNC: 22 MMOL/L (ref 23–29)
CREAT SERPL-MCNC: 0.8 MG/DL (ref 0.5–1.4)
DIFFERENTIAL METHOD: ABNORMAL
EOSINOPHIL # BLD AUTO: 0.5 K/UL (ref 0–0.5)
EOSINOPHIL NFR BLD: 4.7 % (ref 0–8)
ERYTHROCYTE [DISTWIDTH] IN BLOOD BY AUTOMATED COUNT: 20.3 % (ref 11.5–14.5)
EST. GFR  (NO RACE VARIABLE): >60 ML/MIN/1.73 M^2
GLUCOSE SERPL-MCNC: 76 MG/DL (ref 70–110)
HBV SURFACE AG SERPL QL IA: NORMAL
HCT VFR BLD AUTO: 38.3 % (ref 37–48.5)
HGB BLD-MCNC: 12.3 G/DL (ref 12–16)
IMM GRANULOCYTES # BLD AUTO: 0.08 K/UL (ref 0–0.04)
IMM GRANULOCYTES NFR BLD AUTO: 0.8 % (ref 0–0.5)
LYMPHOCYTES # BLD AUTO: 2.6 K/UL (ref 1–4.8)
LYMPHOCYTES NFR BLD: 26.5 % (ref 18–48)
MCH RBC QN AUTO: 25.5 PG (ref 27–31)
MCHC RBC AUTO-ENTMCNC: 32.1 G/DL (ref 32–36)
MCV RBC AUTO: 79 FL (ref 82–98)
MONOCYTES # BLD AUTO: 1 K/UL (ref 0.3–1)
MONOCYTES NFR BLD: 10 % (ref 4–15)
NEUTROPHILS # BLD AUTO: 5.6 K/UL (ref 1.8–7.7)
NEUTROPHILS NFR BLD: 57.5 % (ref 38–73)
NRBC BLD-RTO: 0 /100 WBC
PLATELET # BLD AUTO: 294 K/UL (ref 150–450)
PMV BLD AUTO: 10 FL (ref 9.2–12.9)
POTASSIUM SERPL-SCNC: 3.7 MMOL/L (ref 3.5–5.1)
PROT SERPL-MCNC: 7.5 G/DL (ref 6–8.4)
RBC # BLD AUTO: 4.83 M/UL (ref 4–5.4)
SODIUM SERPL-SCNC: 144 MMOL/L (ref 136–145)
WBC # BLD AUTO: 9.71 K/UL (ref 3.9–12.7)

## 2023-07-21 PROCEDURE — 80053 COMPREHEN METABOLIC PANEL: CPT | Mod: PN | Performed by: INTERNAL MEDICINE

## 2023-07-21 PROCEDURE — 85025 COMPLETE CBC W/AUTO DIFF WBC: CPT | Mod: PN | Performed by: INTERNAL MEDICINE

## 2023-07-21 PROCEDURE — 25000003 PHARM REV CODE 250: Mod: PN | Performed by: INTERNAL MEDICINE

## 2023-07-21 PROCEDURE — 63600175 PHARM REV CODE 636 W HCPCS: Mod: JZ,JG,PN | Performed by: INTERNAL MEDICINE

## 2023-07-21 PROCEDURE — 96415 CHEMO IV INFUSION ADDL HR: CPT | Mod: PN

## 2023-07-21 PROCEDURE — 96375 TX/PRO/DX INJ NEW DRUG ADDON: CPT | Mod: PN

## 2023-07-21 PROCEDURE — 96413 CHEMO IV INFUSION 1 HR: CPT | Mod: PN

## 2023-07-21 PROCEDURE — A4216 STERILE WATER/SALINE, 10 ML: HCPCS | Mod: PN | Performed by: INTERNAL MEDICINE

## 2023-07-21 PROCEDURE — 87340 HEPATITIS B SURFACE AG IA: CPT | Performed by: INTERNAL MEDICINE

## 2023-07-21 RX ORDER — SODIUM CHLORIDE 0.9 % (FLUSH) 0.9 %
10 SYRINGE (ML) INJECTION
Status: CANCELLED | OUTPATIENT
Start: 2023-08-04

## 2023-07-21 RX ORDER — ACETAMINOPHEN 325 MG/1
650 TABLET ORAL
Status: CANCELLED | OUTPATIENT
Start: 2023-08-04

## 2023-07-21 RX ORDER — METHYLPREDNISOLONE SOD SUCC 125 MG
40 VIAL (EA) INJECTION
Status: CANCELLED | OUTPATIENT
Start: 2023-08-04

## 2023-07-21 RX ORDER — IPRATROPIUM BROMIDE AND ALBUTEROL SULFATE 2.5; .5 MG/3ML; MG/3ML
3 SOLUTION RESPIRATORY (INHALATION)
Status: CANCELLED | OUTPATIENT
Start: 2023-08-04

## 2023-07-21 RX ORDER — EPINEPHRINE 1 MG/ML
0.3 INJECTION, SOLUTION, CONCENTRATE INTRAVENOUS
Status: CANCELLED | OUTPATIENT
Start: 2023-08-04

## 2023-07-21 RX ORDER — HEPARIN 100 UNIT/ML
500 SYRINGE INTRAVENOUS
Status: CANCELLED | OUTPATIENT
Start: 2023-08-04

## 2023-07-21 RX ORDER — DIPHENHYDRAMINE HYDROCHLORIDE 50 MG/ML
25 INJECTION INTRAMUSCULAR; INTRAVENOUS
Status: CANCELLED | OUTPATIENT
Start: 2023-08-04

## 2023-07-21 RX ORDER — DIPHENHYDRAMINE HYDROCHLORIDE 50 MG/ML
25 INJECTION INTRAMUSCULAR; INTRAVENOUS
Status: COMPLETED | OUTPATIENT
Start: 2023-07-21 | End: 2023-07-21

## 2023-07-21 RX ORDER — ACETAMINOPHEN 325 MG/1
650 TABLET ORAL
Status: COMPLETED | OUTPATIENT
Start: 2023-07-21 | End: 2023-07-21

## 2023-07-21 RX ORDER — SODIUM CHLORIDE 0.9 % (FLUSH) 0.9 %
10 SYRINGE (ML) INJECTION
Status: DISCONTINUED | OUTPATIENT
Start: 2023-07-21 | End: 2023-07-21 | Stop reason: HOSPADM

## 2023-07-21 RX ADMIN — ACETAMINOPHEN 650 MG: 325 TABLET, FILM COATED ORAL at 09:07

## 2023-07-21 RX ADMIN — DIPHENHYDRAMINE HYDROCHLORIDE 25 MG: 50 INJECTION INTRAMUSCULAR; INTRAVENOUS at 09:07

## 2023-07-21 RX ADMIN — SODIUM CHLORIDE 580 MG: 9 INJECTION, SOLUTION INTRAVENOUS at 09:07

## 2023-07-21 RX ADMIN — SODIUM CHLORIDE: 9 INJECTION, SOLUTION INTRAVENOUS at 09:07

## 2023-07-21 RX ADMIN — Medication 10 ML: at 09:07

## 2023-07-21 NOTE — PLAN OF CARE
Problem: Adult Inpatient Plan of Care  Goal: Plan of Care Review  7/21/2023 1222 by Malina Sanford, RN  Outcome: Ongoing, Progressing  Flowsheets (Taken 7/21/2023 1200)  Plan of Care Reviewed With: patient   Pt tolerated Inflectra infusion well, NAD. No new c/o voiced. Pt given a schedule and reviewed, pt verbalized understanding. Pt ambulated out of the clinic without difficulty.

## 2023-07-21 NOTE — PLAN OF CARE
Problem: Fatigue  Goal: Improved Activity Tolerance  Intervention: Promote Improved Energy  Flowsheets (Taken 7/21/2023 0943)  Fatigue Management:   frequent rest breaks encouraged   paced activity encouraged   fatigue-related activity identified   activity schedule adjusted  Sleep/Rest Enhancement:   relaxation techniques promoted   natural light exposure provided   regular sleep/rest pattern promoted  Activity Management:   Ambulated -L4   Ambulated to bathroom - L4   Ambulated in riley - L4   Up in stretcher chair - L1     Problem: Adult Inpatient Plan of Care  Goal: Patient-Specific Goal (Individualized)  Outcome: Ongoing, Progressing  Flowsheets (Taken 7/21/2023 0943)  Anxieties, Fears or Concerns: none  Individualized Care Needs: recliner, warm blanket, pillow, tv, snacks, conversation

## 2023-08-15 ENCOUNTER — OFFICE VISIT (OUTPATIENT)
Dept: ORTHOPEDICS | Facility: CLINIC | Age: 58
End: 2023-08-15
Payer: MEDICARE

## 2023-08-15 VITALS — WEIGHT: 255 LBS | HEIGHT: 63 IN | BODY MASS INDEX: 45.18 KG/M2

## 2023-08-15 DIAGNOSIS — M17.11 PRIMARY OSTEOARTHRITIS OF RIGHT KNEE: ICD-10-CM

## 2023-08-15 DIAGNOSIS — M70.61 TROCHANTERIC BURSITIS OF BOTH HIPS: Primary | ICD-10-CM

## 2023-08-15 DIAGNOSIS — M17.12 PRIMARY OSTEOARTHRITIS OF LEFT KNEE: ICD-10-CM

## 2023-08-15 DIAGNOSIS — M70.62 TROCHANTERIC BURSITIS OF BOTH HIPS: Primary | ICD-10-CM

## 2023-08-15 PROCEDURE — 99999 PR PBB SHADOW E&M-EST. PATIENT-LVL IV: CPT | Mod: PBBFAC,,, | Performed by: NURSE PRACTITIONER

## 2023-08-15 PROCEDURE — 20610 LARGE JOINT ASPIRATION/INJECTION: BILATERAL GREATER TROCHANTERIC BURSA: ICD-10-PCS | Mod: 50,S$GLB,ICN, | Performed by: NURSE PRACTITIONER

## 2023-08-15 PROCEDURE — 99213 OFFICE O/P EST LOW 20 MIN: CPT | Mod: 25,S$GLB,ICN, | Performed by: NURSE PRACTITIONER

## 2023-08-15 PROCEDURE — 1159F PR MEDICATION LIST DOCUMENTED IN MEDICAL RECORD: ICD-10-PCS | Mod: CPTII,S$GLB,ICN, | Performed by: NURSE PRACTITIONER

## 2023-08-15 PROCEDURE — 99999 PR PBB SHADOW E&M-EST. PATIENT-LVL IV: ICD-10-PCS | Mod: PBBFAC,,, | Performed by: NURSE PRACTITIONER

## 2023-08-15 PROCEDURE — 4010F ACE/ARB THERAPY RXD/TAKEN: CPT | Mod: CPTII,S$GLB,ICN, | Performed by: NURSE PRACTITIONER

## 2023-08-15 PROCEDURE — 3008F BODY MASS INDEX DOCD: CPT | Mod: CPTII,S$GLB,ICN, | Performed by: NURSE PRACTITIONER

## 2023-08-15 PROCEDURE — 20610 DRAIN/INJ JOINT/BURSA W/O US: CPT | Mod: 50,S$GLB,ICN, | Performed by: NURSE PRACTITIONER

## 2023-08-15 PROCEDURE — 3008F PR BODY MASS INDEX (BMI) DOCUMENTED: ICD-10-PCS | Mod: CPTII,S$GLB,ICN, | Performed by: NURSE PRACTITIONER

## 2023-08-15 PROCEDURE — 1160F PR REVIEW ALL MEDS BY PRESCRIBER/CLIN PHARMACIST DOCUMENTED: ICD-10-PCS | Mod: CPTII,S$GLB,ICN, | Performed by: NURSE PRACTITIONER

## 2023-08-15 PROCEDURE — 1160F RVW MEDS BY RX/DR IN RCRD: CPT | Mod: CPTII,S$GLB,ICN, | Performed by: NURSE PRACTITIONER

## 2023-08-15 PROCEDURE — 20610 DRAIN/INJ JOINT/BURSA W/O US: CPT | Mod: 50,59,S$GLB,ICN | Performed by: NURSE PRACTITIONER

## 2023-08-15 PROCEDURE — 99213 PR OFFICE/OUTPT VISIT, EST, LEVL III, 20-29 MIN: ICD-10-PCS | Mod: 25,S$GLB,ICN, | Performed by: NURSE PRACTITIONER

## 2023-08-15 PROCEDURE — 1159F MED LIST DOCD IN RCRD: CPT | Mod: CPTII,S$GLB,ICN, | Performed by: NURSE PRACTITIONER

## 2023-08-15 PROCEDURE — 4010F PR ACE/ARB THEARPY RXD/TAKEN: ICD-10-PCS | Mod: CPTII,S$GLB,ICN, | Performed by: NURSE PRACTITIONER

## 2023-08-15 RX ORDER — TRIAMCINOLONE ACETONIDE 40 MG/ML
40 INJECTION, SUSPENSION INTRA-ARTICULAR; INTRAMUSCULAR
Status: DISCONTINUED | OUTPATIENT
Start: 2023-08-15 | End: 2023-08-15 | Stop reason: HOSPADM

## 2023-08-15 RX ORDER — FAMOTIDINE 40 MG/1
40 TABLET, FILM COATED ORAL DAILY PRN
COMMUNITY
Start: 2023-07-28

## 2023-08-15 RX ORDER — ATORVASTATIN CALCIUM 20 MG/1
20 TABLET, FILM COATED ORAL NIGHTLY
COMMUNITY
Start: 2023-07-03

## 2023-08-15 RX ORDER — PROMETHAZINE HYDROCHLORIDE 50 MG/1
50 TABLET ORAL EVERY 6 HOURS PRN
COMMUNITY
Start: 2023-08-09 | End: 2024-02-26

## 2023-08-15 RX ADMIN — TRIAMCINOLONE ACETONIDE 40 MG: 40 INJECTION, SUSPENSION INTRA-ARTICULAR; INTRAMUSCULAR at 09:08

## 2023-08-15 NOTE — PROGRESS NOTES
Chief Complaint   Patient presents with    Right Knee - Pain    Left Knee - Pain    Right Hip - Pain    Left Hip - Pain         HPI:   This is a 57 y.o. who presents to clinic today complaining of bilateral hip and knee pain for 2 weeks after no known trauma. Pain is progressively worsening. No numbness or tingling. No associated signs or symptoms.    Past Medical History:   Diagnosis Date    Anxiety     Arthritis     Asthma     Crohn's disease     Depression     Encounter for blood transfusion     Headache     Hypertension     Myofascial pain syndrome, cervical 9/28/2022     Past Surgical History:   Procedure Laterality Date    COLONOSCOPY N/A 03/10/2022    Procedure: COLONOSCOPY;  Surgeon: Nilson Wiseman MD;  Location: [facility];  Service: Endoscopy;  Laterality: N/A; Repeat colonoscopy in 6 months because the bowel prep was poor    COLONOSCOPY N/A 3/2/2023    Procedure: COLONOSCOPY;  Surgeon: Erik Garcia MD;  Location: [facility];  Service: Endoscopy;  Laterality: N/A;    ESOPHAGOGASTRODUODENOSCOPY N/A 03/10/2022    Procedure: EGD (ESOPHAGOGASTRODUODENOSCOPY);  Surgeon: Nilson Wiseman MD;  Location: [facility];  Service: Endoscopy;  Laterality: N/A; Repeat upper endoscopy in 8 weeks for surveillance    ESOPHAGOGASTRODUODENOSCOPY N/A 3/16/2023    Procedure: EGD (ESOPHAGOGASTRODUODENOSCOPY);  Surgeon: Erik Garcia MD;  Location: [facility];  Service: Endoscopy;  Laterality: N/A;     Current Outpatient Medications on File Prior to Visit   Medication Sig Dispense Refill    albuterol (PROVENTIL/VENTOLIN HFA) 90 mcg/actuation inhaler Inhale 2 puffs into the lungs every 6 (six) hours as needed for Wheezing. Rescue 18 g 6    ALPRAZolam (XANAX) 0.5 MG tablet Take 0.5 mg by mouth 2 (two) times daily as needed.      amLODIPine (NORVASC) 10 MG tablet Take 1 tablet (10 mg total) by mouth daily      atorvastatin (LIPITOR) 20 MG tablet Take 20 mg by mouth every evening.      budesonide 4 mg CpDR Take 4 mg by  mouth 2 (two) times daily as needed (crohn's  flare). 30 capsule 3    chlorhexidine (PERIDEX) 0.12 % solution swish and spit 15 MILLILITERS in the mouth or throat TWICE DAILY FOR FOURTEEN DAYS 473 mL 6    diclofenac sodium (VOLTAREN) 1 % Gel APPLY TOPICALLY FOUR TIMES DAILY 300 g 3    dicyclomine (BENTYL) 20 mg tablet Take 20 mg by mouth once daily.       diltiaZEM (CARDIZEM CD) 240 MG 24 hr capsule Take 240 mg by mouth.      epinephrine (EPIPEN) 0.3 mg/0.3 mL AtIn Inject 0.3 mg into the muscle.      famotidine (PEPCID) 40 MG tablet Take 40 mg by mouth daily as needed.      fluticasone-salmeterol 500-50 mcg/dose (ADVAIR) 500-50 mcg/dose DsDv diskus inhaler Inhale 1 puff into the lungs.      furosemide (LASIX) 20 MG tablet Take 1 tablet (20 mg total) by mouth daily      gabapentin (NEURONTIN) 800 MG tablet Take 1 tablet (800 mg total) by mouth 3 (three) times daily. 270 tablet 1    hydrOXYchloroQUINE (PLAQUENIL) 200 mg tablet Take 1 tablet (200 mg total) by mouth 2 (two) times daily. 180 tablet 3    hydrOXYzine pamoate (VISTARIL) 25 MG Cap TAKE ONE CAPSULE BY MOUTH THREE TIMES DAILY AS NEEDED FOR ITCHING 45 capsule 3    ipratropium-albuteroL (COMBIVENT)  mcg/actuation inhaler Inhale 1 puff into the lungs 4 (four) times daily. Rescue 4 g 12    irbesartan (AVAPRO) 150 MG tablet TAKE 1 TABLET BY MOUTH EVERY NIGHT AT BEDTIME FOR BLOOD PRESSURE      lidocaine HCl 2% (LIDOCAINE VISCOUS) 2 % Soln by Mucous Membrane route every 8 (eight) hours as needed. 100 mL 0    memantine (NAMENDA) 5 MG Tab Take 1 tablet (5 mg total) by mouth 2 (two) times daily. 60 tablet 11    montelukast (SINGULAIR) 10 mg tablet Take 10 mg by mouth every evening.      nebulizer and compressor Siomara Use as directed      neomycin-polymyxin-hydrocortisone (CORTISPORIN) otic solution PLACE 2 DROPS IN AFFECTED EAR FOUR TIMES DAILY FOR 5-7 DAYS      [START ON 10/9/2023] oxyCODONE-acetaminophen (PERCOCET)  mg per tablet Take 1 tablet by mouth  every 8 (eight) hours as needed for Pain. 90 tablet 0    pantoprazole (PROTONIX) 40 MG tablet Take 1 tablet (40 mg total) by mouth once daily. 30 tablet 3    phentermine (ADIPEX-P) 37.5 mg tablet Take 37.5 mg by mouth once daily.      potassium chloride SA (K-DUR,KLOR-CON M) 10 MEQ tablet Take 10 mEq by mouth 2 (two) times daily.      prochlorperazine (COMPAZINE) 10 MG tablet Take 1 tablet (10 mg total) by mouth every 6 (six) hours as needed (migraine or nausea). 60 tablet 11    promethazine (PHENERGAN) 50 MG tablet Take 50 mg by mouth every 6 (six) hours as needed.      rizatriptan (MAXALT) 10 MG tablet 1 tab PO PRN migraine. May repeat every 2 hours for max 3 tabs in 24 hours. Use no more than 10 days per month. 10 tablet 11    sertraline (ZOLOFT) 100 MG tablet Take 150 mg by mouth.      tiZANidine (ZANAFLEX) 4 MG tablet Take 1 tablet (4 mg total) by mouth nightly as needed. 90 tablet 3    traZODone (DESYREL) 100 MG tablet Take 1 tablet (100 mg total) by mouth every evening. 90 tablet 1    triamcinolone acetonide 0.1% (KENALOG) 0.1 % ointment Apply topically 2 (two) times daily. 80 g 3    [DISCONTINUED] atorvastatin (LIPITOR) 40 MG tablet Take 1 tablet (40 mg total) by mouth daily      [DISCONTINUED] oxyCODONE-acetaminophen (PERCOCET)  mg per tablet Take 1 tablet by mouth every 8 (eight) hours as needed for Pain. 90 tablet 0    [DISCONTINUED] oxyCODONE-acetaminophen (PERCOCET)  mg per tablet Take 1 tablet by mouth every 8 (eight) hours as needed for Pain. 90 tablet 0     Current Facility-Administered Medications on File Prior to Visit   Medication Dose Route Frequency Provider Last Rate Last Admin    onabotulinumtoxina injection 200 Units  200 Units Intramuscular q12 weeks Heavenly Fletcher, NP   200 Units at 09/30/22 0958     Review of patient's allergies indicates:   Allergen Reactions    Amlodipine-benazepril Swelling    Ace inhibitors Swelling     Angioedema; was taking Benazepril.    Tramadol  Itching     Family History   Problem Relation Age of Onset    Cancer Mother     Diabetes Mother     Arthritis Mother     Hypertension Mother     Cancer Father     Diabetes Father     Arthritis Father     Hypertension Father     Celiac disease Neg Hx     Colon cancer Neg Hx     Crohn's disease Neg Hx     Esophageal cancer Neg Hx     Stomach cancer Neg Hx     Ulcerative colitis Neg Hx      Social History     Socioeconomic History    Marital status:    Tobacco Use    Smoking status: Never    Smokeless tobacco: Never   Substance and Sexual Activity    Alcohol use: Yes     Alcohol/week: 0.0 - 2.0 standard drinks of alcohol    Drug use: Yes     Types: Hydrocodone    Sexual activity: Never       Review of Systems:  Constitutional:  Denies fever or chills   Eyes:  Denies change in visual acuity   HENT:  Denies nasal congestion or sore throat   Respiratory:  Denies cough or shortness of breath   Cardiovascular:  Denies chest pain or edema   GI:  Denies abdominal pain, nausea, vomiting, bloody stools or diarrhea   :  Denies dysuria   Integument:  Denies rash   Neurologic:  Denies headache, focal weakness or sensory changes   Endocrine:  Denies polyuria or polydipsia   Lymphatic:  Denies swollen glands   Psychiatric:  Denies depression or anxiety     Physical Exam:   Constitutional:  Well developed, well nourished, no acute distress, non-toxic appearance   Integument:  Well hydrated, no rash   Lymphatic:  No lymphadenopathy noted   Neurologic:  Alert & oriented x 3, CN 2-12 normal, normal motor function, normal sensory function, no focal deficits noted   Psychiatric:  Speech and behavior appropriate   Eyes: EOMI  Gi: abdomen soft    Bilateral Knee Exam    bilateral Knee Exam     Tenderness   The patient is experiencing tenderness in the medial joint line.    Range of Motion   Extension: abnormal   Flexion: abnormal     Muscle Strength     The patient has normal knee strength.    Tests   Ellie:  Medial - positive    Lachman:  Anterior - negative      Varus: negative  Valgus: negative  Patellar Apprehension: negative    Other   Erythema: absent  Sensation: normal  Pulse: present  Swelling: mild      bilateral Knee Exam   bilateral knee exam performed same as contralateral side and is normal.    Bilateral Hip Exam Performed    Bilateral Hip Exam     Tenderness   The patient is experiencing tenderness in the greater trochanter.    Range of Motion   The patient has normal hip ROM.    Muscle Strength   Abduction: 4/5     Other   Erythema: absent  Sensation: normal  Pulse: present    Bilateral Hip Exam   Hip exam performed same as contralateral hip and is normal.             Trochanteric bursitis of both hips  -     Large Joint Aspiration/Injection: bilateral greater trochanteric bursa    Primary osteoarthritis of right knee  -     Large Joint Aspiration/Injection: bilateral knee    Primary osteoarthritis of left knee  -     Large Joint Aspiration/Injection: bilateral knee        Using an aseptic technique, I injected 5 cc of lidocaine 1% without and 1 cc of kenalog 40mg into the bilateral Knee. The patient tolerated this well.     Using an aseptic technique, I injected 5 cc of lidocaine 1% without and 1 cc of kenalog 40mg into the bilateral Hip. The patient tolerated this well.     Rtc in 3 months.

## 2023-08-15 NOTE — PROCEDURES
Large Joint Aspiration/Injection: bilateral greater trochanteric bursa    Date/Time: 8/15/2023 9:40 AM    Performed by: Caro Nguyen FNP  Authorized by: Caro Nguyen FNP    Consent Done?:  Yes (Verbal)  Indications:  Pain  Timeout: prior to procedure the correct patient, procedure, and site was verified    Prep: patient was prepped and draped in usual sterile fashion      Local anesthesia used?: Yes    Local anesthetic:  Lidocaine 1% without epinephrine  Anesthetic total (ml):  5      Details:  Needle Size:  21 G  Approach:  Lateral  Location:  Hip  Laterality:  Bilateral  Site:  Bilateral greater trochanteric bursa  Medications (Right):  40 mg triamcinolone acetonide 40 mg/mL  Medications (Left):  40 mg triamcinolone acetonide 40 mg/mL  Patient tolerance:  Patient tolerated the procedure well with no immediate complications  Large Joint Aspiration/Injection: bilateral knee    Date/Time: 8/15/2023 9:40 AM    Performed by: Caro Nguyen FNP  Authorized by: Caro Nguyen FNP    Consent Done?:  Yes (Verbal)  Indications:  Pain  Timeout: prior to procedure the correct patient, procedure, and site was verified    Prep: patient was prepped and draped in usual sterile fashion      Local anesthesia used?: Yes    Local anesthetic:  Lidocaine 1% without epinephrine  Anesthetic total (ml):  5      Details:  Needle Size:  21 G  Approach:  Anterolateral  Location:  Knee  Laterality:  Bilateral  Site:  Bilateral knee  Medications (Right):  40 mg triamcinolone acetonide 40 mg/mL  Medications (Left):  40 mg triamcinolone acetonide 40 mg/mL  Patient tolerance:  Patient tolerated the procedure well with no immediate complications

## 2023-09-15 ENCOUNTER — TELEPHONE (OUTPATIENT)
Dept: GASTROENTEROLOGY | Facility: CLINIC | Age: 58
End: 2023-09-15
Payer: MEDICARE

## 2023-09-15 ENCOUNTER — INFUSION (OUTPATIENT)
Dept: INFUSION THERAPY | Facility: HOSPITAL | Age: 58
End: 2023-09-15
Attending: INTERNAL MEDICINE
Payer: MEDICARE

## 2023-09-15 VITALS
WEIGHT: 260.38 LBS | BODY MASS INDEX: 46.14 KG/M2 | HEIGHT: 63 IN | HEART RATE: 70 BPM | TEMPERATURE: 98 F | RESPIRATION RATE: 18 BRPM | SYSTOLIC BLOOD PRESSURE: 123 MMHG | DIASTOLIC BLOOD PRESSURE: 76 MMHG

## 2023-09-15 DIAGNOSIS — K50.819 CROHN'S DISEASE OF SMALL AND LARGE INTESTINES WITH COMPLICATION: Primary | ICD-10-CM

## 2023-09-15 LAB
ALBUMIN SERPL BCP-MCNC: 3.6 G/DL (ref 3.5–5.2)
ALP SERPL-CCNC: 97 U/L (ref 55–135)
ALT SERPL W/O P-5'-P-CCNC: 24 U/L (ref 10–44)
ANION GAP SERPL CALC-SCNC: 12 MMOL/L (ref 8–16)
AST SERPL-CCNC: 20 U/L (ref 10–40)
BASOPHILS # BLD AUTO: 0.03 K/UL (ref 0–0.2)
BASOPHILS NFR BLD: 0.4 % (ref 0–1.9)
BILIRUB SERPL-MCNC: 0.4 MG/DL (ref 0.1–1)
BUN SERPL-MCNC: 14 MG/DL (ref 6–20)
CALCIUM SERPL-MCNC: 9.4 MG/DL (ref 8.7–10.5)
CHLORIDE SERPL-SCNC: 111 MMOL/L (ref 95–110)
CO2 SERPL-SCNC: 20 MMOL/L (ref 23–29)
CREAT SERPL-MCNC: 0.7 MG/DL (ref 0.5–1.4)
DIFFERENTIAL METHOD: ABNORMAL
EOSINOPHIL # BLD AUTO: 0.2 K/UL (ref 0–0.5)
EOSINOPHIL NFR BLD: 2.5 % (ref 0–8)
ERYTHROCYTE [DISTWIDTH] IN BLOOD BY AUTOMATED COUNT: 18.2 % (ref 11.5–14.5)
EST. GFR  (NO RACE VARIABLE): >60 ML/MIN/1.73 M^2
GLUCOSE SERPL-MCNC: 93 MG/DL (ref 70–110)
HCT VFR BLD AUTO: 42 % (ref 37–48.5)
HGB BLD-MCNC: 13.2 G/DL (ref 12–16)
IMM GRANULOCYTES # BLD AUTO: 0.08 K/UL (ref 0–0.04)
IMM GRANULOCYTES NFR BLD AUTO: 1 % (ref 0–0.5)
LYMPHOCYTES # BLD AUTO: 2 K/UL (ref 1–4.8)
LYMPHOCYTES NFR BLD: 24.6 % (ref 18–48)
MCH RBC QN AUTO: 26 PG (ref 27–31)
MCHC RBC AUTO-ENTMCNC: 31.4 G/DL (ref 32–36)
MCV RBC AUTO: 83 FL (ref 82–98)
MONOCYTES # BLD AUTO: 0.7 K/UL (ref 0.3–1)
MONOCYTES NFR BLD: 9.3 % (ref 4–15)
NEUTROPHILS # BLD AUTO: 5 K/UL (ref 1.8–7.7)
NEUTROPHILS NFR BLD: 62.2 % (ref 38–73)
NRBC BLD-RTO: 0 /100 WBC
PLATELET # BLD AUTO: 243 K/UL (ref 150–450)
PMV BLD AUTO: 9.2 FL (ref 9.2–12.9)
POTASSIUM SERPL-SCNC: 4.1 MMOL/L (ref 3.5–5.1)
PROT SERPL-MCNC: 7.5 G/DL (ref 6–8.4)
RBC # BLD AUTO: 5.08 M/UL (ref 4–5.4)
SODIUM SERPL-SCNC: 143 MMOL/L (ref 136–145)
WBC # BLD AUTO: 7.97 K/UL (ref 3.9–12.7)

## 2023-09-15 PROCEDURE — 80053 COMPREHEN METABOLIC PANEL: CPT | Mod: PN | Performed by: INTERNAL MEDICINE

## 2023-09-15 PROCEDURE — 85025 COMPLETE CBC W/AUTO DIFF WBC: CPT | Mod: PN | Performed by: INTERNAL MEDICINE

## 2023-09-15 PROCEDURE — 96375 TX/PRO/DX INJ NEW DRUG ADDON: CPT | Mod: PN

## 2023-09-15 PROCEDURE — 25000003 PHARM REV CODE 250: Mod: PN | Performed by: INTERNAL MEDICINE

## 2023-09-15 PROCEDURE — 96365 THER/PROPH/DIAG IV INF INIT: CPT | Mod: PN

## 2023-09-15 PROCEDURE — 96366 THER/PROPH/DIAG IV INF ADDON: CPT | Mod: PN

## 2023-09-15 PROCEDURE — 63600175 PHARM REV CODE 636 W HCPCS: Mod: PN | Performed by: INTERNAL MEDICINE

## 2023-09-15 RX ORDER — DIPHENHYDRAMINE HYDROCHLORIDE 50 MG/ML
25 INJECTION INTRAMUSCULAR; INTRAVENOUS
Status: CANCELLED | OUTPATIENT
Start: 2023-09-29

## 2023-09-15 RX ORDER — IPRATROPIUM BROMIDE AND ALBUTEROL SULFATE 2.5; .5 MG/3ML; MG/3ML
3 SOLUTION RESPIRATORY (INHALATION)
Status: CANCELLED | OUTPATIENT
Start: 2023-09-29

## 2023-09-15 RX ORDER — METHYLPREDNISOLONE SOD SUCC 125 MG
40 VIAL (EA) INJECTION
Status: CANCELLED | OUTPATIENT
Start: 2023-09-29

## 2023-09-15 RX ORDER — ACETAMINOPHEN 325 MG/1
650 TABLET ORAL
Status: COMPLETED | OUTPATIENT
Start: 2023-09-15 | End: 2023-09-15

## 2023-09-15 RX ORDER — SODIUM CHLORIDE 0.9 % (FLUSH) 0.9 %
10 SYRINGE (ML) INJECTION
Status: CANCELLED | OUTPATIENT
Start: 2023-09-29

## 2023-09-15 RX ORDER — ACETAMINOPHEN 325 MG/1
650 TABLET ORAL
Status: CANCELLED | OUTPATIENT
Start: 2023-09-29

## 2023-09-15 RX ORDER — HEPARIN 100 UNIT/ML
500 SYRINGE INTRAVENOUS
Status: CANCELLED | OUTPATIENT
Start: 2023-09-29

## 2023-09-15 RX ORDER — DIPHENHYDRAMINE HYDROCHLORIDE 50 MG/ML
25 INJECTION INTRAMUSCULAR; INTRAVENOUS
Status: COMPLETED | OUTPATIENT
Start: 2023-09-15 | End: 2023-09-15

## 2023-09-15 RX ORDER — EPINEPHRINE 1 MG/ML
0.3 INJECTION, SOLUTION, CONCENTRATE INTRAVENOUS
Status: CANCELLED | OUTPATIENT
Start: 2023-09-29

## 2023-09-15 RX ADMIN — DIPHENHYDRAMINE HYDROCHLORIDE 25 MG: 50 INJECTION INTRAMUSCULAR; INTRAVENOUS at 09:09

## 2023-09-15 RX ADMIN — SODIUM CHLORIDE: 9 INJECTION, SOLUTION INTRAVENOUS at 09:09

## 2023-09-15 RX ADMIN — ACETAMINOPHEN 650 MG: 325 TABLET ORAL at 09:09

## 2023-09-15 RX ADMIN — SODIUM CHLORIDE 590 MG: 9 INJECTION, SOLUTION INTRAVENOUS at 10:09

## 2023-09-15 NOTE — PLAN OF CARE
Problem: Adult Inpatient Plan of Care  Goal: Plan of Care Review  Outcome: Ongoing, Progressing  Flowsheets (Taken 9/15/2023 0959)  Plan of Care Reviewed With: patient  Goal: Patient-Specific Goal (Individualized)  Outcome: Ongoing, Progressing  Flowsheets (Taken 9/15/2023 0959)  Anxieties, Fears or Concerns: none  Individualized Care Needs: recliner, blanket, pillow, water     Problem: Fatigue  Goal: Improved Activity Tolerance  Outcome: Ongoing, Progressing  Intervention: Promote Improved Energy  Flowsheets (Taken 9/15/2023 0959)  Fatigue Management: frequent rest breaks encouraged  Sleep/Rest Enhancement:   noise level reduced   natural light exposure provided   room darkened  Activity Management:   Ambulated -L4   Up in chair - L3     Pt tolerated inflectra, VSS. Pt voiced no new complaints or concerns at this time. NAD noted, schedule printed and given to pt. Pt d/c home.

## 2023-09-15 NOTE — TELEPHONE ENCOUNTER
----- Message from Nilson Wiseman MD sent at 9/15/2023 12:31 PM CDT -----  Needs f/u with NP clinic in 6 months

## 2023-10-19 ENCOUNTER — LAB VISIT (OUTPATIENT)
Dept: LAB | Facility: HOSPITAL | Age: 58
End: 2023-10-19
Attending: PHYSICIAN ASSISTANT
Payer: MEDICARE

## 2023-10-19 DIAGNOSIS — M02.39 REACTIVE ARTHRITIS OF MULTIPLE SITES: ICD-10-CM

## 2023-10-19 LAB
ALBUMIN SERPL BCP-MCNC: 3.6 G/DL (ref 3.5–5.2)
ALP SERPL-CCNC: 79 U/L (ref 55–135)
ALT SERPL W/O P-5'-P-CCNC: 29 U/L (ref 10–44)
ANION GAP SERPL CALC-SCNC: 8 MMOL/L (ref 8–16)
AST SERPL-CCNC: 24 U/L (ref 10–40)
BASOPHILS # BLD AUTO: 0.04 K/UL (ref 0–0.2)
BASOPHILS NFR BLD: 0.5 % (ref 0–1.9)
BILIRUB SERPL-MCNC: 0.4 MG/DL (ref 0.1–1)
BUN SERPL-MCNC: 16 MG/DL (ref 6–20)
CALCIUM SERPL-MCNC: 9.8 MG/DL (ref 8.7–10.5)
CHLORIDE SERPL-SCNC: 111 MMOL/L (ref 95–110)
CO2 SERPL-SCNC: 24 MMOL/L (ref 23–29)
CREAT SERPL-MCNC: 0.8 MG/DL (ref 0.5–1.4)
CRP SERPL-MCNC: 11.4 MG/L (ref 0–8.2)
DIFFERENTIAL METHOD: ABNORMAL
EOSINOPHIL # BLD AUTO: 0.2 K/UL (ref 0–0.5)
EOSINOPHIL NFR BLD: 2.8 % (ref 0–8)
ERYTHROCYTE [DISTWIDTH] IN BLOOD BY AUTOMATED COUNT: 19.5 % (ref 11.5–14.5)
ERYTHROCYTE [SEDIMENTATION RATE] IN BLOOD BY WESTERGREN METHOD: 28 MM/HR (ref 0–20)
EST. GFR  (NO RACE VARIABLE): >60 ML/MIN/1.73 M^2
GLUCOSE SERPL-MCNC: 75 MG/DL (ref 70–110)
HCT VFR BLD AUTO: 41.2 % (ref 37–48.5)
HGB BLD-MCNC: 12.8 G/DL (ref 12–16)
IMM GRANULOCYTES # BLD AUTO: 0.04 K/UL (ref 0–0.04)
IMM GRANULOCYTES NFR BLD AUTO: 0.5 % (ref 0–0.5)
LYMPHOCYTES # BLD AUTO: 2.3 K/UL (ref 1–4.8)
LYMPHOCYTES NFR BLD: 29.4 % (ref 18–48)
MCH RBC QN AUTO: 26.2 PG (ref 27–31)
MCHC RBC AUTO-ENTMCNC: 31.1 G/DL (ref 32–36)
MCV RBC AUTO: 84 FL (ref 82–98)
MONOCYTES # BLD AUTO: 0.7 K/UL (ref 0.3–1)
MONOCYTES NFR BLD: 9.5 % (ref 4–15)
NEUTROPHILS # BLD AUTO: 4.4 K/UL (ref 1.8–7.7)
NEUTROPHILS NFR BLD: 57.3 % (ref 38–73)
NRBC BLD-RTO: 0 /100 WBC
PLATELET # BLD AUTO: 271 K/UL (ref 150–450)
PMV BLD AUTO: 10.2 FL (ref 9.2–12.9)
POTASSIUM SERPL-SCNC: 4.2 MMOL/L (ref 3.5–5.1)
PROT SERPL-MCNC: 7.1 G/DL (ref 6–8.4)
RBC # BLD AUTO: 4.89 M/UL (ref 4–5.4)
SODIUM SERPL-SCNC: 143 MMOL/L (ref 136–145)
WBC # BLD AUTO: 7.72 K/UL (ref 3.9–12.7)

## 2023-10-19 PROCEDURE — 86140 C-REACTIVE PROTEIN: CPT | Performed by: PHYSICIAN ASSISTANT

## 2023-10-19 PROCEDURE — 85025 COMPLETE CBC W/AUTO DIFF WBC: CPT | Performed by: PHYSICIAN ASSISTANT

## 2023-10-19 PROCEDURE — 80053 COMPREHEN METABOLIC PANEL: CPT | Performed by: PHYSICIAN ASSISTANT

## 2023-10-19 PROCEDURE — 85651 RBC SED RATE NONAUTOMATED: CPT | Mod: PO | Performed by: PHYSICIAN ASSISTANT

## 2023-10-19 PROCEDURE — 36415 COLL VENOUS BLD VENIPUNCTURE: CPT | Mod: PO | Performed by: PHYSICIAN ASSISTANT

## 2023-11-01 ENCOUNTER — TELEPHONE (OUTPATIENT)
Dept: GASTROENTEROLOGY | Facility: CLINIC | Age: 58
End: 2023-11-01
Payer: MEDICARE

## 2023-11-01 NOTE — TELEPHONE ENCOUNTER
----- Message from Kaitlynn Zamarripa LPN sent at 11/1/2023  3:13 PM CDT -----    ----- Message -----  From: Stacie Tolbert  Sent: 11/1/2023   3:12 PM CDT  To: [facility] Clinical Staff    Type:  Needs Medical Advice    Who Called: the patient  Symptoms (please be specific): colonoscopy  How long has patient had these symptoms:    Pharmacy name and phone #:    Would the patient rather a call back or a response via [facility]? call back  Best Call Back Number: [phone]   Additional Information: Pt called to schedule a colonoscopy from recall  Please advise  Thanks          
6 month follow up is scheduled with NP price on 11/29 at 11:30 AM.   
Keep the cast/splint/dressing clean and dry./Instructed patient/caregiver regarding signs and symptoms of infection./Verbal/written post procedure instructions were given to patient/caregiver./Instructed patient/caregiver to follow-up with primary care physician.

## 2023-11-03 ENCOUNTER — OFFICE VISIT (OUTPATIENT)
Dept: RHEUMATOLOGY | Facility: CLINIC | Age: 58
End: 2023-11-03
Payer: MEDICARE

## 2023-11-03 DIAGNOSIS — D84.9 IMMUNOCOMPROMISED: ICD-10-CM

## 2023-11-03 DIAGNOSIS — M02.39 REACTIVE ARTHRITIS OF MULTIPLE SITES: ICD-10-CM

## 2023-11-03 DIAGNOSIS — R60.9 EDEMA, UNSPECIFIED TYPE: Primary | ICD-10-CM

## 2023-11-03 DIAGNOSIS — J98.8 RESPIRATORY INFECTION: ICD-10-CM

## 2023-11-03 DIAGNOSIS — R73.9 HYPERGLYCEMIA: ICD-10-CM

## 2023-11-03 DIAGNOSIS — K50.90 CROHN'S DISEASE WITHOUT COMPLICATION, UNSPECIFIED GASTROINTESTINAL TRACT LOCATION: ICD-10-CM

## 2023-11-03 DIAGNOSIS — G89.4 CHRONIC PAIN SYNDROME: ICD-10-CM

## 2023-11-03 PROCEDURE — 1160F RVW MEDS BY RX/DR IN RCRD: CPT | Mod: CPTII,95,, | Performed by: PHYSICIAN ASSISTANT

## 2023-11-03 PROCEDURE — 4010F PR ACE/ARB THEARPY RXD/TAKEN: ICD-10-PCS | Mod: CPTII,95,, | Performed by: PHYSICIAN ASSISTANT

## 2023-11-03 PROCEDURE — 1159F MED LIST DOCD IN RCRD: CPT | Mod: CPTII,95,, | Performed by: PHYSICIAN ASSISTANT

## 2023-11-03 PROCEDURE — 99214 OFFICE O/P EST MOD 30 MIN: CPT | Mod: 95,,, | Performed by: PHYSICIAN ASSISTANT

## 2023-11-03 PROCEDURE — 4010F ACE/ARB THERAPY RXD/TAKEN: CPT | Mod: CPTII,95,, | Performed by: PHYSICIAN ASSISTANT

## 2023-11-03 PROCEDURE — 1159F PR MEDICATION LIST DOCUMENTED IN MEDICAL RECORD: ICD-10-PCS | Mod: CPTII,95,, | Performed by: PHYSICIAN ASSISTANT

## 2023-11-03 PROCEDURE — 99214 PR OFFICE/OUTPT VISIT, EST, LEVL IV, 30-39 MIN: ICD-10-PCS | Mod: 95,,, | Performed by: PHYSICIAN ASSISTANT

## 2023-11-03 PROCEDURE — 1160F PR REVIEW ALL MEDS BY PRESCRIBER/CLIN PHARMACIST DOCUMENTED: ICD-10-PCS | Mod: CPTII,95,, | Performed by: PHYSICIAN ASSISTANT

## 2023-11-03 RX ORDER — POTASSIUM CHLORIDE 750 MG/1
10 TABLET, EXTENDED RELEASE ORAL 2 TIMES DAILY PRN
Qty: 60 TABLET | Refills: 3 | Status: SHIPPED | OUTPATIENT
Start: 2023-11-03

## 2023-11-03 RX ORDER — DOXYCYCLINE 100 MG/1
100 CAPSULE ORAL EVERY 12 HOURS
Qty: 20 CAPSULE | Refills: 0 | Status: SHIPPED | OUTPATIENT
Start: 2023-11-03 | End: 2023-11-13

## 2023-11-03 RX ORDER — FUROSEMIDE 20 MG/1
20 TABLET ORAL 2 TIMES DAILY
Qty: 60 TABLET | Refills: 3 | Status: SHIPPED | OUTPATIENT
Start: 2023-11-03 | End: 2024-11-02

## 2023-11-03 RX ORDER — ALBUTEROL SULFATE 0.83 MG/ML
2.5 SOLUTION RESPIRATORY (INHALATION) EVERY 6 HOURS PRN
Qty: 75 ML | Refills: 1 | Status: SHIPPED | OUTPATIENT
Start: 2023-11-03 | End: 2024-11-02

## 2023-11-03 RX ORDER — PROMETHAZINE HYDROCHLORIDE 6.25 MG/5ML
6.25 SYRUP ORAL EVERY 6 HOURS PRN
Qty: 180 ML | Refills: 0 | Status: SHIPPED | OUTPATIENT
Start: 2023-11-03 | End: 2024-02-26

## 2023-11-03 NOTE — PROGRESS NOTES
The patient location is: home  The chief complaint leading to consultation is: IBD arthritis    Visit type: audiovisual    Face to Face time with patient: 33 minutes  40 minutes of total time spent on the encounter, which includes face to face time and non-face to face time preparing to see the patient (eg, review of tests), Obtaining and/or reviewing separately obtained history, Documenting clinical information in the electronic or other health record, Independently interpreting results (not separately reported) and communicating results to the patient/family/caregiver, or Care coordination (not separately reported).     Each patient to whom he or she provides medical services by telemedicine is:  (1) informed of the relationship between the physician and patient and the respective role of any other health care provider with respect to management of the patient; and (2) notified that he or she may decline to receive medical services by telemedicine and may withdraw from such care at any time.    Notes:     Subjective:       Patient ID: Amanda Mcguire is a 58 y.o. female.    Chief Complaint: Disease Management      Mrs. Mcguire is a 58 year old female who presents to telemedicine virtual for follow up on reactive arthritis, osteoarthritis. She is doing poorly. Complains of bilateral lower ext edema onset approx 2 days ago only slightly improved with lasix and new severe pain behind her R knee that was causing difficulty walking within the last few days. She reports increased generalized pain as well in the last week due to change in the weather. Taking percocet tid prn for severe pain and this is helping her.     She complains of sore throat, productive cough, and chest congestion. Subjective fever and chills.  No headache.     Next Remicade infusion is scheduled 11/10 ordered by GI for crohn's disease. She states remicade is working well for GI symptoms.    We reviewed her recent labs --ESR remains high and CRP  is slightly improved.     Current tx:  1. Remicade  2. percpcet  3. baclofen      Review of Systems   Constitutional: Negative for activity change, appetite change, fatigue and fever.   Eyes: Negative for visual disturbance.   Cardiovascular: Negative for chest pain, palpitations and leg swelling.   Gastrointestinal:  Negative for constipation, diarrhea and vomiting.   Musculoskeletal: Positive for arthralgias, joint swelling and myalgias.   Pulmonary: Positive shortness of breath, +cough and PND  Neurological: Negative for dizziness, weakness, light-headedness and headaches.   Skin: no rash  Psych: No anxiety        Objective:     There were no vitals filed for this visit.      Past Medical History:   Diagnosis Date    Anxiety     Arthritis     Asthma     Crohn's disease     Depression     Encounter for blood transfusion     Headache     Hypertension     Myofascial pain syndrome, cervical 9/28/2022     Past Surgical History:   Procedure Laterality Date    COLONOSCOPY N/A 03/10/2022    Procedure: COLONOSCOPY;  Surgeon: Nilson Wiseman MD;  Location: [facility];  Service: Endoscopy;  Laterality: N/A; Repeat colonoscopy in 6 months because the bowel prep was poor    COLONOSCOPY N/A 3/2/2023    Procedure: COLONOSCOPY;  Surgeon: Erik Garcia MD;  Location: [facility];  Service: Endoscopy;  Laterality: N/A;    ESOPHAGOGASTRODUODENOSCOPY N/A 03/10/2022    Procedure: EGD (ESOPHAGOGASTRODUODENOSCOPY);  Surgeon: Nilson Wiseman MD;  Location: [facility];  Service: Endoscopy;  Laterality: N/A; Repeat upper endoscopy in 8 weeks for surveillance    ESOPHAGOGASTRODUODENOSCOPY N/A 3/16/2023    Procedure: EGD (ESOPHAGOGASTRODUODENOSCOPY);  Surgeon: Erik Garcia MD;  Location: [facility];  Service: Endoscopy;  Laterality: N/A;     Physical Exam   Constitutional:   Alert and oriented       Labs:  Component      Latest Ref Rng 10/19/2023   WBC      3.90 - 12.70 K/uL 7.72    RBC      4.00 - 5.40 M/uL 4.89     Hemoglobin      12.0 - 16.0 g/dL 12.8    Hematocrit      37.0 - 48.5 % 41.2    MCV      82 - 98 fL 84    MCH      27.0 - 31.0 pg 26.2 (L)    MCHC      32.0 - 36.0 g/dL 31.1 (L)    RDW      11.5 - 14.5 % 19.5 (H)    Platelet Count      150 - 450 K/uL 271    MPV      9.2 - 12.9 fL 10.2    Immature Granulocytes      0.0 - 0.5 % 0.5    Gran # (ANC)      1.8 - 7.7 K/uL 4.4    Immature Grans (Abs)      0.00 - 0.04 K/uL 0.04    Lymph #      1.0 - 4.8 K/uL 2.3    Mono #      0.3 - 1.0 K/uL 0.7    Eos #      0.0 - 0.5 K/uL 0.2    Baso #      0.00 - 0.20 K/uL 0.04    nRBC      0 /100 WBC 0    Gran %      38.0 - 73.0 % 57.3    Lymph %      18.0 - 48.0 % 29.4    Mono %      4.0 - 15.0 % 9.5    Eosinophil %      0.0 - 8.0 % 2.8    Basophil %      0.0 - 1.9 % 0.5    Differential Method Automated    Sodium      136 - 145 mmol/L 143    Potassium      3.5 - 5.1 mmol/L 4.2    Chloride      95 - 110 mmol/L 111 (H)    CO2      23 - 29 mmol/L 24    Glucose      70 - 110 mg/dL 75    BUN      6 - 20 mg/dL 16    Creatinine      0.5 - 1.4 mg/dL 0.8    Calcium      8.7 - 10.5 mg/dL 9.8    PROTEIN TOTAL      6.0 - 8.4 g/dL 7.1    Albumin      3.5 - 5.2 g/dL 3.6    BILIRUBIN TOTAL      0.1 - 1.0 mg/dL 0.4    ALP      55 - 135 U/L 79    AST      10 - 40 U/L 24    ALT      10 - 44 U/L 29    eGFR      >60 mL/min/1.73 m^2 >60.0    Anion Gap      8 - 16 mmol/L 8    CRP      0.0 - 8.2 mg/L 11.4 (H)    Sed Rate      0 - 20 mm/Hr 28 (H)         ASSESSMENT:    1. Edema, unspecified type    2. Respiratory infection    3. Reactive arthritis of multiple sites    4. Hyperglycemia              Plan:       Edema, unspecified type  -     potassium chloride SA (K-DUR,KLOR-CON M) 10 MEQ tablet; Take 1 tablet (10 mEq total) by mouth 2 (two) times daily as needed (Take with lasix).  Dispense: 60 tablet; Refill: 3  -     furosemide (LASIX) 20 MG tablet; Take 1 tablet (20 mg total) by mouth 2 (two) times daily.  Dispense: 60 tablet; Refill: 3  -     US Lower  Extremity Veins Bilateral; Future; Expected date: 11/03/2023    Respiratory infection  -     doxycycline (VIBRAMYCIN) 100 MG Cap; Take 1 capsule (100 mg total) by mouth every 12 (twelve) hours. for 10 days  Dispense: 20 capsule; Refill: 0  -     promethazine (PHENERGAN) 6.25 mg/5 mL syrup; Take 5 mLs (6.25 mg total) by mouth every 6 (six) hours as needed (cough).  Dispense: 180 mL; Refill: 0  -     NEBULIZER FOR HOME USE  -     albuterol (PROVENTIL) 2.5 mg /3 mL (0.083 %) nebulizer solution; Take 3 mLs (2.5 mg total) by nebulization every 6 (six) hours as needed for Wheezing. Rescue  Dispense: 75 mL; Refill: 1    Reactive arthritis of multiple sites  -     CBC Auto Differential; Future; Expected date: 11/03/2023  -     Comprehensive Metabolic Panel; Future; Expected date: 11/03/2023  -     C-Reactive Protein; Future; Expected date: 11/03/2023  -     Sedimentation rate; Future; Expected date: 11/03/2023  -     Hemoglobin A1C; Future; Expected date: 11/03/2023    Hyperglycemia  -     Hemoglobin A1C; Future; Expected date: 11/03/2023          Assessment:  58 year old female with   Reactive arthritis, osteoarthritis, erythema nodosum on plaquenil  --chronic pain syndrome on norco 10/325  --HTN  --hx of asthma  --hx of pancreatitis 9/2019  -- Crohn's disease 10/2020, BON s/p iron infusions    Plan:  1. Cont Remicade per GI, Cont plaquenil 200 mg bid  2. doxycycline, promethazine cough syrup sent. Nebulizer and albuterol ordered   3. Cont. percocet per MD.  I have checked [place] prescription monitoring program site and no unusual or abnormal behavior has occurred pt understand the risk and benefits of taking opioid medications and has decided to continue the medication.  4. Cont gabapentin, cont tizanidine  5. Cont trazodone  6. Lasix and Potassium refilled  7. Venous US to rule out DVT  8. Follow up with PCP for breast pain and weight loss medication    Follow up:  3 mo Dr. Devin lawrence/kyle fernandes

## 2023-11-06 RX ORDER — OXYCODONE AND ACETAMINOPHEN 10; 325 MG/1; MG/1
1 TABLET ORAL EVERY 8 HOURS PRN
Qty: 90 TABLET | Refills: 0 | Status: SHIPPED | OUTPATIENT
Start: 2023-12-08 | End: 2024-02-05

## 2023-11-06 RX ORDER — OXYCODONE AND ACETAMINOPHEN 10; 325 MG/1; MG/1
1 TABLET ORAL EVERY 8 HOURS PRN
Qty: 90 TABLET | Refills: 0 | Status: SHIPPED | OUTPATIENT
Start: 2023-11-08 | End: 2024-02-05

## 2023-11-06 RX ORDER — OXYCODONE AND ACETAMINOPHEN 10; 325 MG/1; MG/1
1 TABLET ORAL EVERY 8 HOURS PRN
Qty: 90 TABLET | Refills: 0 | Status: SHIPPED | OUTPATIENT
Start: 2024-01-07 | End: 2024-02-05

## 2023-11-10 ENCOUNTER — INFUSION (OUTPATIENT)
Dept: INFUSION THERAPY | Facility: HOSPITAL | Age: 58
End: 2023-11-10
Attending: INTERNAL MEDICINE
Payer: MEDICARE

## 2023-11-10 VITALS
WEIGHT: 260.38 LBS | TEMPERATURE: 98 F | SYSTOLIC BLOOD PRESSURE: 96 MMHG | HEART RATE: 67 BPM | DIASTOLIC BLOOD PRESSURE: 71 MMHG | HEIGHT: 63 IN | RESPIRATION RATE: 18 BRPM | BODY MASS INDEX: 46.14 KG/M2

## 2023-11-10 DIAGNOSIS — K50.819 CROHN'S DISEASE OF SMALL AND LARGE INTESTINES WITH COMPLICATION: Primary | ICD-10-CM

## 2023-11-10 LAB
ALBUMIN SERPL BCP-MCNC: 3.6 G/DL (ref 3.5–5.2)
ALP SERPL-CCNC: 96 U/L (ref 55–135)
ALT SERPL W/O P-5'-P-CCNC: 31 U/L (ref 10–44)
ANION GAP SERPL CALC-SCNC: 9 MMOL/L (ref 8–16)
AST SERPL-CCNC: 23 U/L (ref 10–40)
BASOPHILS # BLD AUTO: 0.03 K/UL (ref 0–0.2)
BASOPHILS NFR BLD: 0.4 % (ref 0–1.9)
BILIRUB SERPL-MCNC: 0.3 MG/DL (ref 0.1–1)
BUN SERPL-MCNC: 17 MG/DL (ref 6–20)
CALCIUM SERPL-MCNC: 9.1 MG/DL (ref 8.7–10.5)
CHLORIDE SERPL-SCNC: 115 MMOL/L (ref 95–110)
CO2 SERPL-SCNC: 22 MMOL/L (ref 23–29)
CREAT SERPL-MCNC: 0.8 MG/DL (ref 0.5–1.4)
DIFFERENTIAL METHOD: ABNORMAL
EOSINOPHIL # BLD AUTO: 0.3 K/UL (ref 0–0.5)
EOSINOPHIL NFR BLD: 3.5 % (ref 0–8)
ERYTHROCYTE [DISTWIDTH] IN BLOOD BY AUTOMATED COUNT: 18.3 % (ref 11.5–14.5)
EST. GFR  (NO RACE VARIABLE): >60 ML/MIN/1.73 M^2
GLUCOSE SERPL-MCNC: 109 MG/DL (ref 70–110)
HCT VFR BLD AUTO: 42.2 % (ref 37–48.5)
HGB BLD-MCNC: 13 G/DL (ref 12–16)
IMM GRANULOCYTES # BLD AUTO: 0.04 K/UL (ref 0–0.04)
IMM GRANULOCYTES NFR BLD AUTO: 0.6 % (ref 0–0.5)
LYMPHOCYTES # BLD AUTO: 1.6 K/UL (ref 1–4.8)
LYMPHOCYTES NFR BLD: 21.5 % (ref 18–48)
MCH RBC QN AUTO: 26.7 PG (ref 27–31)
MCHC RBC AUTO-ENTMCNC: 30.8 G/DL (ref 32–36)
MCV RBC AUTO: 87 FL (ref 82–98)
MONOCYTES # BLD AUTO: 0.8 K/UL (ref 0.3–1)
MONOCYTES NFR BLD: 11.3 % (ref 4–15)
NEUTROPHILS # BLD AUTO: 4.5 K/UL (ref 1.8–7.7)
NEUTROPHILS NFR BLD: 62.7 % (ref 38–73)
NRBC BLD-RTO: 0 /100 WBC
PLATELET # BLD AUTO: 242 K/UL (ref 150–450)
PMV BLD AUTO: 9.9 FL (ref 9.2–12.9)
POTASSIUM SERPL-SCNC: 3.7 MMOL/L (ref 3.5–5.1)
PROT SERPL-MCNC: 7.4 G/DL (ref 6–8.4)
RBC # BLD AUTO: 4.87 M/UL (ref 4–5.4)
SODIUM SERPL-SCNC: 146 MMOL/L (ref 136–145)
WBC # BLD AUTO: 7.2 K/UL (ref 3.9–12.7)

## 2023-11-10 PROCEDURE — 63600175 PHARM REV CODE 636 W HCPCS: Mod: JZ,JG,PN | Performed by: INTERNAL MEDICINE

## 2023-11-10 PROCEDURE — 96375 TX/PRO/DX INJ NEW DRUG ADDON: CPT | Mod: PN

## 2023-11-10 PROCEDURE — 96415 CHEMO IV INFUSION ADDL HR: CPT | Mod: PN

## 2023-11-10 PROCEDURE — 85025 COMPLETE CBC W/AUTO DIFF WBC: CPT | Mod: PN | Performed by: INTERNAL MEDICINE

## 2023-11-10 PROCEDURE — 80053 COMPREHEN METABOLIC PANEL: CPT | Mod: PN | Performed by: INTERNAL MEDICINE

## 2023-11-10 PROCEDURE — 96413 CHEMO IV INFUSION 1 HR: CPT | Mod: PN

## 2023-11-10 PROCEDURE — 25000003 PHARM REV CODE 250: Mod: PN | Performed by: INTERNAL MEDICINE

## 2023-11-10 RX ORDER — ACETAMINOPHEN 325 MG/1
650 TABLET ORAL
Status: COMPLETED | OUTPATIENT
Start: 2023-11-10 | End: 2023-11-10

## 2023-11-10 RX ORDER — SODIUM CHLORIDE 0.9 % (FLUSH) 0.9 %
10 SYRINGE (ML) INJECTION
Status: CANCELLED | OUTPATIENT
Start: 2024-01-05

## 2023-11-10 RX ORDER — ACETAMINOPHEN 325 MG/1
650 TABLET ORAL
Status: CANCELLED | OUTPATIENT
Start: 2024-01-05

## 2023-11-10 RX ORDER — SODIUM CHLORIDE 0.9 % (FLUSH) 0.9 %
10 SYRINGE (ML) INJECTION
Status: DISCONTINUED | OUTPATIENT
Start: 2023-11-10 | End: 2023-11-10 | Stop reason: HOSPADM

## 2023-11-10 RX ORDER — IPRATROPIUM BROMIDE AND ALBUTEROL SULFATE 2.5; .5 MG/3ML; MG/3ML
3 SOLUTION RESPIRATORY (INHALATION)
Status: CANCELLED | OUTPATIENT
Start: 2024-01-05

## 2023-11-10 RX ORDER — DIPHENHYDRAMINE HYDROCHLORIDE 50 MG/ML
25 INJECTION INTRAMUSCULAR; INTRAVENOUS
Status: CANCELLED | OUTPATIENT
Start: 2024-01-05

## 2023-11-10 RX ORDER — DIPHENHYDRAMINE HYDROCHLORIDE 50 MG/ML
25 INJECTION INTRAMUSCULAR; INTRAVENOUS
Status: COMPLETED | OUTPATIENT
Start: 2023-11-10 | End: 2023-11-10

## 2023-11-10 RX ORDER — HEPARIN 100 UNIT/ML
500 SYRINGE INTRAVENOUS
Status: DISCONTINUED | OUTPATIENT
Start: 2023-11-10 | End: 2023-11-10 | Stop reason: HOSPADM

## 2023-11-10 RX ORDER — HEPARIN 100 UNIT/ML
500 SYRINGE INTRAVENOUS
Status: CANCELLED | OUTPATIENT
Start: 2024-01-05

## 2023-11-10 RX ORDER — METHYLPREDNISOLONE SOD SUCC 125 MG
40 VIAL (EA) INJECTION
Status: CANCELLED | OUTPATIENT
Start: 2024-01-05

## 2023-11-10 RX ORDER — EPINEPHRINE 1 MG/ML
0.3 INJECTION, SOLUTION, CONCENTRATE INTRAVENOUS
Status: CANCELLED | OUTPATIENT
Start: 2024-01-05

## 2023-11-10 RX ADMIN — ACETAMINOPHEN 650 MG: 325 TABLET, FILM COATED ORAL at 09:11

## 2023-11-10 RX ADMIN — SODIUM CHLORIDE: 9 INJECTION, SOLUTION INTRAVENOUS at 09:11

## 2023-11-10 RX ADMIN — DIPHENHYDRAMINE HYDROCHLORIDE 25 MG: 50 INJECTION, SOLUTION INTRAMUSCULAR; INTRAVENOUS at 09:11

## 2023-11-10 RX ADMIN — SODIUM CHLORIDE 590 MG: 9 INJECTION, SOLUTION INTRAVENOUS at 10:11

## 2023-11-10 NOTE — PLAN OF CARE
Tolerated inflectra well.  No reactions noted.  No questions or concerns at this time.  Ambulated off unit in NAD.

## 2023-11-29 RX ORDER — CHLORHEXIDINE GLUCONATE ORAL RINSE 1.2 MG/ML
SOLUTION DENTAL
Qty: 473 ML | Refills: 6 | Status: SHIPPED | OUTPATIENT
Start: 2023-11-29

## 2023-12-07 ENCOUNTER — TELEPHONE (OUTPATIENT)
Dept: GASTROENTEROLOGY | Facility: CLINIC | Age: 58
End: 2023-12-07
Payer: MEDICARE

## 2023-12-07 NOTE — TELEPHONE ENCOUNTER
----- Message from Dominick Archibald sent at 12/7/2023 12:33 PM CST -----  Type:  Sooner Appointment Request    Caller is requesting a sooner appointment.  Caller declined first available appointment listed below.  Caller will not accept being placed on the waitlist and is requesting a message be sent to doctor.    Name of Caller:  pt   When is the first available appointment?  NA   Symptoms:   6 month follow-up Per Dr. Wiseman  Would the patient rather a call back or a response via [facility]? Call back   Best Call Back Number:  [phone] or [phone]    Additional Information:  pt stated due to being very unwell pt has unable to complete debbie'd appt and needs to speak to someone in office to get jose martin asap please call pt greg martinez to advise and jose martin thanks!

## 2024-01-04 ENCOUNTER — OFFICE VISIT (OUTPATIENT)
Dept: ORTHOPEDICS | Facility: CLINIC | Age: 59
End: 2024-01-04
Payer: MEDICARE

## 2024-01-04 DIAGNOSIS — M17.12 PRIMARY OSTEOARTHRITIS OF LEFT KNEE: ICD-10-CM

## 2024-01-04 DIAGNOSIS — M17.11 PRIMARY OSTEOARTHRITIS OF RIGHT KNEE: ICD-10-CM

## 2024-01-04 DIAGNOSIS — M70.62 TROCHANTERIC BURSITIS OF BOTH HIPS: Primary | ICD-10-CM

## 2024-01-04 DIAGNOSIS — G57.91 NEUROPATHY OF RIGHT FOOT: ICD-10-CM

## 2024-01-04 DIAGNOSIS — M70.61 TROCHANTERIC BURSITIS OF BOTH HIPS: Primary | ICD-10-CM

## 2024-01-04 PROCEDURE — 1160F RVW MEDS BY RX/DR IN RCRD: CPT | Mod: CPTII,S$GLB,ICN, | Performed by: NURSE PRACTITIONER

## 2024-01-04 PROCEDURE — 99999 PR PBB SHADOW E&M-EST. PATIENT-LVL IV: CPT | Mod: PBBFAC,,, | Performed by: NURSE PRACTITIONER

## 2024-01-04 PROCEDURE — 99214 OFFICE O/P EST MOD 30 MIN: CPT | Mod: 25,S$GLB,ICN, | Performed by: NURSE PRACTITIONER

## 2024-01-04 PROCEDURE — 20610 DRAIN/INJ JOINT/BURSA W/O US: CPT | Mod: 50,51,XS,S$GLB | Performed by: NURSE PRACTITIONER

## 2024-01-04 PROCEDURE — 20610 DRAIN/INJ JOINT/BURSA W/O US: CPT | Mod: 50,S$GLB,ICN, | Performed by: NURSE PRACTITIONER

## 2024-01-04 PROCEDURE — 1159F MED LIST DOCD IN RCRD: CPT | Mod: CPTII,S$GLB,ICN, | Performed by: NURSE PRACTITIONER

## 2024-01-04 RX ORDER — TRIAMCINOLONE ACETONIDE 40 MG/ML
40 INJECTION, SUSPENSION INTRA-ARTICULAR; INTRAMUSCULAR
Status: SHIPPED | OUTPATIENT
Start: 2024-01-04 | End: 2024-01-04

## 2024-01-04 RX ADMIN — TRIAMCINOLONE ACETONIDE 40 MG: 40 INJECTION, SUSPENSION INTRA-ARTICULAR; INTRAMUSCULAR at 10:01

## 2024-01-04 NOTE — PROCEDURES
Large Joint Aspiration/Injection: bilateral knee    Date/Time: 1/4/2024 10:25 AM    Performed by: Caro Nguyen FNP  Authorized by: Caro Nguyen, TERRELLP    Consent Done?:  Yes (Verbal)  Indications:  Pain  Timeout: prior to procedure the correct patient, procedure, and site was verified    Prep: patient was prepped and draped in usual sterile fashion    Local anesthetic:  Lidocaine 1% without epinephrine  Anesthetic total (ml):  5      Details:  Needle Size:  21 G  Approach:  Anterolateral  Location:  Knee  Laterality:  Bilateral  Site:  Bilateral knee  Medications (Right):  40 mg triamcinolone acetonide 40 mg/mL  Medications (Left):  40 mg triamcinolone acetonide 40 mg/mL  Patient tolerance:  Patient tolerated the procedure well with no immediate complications  Large Joint Aspiration/Injection: bilateral greater trochanteric bursa    Date/Time: 1/4/2024 10:25 AM    Performed by: Caro Nguyen FNP  Authorized by: Caro Nguyen, TERRELLP    Consent Done?:  Yes (Verbal)  Indications:  Pain  Timeout: prior to procedure the correct patient, procedure, and site was verified    Prep: patient was prepped and draped in usual sterile fashion      Local anesthesia used?: Yes    Local anesthetic:  Lidocaine 1% without epinephrine  Anesthetic total (ml):  5      Details:  Needle Size:  21 G  Approach:  Lateral  Location:  Hip  Laterality:  Bilateral  Site:  Bilateral greater trochanteric bursa  Medications (Right):  40 mg triamcinolone acetonide 40 mg/mL  Medications (Left):  40 mg triamcinolone acetonide 40 mg/mL  Patient tolerance:  Patient tolerated the procedure well with no immediate complications

## 2024-01-04 NOTE — PROGRESS NOTES
Chief Complaint   Patient presents with    Left Knee - Pain    Right Knee - Pain    Left Hip - Pain    Right Hip - Pain         HPI:   This is a 58 y.o. who presents to clinic today complaining of bilateral hip and bilateral knee pain for 1 month after no known trauma. Pain is progressively worsening. No numbness or tingling. No associated signs or symptoms.    Past Medical History:   Diagnosis Date    Anxiety     Arthritis     Asthma     Crohn's disease     Depression     Encounter for blood transfusion     Headache     Hypertension     Myofascial pain syndrome, cervical 9/28/2022     Past Surgical History:   Procedure Laterality Date    COLONOSCOPY N/A 03/10/2022    Procedure: COLONOSCOPY;  Surgeon: Nilson Wiseman MD;  Location: [facility];  Service: Endoscopy;  Laterality: N/A; Repeat colonoscopy in 6 months because the bowel prep was poor    COLONOSCOPY N/A 3/2/2023    Procedure: COLONOSCOPY;  Surgeon: Erik Garcia MD;  Location: [facility];  Service: Endoscopy;  Laterality: N/A;    ESOPHAGOGASTRODUODENOSCOPY N/A 03/10/2022    Procedure: EGD (ESOPHAGOGASTRODUODENOSCOPY);  Surgeon: Nilson Wiseman MD;  Location: [facility];  Service: Endoscopy;  Laterality: N/A; Repeat upper endoscopy in 8 weeks for surveillance    ESOPHAGOGASTRODUODENOSCOPY N/A 3/16/2023    Procedure: EGD (ESOPHAGOGASTRODUODENOSCOPY);  Surgeon: Erik Garcia MD;  Location: [facility];  Service: Endoscopy;  Laterality: N/A;     Current Outpatient Medications on File Prior to Visit   Medication Sig Dispense Refill    albuterol (PROVENTIL) 2.5 mg /3 mL (0.083 %) nebulizer solution Take 3 mLs (2.5 mg total) by nebulization every 6 (six) hours as needed for Wheezing. Rescue 75 mL 1    albuterol (PROVENTIL/VENTOLIN HFA) 90 mcg/actuation inhaler Inhale 2 puffs into the lungs every 6 (six) hours as needed for Wheezing. Rescue 18 g 6    ALPRAZolam (XANAX) 0.5 MG tablet Take 0.5 mg by mouth 2 (two) times daily as needed.      amLODIPine  (NORVASC) 10 MG tablet Take 1 tablet (10 mg total) by mouth daily      atorvastatin (LIPITOR) 20 MG tablet Take 20 mg by mouth every evening.      budesonide 4 mg CpDR Take 4 mg by mouth 2 (two) times daily as needed (crohn's  flare). 30 capsule 3    chlorhexidine (PERIDEX) 0.12 % solution swish and spit 15 MILLILITERS in the mouth or throat TWICE DAILY FOR FOURTEEN DAYS 473 mL 6    diclofenac sodium (VOLTAREN) 1 % Gel APPLY TOPICALLY FOUR TIMES DAILY 300 g 3    dicyclomine (BENTYL) 20 mg tablet Take 20 mg by mouth once daily.       diltiaZEM (CARDIZEM CD) 240 MG 24 hr capsule Take 240 mg by mouth.      epinephrine (EPIPEN) 0.3 mg/0.3 mL AtIn Inject 0.3 mg into the muscle.      famotidine (PEPCID) 40 MG tablet Take 40 mg by mouth daily as needed.      fluticasone-salmeterol 500-50 mcg/dose (ADVAIR) 500-50 mcg/dose DsDv diskus inhaler Inhale 1 puff into the lungs.      furosemide (LASIX) 20 MG tablet Take 1 tablet (20 mg total) by mouth daily      furosemide (LASIX) 20 MG tablet Take 1 tablet (20 mg total) by mouth 2 (two) times daily. 60 tablet 3    gabapentin (NEURONTIN) 800 MG tablet Take 1 tablet (800 mg total) by mouth 3 (three) times daily. 270 tablet 1    hydrOXYchloroQUINE (PLAQUENIL) 200 mg tablet Take 1 tablet (200 mg total) by mouth 2 (two) times daily. 180 tablet 3    hydrOXYzine pamoate (VISTARIL) 25 MG Cap TAKE ONE CAPSULE BY MOUTH THREE TIMES DAILY AS NEEDED FOR ITCHING 45 capsule 3    ipratropium-albuteroL (COMBIVENT)  mcg/actuation inhaler Inhale 1 puff into the lungs 4 (four) times daily. Rescue 4 g 12    irbesartan (AVAPRO) 150 MG tablet TAKE 1 TABLET BY MOUTH EVERY NIGHT AT BEDTIME FOR BLOOD PRESSURE      lidocaine HCl 2% (LIDOCAINE VISCOUS) 2 % Soln by Mucous Membrane route every 8 (eight) hours as needed. 100 mL 0    memantine (NAMENDA) 5 MG Tab Take 1 tablet (5 mg total) by mouth 2 (two) times daily. 60 tablet 11    montelukast (SINGULAIR) 10 mg tablet Take 10 mg by mouth every evening.       nebulizer and compressor Siomara Use as directed      neomycin-polymyxin-hydrocortisone (CORTISPORIN) otic solution PLACE 2 DROPS IN AFFECTED EAR FOUR TIMES DAILY FOR 5-7 DAYS      [START ON 1/7/2024] oxyCODONE-acetaminophen (PERCOCET)  mg per tablet Take 1 tablet by mouth every 8 (eight) hours as needed for Pain. 90 tablet 0    oxyCODONE-acetaminophen (PERCOCET)  mg per tablet Take 1 tablet by mouth every 8 (eight) hours as needed for Pain. 90 tablet 0    oxyCODONE-acetaminophen (PERCOCET)  mg per tablet Take 1 tablet by mouth every 8 (eight) hours as needed for Pain. 90 tablet 0    pantoprazole (PROTONIX) 40 MG tablet Take 1 tablet (40 mg total) by mouth once daily. 30 tablet 3    phentermine (ADIPEX-P) 37.5 mg tablet Take 37.5 mg by mouth once daily.      potassium chloride SA (K-DUR,KLOR-CON M) 10 MEQ tablet Take 1 tablet (10 mEq total) by mouth 2 (two) times daily as needed (Take with lasix). 60 tablet 3    prochlorperazine (COMPAZINE) 10 MG tablet Take 1 tablet (10 mg total) by mouth every 6 (six) hours as needed (migraine or nausea). 60 tablet 11    promethazine (PHENERGAN) 50 MG tablet Take 50 mg by mouth every 6 (six) hours as needed.      promethazine (PHENERGAN) 6.25 mg/5 mL syrup Take 5 mLs (6.25 mg total) by mouth every 6 (six) hours as needed (cough). 180 mL 0    rizatriptan (MAXALT) 10 MG tablet 1 tab PO PRN migraine. May repeat every 2 hours for max 3 tabs in 24 hours. Use no more than 10 days per month. 10 tablet 11    sertraline (ZOLOFT) 100 MG tablet Take 150 mg by mouth.      tiZANidine (ZANAFLEX) 4 MG tablet Take 1 tablet (4 mg total) by mouth nightly as needed. 90 tablet 3    traZODone (DESYREL) 100 MG tablet Take 1 tablet (100 mg total) by mouth every evening. 90 tablet 1    triamcinolone acetonide 0.1% (KENALOG) 0.1 % ointment Apply topically 2 (two) times daily. 80 g 3     Current Facility-Administered Medications on File Prior to Visit   Medication Dose Route Frequency  Provider Last Rate Last Admin    onabotulinumtoxina injection 200 Units  200 Units Intramuscular q12 weeks Heavenly Fletcher, NP   200 Units at 09/30/22 0958     Review of patient's allergies indicates:   Allergen Reactions    Amlodipine-benazepril Swelling    Ace inhibitors Swelling     Angioedema; was taking Benazepril.    Tramadol Itching     Family History   Problem Relation Age of Onset    Cancer Mother     Diabetes Mother     Arthritis Mother     Hypertension Mother     Cancer Father     Diabetes Father     Arthritis Father     Hypertension Father     Celiac disease Neg Hx     Colon cancer Neg Hx     Crohn's disease Neg Hx     Esophageal cancer Neg Hx     Stomach cancer Neg Hx     Ulcerative colitis Neg Hx      Social History     Socioeconomic History    Marital status:    Tobacco Use    Smoking status: Never    Smokeless tobacco: Never   Substance and Sexual Activity    Alcohol use: Yes     Alcohol/week: 0.0 - 2.0 standard drinks of alcohol    Drug use: Yes     Types: Hydrocodone    Sexual activity: Never       Review of Systems:  Constitutional:  Denies fever or chills   Eyes:  Denies change in visual acuity   HENT:  Denies nasal congestion or sore throat   Respiratory:  Denies cough or shortness of breath   Cardiovascular:  Denies chest pain or edema   GI:  Denies abdominal pain, nausea, vomiting, bloody stools or diarrhea   :  Denies dysuria   Integument:  Denies rash   Neurologic:  Denies headache, focal weakness or sensory changes   Endocrine:  Denies polyuria or polydipsia   Lymphatic:  Denies swollen glands   Psychiatric:  Denies depression or anxiety     Physical Exam:   Constitutional:  Well developed, well nourished, no acute distress, non-toxic appearance   Integument:  Well hydrated  Neurologic:  Alert & oriented x 3  Psychiatric:  Speech and behavior appropriate     Bilateral Knee Exam    Bilateral Knee Exam     Tenderness   The patient is experiencing tenderness in the medial joint  line.    Range of Motion   Extension: abnormal   Flexion: abnormal     Muscle Strength     The patient has normal knee strength.    Tests   Ellie:  Medial - positive   Lachman:  Anterior - negative      Varus: negative  Valgus: negative  Patellar Apprehension: negative    Other   Erythema: absent  Sensation: normal  Pulse: present  Swelling: mild      Bilateral Hip Exam Performed    bilateral Hip Exam     Tenderness   The patient is experiencing tenderness in the greater trochanter.    Range of Motion   The patient has normal hip ROM.    Muscle Strength   Abduction: 4/5     Other   Erythema: absent  Sensation: normal  Pulse: present          Assessment/Plan  Trochanteric bursitis of both hips  -     Large Joint Aspiration/Injection: bilateral greater trochanteric bursa  -     triamcinolone acetonide injection 40 mg  -     triamcinolone acetonide injection 40 mg    Neuropathy of right foot  -     Ambulatory referral/consult to Podiatry; Future; Expected date: 01/11/2024    Primary osteoarthritis of left knee  -     Large Joint Aspiration/Injection: bilateral knee  -     triamcinolone acetonide injection 40 mg  -     triamcinolone acetonide injection 40 mg    Primary osteoarthritis of right knee  -     Large Joint Aspiration/Injection: bilateral knee  -     triamcinolone acetonide injection 40 mg  -     triamcinolone acetonide injection 40 mg    Using an aseptic technique, I injected 5 cc of lidocaine 1% without and 1 cc of kenalog 40mg into the bilateral knee and bilateral hip. The patient tolerated this well. I will have them return to clinic as needed.

## 2024-01-17 ENCOUNTER — TELEPHONE (OUTPATIENT)
Dept: INFUSION THERAPY | Facility: HOSPITAL | Age: 59
End: 2024-01-17
Payer: MEDICARE

## 2024-01-23 ENCOUNTER — TELEPHONE (OUTPATIENT)
Dept: INFUSION THERAPY | Facility: HOSPITAL | Age: 59
End: 2024-01-23
Payer: MEDICARE

## 2024-01-23 NOTE — TELEPHONE ENCOUNTER
----- Message from Lila Glaser, Patient Care Assistant sent at 1/19/2024  8:37 AM CST -----  Type: Needs Medical Advice  Who Called:  yasir  Best Call Back Number: [phone]    Additional Information: yasir is wanting to reschedule her missed infusion please call to further  discuss, thank you .

## 2024-01-23 NOTE — TELEPHONE ENCOUNTER
SPOKE WITH PT, CONFIRMED SHE DOES NOT HAVE VOICEMAIL.  RESCHEDULED APPT FOR NEXT TUESDAY, GAVE FULL ADDRESS AND PHONE NUMBER.

## 2024-01-23 NOTE — TELEPHONE ENCOUNTER
----- Message from Lila Glaser, Patient Care Assistant sent at 1/23/2024  8:35 AM CST -----  Type: Needs Medical Advice  Who Called:  yasir  Best Call Back Number: [phone]    Additional Information: yasir states this is her 3rd time calling to schedule her infusion that was missed last week due to weather,  please call to further discuss, thank you .

## 2024-01-30 ENCOUNTER — INFUSION (OUTPATIENT)
Dept: INFUSION THERAPY | Facility: HOSPITAL | Age: 59
End: 2024-01-30
Attending: INTERNAL MEDICINE
Payer: MEDICARE

## 2024-01-30 VITALS
HEIGHT: 63 IN | BODY MASS INDEX: 46.25 KG/M2 | SYSTOLIC BLOOD PRESSURE: 111 MMHG | DIASTOLIC BLOOD PRESSURE: 68 MMHG | WEIGHT: 261 LBS | OXYGEN SATURATION: 97 % | RESPIRATION RATE: 18 BRPM | TEMPERATURE: 98 F | HEART RATE: 76 BPM

## 2024-01-30 DIAGNOSIS — K50.819 CROHN'S DISEASE OF SMALL AND LARGE INTESTINES WITH COMPLICATION: Primary | ICD-10-CM

## 2024-01-30 PROCEDURE — 25000003 PHARM REV CODE 250: Mod: PN | Performed by: INTERNAL MEDICINE

## 2024-01-30 PROCEDURE — 96375 TX/PRO/DX INJ NEW DRUG ADDON: CPT | Mod: PN

## 2024-01-30 PROCEDURE — 96365 THER/PROPH/DIAG IV INF INIT: CPT | Mod: PN

## 2024-01-30 PROCEDURE — 63600175 PHARM REV CODE 636 W HCPCS: Mod: PN | Performed by: INTERNAL MEDICINE

## 2024-01-30 RX ORDER — DIPHENHYDRAMINE HYDROCHLORIDE 50 MG/ML
25 INJECTION INTRAMUSCULAR; INTRAVENOUS
Status: COMPLETED | OUTPATIENT
Start: 2024-01-30 | End: 2024-01-30

## 2024-01-30 RX ORDER — ACETAMINOPHEN 325 MG/1
650 TABLET ORAL
Status: COMPLETED | OUTPATIENT
Start: 2024-01-30 | End: 2024-01-30

## 2024-01-30 RX ORDER — DIPHENHYDRAMINE HYDROCHLORIDE 50 MG/ML
25 INJECTION INTRAMUSCULAR; INTRAVENOUS
OUTPATIENT
Start: 2024-02-27

## 2024-01-30 RX ORDER — SODIUM CHLORIDE 0.9 % (FLUSH) 0.9 %
10 SYRINGE (ML) INJECTION
Status: DISCONTINUED | OUTPATIENT
Start: 2024-01-30 | End: 2024-01-30 | Stop reason: HOSPADM

## 2024-01-30 RX ORDER — SODIUM CHLORIDE 0.9 % (FLUSH) 0.9 %
10 SYRINGE (ML) INJECTION
OUTPATIENT
Start: 2024-02-27

## 2024-01-30 RX ORDER — ACETAMINOPHEN 325 MG/1
650 TABLET ORAL
OUTPATIENT
Start: 2024-02-27

## 2024-01-30 RX ORDER — IPRATROPIUM BROMIDE AND ALBUTEROL SULFATE 2.5; .5 MG/3ML; MG/3ML
3 SOLUTION RESPIRATORY (INHALATION)
OUTPATIENT
Start: 2024-02-27

## 2024-01-30 RX ORDER — HEPARIN 100 UNIT/ML
500 SYRINGE INTRAVENOUS
OUTPATIENT
Start: 2024-02-27

## 2024-01-30 RX ORDER — EPINEPHRINE 1 MG/ML
0.3 INJECTION, SOLUTION, CONCENTRATE INTRAVENOUS
OUTPATIENT
Start: 2024-02-27

## 2024-01-30 RX ORDER — METHYLPREDNISOLONE SOD SUCC 125 MG
40 VIAL (EA) INJECTION
OUTPATIENT
Start: 2024-02-27

## 2024-01-30 RX ADMIN — SODIUM CHLORIDE: 9 INJECTION, SOLUTION INTRAVENOUS at 09:01

## 2024-01-30 RX ADMIN — ACETAMINOPHEN 650 MG: 325 TABLET, FILM COATED ORAL at 09:01

## 2024-01-30 RX ADMIN — SODIUM CHLORIDE 590 MG: 9 INJECTION, SOLUTION INTRAVENOUS at 09:01

## 2024-01-30 RX ADMIN — DIPHENHYDRAMINE HYDROCHLORIDE 25 MG: 50 INJECTION, SOLUTION INTRAMUSCULAR; INTRAVENOUS at 09:01

## 2024-01-30 NOTE — PLAN OF CARE
Problem: Adult Inpatient Plan of Care  Goal: Patient-Specific Goal (Individualized)  Outcome: Ongoing, Progressing  Flowsheets (Taken 1/30/2024 1220)  Anxieties, Fears or Concerns: None  Individualized Care Needs: rupa Tolbert     Problem: Fatigue  Goal: Improved Activity Tolerance  Intervention: Promote Improved Energy  Flowsheets (Taken 1/30/2024 1220)  Fatigue Management:   activity schedule adjusted   paced activity encouraged   fatigue-related activity identified  Sleep/Rest Enhancement:   relaxation techniques promoted   regular sleep/rest pattern promoted  Activity Management:   Ambulated -L4   Ambulated in riley - L4     Problem: Adult Inpatient Plan of Care  Goal: Plan of Care Review  Outcome: Ongoing, Progressing  Flowsheets (Taken 1/30/2024 1220)  Plan of Care Reviewed With: patient  Tolerated treatment with no known distress.  Ambulated from infusion center with steady gait.

## 2024-02-05 DIAGNOSIS — K50.90 CROHN'S DISEASE WITHOUT COMPLICATION, UNSPECIFIED GASTROINTESTINAL TRACT LOCATION: ICD-10-CM

## 2024-02-05 DIAGNOSIS — D84.9 IMMUNOCOMPROMISED: ICD-10-CM

## 2024-02-05 DIAGNOSIS — G89.4 CHRONIC PAIN SYNDROME: ICD-10-CM

## 2024-02-05 DIAGNOSIS — M02.39 REACTIVE ARTHRITIS OF MULTIPLE SITES: ICD-10-CM

## 2024-02-05 RX ORDER — OXYCODONE AND ACETAMINOPHEN 10; 325 MG/1; MG/1
1 TABLET ORAL EVERY 8 HOURS PRN
Qty: 90 TABLET | Refills: 0 | Status: SHIPPED | OUTPATIENT
Start: 2024-02-05 | End: 2024-03-06

## 2024-02-19 ENCOUNTER — LAB VISIT (OUTPATIENT)
Dept: LAB | Facility: HOSPITAL | Age: 59
End: 2024-02-19
Attending: PHYSICIAN ASSISTANT
Payer: MEDICARE

## 2024-02-19 DIAGNOSIS — M02.39 REACTIVE ARTHRITIS OF MULTIPLE SITES: ICD-10-CM

## 2024-02-19 DIAGNOSIS — R73.9 HYPERGLYCEMIA: ICD-10-CM

## 2024-02-19 LAB
ESTIMATED AVG GLUCOSE: 117 MG/DL (ref 68–131)
HBA1C MFR BLD: 5.7 % (ref 4–5.6)

## 2024-02-19 PROCEDURE — 36415 COLL VENOUS BLD VENIPUNCTURE: CPT | Mod: PO | Performed by: PHYSICIAN ASSISTANT

## 2024-02-19 PROCEDURE — 83036 HEMOGLOBIN GLYCOSYLATED A1C: CPT | Performed by: PHYSICIAN ASSISTANT

## 2024-02-26 ENCOUNTER — OFFICE VISIT (OUTPATIENT)
Dept: RHEUMATOLOGY | Facility: CLINIC | Age: 59
End: 2024-02-26
Payer: MEDICARE

## 2024-02-26 VITALS
HEART RATE: 89 BPM | BODY MASS INDEX: 47.65 KG/M2 | HEIGHT: 63 IN | SYSTOLIC BLOOD PRESSURE: 127 MMHG | WEIGHT: 268.94 LBS | DIASTOLIC BLOOD PRESSURE: 79 MMHG

## 2024-02-26 DIAGNOSIS — G89.4 CHRONIC PAIN SYNDROME: ICD-10-CM

## 2024-02-26 DIAGNOSIS — K50.90 CROHN'S DISEASE WITHOUT COMPLICATION, UNSPECIFIED GASTROINTESTINAL TRACT LOCATION: ICD-10-CM

## 2024-02-26 DIAGNOSIS — K21.9 GASTROESOPHAGEAL REFLUX DISEASE, UNSPECIFIED WHETHER ESOPHAGITIS PRESENT: ICD-10-CM

## 2024-02-26 DIAGNOSIS — R94.6 ABNORMAL RESULTS OF THYROID FUNCTION STUDIES: ICD-10-CM

## 2024-02-26 DIAGNOSIS — M02.39 REACTIVE ARTHRITIS OF MULTIPLE SITES: ICD-10-CM

## 2024-02-26 DIAGNOSIS — R73.9 HYPERGLYCEMIA: ICD-10-CM

## 2024-02-26 DIAGNOSIS — D84.9 IMMUNOCOMPROMISED: ICD-10-CM

## 2024-02-26 DIAGNOSIS — J45.909 ASTHMA, UNSPECIFIED ASTHMA SEVERITY, UNSPECIFIED WHETHER COMPLICATED, UNSPECIFIED WHETHER PERSISTENT: Primary | ICD-10-CM

## 2024-02-26 DIAGNOSIS — J40 BRONCHITIS: ICD-10-CM

## 2024-02-26 DIAGNOSIS — E11.69 TYPE 2 DIABETES MELLITUS WITH OTHER SPECIFIED COMPLICATION, WITHOUT LONG-TERM CURRENT USE OF INSULIN: ICD-10-CM

## 2024-02-26 PROCEDURE — 3074F SYST BP LT 130 MM HG: CPT | Mod: CPTII,S$GLB,, | Performed by: INTERNAL MEDICINE

## 2024-02-26 PROCEDURE — 99215 OFFICE O/P EST HI 40 MIN: CPT | Mod: 25,S$GLB,, | Performed by: INTERNAL MEDICINE

## 2024-02-26 PROCEDURE — 3078F DIAST BP <80 MM HG: CPT | Mod: CPTII,S$GLB,, | Performed by: INTERNAL MEDICINE

## 2024-02-26 PROCEDURE — 99999 PR PBB SHADOW E&M-EST. PATIENT-LVL V: CPT | Mod: PBBFAC,,, | Performed by: INTERNAL MEDICINE

## 2024-02-26 PROCEDURE — 3044F HG A1C LEVEL LT 7.0%: CPT | Mod: CPTII,S$GLB,, | Performed by: INTERNAL MEDICINE

## 2024-02-26 PROCEDURE — 1159F MED LIST DOCD IN RCRD: CPT | Mod: CPTII,S$GLB,, | Performed by: INTERNAL MEDICINE

## 2024-02-26 PROCEDURE — 3008F BODY MASS INDEX DOCD: CPT | Mod: CPTII,S$GLB,, | Performed by: INTERNAL MEDICINE

## 2024-02-26 PROCEDURE — 96372 THER/PROPH/DIAG INJ SC/IM: CPT | Mod: S$GLB,,, | Performed by: INTERNAL MEDICINE

## 2024-02-26 RX ORDER — OXYCODONE AND ACETAMINOPHEN 10; 325 MG/1; MG/1
1 TABLET ORAL EVERY 8 HOURS PRN
Qty: 90 TABLET | Refills: 0
Start: 2024-04-04 | End: 2024-02-26 | Stop reason: SDUPTHER

## 2024-02-26 RX ORDER — BUDESONIDE AND FORMOTEROL FUMARATE DIHYDRATE 160; 4.5 UG/1; UG/1
2 AEROSOL RESPIRATORY (INHALATION) 2 TIMES DAILY
COMMUNITY

## 2024-02-26 RX ORDER — METHYLPREDNISOLONE ACETATE 80 MG/ML
160 INJECTION, SUSPENSION INTRA-ARTICULAR; INTRALESIONAL; INTRAMUSCULAR; SOFT TISSUE
Status: COMPLETED | OUTPATIENT
Start: 2024-02-26 | End: 2024-02-26

## 2024-02-26 RX ORDER — PROMETHAZINE HYDROCHLORIDE 6.25 MG/5ML
25 SYRUP ORAL EVERY 6 HOURS PRN
Qty: 473 ML | Refills: 0 | Status: SHIPPED | OUTPATIENT
Start: 2024-02-26 | End: 2024-03-12

## 2024-02-26 RX ORDER — PANTOPRAZOLE SODIUM 40 MG/1
40 TABLET, DELAYED RELEASE ORAL DAILY
Qty: 90 TABLET | Refills: 3 | Status: SHIPPED | OUTPATIENT
Start: 2024-02-26 | End: 2025-02-25

## 2024-02-26 RX ORDER — TIRZEPATIDE 2.5 MG/.5ML
2.5 INJECTION, SOLUTION SUBCUTANEOUS
Qty: 4 PEN | Refills: 1 | Status: SHIPPED | OUTPATIENT
Start: 2024-02-26 | End: 2024-04-26

## 2024-02-26 RX ORDER — OXYCODONE AND ACETAMINOPHEN 10; 325 MG/1; MG/1
1 TABLET ORAL EVERY 8 HOURS PRN
Qty: 90 TABLET | Refills: 0 | Status: SHIPPED | OUTPATIENT
Start: 2024-05-04 | End: 2024-05-29

## 2024-02-26 RX ORDER — TIRZEPATIDE 5 MG/.5ML
5 INJECTION, SOLUTION SUBCUTANEOUS
Qty: 4 PEN | Refills: 5 | Status: SHIPPED | OUTPATIENT
Start: 2024-02-26 | End: 2024-08-24

## 2024-02-26 RX ORDER — FLUTICASONE PROPIONATE 50 MCG
SPRAY, SUSPENSION (ML) NASAL
COMMUNITY

## 2024-02-26 RX ORDER — OXYCODONE AND ACETAMINOPHEN 10; 325 MG/1; MG/1
1 TABLET ORAL EVERY 8 HOURS PRN
Qty: 90 TABLET | Refills: 0 | Status: SHIPPED | OUTPATIENT
Start: 2024-03-04 | End: 2024-04-03

## 2024-02-26 RX ORDER — AZITHROMYCIN 500 MG/1
500 TABLET, FILM COATED ORAL DAILY
Qty: 10 TABLET | Refills: 1 | Status: SHIPPED | OUTPATIENT
Start: 2024-02-26 | End: 2024-03-17

## 2024-02-26 RX ORDER — SUMATRIPTAN 50 MG/1
TABLET, FILM COATED ORAL
COMMUNITY
Start: 2024-02-16

## 2024-02-26 RX ORDER — CEFTRIAXONE 1 G/1
1 INJECTION, POWDER, FOR SOLUTION INTRAMUSCULAR; INTRAVENOUS
Status: COMPLETED | OUTPATIENT
Start: 2024-02-26 | End: 2024-02-26

## 2024-02-26 RX ORDER — KETOROLAC TROMETHAMINE 30 MG/ML
30 INJECTION, SOLUTION INTRAMUSCULAR; INTRAVENOUS
Status: COMPLETED | OUTPATIENT
Start: 2024-02-26 | End: 2024-02-26

## 2024-02-26 RX ORDER — BUTALBITAL, ACETAMINOPHEN AND CAFFEINE 50; 325; 40 MG/1; MG/1; MG/1
1 TABLET ORAL EVERY 4 HOURS PRN
COMMUNITY
Start: 2024-02-16

## 2024-02-26 RX ORDER — HYDROXYCHLOROQUINE SULFATE 200 MG/1
200 TABLET, FILM COATED ORAL 2 TIMES DAILY
Qty: 180 TABLET | Refills: 3 | Status: SHIPPED | OUTPATIENT
Start: 2024-02-26

## 2024-02-26 RX ORDER — OXYCODONE AND ACETAMINOPHEN 10; 325 MG/1; MG/1
1 TABLET ORAL EVERY 8 HOURS PRN
Qty: 90 TABLET | Refills: 0
Start: 2024-03-04 | End: 2024-02-26 | Stop reason: SDUPTHER

## 2024-02-26 RX ORDER — OXYCODONE AND ACETAMINOPHEN 10; 325 MG/1; MG/1
1 TABLET ORAL EVERY 8 HOURS PRN
Qty: 90 TABLET | Refills: 0 | Status: SHIPPED | OUTPATIENT
Start: 2024-04-04 | End: 2024-05-04

## 2024-02-26 RX ADMIN — METHYLPREDNISOLONE ACETATE 160 MG: 80 INJECTION, SUSPENSION INTRA-ARTICULAR; INTRALESIONAL; INTRAMUSCULAR; SOFT TISSUE at 01:02

## 2024-02-26 RX ADMIN — CEFTRIAXONE 1 G: 1 INJECTION, POWDER, FOR SOLUTION INTRAMUSCULAR; INTRAVENOUS at 01:02

## 2024-02-26 RX ADMIN — KETOROLAC TROMETHAMINE 30 MG: 30 INJECTION, SOLUTION INTRAMUSCULAR; INTRAVENOUS at 01:02

## 2024-02-26 ASSESSMENT — ROUTINE ASSESSMENT OF PATIENT INDEX DATA (RAPID3)
TOTAL RAPID3 SCORE: 8.11
PATIENT GLOBAL ASSESSMENT SCORE: 8.5
FATIGUE SCORE: 2.2
PAIN SCORE: 8.5
PSYCHOLOGICAL DISTRESS SCORE: 5.5
MDHAQ FUNCTION SCORE: 2.2

## 2024-02-26 NOTE — LETTER
February 26, 2024    Amanda Mcguire  [address] [address] [place] [address]             [facility]  [address] [place] [address]  Phone: [phone]  Fax: [phone] Dear Shade.     Ms. Mcguire has Crohn's and rheumatoid arthritis and at this time she also has COPD and sleep apnea she does have a recurrent bronchitis as well.  Due to her multiple disease states her arthritis is poorly controlled she requires assistance with ADLs.  She needs assistance with cooking cleaning dressing bathing due to her recurrent flares.  She requires assistance getting to and from her multiple appointments as well as her every 6 week IV infusion.  Please allow Ms.Destine Mcguire to assist her mother on a full-time basis as her primary caregiver in her home.  She requires 8 hours of care in a 12 to 24 hour period.  If you have any questions or concerns, please don't hesitate to call.    Sincerely,        Sukhjinder Beltran MD

## 2024-02-26 NOTE — PROGRESS NOTES
Subjective:     Patient ID:  Amanda Mcguire    Chief Complaint:  Disease Management     History of Present Illness:  Pt is a 58 y.o. female Mrs. Mcguire is a 58 year old female who presents to [facility] for follow up on reactive arthritis, osteoarthritis. She is doing poorly. Complains of bilateral lower ext edema onset approx 2 days ago only slightly improved with lasix and new severe pain behind her R knee that was causing difficulty walking within the last few days. She reports increased generalized pain as well in the last week due to change in the weather. Taking percocet tid prn for severe pain and this is helping her.      She complains of sore throat, productive cough, and chest congestion. Subjective fever and chills.  No headache.         We reviewed her recent labs --ESR remains high and CRP is slightly improved.      Current tx:  1. Remicade  2. percpcet  3. baclofe  Rheumatologic History:   - Diagnosis/es:  - Positive serologies:  - Infectious screening labs:  - Previous Treatments:  - Current Treatments:     Interval History:   Hospitalization since last office visit: No    Patient Active Problem List    Diagnosis Date Noted    Crohn's disease of small and large intestines with complication 05/18/2023    Immunocompromised 04/04/2023    Reactive arthritis of multiple sites 04/04/2023    Morbid (severe) obesity due to excess calories 04/04/2023    Myofascial pain syndrome, cervical 09/28/2022    Neck pain 09/28/2022    Intractable chronic migraine without aura and without status migrainosus 09/09/2022    Chronic hip pain, bilateral 01/12/2022    Crohn's disease of small intestine with intestinal obstruction 03/03/2021    Avascular necrosis of bones of both hips 03/03/2021    Psoriasis of nail 03/03/2021    Chronic pain syndrome 03/03/2021     Past Surgical History:   Procedure Laterality Date    COLONOSCOPY N/A 03/10/2022    Procedure: COLONOSCOPY;  Surgeon: Nilson Wiseman MD;   Location: [facility];  Service: Endoscopy;  Laterality: N/A; Repeat colonoscopy in 6 months because the bowel prep was poor    COLONOSCOPY N/A 3/2/2023    Procedure: COLONOSCOPY;  Surgeon: Erik Garcia MD;  Location: [facility];  Service: Endoscopy;  Laterality: N/A;    ESOPHAGOGASTRODUODENOSCOPY N/A 03/10/2022    Procedure: EGD (ESOPHAGOGASTRODUODENOSCOPY);  Surgeon: Nilson Wiseman MD;  Location: [facility];  Service: Endoscopy;  Laterality: N/A; Repeat upper endoscopy in 8 weeks for surveillance    ESOPHAGOGASTRODUODENOSCOPY N/A 3/16/2023    Procedure: EGD (ESOPHAGOGASTRODUODENOSCOPY);  Surgeon: Erik Garcia MD;  Location: [facility];  Service: Endoscopy;  Laterality: N/A;     Social History     Tobacco Use    Smoking status: Never    Smokeless tobacco: Never   Substance Use Topics    Alcohol use: Yes     Alcohol/week: 0.0 - 2.0 standard drinks of alcohol    Drug use: Yes     Types: Hydrocodone     Family History   Problem Relation Age of Onset    Cancer Mother     Diabetes Mother     Arthritis Mother     Hypertension Mother     Cancer Father     Diabetes Father     Arthritis Father     Hypertension Father     Celiac disease Neg Hx     Colon cancer Neg Hx     Crohn's disease Neg Hx     Esophageal cancer Neg Hx     Stomach cancer Neg Hx     Ulcerative colitis Neg Hx      Review of patient's allergies indicates:   Allergen Reactions    Amlodipine-benazepril Swelling    Ace inhibitors Swelling     Angioedema; was taking Benazepril.    Tramadol Itching       Review of Systems   Review of Systems     Current Medications:  Current Outpatient Medications   Medication Instructions    albuterol (PROVENTIL) 2.5 mg, Nebulization, Every 6 hours PRN, Rescue    albuterol (PROVENTIL/VENTOLIN HFA) 90 mcg/actuation inhaler 2 puffs, Inhalation, Every 6 hours PRN, Rescue    ALPRAZolam (XANAX) 0.5 mg, Oral, 2 times daily PRN    amLODIPine (NORVASC) 10 MG tablet Take 1 tablet (10 mg total) by mouth daily    atorvastatin  (LIPITOR) 20 mg, Oral, Nightly    azithromycin (ZITHROMAX) 500 mg, Oral, Daily    budesonide-formoterol 160-4.5 mcg (SYMBICORT) 160-4.5 mcg/actuation HFAA 2 puffs, Inhalation, 2 times daily    budesonide 4 mg, Oral, 2 times daily PRN    butalbital-acetaminophen-caffeine -40 mg (FIORICET, ESGIC) -40 mg per tablet 1 tablet, Oral, Every 4 hours PRN    chlorhexidine (PERIDEX) 0.12 % solution swish and spit 15 MILLILITERS in the mouth or throat TWICE DAILY FOR FOURTEEN DAYS    diclofenac sodium (VOLTAREN) 1 % Gel APPLY TOPICALLY FOUR TIMES DAILY    dicyclomine (BENTYL) 20 mg, Oral, Daily    diltiaZEM (CARDIZEM CD) 240 mg, Oral    EPINEPHrine (EPIPEN) 0.3 mg, Intramuscular    famotidine (PEPCID) 40 mg, Oral, Daily PRN    fluticasone propionate (FLONASE) 50 mcg/actuation nasal spray SPRAY ONE SPRAY IN EACH NOSTRIL TWICE DAILY    fluticasone-salmeterol 500-50 mcg/dose (ADVAIR) 500-50 mcg/dose DsDv diskus inhaler 1 puff, Inhalation    furosemide (LASIX) 20 MG tablet Take 1 tablet (20 mg total) by mouth daily    furosemide (LASIX) 20 mg, Oral, 2 times daily    gabapentin (NEURONTIN) 800 mg, Oral, 3 times daily    hydroxychloroquine (PLAQUENIL) 200 mg, Oral, 2 times daily    hydrOXYzine pamoate (VISTARIL) 25 MG Cap TAKE ONE CAPSULE BY MOUTH THREE TIMES DAILY AS NEEDED FOR ITCHING    ipratropium-albuteroL (COMBIVENT)  mcg/actuation inhaler 1 puff, Inhalation, 4 times daily, Rescue    irbesartan (AVAPRO) 150 MG tablet TAKE 1 TABLET BY MOUTH EVERY NIGHT AT BEDTIME FOR BLOOD PRESSURE    lidocaine HCl 2% (LIDOCAINE VISCOUS) 2 % Soln Mucous Membrane, Every 8 hours PRN    memantine (NAMENDA) 5 mg, Oral, 2 times daily    montelukast (SINGULAIR) 10 mg, Oral, Nightly    MOUNJARO 2.5 mg, Subcutaneous, Every 7 days    MOUNJARO 5 mg, Subcutaneous, Every 7 days    neomycin-polymyxin-hydrocortisone (CORTISPORIN) otic solution PLACE 2 DROPS IN AFFECTED EAR FOUR TIMES DAILY FOR 5-7 DAYS    oxyCODONE-acetaminophen (PERCOCET)  " mg per tablet 1 tablet, Oral, Every 8 hours PRN    pantoprazole (PROTONIX) 40 mg, Oral, Daily    potassium chloride SA (K-DUR,KLOR-CON M) 10 MEQ tablet 10 mEq, Oral, 2 times daily PRN    promethazine (PHENERGAN) 25 mg, Oral, Every 6 hours PRN    rizatriptan (MAXALT) 10 MG tablet 1 tab PO PRN migraine. May repeat every 2 hours for max 3 tabs in 24 hours. Use no more than 10 days per month.    sertraline (ZOLOFT) 150 mg, Oral    sumatriptan (IMITREX) 50 MG tablet TAKE ONE TABLET BY MOUTH AT ONSET OF MIGRAINE, MAY REPEAT DOSE once AFTER TWO HOURS IF NO RELIEF    tiZANidine (ZANAFLEX) 4 mg, Oral, Nightly PRN    traZODone (DESYREL) 100 mg, Oral, Nightly    triamcinolone acetonide 0.1% (KENALOG) 0.1 % ointment Topical (Top), 2 times daily         Objective:     Vitals:    02/26/24 1233   BP: 127/79   Pulse: 89   Weight: 122 kg (268 lb 15.4 oz)   Height: 5' 2.99" (1.6 m)   PainSc:   9      Body mass index is 47.66 kg/m².     Physical Examinations:  Physical Exam     Disease Assessment Scores:  Patient's Global Assessment of arthritis (0-10): 2  Physician's Global Assessment of arthritis (0-10): 2  Number of Tender Joints (0-28): 4  Number of Swollen Joints (0-28): 4    There is currently no information documented on the homunculus. Go to the Rheumatology activity and complete the homunculus joint exam.         4/3/2023     1:00 PM   Rapid3 Question Responses and Scores   MDHAQ Score 0.7   Psychologic Score 4.4   Pain Score 10   When you awakened in the morning OVER THE LAST WEEK, did you feel stiff? Yes   If Yes, please indicate the number of hours until you are as limber as you will be for the day 10   Fatigue Score 10   Global Health Score 10   RAPID3 Score 7.44       Monitoring Lab Results:  Lab Results   Component Value Date    WBC 7.20 11/10/2023    RBC 4.87 11/10/2023    HGB 13.0 11/10/2023    HCT 42.2 11/10/2023    MCV 87 11/10/2023    MCH 26.7 (L) 11/10/2023    MCHC 30.8 (L) 11/10/2023    RDW 18.3 (H) " "11/10/2023     11/10/2023        Lab Results   Component Value Date     (H) 11/26/2023    K 4.0 11/26/2023     (H) 11/10/2023    CO2 22 11/26/2023     11/10/2023    BUN 18 11/26/2023    CREATININE 0.86 11/26/2023    CALCIUM 9.4 11/26/2023    PROT 7.0 11/26/2023    ALBUMIN 3.7 11/26/2023    BILITOT 0.3 (L) 11/26/2023    ALKPHOS 112 11/26/2023    AST 24 11/26/2023    ALT 28 11/26/2023    ANIONGAP 12 11/26/2023    EGFRNORACEVR >60.0 11/10/2023       Lab Results   Component Value Date    SEDRATE 28 (H) 10/19/2023    CRP 11.4 (H) 10/19/2023        Lab Results   Component Value Date    AIAVTTBB54ET 32 11/02/2022        No results found for: "CHOL", "HDL", "LDLCALC", "TRIG"    Lab Results   Component Value Date    RF <13.0 02/28/2022    CCPANTIBODIE <0.5 11/02/2022     Lab Results   Component Value Date    ANASCREEN Positive (A) 06/08/2023    ANATITER 1:640 06/08/2023    DSDNA Negative 1:10 06/08/2023     No results found for: "HLABB27"    Infectious Disease Screening:  Lab Results   Component Value Date    HEPBSAG Non-reactive 07/21/2023    HEPBSAB <3.00 06/09/2023    HEPBSAB Non-reactive 06/09/2023     Lab Results   Component Value Date    HEPCAB Non-reactive 06/09/2023     Lab Results   Component Value Date    TBGOLDPLUS Negative 06/09/2023     No results found for: "QUANTIFERON", "SVCMT", "QUANTAGVALUE", "QUANTNILVALU", "QUANTMITOGEN", "QFTTBAG", "QINT"     Imaging:  DEXA, Xrays, MRIs, CTs, etc    Old & Outside Medical Records:  Reviewed old and all outside medical records available in Care Everywhere    Current Medication Changes:  Medication List with Changes/Refills   New Medications    AZITHROMYCIN (ZITHROMAX) 500 MG TABLET    Take 1 tablet (500 mg total) by mouth once daily. for 20 days    PROMETHAZINE (PHENERGAN) 6.25 MG/5 ML SYRUP    Take 20 mLs (25 mg total) by mouth every 6 (six) hours as needed for Nausea.    TIRZEPATIDE (MOUNJARO) 2.5 MG/0.5 ML PNIJ    Inject 2.5 mg into the skin " every 7 days.    TIRZEPATIDE (MOUNJARO) 5 MG/0.5 ML PNIJ    Inject 5 mg into the skin every 7 days.   Current Medications    ALBUTEROL (PROVENTIL) 2.5 MG /3 ML (0.083 %) NEBULIZER SOLUTION    Take 3 mLs (2.5 mg total) by nebulization every 6 (six) hours as needed for Wheezing. Rescue    ALBUTEROL (PROVENTIL/VENTOLIN HFA) 90 MCG/ACTUATION INHALER    Inhale 2 puffs into the lungs every 6 (six) hours as needed for Wheezing. Rescue    ALPRAZOLAM (XANAX) 0.5 MG TABLET    Take 0.5 mg by mouth 2 (two) times daily as needed.    AMLODIPINE (NORVASC) 10 MG TABLET    Take 1 tablet (10 mg total) by mouth daily    ATORVASTATIN (LIPITOR) 20 MG TABLET    Take 20 mg by mouth every evening.    BUDESONIDE 4 MG CPDR    Take 4 mg by mouth 2 (two) times daily as needed (crohn's  flare).    BUDESONIDE-FORMOTEROL 160-4.5 MCG (SYMBICORT) 160-4.5 MCG/ACTUATION HFAA    Inhale 2 puffs into the lungs 2 (two) times daily.    BUTALBITAL-ACETAMINOPHEN-CAFFEINE -40 MG (FIORICET, ESGIC) -40 MG PER TABLET    Take 1 tablet by mouth every 4 (four) hours as needed.    CHLORHEXIDINE (PERIDEX) 0.12 % SOLUTION    swish and spit 15 MILLILITERS in the mouth or throat TWICE DAILY FOR FOURTEEN DAYS    DICLOFENAC SODIUM (VOLTAREN) 1 % GEL    APPLY TOPICALLY FOUR TIMES DAILY    DICYCLOMINE (BENTYL) 20 MG TABLET    Take 20 mg by mouth once daily.     DILTIAZEM (CARDIZEM CD) 240 MG 24 HR CAPSULE    Take 240 mg by mouth.    EPINEPHRINE (EPIPEN) 0.3 MG/0.3 ML ATIN    Inject 0.3 mg into the muscle.    FAMOTIDINE (PEPCID) 40 MG TABLET    Take 40 mg by mouth daily as needed.    FLUTICASONE PROPIONATE (FLONASE) 50 MCG/ACTUATION NASAL SPRAY    SPRAY ONE SPRAY IN EACH NOSTRIL TWICE DAILY    FLUTICASONE-SALMETEROL 500-50 MCG/DOSE (ADVAIR) 500-50 MCG/DOSE DSDV DISKUS INHALER    Inhale 1 puff into the lungs.    FUROSEMIDE (LASIX) 20 MG TABLET    Take 1 tablet (20 mg total) by mouth daily    FUROSEMIDE (LASIX) 20 MG TABLET    Take 1 tablet (20 mg total) by mouth  2 (two) times daily.    GABAPENTIN (NEURONTIN) 800 MG TABLET    Take 1 tablet (800 mg total) by mouth 3 (three) times daily.    HYDROXYZINE PAMOATE (VISTARIL) 25 MG CAP    TAKE ONE CAPSULE BY MOUTH THREE TIMES DAILY AS NEEDED FOR ITCHING    IPRATROPIUM-ALBUTEROL (COMBIVENT)  MCG/ACTUATION INHALER    Inhale 1 puff into the lungs 4 (four) times daily. Rescue    IRBESARTAN (AVAPRO) 150 MG TABLET    TAKE 1 TABLET BY MOUTH EVERY NIGHT AT BEDTIME FOR BLOOD PRESSURE    LIDOCAINE HCL 2% (LIDOCAINE VISCOUS) 2 % SOLN    by Mucous Membrane route every 8 (eight) hours as needed.    MEMANTINE (NAMENDA) 5 MG TAB    Take 1 tablet (5 mg total) by mouth 2 (two) times daily.    MONTELUKAST (SINGULAIR) 10 MG TABLET    Take 10 mg by mouth every evening.    NEOMYCIN-POLYMYXIN-HYDROCORTISONE (CORTISPORIN) OTIC SOLUTION    PLACE 2 DROPS IN AFFECTED EAR FOUR TIMES DAILY FOR 5-7 DAYS    OXYCODONE-ACETAMINOPHEN (PERCOCET)  MG PER TABLET    Take 1 tablet by mouth every 8 (eight) hours as needed for Pain.    POTASSIUM CHLORIDE SA (K-DUR,KLOR-CON M) 10 MEQ TABLET    Take 1 tablet (10 mEq total) by mouth 2 (two) times daily as needed (Take with lasix).    RIZATRIPTAN (MAXALT) 10 MG TABLET    1 tab PO PRN migraine. May repeat every 2 hours for max 3 tabs in 24 hours. Use no more than 10 days per month.    SERTRALINE (ZOLOFT) 100 MG TABLET    Take 150 mg by mouth.    SUMATRIPTAN (IMITREX) 50 MG TABLET    TAKE ONE TABLET BY MOUTH AT ONSET OF MIGRAINE, MAY REPEAT DOSE once AFTER TWO HOURS IF NO RELIEF    TIZANIDINE (ZANAFLEX) 4 MG TABLET    Take 1 tablet (4 mg total) by mouth nightly as needed.    TRAZODONE (DESYREL) 100 MG TABLET    Take 1 tablet (100 mg total) by mouth every evening.    TRIAMCINOLONE ACETONIDE 0.1% (KENALOG) 0.1 % OINTMENT    Apply topically 2 (two) times daily.   Changed and/or Refilled Medications    Modified Medication Previous Medication    HYDROXYCHLOROQUINE (PLAQUENIL) 200 MG TABLET hydrOXYchloroQUINE (PLAQUENIL)  200 mg tablet       Take 1 tablet (200 mg total) by mouth 2 (two) times daily.    Take 1 tablet (200 mg total) by mouth 2 (two) times daily.    PANTOPRAZOLE (PROTONIX) 40 MG TABLET pantoprazole (PROTONIX) 40 MG tablet       Take 1 tablet (40 mg total) by mouth once daily.    Take 1 tablet (40 mg total) by mouth once daily.   Discontinued Medications    NEBULIZER AND COMPRESSOR KARLA    Use as directed    PHENTERMINE (ADIPEX-P) 37.5 MG TABLET    Take 37.5 mg by mouth once daily.    PROCHLORPERAZINE (COMPAZINE) 10 MG TABLET    Take 1 tablet (10 mg total) by mouth every 6 (six) hours as needed (migraine or nausea).    PROMETHAZINE (PHENERGAN) 50 MG TABLET    Take 50 mg by mouth every 6 (six) hours as needed.    PROMETHAZINE (PHENERGAN) 6.25 MG/5 ML SYRUP    Take 5 mLs (6.25 mg total) by mouth every 6 (six) hours as needed (cough).        Assessment:     Pt is a 58 y.o. female with rheumatoid arthritis and crohn's dzThe patient has not achieved clinical remission.   Plan:      Encounter Diagnoses   Name Primary?    Asthma, unspecified asthma severity, unspecified whether complicated, unspecified whether persistent Yes    Bronchitis     Hyperglycemia     Chronic pain syndrome     Immunocompromised     Reactive arthritis of multiple sites     Crohn's disease without complication, unspecified gastrointestinal tract location     Gastroesophageal reflux disease, unspecified whether esophagitis present     Abnormal results of thyroid function studies     Type 2 diabetes mellitus with other specified complication, without long-term current use of insulin          Amanda was seen today for disease management.    Diagnoses and all orders for this visit:    Asthma, unspecified asthma severity, unspecified whether complicated, unspecified whether persistent  -     NEBULIZER FOR HOME USE  -     NEBULIZER KIT (SUPPLIES) FOR HOME USE  -     cefTRIAXone injection 1 g  -     methylPREDNISolone acetate injection 160 mg  -     ketorolac  injection 30 mg  -     azithromycin (ZITHROMAX) 500 MG tablet; Take 1 tablet (500 mg total) by mouth once daily. for 20 days  -     promethazine (PHENERGAN) 6.25 mg/5 mL syrup; Take 20 mLs (25 mg total) by mouth every 6 (six) hours as needed for Nausea.  -     hydroxychloroquine (PLAQUENIL) 200 mg tablet; Take 1 tablet (200 mg total) by mouth 2 (two) times daily.  -     pantoprazole (PROTONIX) 40 MG tablet; Take 1 tablet (40 mg total) by mouth once daily.  -     Ambulatory referral/consult to Gastroenterology; Future  -     T4, Free; Future  -     TSH; Future  -     Vitamin D; Future  -     Cyclic Citrullinated Peptide Antibody, IgG; Future  -     Rheumatoid Factor; Future  -     Sedimentation rate; Future  -     C-Reactive Protein; Future  -     Comprehensive Metabolic Panel; Future  -     CBC Auto Differential; Future  -     Transferrin; Future  -     Iron and TIBC; Future  -     tirzepatide (MOUNJARO) 2.5 mg/0.5 mL PnIj; Inject 2.5 mg into the skin every 7 days.  -     tirzepatide (MOUNJARO) 5 mg/0.5 mL PnIj; Inject 5 mg into the skin every 7 days.    Bronchitis  -     NEBULIZER FOR HOME USE  -     NEBULIZER KIT (SUPPLIES) FOR HOME USE  -     cefTRIAXone injection 1 g  -     methylPREDNISolone acetate injection 160 mg  -     ketorolac injection 30 mg  -     azithromycin (ZITHROMAX) 500 MG tablet; Take 1 tablet (500 mg total) by mouth once daily. for 20 days  -     promethazine (PHENERGAN) 6.25 mg/5 mL syrup; Take 20 mLs (25 mg total) by mouth every 6 (six) hours as needed for Nausea.  -     hydroxychloroquine (PLAQUENIL) 200 mg tablet; Take 1 tablet (200 mg total) by mouth 2 (two) times daily.  -     pantoprazole (PROTONIX) 40 MG tablet; Take 1 tablet (40 mg total) by mouth once daily.  -     Ambulatory referral/consult to Gastroenterology; Future  -     T4, Free; Future  -     TSH; Future  -     Vitamin D; Future  -     Cyclic Citrullinated Peptide Antibody, IgG; Future  -     Rheumatoid Factor; Future  -      Sedimentation rate; Future  -     C-Reactive Protein; Future  -     Comprehensive Metabolic Panel; Future  -     CBC Auto Differential; Future  -     Transferrin; Future  -     Iron and TIBC; Future  -     tirzepatide (MOUNJARO) 2.5 mg/0.5 mL PnIj; Inject 2.5 mg into the skin every 7 days.  -     tirzepatide (MOUNJARO) 5 mg/0.5 mL PnIj; Inject 5 mg into the skin every 7 days.    Hyperglycemia  -     cefTRIAXone injection 1 g  -     methylPREDNISolone acetate injection 160 mg  -     ketorolac injection 30 mg  -     azithromycin (ZITHROMAX) 500 MG tablet; Take 1 tablet (500 mg total) by mouth once daily. for 20 days  -     promethazine (PHENERGAN) 6.25 mg/5 mL syrup; Take 20 mLs (25 mg total) by mouth every 6 (six) hours as needed for Nausea.  -     hydroxychloroquine (PLAQUENIL) 200 mg tablet; Take 1 tablet (200 mg total) by mouth 2 (two) times daily.  -     pantoprazole (PROTONIX) 40 MG tablet; Take 1 tablet (40 mg total) by mouth once daily.  -     Ambulatory referral/consult to Gastroenterology; Future  -     T4, Free; Future  -     TSH; Future  -     Vitamin D; Future  -     Cyclic Citrullinated Peptide Antibody, IgG; Future  -     Rheumatoid Factor; Future  -     Sedimentation rate; Future  -     C-Reactive Protein; Future  -     Comprehensive Metabolic Panel; Future  -     CBC Auto Differential; Future  -     Transferrin; Future  -     Iron and TIBC; Future  -     tirzepatide (MOUNJARO) 2.5 mg/0.5 mL PnIj; Inject 2.5 mg into the skin every 7 days.  -     tirzepatide (MOUNJARO) 5 mg/0.5 mL PnIj; Inject 5 mg into the skin every 7 days.    Chronic pain syndrome  -     cefTRIAXone injection 1 g  -     methylPREDNISolone acetate injection 160 mg  -     ketorolac injection 30 mg  -     azithromycin (ZITHROMAX) 500 MG tablet; Take 1 tablet (500 mg total) by mouth once daily. for 20 days  -     promethazine (PHENERGAN) 6.25 mg/5 mL syrup; Take 20 mLs (25 mg total) by mouth every 6 (six) hours as needed for Nausea.  -      hydroxychloroquine (PLAQUENIL) 200 mg tablet; Take 1 tablet (200 mg total) by mouth 2 (two) times daily.  -     pantoprazole (PROTONIX) 40 MG tablet; Take 1 tablet (40 mg total) by mouth once daily.  -     Ambulatory referral/consult to Gastroenterology; Future  -     T4, Free; Future  -     TSH; Future  -     Vitamin D; Future  -     Cyclic Citrullinated Peptide Antibody, IgG; Future  -     Rheumatoid Factor; Future  -     Sedimentation rate; Future  -     C-Reactive Protein; Future  -     Comprehensive Metabolic Panel; Future  -     CBC Auto Differential; Future  -     Transferrin; Future  -     Iron and TIBC; Future  -     tirzepatide (MOUNJARO) 2.5 mg/0.5 mL PnIj; Inject 2.5 mg into the skin every 7 days.  -     tirzepatide (MOUNJARO) 5 mg/0.5 mL PnIj; Inject 5 mg into the skin every 7 days.  The following orders have not been finalized:  -     oxyCODONE-acetaminophen (PERCOCET)  mg per tablet  -     oxyCODONE-acetaminophen (PERCOCET)  mg per tablet  -     oxyCODONE-acetaminophen (PERCOCET)  mg per tablet    Immunocompromised  -     cefTRIAXone injection 1 g  -     methylPREDNISolone acetate injection 160 mg  -     ketorolac injection 30 mg  -     azithromycin (ZITHROMAX) 500 MG tablet; Take 1 tablet (500 mg total) by mouth once daily. for 20 days  -     promethazine (PHENERGAN) 6.25 mg/5 mL syrup; Take 20 mLs (25 mg total) by mouth every 6 (six) hours as needed for Nausea.  -     hydroxychloroquine (PLAQUENIL) 200 mg tablet; Take 1 tablet (200 mg total) by mouth 2 (two) times daily.  -     pantoprazole (PROTONIX) 40 MG tablet; Take 1 tablet (40 mg total) by mouth once daily.  -     Ambulatory referral/consult to Gastroenterology; Future  -     T4, Free; Future  -     TSH; Future  -     Vitamin D; Future  -     Cyclic Citrullinated Peptide Antibody, IgG; Future  -     Rheumatoid Factor; Future  -     Sedimentation rate; Future  -     C-Reactive Protein; Future  -     Comprehensive Metabolic  Panel; Future  -     CBC Auto Differential; Future  -     Transferrin; Future  -     Iron and TIBC; Future  -     tirzepatide (MOUNJARO) 2.5 mg/0.5 mL PnIj; Inject 2.5 mg into the skin every 7 days.  -     tirzepatide (MOUNJARO) 5 mg/0.5 mL PnIj; Inject 5 mg into the skin every 7 days.  The following orders have not been finalized:  -     oxyCODONE-acetaminophen (PERCOCET)  mg per tablet  -     oxyCODONE-acetaminophen (PERCOCET)  mg per tablet  -     oxyCODONE-acetaminophen (PERCOCET)  mg per tablet    Reactive arthritis of multiple sites  -     hydroxychloroquine (PLAQUENIL) 200 mg tablet; Take 1 tablet (200 mg total) by mouth 2 (two) times daily.  -     pantoprazole (PROTONIX) 40 MG tablet; Take 1 tablet (40 mg total) by mouth once daily.  -     Ambulatory referral/consult to Gastroenterology; Future  -     T4, Free; Future  -     TSH; Future  -     Vitamin D; Future  -     Cyclic Citrullinated Peptide Antibody, IgG; Future  -     Rheumatoid Factor; Future  -     Sedimentation rate; Future  -     C-Reactive Protein; Future  -     Comprehensive Metabolic Panel; Future  -     CBC Auto Differential; Future  -     Transferrin; Future  -     Iron and TIBC; Future  -     tirzepatide (MOUNJARO) 2.5 mg/0.5 mL PnIj; Inject 2.5 mg into the skin every 7 days.  -     tirzepatide (MOUNJARO) 5 mg/0.5 mL PnIj; Inject 5 mg into the skin every 7 days.  The following orders have not been finalized:  -     oxyCODONE-acetaminophen (PERCOCET)  mg per tablet  -     oxyCODONE-acetaminophen (PERCOCET)  mg per tablet  -     oxyCODONE-acetaminophen (PERCOCET)  mg per tablet    Crohn's disease without complication, unspecified gastrointestinal tract location  -     hydroxychloroquine (PLAQUENIL) 200 mg tablet; Take 1 tablet (200 mg total) by mouth 2 (two) times daily.  -     pantoprazole (PROTONIX) 40 MG tablet; Take 1 tablet (40 mg total) by mouth once daily.  -     Ambulatory referral/consult to  Gastroenterology; Future  -     T4, Free; Future  -     TSH; Future  -     Vitamin D; Future  -     Cyclic Citrullinated Peptide Antibody, IgG; Future  -     Rheumatoid Factor; Future  -     Sedimentation rate; Future  -     C-Reactive Protein; Future  -     Comprehensive Metabolic Panel; Future  -     CBC Auto Differential; Future  -     Transferrin; Future  -     Iron and TIBC; Future  -     tirzepatide (MOUNJARO) 2.5 mg/0.5 mL PnIj; Inject 2.5 mg into the skin every 7 days.  -     tirzepatide (MOUNJARO) 5 mg/0.5 mL PnIj; Inject 5 mg into the skin every 7 days.  The following orders have not been finalized:  -     oxyCODONE-acetaminophen (PERCOCET)  mg per tablet  -     oxyCODONE-acetaminophen (PERCOCET)  mg per tablet  -     oxyCODONE-acetaminophen (PERCOCET)  mg per tablet    Gastroesophageal reflux disease, unspecified whether esophagitis present  -     hydroxychloroquine (PLAQUENIL) 200 mg tablet; Take 1 tablet (200 mg total) by mouth 2 (two) times daily.  -     pantoprazole (PROTONIX) 40 MG tablet; Take 1 tablet (40 mg total) by mouth once daily.  -     Ambulatory referral/consult to Gastroenterology; Future  -     T4, Free; Future  -     TSH; Future  -     Vitamin D; Future  -     Cyclic Citrullinated Peptide Antibody, IgG; Future  -     Rheumatoid Factor; Future  -     Sedimentation rate; Future  -     C-Reactive Protein; Future  -     Comprehensive Metabolic Panel; Future  -     CBC Auto Differential; Future  -     Transferrin; Future  -     Iron and TIBC; Future  -     tirzepatide (MOUNJARO) 2.5 mg/0.5 mL PnIj; Inject 2.5 mg into the skin every 7 days.  -     tirzepatide (MOUNJARO) 5 mg/0.5 mL PnIj; Inject 5 mg into the skin every 7 days.  The following orders have not been finalized:  -     oxyCODONE-acetaminophen (PERCOCET)  mg per tablet  -     oxyCODONE-acetaminophen (PERCOCET)  mg per tablet  -     oxyCODONE-acetaminophen (PERCOCET)  mg per tablet    Abnormal  results of thyroid function studies  -     T4, Free; Future  -     TSH; Future  -     Vitamin D; Future  -     Cyclic Citrullinated Peptide Antibody, IgG; Future  -     Rheumatoid Factor; Future  -     Sedimentation rate; Future  -     C-Reactive Protein; Future  -     Comprehensive Metabolic Panel; Future  -     CBC Auto Differential; Future  -     Transferrin; Future  -     Iron and TIBC; Future  -     tirzepatide (MOUNJARO) 2.5 mg/0.5 mL PnIj; Inject 2.5 mg into the skin every 7 days.  -     tirzepatide (MOUNJARO) 5 mg/0.5 mL PnIj; Inject 5 mg into the skin every 7 days.  The following orders have not been finalized:  -     oxyCODONE-acetaminophen (PERCOCET)  mg per tablet  -     oxyCODONE-acetaminophen (PERCOCET)  mg per tablet  -     oxyCODONE-acetaminophen (PERCOCET)  mg per tablet    Type 2 diabetes mellitus with other specified complication, without long-term current use of insulin  -     T4, Free; Future  -     TSH; Future  -     Vitamin D; Future  -     Cyclic Citrullinated Peptide Antibody, IgG; Future  -     Rheumatoid Factor; Future  -     Sedimentation rate; Future  -     C-Reactive Protein; Future  -     Comprehensive Metabolic Panel; Future  -     CBC Auto Differential; Future  -     Transferrin; Future  -     Iron and TIBC; Future  -     tirzepatide (MOUNJARO) 2.5 mg/0.5 mL PnIj; Inject 2.5 mg into the skin every 7 days.  -     tirzepatide (MOUNJARO) 5 mg/0.5 mL PnIj; Inject 5 mg into the skin every 7 days.    Nurse injections with abx  Promethazine liquid  Zithromax pneumonia dose  Labs   5 refill perocet  system not working printed scripts also got pt a nebulizer    More than 50% of the  40 minute encounter was spent face to face counseling the patient regarding current status and future plan of care as well as side effects  of the medications. All questions were answered to patient's satisfaction also includes  non-face to face time preparing to see the patient (eg, review of  tests), Obtaining and/or reviewing separately obtained history, Documenting clinical information in the electronic or other health record, Independently interpreting results

## 2024-02-27 ENCOUNTER — LAB VISIT (OUTPATIENT)
Dept: LAB | Facility: HOSPITAL | Age: 59
End: 2024-02-27
Attending: NURSE PRACTITIONER
Payer: MEDICARE

## 2024-02-27 DIAGNOSIS — G89.4 CHRONIC PAIN SYNDROME: ICD-10-CM

## 2024-02-27 DIAGNOSIS — K50.90 CROHN'S DISEASE WITHOUT COMPLICATION, UNSPECIFIED GASTROINTESTINAL TRACT LOCATION: ICD-10-CM

## 2024-02-27 DIAGNOSIS — E11.69 TYPE 2 DIABETES MELLITUS WITH OTHER SPECIFIED COMPLICATION, WITHOUT LONG-TERM CURRENT USE OF INSULIN: ICD-10-CM

## 2024-02-27 DIAGNOSIS — D50.8 OTHER IRON DEFICIENCY ANEMIAS: ICD-10-CM

## 2024-02-27 DIAGNOSIS — K21.9 GASTROESOPHAGEAL REFLUX DISEASE, UNSPECIFIED WHETHER ESOPHAGITIS PRESENT: ICD-10-CM

## 2024-02-27 DIAGNOSIS — J40 BRONCHITIS: ICD-10-CM

## 2024-02-27 DIAGNOSIS — M02.39 REACTIVE ARTHRITIS OF MULTIPLE SITES: ICD-10-CM

## 2024-02-27 DIAGNOSIS — J45.909 ASTHMA, UNSPECIFIED ASTHMA SEVERITY, UNSPECIFIED WHETHER COMPLICATED, UNSPECIFIED WHETHER PERSISTENT: ICD-10-CM

## 2024-02-27 DIAGNOSIS — R73.9 HYPERGLYCEMIA: ICD-10-CM

## 2024-02-27 DIAGNOSIS — D84.9 IMMUNOCOMPROMISED: ICD-10-CM

## 2024-02-27 DIAGNOSIS — R94.6 ABNORMAL RESULTS OF THYROID FUNCTION STUDIES: ICD-10-CM

## 2024-02-27 LAB
BASOPHILS # BLD AUTO: 0.03 K/UL (ref 0–0.2)
BASOPHILS NFR BLD: 0.3 % (ref 0–1.9)
DIFFERENTIAL METHOD BLD: ABNORMAL
EOSINOPHIL # BLD AUTO: 0.1 K/UL (ref 0–0.5)
EOSINOPHIL NFR BLD: 1.1 % (ref 0–8)
ERYTHROCYTE [DISTWIDTH] IN BLOOD BY AUTOMATED COUNT: 17.3 % (ref 11.5–14.5)
FERRITIN SERPL-MCNC: 117 NG/ML (ref 20–300)
HCT VFR BLD AUTO: 42.7 % (ref 37–48.5)
HGB BLD-MCNC: 13.4 G/DL (ref 12–16)
IMM GRANULOCYTES # BLD AUTO: 0.09 K/UL (ref 0–0.04)
IMM GRANULOCYTES NFR BLD AUTO: 0.9 % (ref 0–0.5)
LYMPHOCYTES # BLD AUTO: 1.8 K/UL (ref 1–4.8)
LYMPHOCYTES NFR BLD: 18.9 % (ref 18–48)
MCH RBC QN AUTO: 26.6 PG (ref 27–31)
MCHC RBC AUTO-ENTMCNC: 31.4 G/DL (ref 32–36)
MCV RBC AUTO: 85 FL (ref 82–98)
MONOCYTES # BLD AUTO: 0.8 K/UL (ref 0.3–1)
MONOCYTES NFR BLD: 8.5 % (ref 4–15)
NEUTROPHILS # BLD AUTO: 6.8 K/UL (ref 1.8–7.7)
NEUTROPHILS NFR BLD: 70.3 % (ref 38–73)
NRBC BLD-RTO: 0 /100 WBC
PLATELET # BLD AUTO: 272 K/UL (ref 150–450)
PMV BLD AUTO: 10.9 FL (ref 9.2–12.9)
RBC # BLD AUTO: 5.03 M/UL (ref 4–5.4)
WBC # BLD AUTO: 9.65 K/UL (ref 3.9–12.7)

## 2024-02-27 PROCEDURE — 85025 COMPLETE CBC W/AUTO DIFF WBC: CPT | Performed by: NURSE PRACTITIONER

## 2024-02-27 PROCEDURE — 36415 COLL VENOUS BLD VENIPUNCTURE: CPT | Mod: PO | Performed by: NURSE PRACTITIONER

## 2024-02-27 PROCEDURE — 82728 ASSAY OF FERRITIN: CPT | Performed by: NURSE PRACTITIONER

## 2024-04-03 ENCOUNTER — TELEPHONE (OUTPATIENT)
Dept: RHEUMATOLOGY | Facility: CLINIC | Age: 59
End: 2024-04-03
Payer: MEDICARE

## 2024-04-04 ENCOUNTER — TELEPHONE (OUTPATIENT)
Dept: RHEUMATOLOGY | Facility: CLINIC | Age: 59
End: 2024-04-04
Payer: MEDICARE

## 2024-04-04 NOTE — TELEPHONE ENCOUNTER
----- Message from Josefa You sent at 4/4/2024  2:23 PM CDT -----  Contact: self  Type: RX Refill Request        Who Called: Patient   Refill or New Rx: refill  RX Name and Strength: promethazine 6.25   How is the patient currently taking it? (ex. 1XDay): as directed   Is this a 30 day or 90 day RX: n/a  Preferred Pharmacy with phone number:   [facility] [place] - [address] [facility] [address]  Phone: [phone] Fax: [phone]  Local or Mail Order: local   Ordering Provider: Dr. Beltran   Best Call Back Number: [phone]  Additional Information: Dr. Beltran suppose to fill every month for her now because if her COPD. Pt connie has an infection in her throat needs something called in fr that as well. Thanks

## 2024-04-08 RX ORDER — PROMETHAZINE HYDROCHLORIDE AND DEXTROMETHORPHAN HYDROBROMIDE 6.25; 15 MG/5ML; MG/5ML
5 SYRUP ORAL EVERY 4 HOURS PRN
Qty: 240 ML | Refills: 6 | Status: CANCELLED | OUTPATIENT
Start: 2024-04-08 | End: 2024-05-08

## 2024-04-08 NOTE — TELEPHONE ENCOUNTER
----- Message from Josefa You sent at 4/4/2024  2:23 PM CDT -----  Contact: self  Type: RX Refill Request        Who Called: Patient   Refill or New Rx: refill  RX Name and Strength: promethazine 6.25   How is the patient currently taking it? (ex. 1XDay): as directed   Is this a 30 day or 90 day RX: n/a  [facility] number[facility] [place] [facility] [address] [facility] [address]  Phone: [phone] Fax: [phone]  Local or Mail Order: local   Ordering Provider: Dr. Beltran   Best Call Back Number: [phone]  Additional Information: Dr. Beltran suppose to fill every month for her now because if her COPD. Pt connie has an infection in her throat needs something called in fr that as well. Thanks

## 2024-04-14 RX ORDER — PROMETHAZINE HYDROCHLORIDE 6.25 MG/5ML
6.25 SYRUP ORAL EVERY 4 HOURS PRN
Qty: 240 ML | Refills: 6 | Status: SHIPPED | OUTPATIENT
Start: 2024-04-14

## 2024-04-15 DIAGNOSIS — M02.39 REACTIVE ARTHRITIS OF MULTIPLE SITES: ICD-10-CM

## 2024-04-15 DIAGNOSIS — K50.90 CROHN'S DISEASE WITHOUT COMPLICATION, UNSPECIFIED GASTROINTESTINAL TRACT LOCATION: ICD-10-CM

## 2024-04-15 DIAGNOSIS — R94.6 ABNORMAL RESULTS OF THYROID FUNCTION STUDIES: ICD-10-CM

## 2024-04-15 DIAGNOSIS — K21.9 GASTROESOPHAGEAL REFLUX DISEASE, UNSPECIFIED WHETHER ESOPHAGITIS PRESENT: ICD-10-CM

## 2024-04-15 DIAGNOSIS — R73.9 HYPERGLYCEMIA: ICD-10-CM

## 2024-04-15 DIAGNOSIS — J45.909 ASTHMA, UNSPECIFIED ASTHMA SEVERITY, UNSPECIFIED WHETHER COMPLICATED, UNSPECIFIED WHETHER PERSISTENT: ICD-10-CM

## 2024-04-15 DIAGNOSIS — G89.4 CHRONIC PAIN SYNDROME: ICD-10-CM

## 2024-04-15 DIAGNOSIS — E11.69 TYPE 2 DIABETES MELLITUS WITH OTHER SPECIFIED COMPLICATION, WITHOUT LONG-TERM CURRENT USE OF INSULIN: ICD-10-CM

## 2024-04-15 DIAGNOSIS — D84.9 IMMUNOCOMPROMISED: ICD-10-CM

## 2024-04-15 DIAGNOSIS — J40 BRONCHITIS: ICD-10-CM

## 2024-04-15 RX ORDER — TIRZEPATIDE 2.5 MG/.5ML
2.5 INJECTION, SOLUTION SUBCUTANEOUS
Refills: 1 | OUTPATIENT
Start: 2024-04-15 | End: 2024-06-14

## 2024-04-30 ENCOUNTER — NURSE TRIAGE (OUTPATIENT)
Dept: ADMINISTRATIVE | Facility: CLINIC | Age: 59
End: 2024-04-30
Payer: MEDICARE

## 2024-04-30 NOTE — TELEPHONE ENCOUNTER
Patient transferred from navigator. Patient C/O both legs extremely swollen to her knees. States she knees started hurting last night, she got up to go to bathroom and fell. She also states she was SOB last night. Triage done- dispo ED. States she went to [place] before and they sent her home. Advised I was sorry but she still needed to go back. Verb understanding. Reason for Disposition   SEVERE swelling (e.g., swelling extends above knee, entire leg is swollen, weeping fluid)    Additional Information   Negative: Sounds like a life-threatening emergency to the triager   Negative: Difficulty breathing at rest   Negative: Entire foot is cool or blue in comparison to other side    Protocols used: Leg Swelling and Edema-A-OH

## 2024-04-30 NOTE — TELEPHONE ENCOUNTER
Attempted to contact patient regarding recent triage call regarding sob and pain. Unable to reach one line disconnected the other line no voice mail set up

## 2024-05-06 ENCOUNTER — TELEPHONE (OUTPATIENT)
Dept: NEUROLOGY | Facility: CLINIC | Age: 59
End: 2024-05-06
Payer: MEDICARE

## 2024-05-06 DIAGNOSIS — R41.3 MEMORY LOSS: Primary | ICD-10-CM

## 2024-05-06 NOTE — TELEPHONE ENCOUNTER
Spoke with patient and advised Dr. Kim wants patient to have neuropsych testing done before coming back in for follow up. New referral placed by Dr. Kim.

## 2024-05-06 NOTE — TELEPHONE ENCOUNTER
----- Message from Erik Puckett sent at 5/6/2024  8:51 AM CDT -----  Regarding: appt  Contact: KALA MOTT [[id number]]  Type:  Sooner Appointment Request    Caller is requesting a sooner appointment.  .    Name of Caller:  Kala    When is the first available appointment?  Dept book    Symptoms:  Memory-EP    Would the patient rather a call back or a response via [email or web address]? Call    Best Call Back Number:  [phone] (home)     Additional Information:  Needs dori reschedule 5/3 was incorrectly scheduled to [facility]. Please call to advise.

## 2024-05-09 ENCOUNTER — HOSPITAL ENCOUNTER (OUTPATIENT)
Dept: RADIOLOGY | Facility: HOSPITAL | Age: 59
Discharge: HOME | End: 2024-05-09
Attending: NURSE PRACTITIONER
Payer: MEDICARE

## 2024-05-09 ENCOUNTER — OFFICE VISIT (OUTPATIENT)
Dept: ORTHOPEDICS | Facility: CLINIC | Age: 59
End: 2024-05-09
Payer: MEDICARE

## 2024-05-09 DIAGNOSIS — M79.662 BILATERAL CALF PAIN: ICD-10-CM

## 2024-05-09 DIAGNOSIS — M17.0 BILATERAL PRIMARY OSTEOARTHRITIS OF KNEE: Primary | ICD-10-CM

## 2024-05-09 DIAGNOSIS — M79.661 BILATERAL CALF PAIN: ICD-10-CM

## 2024-05-09 PROCEDURE — 99999 PR PBB SHADOW E&M-EST. PATIENT-LVL IV: CPT | Mod: PBBFAC,,, | Performed by: NURSE PRACTITIONER

## 2024-05-09 PROCEDURE — 1160F RVW MEDS BY RX/DR IN RCRD: CPT | Mod: CPTII,S$GLB,, | Performed by: NURSE PRACTITIONER

## 2024-05-09 PROCEDURE — 3044F HG A1C LEVEL LT 7.0%: CPT | Mod: CPTII,S$GLB,, | Performed by: NURSE PRACTITIONER

## 2024-05-09 PROCEDURE — 93970 EXTREMITY STUDY: CPT | Mod: 26,,, | Performed by: RADIOLOGY

## 2024-05-09 PROCEDURE — 93970 EXTREMITY STUDY: CPT | Mod: TC,PO

## 2024-05-09 PROCEDURE — 20610 DRAIN/INJ JOINT/BURSA W/O US: CPT | Mod: 50,S$GLB,, | Performed by: NURSE PRACTITIONER

## 2024-05-09 PROCEDURE — 4010F ACE/ARB THERAPY RXD/TAKEN: CPT | Mod: CPTII,S$GLB,, | Performed by: NURSE PRACTITIONER

## 2024-05-09 PROCEDURE — 99214 OFFICE O/P EST MOD 30 MIN: CPT | Mod: 25,S$GLB,, | Performed by: NURSE PRACTITIONER

## 2024-05-09 PROCEDURE — 1159F MED LIST DOCD IN RCRD: CPT | Mod: CPTII,S$GLB,, | Performed by: NURSE PRACTITIONER

## 2024-05-09 RX ORDER — TRIAMCINOLONE ACETONIDE 40 MG/ML
40 INJECTION, SUSPENSION INTRA-ARTICULAR; INTRAMUSCULAR
Status: DISCONTINUED | OUTPATIENT
Start: 2024-05-09 | End: 2024-05-09 | Stop reason: HOSPADM

## 2024-05-09 RX ADMIN — TRIAMCINOLONE ACETONIDE 40 MG: 40 INJECTION, SUSPENSION INTRA-ARTICULAR; INTRAMUSCULAR at 09:05

## 2024-05-09 NOTE — PROGRESS NOTES
Chief Complaint   Patient presents with    Left Knee - Pain, Swelling    Right Knee - Pain, Swelling         HPI:   This is a 58 y.o. who presents to clinic today complaining of bilateral knee and calf pain for 1 months after no known trauma. Pain is progressively worsening. No numbness or tingling. No associated signs or symptoms. She wants her bilateral knee injections today, but also reports she is concerned about having blood clots to her legs and wants to have this ruled out as well.     Past Medical History:   Diagnosis Date    Anxiety     Arthritis     Asthma     Crohn's disease     Depression     Encounter for blood transfusion     Headache     Hypertension     Myofascial pain syndrome, cervical 9/28/2022     Past Surgical History:   Procedure Laterality Date    COLONOSCOPY N/A 03/10/2022    Procedure: COLONOSCOPY;  Surgeon: Nilson Wiseman MD;  Location: [facility];  Service: Endoscopy;  Laterality: N/A; Repeat colonoscopy in 6 months because the bowel prep was poor    COLONOSCOPY N/A 3/2/2023    Procedure: COLONOSCOPY;  Surgeon: Erik Garcia MD;  Location: [facility];  Service: Endoscopy;  Laterality: N/A;    ESOPHAGOGASTRODUODENOSCOPY N/A 03/10/2022    Procedure: EGD (ESOPHAGOGASTRODUODENOSCOPY);  Surgeon: Nilson Wiseman MD;  Location: [facility];  Service: Endoscopy;  Laterality: N/A; Repeat upper endoscopy in 8 weeks for surveillance    ESOPHAGOGASTRODUODENOSCOPY N/A 3/16/2023    Procedure: EGD (ESOPHAGOGASTRODUODENOSCOPY);  Surgeon: Erik Garcia MD;  Location: [facility];  Service: Endoscopy;  Laterality: N/A;     Current Outpatient Medications on File Prior to Visit   Medication Sig Dispense Refill    albuterol (PROVENTIL) 2.5 mg /3 mL (0.083 %) nebulizer solution Take 3 mLs (2.5 mg total) by nebulization every 6 (six) hours as needed for Wheezing. Rescue 75 mL 1    albuterol (PROVENTIL/VENTOLIN HFA) 90 mcg/actuation inhaler Inhale 2 puffs into the lungs every 6 (six) hours as needed  for Wheezing. Rescue 18 g 6    ALPRAZolam (XANAX) 0.5 MG tablet Take 0.5 mg by mouth 2 (two) times daily as needed.      amLODIPine (NORVASC) 10 MG tablet Take 1 tablet (10 mg total) by mouth daily      atorvastatin (LIPITOR) 20 MG tablet Take 20 mg by mouth every evening.      budesonide 4 mg CpDR Take 4 mg by mouth 2 (two) times daily as needed (crohn's  flare). 30 capsule 3    budesonide-formoterol 160-4.5 mcg (SYMBICORT) 160-4.5 mcg/actuation HFAA Inhale 2 puffs into the lungs 2 (two) times daily.      butalbital-acetaminophen-caffeine -40 mg (FIORICET, ESGIC) -40 mg per tablet Take 1 tablet by mouth every 4 (four) hours as needed.      chlorhexidine (PERIDEX) 0.12 % solution swish and spit 15 MILLILITERS in the mouth or throat TWICE DAILY FOR FOURTEEN DAYS 473 mL 6    diclofenac sodium (VOLTAREN) 1 % Gel APPLY TOPICALLY FOUR TIMES DAILY 300 g 3    dicyclomine (BENTYL) 20 mg tablet Take 20 mg by mouth once daily.       diltiaZEM (CARDIZEM CD) 240 MG 24 hr capsule Take 240 mg by mouth.      epinephrine (EPIPEN) 0.3 mg/0.3 mL AtIn Inject 0.3 mg into the muscle.      famotidine (PEPCID) 40 MG tablet Take 40 mg by mouth daily as needed.      fluticasone propionate (FLONASE) 50 mcg/actuation nasal spray SPRAY ONE SPRAY IN EACH NOSTRIL TWICE DAILY      fluticasone-salmeterol 500-50 mcg/dose (ADVAIR) 500-50 mcg/dose DsDv diskus inhaler Inhale 1 puff into the lungs.      furosemide (LASIX) 20 MG tablet Take 1 tablet (20 mg total) by mouth daily      furosemide (LASIX) 20 MG tablet Take 1 tablet (20 mg total) by mouth 2 (two) times daily. 60 tablet 3    gabapentin (NEURONTIN) 800 MG tablet Take 1 tablet (800 mg total) by mouth 3 (three) times daily. 270 tablet 1    hydroxychloroquine (PLAQUENIL) 200 mg tablet Take 1 tablet (200 mg total) by mouth 2 (two) times daily. 180 tablet 3    hydrOXYzine pamoate (VISTARIL) 25 MG Cap TAKE ONE CAPSULE BY MOUTH THREE TIMES DAILY AS NEEDED FOR ITCHING 45 capsule 3     ipratropium-albuteroL (COMBIVENT)  mcg/actuation inhaler Inhale 1 puff into the lungs 4 (four) times daily. Rescue 4 g 12    irbesartan (AVAPRO) 150 MG tablet TAKE 1 TABLET BY MOUTH EVERY NIGHT AT BEDTIME FOR BLOOD PRESSURE      lidocaine HCl 2% (LIDOCAINE VISCOUS) 2 % Soln by Mucous Membrane route every 8 (eight) hours as needed. 100 mL 0    montelukast (SINGULAIR) 10 mg tablet Take 10 mg by mouth every evening.      neomycin-polymyxin-hydrocortisone (CORTISPORIN) otic solution PLACE 2 DROPS IN AFFECTED EAR FOUR TIMES DAILY FOR 5-7 DAYS      oxyCODONE-acetaminophen (PERCOCET)  mg per tablet Take 1 tablet by mouth every 8 (eight) hours as needed for Pain. 90 tablet 0    pantoprazole (PROTONIX) 40 MG tablet Take 1 tablet (40 mg total) by mouth once daily. 90 tablet 3    potassium chloride SA (K-DUR,KLOR-CON M) 10 MEQ tablet Take 1 tablet (10 mEq total) by mouth 2 (two) times daily as needed (Take with lasix). 60 tablet 3    promethazine (PHENERGAN) 6.25 mg/5 mL syrup Take 5 mLs (6.25 mg total) by mouth every 4 (four) hours as needed for Nausea. 240 mL 6    rizatriptan (MAXALT) 10 MG tablet 1 tab PO PRN migraine. May repeat every 2 hours for max 3 tabs in 24 hours. Use no more than 10 days per month. 10 tablet 11    sertraline (ZOLOFT) 100 MG tablet Take 150 mg by mouth.      sumatriptan (IMITREX) 50 MG tablet TAKE ONE TABLET BY MOUTH AT ONSET OF MIGRAINE, MAY REPEAT DOSE once AFTER TWO HOURS IF NO RELIEF      tirzepatide (MOUNJARO) 5 mg/0.5 mL PnIj Inject 5 mg into the skin every 7 days. 4 pen 5    tiZANidine (ZANAFLEX) 4 MG tablet Take 1 tablet (4 mg total) by mouth nightly as needed. 90 tablet 3    traZODone (DESYREL) 100 MG tablet Take 1 tablet (100 mg total) by mouth every evening. 90 tablet 1    triamcinolone acetonide 0.1% (KENALOG) 0.1 % ointment Apply topically 2 (two) times daily. 80 g 3    memantine (NAMENDA) 5 MG Tab Take 1 tablet (5 mg total) by mouth 2 (two) times daily. 60 tablet 11      Current Facility-Administered Medications on File Prior to Visit   Medication Dose Route Frequency Provider Last Rate Last Admin    onabotulinumtoxina injection 200 Units  200 Units Intramuscular q12 weeks Heavenly Fletcher, NP   200 Units at 09/30/22 0958    triamcinolone acetonide injection 40 mg  40 mg Intra-articular  Nguyen, Caro E., FNP   40 mg at 01/04/24 1025    triamcinolone acetonide injection 40 mg  40 mg Intra-articular  Nguyen, Caro E., FNP   40 mg at 01/04/24 1025    triamcinolone acetonide injection 40 mg  40 mg Intra-articular  Nguyen, Caro E., FNP   40 mg at 01/04/24 1025    triamcinolone acetonide injection 40 mg  40 mg Intra-articular  Nguyen, Caro E., FNP   40 mg at 01/04/24 1025     Review of patient's allergies indicates:   Allergen Reactions    Amlodipine-benazepril Swelling    Ace inhibitors Swelling     Angioedema; was taking Benazepril.    Tramadol Itching     Family History   Problem Relation Name Age of Onset    Cancer Mother      Diabetes Mother      Arthritis Mother      Hypertension Mother      Cancer Father      Diabetes Father      Arthritis Father      Hypertension Father      Celiac disease Neg Hx      Colon cancer Neg Hx      Crohn's disease Neg Hx      Esophageal cancer Neg Hx      Stomach cancer Neg Hx      Ulcerative colitis Neg Hx       Social History     Socioeconomic History    Marital status:    Tobacco Use    Smoking status: Never    Smokeless tobacco: Never   Substance and Sexual Activity    Alcohol use: Yes     Alcohol/week: 0.0 - 2.0 standard drinks of alcohol    Drug use: Yes     Types: Hydrocodone    Sexual activity: Never     Social Determinants of Health     Financial Resource Strain: High Risk (4/2/2024)    Received from [facility]    Overall Financial Resource Strain (CARDIA)     Difficulty of Paying Living Expenses: Very hard   Food Insecurity: No Food Insecurity (4/2/2024)    Received from [facility]    Hunger Vital Sign      Worried About Running Out of Food in the Last Year: Never true     Ran Out of Food in the Last Year: Never true   Transportation Needs: Unmet Transportation Needs (4/2/2024)    Received from [facility]    PRAPARE - Transportation     Lack of Transportation (Medical): Yes     Lack of Transportation (Non-Medical): Yes   Physical Activity: Sufficiently Active (4/2/2024)    Received from [facility]    Exercise Vital Sign     Days of Exercise per Week: 3 days     Minutes of Exercise per Session: 60 min   Stress: Stress Concern Present (4/2/2024)    Received from [facility]    [employer] - Occupational Stress Questionnaire     Feeling of Stress : Rather much   Housing Stability: Low Risk  (4/2/2024)    Received from [facility]    Housing Stability Vital Sign     Unable to Pay for Housing in the Last Year: No     Number of Times Moved in the Last Year: 1     Homeless in the Last Year: No       Review of Systems:  Constitutional:  Denies fever or chills   Eyes:  Denies change in visual acuity   HENT:  Denies nasal congestion or sore throat   Respiratory:  Denies cough or shortness of breath   Cardiovascular:  Denies chest pain or edema   GI:  Denies abdominal pain, nausea, vomiting, bloody stools or diarrhea   :  Denies dysuria   Integument:  Denies rash   Neurologic:  Denies headache, focal weakness or sensory changes   Endocrine:  Denies polyuria or polydipsia   Lymphatic:  Denies swollen glands   Psychiatric:  Denies depression or anxiety     Physical Exam:   Constitutional:  Well developed, well nourished, no acute distress, non-toxic appearance   Integument:  Well hydrated  Neurologic:  Alert & oriented x 3  Psychiatric:  Speech and behavior appropriate     Bilateral Knee Exam    Bilateral Knee Exam     Tenderness   The patient is experiencing tenderness in the medial joint line and tenderness to palpation to bilateral mid calves.      Range of Motion   Extension: abnormal   Flexion: abnormal     Muscle Strength     The patient has normal knee strength.    Tests   Ellie:  Medial - positive   Lachman:  Anterior - negative      Varus: negative  Valgus: negative  Patellar Apprehension: negative    Other   Erythema: absent  Sensation: normal  Pulse: present  Swelling: mild          Assessment & plan    Bilateral primary osteoarthritis of knee  -     Large Joint Aspiration/Injection: bilateral knee  -     triamcinolone acetonide injection 40 mg  -     triamcinolone acetonide injection 40 mg    Bilateral calf pain  -     US Lower Extremity Veins Bilateral; Future; Expected date: 05/09/2024    We discussed that she likely has gastrocnemius strain to bilateral calves related to bilateral knee swelling and arthritis.   Will give bilateral knee injections today and get her scheduled for ble ultrasound to rule out dvt.   Will call her with US BLE results if +for DVT.     Using an aseptic technique, I injected 5 cc of lidocaine 1% without and 1 cc of kenalog 40mg into the bilateral Knee. The patient tolerated this well. I will have them return to clinic in 3 months.

## 2024-05-09 NOTE — PROCEDURES
Large Joint Aspiration/Injection: bilateral knee    Date/Time: 5/9/2024 9:00 AM    Performed by: Caro Nguyen FNP  Authorized by: Caro Nguyen FNP    Consent Done?:  Yes (Verbal)  Indications:  Pain  Timeout: prior to procedure the correct patient, procedure, and site was verified    Prep: patient was prepped and draped in usual sterile fashion    Local anesthetic:  Lidocaine 1% without epinephrine  Anesthetic total (ml):  5      Details:  Needle Size:  21 G  Approach:  Anterolateral  Location:  Knee  Laterality:  Bilateral  Site:  Bilateral knee  Medications (Right):  40 mg triamcinolone acetonide 40 mg/mL  Medications (Left):  40 mg triamcinolone acetonide 40 mg/mL  Patient tolerance:  Patient tolerated the procedure well with no immediate complications

## 2024-05-10 ENCOUNTER — TELEPHONE (OUTPATIENT)
Dept: RHEUMATOLOGY | Facility: CLINIC | Age: 59
End: 2024-05-10
Payer: MEDICARE

## 2024-05-10 NOTE — TELEPHONE ENCOUNTER
----- Message from Cl Cohen sent at 5/10/2024 11:15 AM CDT -----  Name of Who is Calling:KALA MOTT [[id number]]        What is the request in detail:Pt would like a callback from the office in regards to discussing when would lab work be scheduled for upcoming 6/20 appt.Please advise thank you       Can the clinic reply by [facility]:NO         What Number to Call Back if not in [facility]:.Telephone Information:  Mobile          [phone]

## 2024-05-10 NOTE — TELEPHONE ENCOUNTER
Called patient no answer patient lab appointment scheduled 1 week before 6/20/24 appointment at the [facility]

## 2024-05-13 ENCOUNTER — TELEPHONE (OUTPATIENT)
Dept: CARDIOLOGY | Facility: CLINIC | Age: 59
End: 2024-05-13
Payer: MEDICARE

## 2024-05-13 NOTE — TELEPHONE ENCOUNTER
----- Message from Brenna Hany sent at 5/13/2024 12:36 PM CDT -----  Regarding: Needs sooner appt  Type: Needs Medical Advice  Who Called:  Pt    Best Call Back Number:      Additional Information: Pt states she had ct done and they showed that there was fluid around her heart and she has been having trouble breathing, please sly with sooner appt

## 2024-05-28 DIAGNOSIS — D84.9 IMMUNOCOMPROMISED: ICD-10-CM

## 2024-05-28 DIAGNOSIS — M02.39 REACTIVE ARTHRITIS OF MULTIPLE SITES: ICD-10-CM

## 2024-05-28 DIAGNOSIS — R94.6 ABNORMAL RESULTS OF THYROID FUNCTION STUDIES: ICD-10-CM

## 2024-05-28 DIAGNOSIS — G89.4 CHRONIC PAIN SYNDROME: ICD-10-CM

## 2024-05-28 DIAGNOSIS — K21.9 GASTROESOPHAGEAL REFLUX DISEASE, UNSPECIFIED WHETHER ESOPHAGITIS PRESENT: ICD-10-CM

## 2024-05-28 DIAGNOSIS — K50.90 CROHN'S DISEASE WITHOUT COMPLICATION, UNSPECIFIED GASTROINTESTINAL TRACT LOCATION: ICD-10-CM

## 2024-05-29 RX ORDER — OXYCODONE AND ACETAMINOPHEN 10; 325 MG/1; MG/1
1 TABLET ORAL EVERY 8 HOURS PRN
Qty: 90 TABLET | Refills: 0 | Status: SHIPPED | OUTPATIENT
Start: 2024-05-29 | End: 2024-06-28

## 2024-05-29 NOTE — TELEPHONE ENCOUNTER
----- Message from Hailey Leo sent at 5/29/2024  4:56 PM CDT -----  Type:  RX Refill Request    Who Called:  pt CALLED   Refill or New Rx:  REFILL  RX Name and Strength:  oxyCODONE-acetaminophen (PERCOCET)  mg per tablet      How is the patient currently taking it? (ex. 1XDay):  As directed  Is this a 30 day or 90 day RX:  90  Preferred Pharmacy with phone number:    [facility] [place] [facility] [address] [facility]  [address] [facility] [address]  Phone: [phone] Fax: [phone]    Local or Mail Order: LOCAL   Ordering Provider:  ARTURO Guerra Call Back Number:  [phone]  Additional Information:  Please call back to advise. Thanks. PT NEEDS REFILL BEFORE JUN, 4, 2024    Pt also needs something called in for urinary tract infection

## 2024-06-05 ENCOUNTER — TELEPHONE (OUTPATIENT)
Dept: RHEUMATOLOGY | Facility: CLINIC | Age: 59
End: 2024-06-05
Payer: MEDICARE

## 2024-06-05 NOTE — TELEPHONE ENCOUNTER
----- Message from Dominick Archibald sent at 6/5/2024 12:37 PM CDT -----  Type: Needs Medical Advice  Who Called:  pt   Symptoms (please be specific):  rx concerns   Pharmacy name and phone #:    [facility] [place] - [address] [facility]  [address] [facility]  [place] [address]  Phone: [phone] Fax: [phone]  Best Call Back Number: [phone]  Additional Information: pt stated she was advised to call and request provider to send in letter advising pt needs rx and asking pt to get rx at no cost so pt can get rx since out of pocket cost is too high for pt and pt is unable to afford rx , please ensure to call pt back to advise asap thanks!

## 2024-06-05 NOTE — TELEPHONE ENCOUNTER
Attempted to return patient call to find out which RX she is referring to. Unable to reach no answer

## 2024-06-05 NOTE — TELEPHONE ENCOUNTER
----- Message from Angle Bolivarrison sent at 6/5/2024  2:15 PM CDT -----  Contact: self  Type:  Patient Returning Call    Who Called:Pt   Who Left Message for Patient: Arelis REYNOLDS / Maria T  Does the patient know what this is regarding? Meds  Would the patient rather a call back or a response via [facility]?  Call   Best Call Back Number: [phone]  Please return call to pt... Thank you...  
Attempted to return patient call no answer  
Color consistent with ethnicity/race, warm, dry intact, resilient.

## 2024-06-13 ENCOUNTER — LAB VISIT (OUTPATIENT)
Dept: LAB | Facility: HOSPITAL | Age: 59
End: 2024-06-13
Payer: MEDICARE

## 2024-06-13 DIAGNOSIS — M02.39 REACTIVE ARTHRITIS OF MULTIPLE SITES: ICD-10-CM

## 2024-06-13 LAB
ALBUMIN SERPL BCP-MCNC: 3.5 G/DL (ref 3.5–5.2)
ALP SERPL-CCNC: 85 U/L (ref 55–135)
ALT SERPL W/O P-5'-P-CCNC: 21 U/L (ref 10–44)
ANION GAP SERPL CALC-SCNC: 10 MMOL/L (ref 8–16)
AST SERPL-CCNC: 18 U/L (ref 10–40)
BASOPHILS # BLD AUTO: 0.07 K/UL (ref 0–0.2)
BASOPHILS NFR BLD: 0.9 % (ref 0–1.9)
BILIRUB SERPL-MCNC: 0.3 MG/DL (ref 0.1–1)
BUN SERPL-MCNC: 16 MG/DL (ref 6–20)
CALCIUM SERPL-MCNC: 10 MG/DL (ref 8.7–10.5)
CHLORIDE SERPL-SCNC: 113 MMOL/L (ref 95–110)
CO2 SERPL-SCNC: 22 MMOL/L (ref 23–29)
CREAT SERPL-MCNC: 0.9 MG/DL (ref 0.5–1.4)
CRP SERPL-MCNC: 23.6 MG/L (ref 0–8.2)
DIFFERENTIAL METHOD BLD: ABNORMAL
EOSINOPHIL # BLD AUTO: 0.2 K/UL (ref 0–0.5)
EOSINOPHIL NFR BLD: 2.7 % (ref 0–8)
ERYTHROCYTE [DISTWIDTH] IN BLOOD BY AUTOMATED COUNT: 16.1 % (ref 11.5–14.5)
ERYTHROCYTE [SEDIMENTATION RATE] IN BLOOD BY WESTERGREN METHOD: 43 MM/HR (ref 0–20)
EST. GFR  (NO RACE VARIABLE): >60 ML/MIN/1.73 M^2
GLUCOSE SERPL-MCNC: 87 MG/DL (ref 70–110)
HCT VFR BLD AUTO: 41.9 % (ref 37–48.5)
HGB BLD-MCNC: 12.8 G/DL (ref 12–16)
IMM GRANULOCYTES # BLD AUTO: 0.08 K/UL (ref 0–0.04)
IMM GRANULOCYTES NFR BLD AUTO: 1 % (ref 0–0.5)
LYMPHOCYTES # BLD AUTO: 1.9 K/UL (ref 1–4.8)
LYMPHOCYTES NFR BLD: 23.3 % (ref 18–48)
MCH RBC QN AUTO: 26.3 PG (ref 27–31)
MCHC RBC AUTO-ENTMCNC: 30.5 G/DL (ref 32–36)
MCV RBC AUTO: 86 FL (ref 82–98)
MONOCYTES # BLD AUTO: 0.8 K/UL (ref 0.3–1)
MONOCYTES NFR BLD: 9.7 % (ref 4–15)
NEUTROPHILS # BLD AUTO: 5 K/UL (ref 1.8–7.7)
NEUTROPHILS NFR BLD: 62.4 % (ref 38–73)
NRBC BLD-RTO: 0 /100 WBC
PLATELET # BLD AUTO: 318 K/UL (ref 150–450)
PMV BLD AUTO: 10.6 FL (ref 9.2–12.9)
POTASSIUM SERPL-SCNC: 4.3 MMOL/L (ref 3.5–5.1)
PROT SERPL-MCNC: 7 G/DL (ref 6–8.4)
RBC # BLD AUTO: 4.87 M/UL (ref 4–5.4)
SODIUM SERPL-SCNC: 145 MMOL/L (ref 136–145)
WBC # BLD AUTO: 8.02 K/UL (ref 3.9–12.7)

## 2024-06-13 PROCEDURE — 36415 COLL VENOUS BLD VENIPUNCTURE: CPT | Mod: PO | Performed by: PHYSICIAN ASSISTANT

## 2024-06-13 PROCEDURE — 80053 COMPREHEN METABOLIC PANEL: CPT | Performed by: PHYSICIAN ASSISTANT

## 2024-06-13 PROCEDURE — 85651 RBC SED RATE NONAUTOMATED: CPT | Mod: PO | Performed by: PHYSICIAN ASSISTANT

## 2024-06-13 PROCEDURE — 86140 C-REACTIVE PROTEIN: CPT | Performed by: PHYSICIAN ASSISTANT

## 2024-06-13 PROCEDURE — 85025 COMPLETE CBC W/AUTO DIFF WBC: CPT | Performed by: PHYSICIAN ASSISTANT

## 2024-06-17 NOTE — PROGRESS NOTES
NEUROPSYCHOLOGY CONSULT    Referral Information  Name: Amanda Mcguire  MRN: [id number]  Age: 58 y.o.    : 1965  Race: Black or  Education: 12 years   Gender: Female   Handedness: Right     Referring Provider: Dr. Mryiam Kim  Referral Reason/Medical Necessity: Neuropsychological evaluation to assess current cognitive functioning, aid in differential diagnosis, and provide treatment recommendations in the context of cognitive concerns.  Consent/Emergency Plan: The patient expressed an understanding of the purpose of the evaluation and consented to all procedures. I informed the patient of limits to confidentiality and discussed an emergency plan.  Visit Type: In-person interview, testing, and treatment plan on 2024.   Sources of Information: The following was gathered from a clinical interview with Ms. Mcguire, her daughter in a separate interview with her permission, and review of available medical records.  Billing table provided at the end of this note.    SUMMARY/TREATMENT PLAN   Results from the evaluation indicate the following diagnoses and treatment plan recommendations. The patient is likely able to follow a treatment plan without help from family.    Ms. Mcguire is a 58 y.o. female with history of CAD, rheumatoid arthritis, Crohn's disease, dyslipidemia, hypertension (controlled), EAN, iron deficiency, insomnia. 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 She is referred for a neuropsychological evaluation in the context of memory concerns. She reports onset of forgetfulness eight years ago coinciding with her 's death and worsening mood. Her daughter reports worsening forgetfulness within the past year. Her daughter is assisting her with medication and appointments. MRI brain incidentally noted small left parietal meningioma; she has not had seizures. MoCA was 13/30 in 2022, MMSE-2 Brief Version was 13/16 in 2023. Performance on the  MoCA today (17/30) reflects an increase from her prior score.     Premorbid abilities are estimated to be in the below average range. She demonstrates greatest weakness in learning new information. This limits memory retrieval, particularly for unstructured information (word list). However, her retrieval is broadly intact for narrative (story) material. She retains learned information as evidenced by benefit from recognition format. Attention is within expectation. She exhibits wide variability across executive functioning skills (strengths in mental set shifting; difficulty with organization, planning, working memory, problem solving). Generative language is variable with intact verbal fluency and reduced naming and vocabulary knowledge. Visuospatial/perceptual skills are below average (and within expectation) overall, although significant weakness is noted when executive functioning demands increase. Processing speed is average. She endorses severe levels of depression and significant anxiety, both as a longstanding trait and during the testing session. She is fully oriented.     In summary, she exhibits deficits in learning and some aspects of executive functioning. However, given low premorbid levels, it is unclear if today's relative weaknesses are longstanding. Therefore, a formal neurocognitive diagnosis will be deferred today, and monitoring is warranted to better understand her cognitive course and trajectory. There are several factors that may contribute to cognitive changes: untreated sleep apnea, uncontrolled mood disturbance, medication effects (daily Xanax, Percocet, gabapentin), pain, and vascular risk factors. Further, serologies to rule out reversible causes were ordered last year but never completed. An emerging neurodegenerative disease process cannot be entirely ruled out, however, she does not exhibit rapid forgetting or temporal disorientation seen in AD. Today's findings will serve as a  baseline and repeat neuropsychological testing is indicated in one year.     PLAN & RECOMMENDATIONS    Medical Follow-Up: Continue usual follow up for current active medical issues.     Follow up with Neurology.    Sleep Medicine: Follow up for treatment of EAN.     Repeat neuropsychological evaluation in 12 months to monitor cognition over time and update recommendations.     Level of care. Continued monitoring/oversight with medication, appointments, finances.      Mental health follow up. Encouraged to discuss alternative to daily Xanax with her prescribing provider. She follows with [facility] in [place]. Recommend follow up to discuss optimally therapeutic dose of Zoloft. She is strongly encouraged to re-initate psychotherapy. This can be provided through [facility] where she is established locally or with the [facility] office. If interested, I can place a referral to Behavorial Health.     Brain Health Tips. Consider the following lifestyle habits to promote healthy brain aging:  Mental exercise, such as reading, doing puzzles, learning something new.  Healthy and balanced diet, e.g., Mediterranean diet - olive oil, nuts, fish, vegetables, legumes, fruit, whole grains, moderate dairy products, limit intake of red meat and poultry.  Monitor and manage cardiac risk factors, such as hypertension, high cholesterol, diabetes and high BMI.   Regular aerobic exercise, such as walking.  Eat foods high in antioxidants, for example, grapes, beans, berries, green leafy vegetables and green tea.  Stay socially engaged. Connect regularly with friends and family.    Consider the following compensatory memory strategies:   Your attention span holds a limited amount of information at any given time. Ask the person you are speaking with to slow down to ensure you heard and understand what was said before moving on to the next topic or idea.   When learning something new, try to  elaborate on the information in a way that makes it more meaningful to you and facilitates a deeper level of processing  Your memory is stronger for information that has embedded structure (i.e., a logical structure or story with a beginning, middle, and end). When learning something new, try to elaborate on the information in a way that makes it more meaningful to you and facilitates a deeper level of processing  Write down important information to improve your attention and focus and to have something to look back on when you need to recall it.  Have fixed locations for all important papers, key phone numbers, medications, keys, wallet, glasses, tools, etc.      Referral Diagnosis: R41.3 (ICD-10-CM) - Memory loss   Problem List Items Addressed This Visit          Neuro    Cognitive change - Primary    Memory loss       Psychiatric    Generalized anxiety disorder    Moderate episode of recurrent major depressive disorder       Thank you for allowing me to participate in Ms. Mcguire's care.  If you have any questions, please contact me at [phone].    Valentina Hartley, Ph.D.  Licensed Clinical Neuropsychologist  [facility] - Department of Neurology    CLINICAL INTERVIEW & RECORD REVIEW   COGNITIVE SYMPTOMS & HISTORY OF PRESENT ILLNESS  Ms. Mcguire reports cognitive changes following her 's unexpected death eight years ago. She experience heightened stress, worsening depression and anxiety. Coinciding with this, she noticed memory issues. She forgets what she intends to do and the content she reads. She will learn something during the sermon at [employer] and forgets what she learned once she gets home. Her children tell her that she repeats herself. She feels these issues are worsening. She reports that multitasking is more challenging for her. She does not engage in activities that require much planning or organization. She has trouble fixing her television settings. Mental speed is slower.  No changes to  language or attention.     Her daughter reports that the patient forgets what she is saying in the middle of conversation. She may put an object down and forget where she placed it after a simple interruption. Her daughter noticed increased forgetfulness for about a year. Her daughter occasionally wonders about possible worsening.     ACTIVITIES OF DAILY LIVING  Basic ADLs: Independent.   Medications: Her daughter gives the patient her medication AM and PM, for several months. Her daughter did not observe errors but felt the need to assist. Her daughter organized her pill box for many years.   Appointments/Schedule: Her daughter has been assisting with making doctor's appointments. She would schedule them for the wrong day. She writes them on her wall calendar. She remembers what she needs to bring with her to an appointment.   Finances: She misses payments on rare occasions. Her daughter may remind her. Lights were turned off once. This is not a change, she reports this as a pattern for the past 8 years.   Cooking: She has forgotten food on the stove. Has not forgotten recipes.   Shopping/Household: She forgets the groceries she needs, even when using a list. Her daughter handles household chores.   Driving: She stopped driving six years due to her knees giving out. She uses medical transportation through her insurance for doctor's visits, which she schedules. For other transportation needs, her daughter will drive her.     PHYSICAL SYMPTOMS  Receives Remicade infusions for Crohn's disease. For arthritis, she takes plaquenil and gabapentin (800mg 3x/day for years). She has pain in her legs and knees, rates as a 9 on a 1-10 pain rating scale (ascending severity). She takes tizanidine at night. She takes Percocet for the past three years, 3x/day. She takes phenergan every morning for nausea. Migraines occur near daily and takes Maxalt. Reports blurred vision for the past five years and wears glasses. No issues with  "hearing. Reports tremor when she is anxious. Weakness in her knees.     MOOD/PSYCHIATRIC HISTORY  Mood: Reports depression and and off throughout life. She grieves the loss of her , sometimes it gets hard when she thinks about him. She feels that her children are different now that he has passed. She experiences anxiety. She has had two panic attacks in her life. Denies history of psychiatric hospitalization. She is take Zoloft 100mg (prescribed by provider at [facility] in [place]) and Xanax 0.5mg (every night for the past three years; prescribed by PCP). She has participated in therapy twice before, last was three years ago. Describes her mood as "off and on" in terms of sadness, which she notes is typical. Denies personality change. Her daughter notes lability in mood, but this is longstanding.  Behavior: Negative for agitation, delusions, hallucinations, apathy, or compulsions. She reports that she has to count her money three times but this is because she forgets.   Neurovegetative: Trazodone nightly for sleep with benefit. Average 5-6 hours per night and does not feel rested in the mornings. She will take an 60-minute nap daily.  Diagnosed with EAN, completed her sleep study 2 weeks ago; has not started CPAP yet.  Appetite is variable. She is taking Mounjaro for weight loss. Energy is "poor." She becomes winded quickly.   Suicidal/Homicidal Ideation: None.     SOCIAL HISTORY AND HEALTH BEHAVIOR  Family Status: ;   8 years ago. They were  27 years. She has three adult children.   Current Living Situation: Her son and daughter have lived with her throughout their lives.   Primary Source of Support: Daughter (Cassidy), and a friend.    Daily Activities: Watches television, listens to [employer], reads Bible, cooking.   Developmental History: Born and raised in [place], [place]. No gestational or later developmental concerns.  Academic History: Math was " challenging. She repeated 7th or 8th grade due to failing math and chemistry. Never evaluated for a learning disorder. Reports history of attention and behavioral difficulties. She got in some fights and reported having a bad temper.   Education Level: High school   Occupational Status and History: On disability for past 15 years due to medical issues. 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    Exercise: Physical therapy 3 days/week.   Substance Use:   Alcohol: None.   Cigarettes/tobacco: Never.   Illicit drugs: None.     FAMILY HISTORY  family history includes Arthritis in her father and mother; Cancer in her father and mother; Diabetes in her father and mother; Hypertension in her father and mother.  Dementia in her father (late in life, onset within a year of passing at age 90). Her mother passed away from an aneurysm at age 65.     MEDICAL STATUS  Patient Active Problem List   Diagnosis    Crohn's disease of small intestine with intestinal obstruction    Avascular necrosis of bones of both hips    Psoriasis of nail    Chronic pain syndrome    Chronic hip pain, bilateral    Intractable chronic migraine without aura and without status migrainosus    Myofascial pain syndrome, cervical    Neck pain    Immunocompromised    Reactive arthritis of multiple sites    Morbid (severe) obesity due to excess calories    Crohn's disease of small and large intestines with complication     Past Medical History:   Diagnosis Date    Anxiety     Arthritis     Asthma     Crohn's disease     Depression     Encounter for blood transfusion     Headache     Hypertension     Myofascial pain syndrome, cervical 9/28/2022     Past Surgical History:   Procedure Laterality Date    COLONOSCOPY N/A 03/10/2022    Procedure: COLONOSCOPY;  Surgeon: Nilson Wiseman MD;  Location: [facility];  Service: Endoscopy;  Laterality: N/A; Repeat colonoscopy in 6 months because the bowel prep was poor  "   COLONOSCOPY N/A 3/2/2023    Procedure: COLONOSCOPY;  Surgeon: Erik Garcia MD;  Location: [facility];  Service: Endoscopy;  Laterality: N/A;    ESOPHAGOGASTRODUODENOSCOPY N/A 03/10/2022    Procedure: EGD (ESOPHAGOGASTRODUODENOSCOPY);  Surgeon: Nilson Wiseman MD;  Location: [facility];  Service: Endoscopy;  Laterality: N/A; Repeat upper endoscopy in 8 weeks for surveillance    ESOPHAGOGASTRODUODENOSCOPY N/A 3/16/2023    Procedure: EGD (ESOPHAGOGASTRODUODENOSCOPY);  Surgeon: Erik Garcia MD;  Location: [facility];  Service: Endoscopy;  Laterality: N/A;       RELEVANT NEUROLOGIC HISTORY  Falls: Last fall was one month ago. Her left knee gives out.   TBI: None reported.  Seizures: None.  Stroke: None.   Movement concerns: None.   CNS Infection: None.     NEURODIAGNOSTICS  MRI Brain w/wo contrast 9/27/2022  Impression:  1. No evidence of acute intracranial abnormality.  2.  Age appropriate, mild generalized cerebral volume loss with scattered T2/FLAIR hyperintense foci within the supratentorial white matter, nonspecific and may reflect sequelae of chronic microvascular ischemic change or migraine headaches.  3. Small, extra-axial, dural-based homogeneously enhancing left parietal convexity lesion, not entirely specific but probably reflecting an incidental meningioma.  No other abnormal intracranial enhancement elsewhere.    RECENT LABS  No results found for: "[id number]"  No results found for: "RPR"  No results found for: "FOLATE"  Lab Results   Component Value Date    TSH 2.921 02/28/2022     Lab Results   Component Value Date    HGBA1C 5.7 (H) 02/19/2024     No results found for: "HIV1X2", "[id number]"    CURRENT MEDICATIONS  Ms. Mcguire has a current medication list which includes the following prescription(s): albuterol, albuterol, alprazolam, amlodipine, atorvastatin, budesonide, budesonide-formoterol 160-4.5 mcg, butalbital-acetaminophen-caffeine -40 mg, chlorhexidine, diclofenac sodium, " "dicyclomine, diltiazem, epinephrine, famotidine, fluticasone propionate, fluticasone-salmeterol 500-50 mcg/dose, furosemide, furosemide, gabapentin, hydroxychloroquine, hydroxyzine pamoate, ipratropium-albuterol, irbesartan, lidocaine viscous hcl 2%, memantine, montelukast, neomycin-polymyxin-hydrocortisone, oxycodone-acetaminophen, pantoprazole, potassium chloride sa, promethazine, rizatriptan, sertraline, sumatriptan, mounjaro, tizanidine, trazodone, and triamcinolone acetonide 0.1%, and the following Facility-Administered Medications: onabotulinumtoxina, triamcinolone acetonide, triamcinolone acetonide, triamcinolone acetonide, and triamcinolone acetonide.     MENTAL STATUS AND OBSERVATIONS  Appearance: Casually dressed and adequate grooming/hygiene.   Alertness/orientation: Attentive and alert. Fully oriented to time and place.  Gait/motor: Ambulated independently. Antalgic gait.   Sensory: She wore glasses. No issues with hearing.   Speech/language: Normal in rate, rhythm, tone, and volume. No significant word finding difficulty noted. Expressive and receptive language was normal.  Stated mood/affect: The patients stated mood was "Alright. I feel good today." Affect was congruent with stated mood.   Interpersonal behavior: Rapport was quickly and easily established.   Thought processes: Logical and goal-directed.   Test taking behavior and validity: She tended to give up quickly on memory tests. She responded quickly on Matrix Reasoning items. Scores on stand-alone and embedded performance validity measures were within normal limits apart from one embedded measure. The current results are considered a valid reflection of the patient's current functioning.     PROCEDURES & RESULTS   In addition to performing a review of pertinent medical records, reviewing limits to confidentiality, conducting a clinical interview, and explaining procedures, the following measures were administered: TOMM; Test of Premorbid " Functioning (TOPF); Se Cognitive Assessment (MoCA); Wechsler Adult Intelligence Scale, Fourth Edition (WAIS-IV) select subtests; Wechsler Memory Scale, Fourth Edition (WMS-IV) Logical Memory; [place] Verbal Learning Test, Revised (HVLT-R); Neuropsychological Assessment Battery (NAB) Naming; [place] Card Sorting Test-64 (WCST-64); Fred Complex Figure Test (RCFT), copy; Jennyfer-Saenz Executive Function System (D-KEFS) Verbal Fluency; [place] Making Test (Vania et al., 2004 norms); State-Trait Anxiety Inventory (STAI); and Monique Depression Inventory, Second Edition (BDI-II).  Manual norms were used unless otherwise indicated.      TEST RESULTS  GLOBAL COGNITIVE FUNCTION  Performance is below expectation on a cognitive screening measure (MoCA 17/30 after education correction). She demonstrated significant difficulty with serial substraction. Instructions on this item were repeated and rephrased, but she still had difficulty with the concept of subtracting seven from 100. She also missed points on cube copy, clock drawing, repetition, naming, recall, attention, and abstraction.     COGNITIVE BASELINE  Baseline cognitive function is estimated to be in the below range based on word reading and educational/occupational attainment.    ATTENTION & PROCESSING SPEED  Immediate auditory attention is low average. Scores on auditory working memory tasks range from below average to low average. Speeded symbol matching and digit symbol coding are average and low average, respectively. Rapid number sequencing is low average.     EXECUTIVE FUNCTIONS  On a mental set shifting task with numbers and letters, her performance is low average, primarily due to slow speed and difficulty finding targets. She committed on set loss error at the end of the task. Mental set shifting in the context of a semantic fluency task is high average. Clock drawing to command is notable for poor planning (poor spacing of numbers) in incorrect placement  of clock hands (pointing to 10 and 2). Conceptual problem solving on a card sorting task is below expectation. She was unable to complete any sorting categories and exhibited a perseverative response style. Nonverbal abstract reasoning is below average.    LANGUAGE  Vocabulary knowledge is exceptionally low. Picture naming is exceptionally low (19/31 correct) with marginal benefit from semantic and phonemic cues (6 additional words). Letter and semantic fluency are low average.     VISUOSPATIAL/PERCEPTUAL  Visual analysis and assembly with 3-dimensional blocks is below average. Copy of a complex figure is exceptionally low. She attempted to draw the figure by rotating her drawing 90 degrees. This ineffective approach contributed to her poor reproduction. Figure elements were incorrectly placed, with omission of some elements. She exhibited poor integration, likely related to her disorganized strategy, although spatial deficit may also be present.      MEMORY  Learning efficiency is exceptionally low on a 12-item word list (max 6 words learned on third trial). She recalled three words after a delay (exceptionally low; 50% of learned words). She recognized 9 words and committed 3 false positive errors (exceptionally low recognition discrimination). Performance on a story memory task is exceptionally low for immediate recall and below average for delayed recall, primarily due to limited learning/encoding. She recalled most of the details she learned. Recognition of story elements is low average.    MOOD  Responses on self-report mood inventories suggest severe depression and clinically significant anxiety, both in situational and longstanding trait anxiety.     PREMORBID FUNCTIONING Raw Score Standardized Score Percentile/CP Descriptor   TOPF simple dem. eFSIQ - 77 6 Below Average   TOPF pred. eFSIQ - 76 5 Below Average   TOPF simple + pred. eFSIQ - 75 5 Below Average   INTELLECTUAL FUNCTIONING Raw Score Standardized  Score Percentile/CP Descriptor   WAIS-IV        CLEMENTE - 71 3 Below Average   PSI - 92 30 Average   Vocabulary 10 3 1 Exceptionally Low    Block Design 16 5 5 Below Average   Matrix Reasoning 6 5 5 Below Average   Digit Span 17 5 5 Below Average         DS Forward 7 7 16 Low Average         DS Backward 4 5 5 Below Average         DS Sequence 6 7 16 Low Average         Longest Digit Forward 4 - - -         Longest Digit Backward 2 - - -         Longest Digit Sequence 4 - - -   Symbol Search 29 10 50 Average   Coding 42 7 16 Low Average   COGNITIVE SCREENING Raw Score Standardized Score Percentile/CP Descriptor   MoCA 17 - - Impaired   Orientation - Place 2/2 - - -   Orientation - Date 4/4 - - -   LANGUAGE FUNCTIONING Raw Score Standardized Score Percentile/CP Descriptor   WAIS-IV Vocabulary 10 3 1 Exceptionally Low    TOPF Word Reading 18 77 6 Below Average   NAB Naming 19 19 <0.1 Exceptionally Low    NAB Naming Percent Correct After Semantic Cuing 8 - 39 Average   NAB Naming Percent Correct After Phonemic Cuing 45 - 29 Average   DKEFS Verbal Fluency: Letter 23 6 9 Low Average   DKEFS Verbal Fluency: Category 30 7 16 Low Average   VISUOSPATIAL FUNCTIONING Raw Score Standardized Score Percentile/CP Descriptor   WAIS-IV CLEMENET - 71 3 Below Average   WAIS-IV Block Design 16 5 5 Below Average   WAIS-IV Matrix Reasoning 6 5 5 Below Average   RCFT Copy 12.5 - <1 Exceptionally Low   RCFT Time to Copy 318 - >16 WNL   LEARNING & MEMORY Raw Score Standardized Score Percentile/CP Descriptor   HVLT-R  N/A      Total Immediate 14 <20 0.1 Exceptionally Low    Delayed Recall 3 <20 0.1 Exceptionally Low    Retention % 50 <20 0.1 Exceptionally Low    Hits 9 - - -   False Positives 3 - - -   Discrimination  6 <20 0.1 Exceptionally Low    WMS-IV Subtests        LM I 8 2 0.4 Exceptionally Low    LM II 7 4 2 Below Average   LM Recognition 22 - 17-25 Low Average   ATTENTION/WORKING MEMORY Raw Score Standardized Score Percentile/CP Descriptor    WAIS-IV Digit Span 17 5 5 Below Average         DS Forward 7 7 16 Low Average         DS Backward 4 5 5 Below Average         DS Sequence 6 7 16 Low Average         Longest Digit Forward 4 - - -         Longest Digit Backward 2 - - -         Longest Digit Sequence 4 - - -   MENTAL PROCESSING SPEED Raw Score Standardized Score Percentile/CP Descriptor   WAIS-IV PSI - 92 30 Average   WAIS-IV Symbol Search 29 10 50 Average   WAIS-IV Coding 42 7 16 Low Average   TMT A  50 42 21 Low Average   TMT A errors 0 - - -   EXECUTIVE FUNCTIONING Raw Score Standardized Score Percentile/CP Descriptor   TMT B 210 39 14 Low Average   TMT B errors 1 - - -   DKEFS Verbal Fluency: Switching Total 15 12 75 High Average   DKEFS Verbal Fluency: Switching Accuracy 14 12 75 High Average   WCST-64        Total Correct 19 - - -   Total Errors 45 63 1 Exceptionally Low    Perseverative Resp. 31 73 4 Below Average   Perseverative Err. 25 72 3 Below Average   Nonperseverative Err. 20 69 2 Below Average   Concept. Level Response 4 60 0.4 Exceptionally Low    Categories Completed 0 - 2-5 Below Average   FMS 0 -  N/A   Learning to Learn N/A -  N/A   WAIS-IV Matrix Reasoning 6 5 5 Below Average   MOOD & PERSONALITY Raw Score Standardized Score Percentile/CP Descriptor   BDI-2 32 - - Severe   STAI:State 43 - 90 Clinically significant   STAI:Trait 56 - 100 Clinically significant   ss = scaled score (mean = 10, SD = 3); SS = standard score (mean = 100, SD = 15); Tscore mean = 50, SD = 10; zscore (mean = 0.00, SD = 1)       It is important to note that scores/percentiles should only be interpreted by a neuropsychologist. It is common for healthy individuals to have 1-3 isolated low/unusual scores that are not indicative of any significant cognitive dysfunction.    BILLING     Service Description CPT Code Minutes Units   Psychiatric diagnostic evaluation by physician [id number]  0   Neurobehavioral status exam by physician [id number] 40 1   Each additional hour  by physician [id number]  0   Test Evaluation Services --  --   Neuropsychological testing evaluation services by physician [id number] 60 1   Each additional hour by physician [id number] 120 2   Test Administration and Scoring --  --   Psychological or neuropsychological test administration and scoring by physician [id number] 190 1   Each additional 30 minutes by physician [id number]  5   Psychological or neuropsychological test administration and scoring by technician [id number] 30 1   Each additional 30 minutes by technician [id number]  0

## 2024-06-18 ENCOUNTER — OFFICE VISIT (OUTPATIENT)
Dept: NEUROLOGY | Facility: CLINIC | Age: 59
End: 2024-06-18
Payer: MEDICARE

## 2024-06-18 DIAGNOSIS — F33.1 MODERATE EPISODE OF RECURRENT MAJOR DEPRESSIVE DISORDER: ICD-10-CM

## 2024-06-18 DIAGNOSIS — F41.1 GENERALIZED ANXIETY DISORDER: ICD-10-CM

## 2024-06-18 DIAGNOSIS — R41.3 MEMORY LOSS: ICD-10-CM

## 2024-06-18 DIAGNOSIS — R41.89 COGNITIVE CHANGE: Primary | ICD-10-CM

## 2024-06-18 PROCEDURE — 96137 PSYCL/NRPSYC TST PHY/QHP EA: CPT | Mod: S$GLB,,, | Performed by: STUDENT IN AN ORGANIZED HEALTH CARE EDUCATION/TRAINING PROGRAM

## 2024-06-18 PROCEDURE — 96138 PSYCL/NRPSYC TECH 1ST: CPT | Mod: 59,S$GLB,, | Performed by: STUDENT IN AN ORGANIZED HEALTH CARE EDUCATION/TRAINING PROGRAM

## 2024-06-18 PROCEDURE — 96136 PSYCL/NRPSYC TST PHY/QHP 1ST: CPT | Mod: S$GLB,,, | Performed by: STUDENT IN AN ORGANIZED HEALTH CARE EDUCATION/TRAINING PROGRAM

## 2024-06-18 PROCEDURE — 96116 NUBHVL XM PHYS/QHP 1ST HR: CPT | Mod: S$GLB,,, | Performed by: STUDENT IN AN ORGANIZED HEALTH CARE EDUCATION/TRAINING PROGRAM

## 2024-06-18 PROCEDURE — 96133 NRPSYC TST EVAL PHYS/QHP EA: CPT | Mod: S$GLB,,, | Performed by: STUDENT IN AN ORGANIZED HEALTH CARE EDUCATION/TRAINING PROGRAM

## 2024-06-18 PROCEDURE — 96132 NRPSYC TST EVAL PHYS/QHP 1ST: CPT | Mod: S$GLB,,, | Performed by: STUDENT IN AN ORGANIZED HEALTH CARE EDUCATION/TRAINING PROGRAM

## 2024-06-18 PROCEDURE — 99499 UNLISTED E&M SERVICE: CPT | Mod: S$GLB,,, | Performed by: STUDENT IN AN ORGANIZED HEALTH CARE EDUCATION/TRAINING PROGRAM

## 2024-06-19 ENCOUNTER — OFFICE VISIT (OUTPATIENT)
Dept: NEUROLOGY | Facility: CLINIC | Age: 59
End: 2024-06-19
Payer: MEDICARE

## 2024-06-19 DIAGNOSIS — R41.3 MEMORY LOSS: ICD-10-CM

## 2024-06-19 DIAGNOSIS — F33.1 MODERATE EPISODE OF RECURRENT MAJOR DEPRESSIVE DISORDER: ICD-10-CM

## 2024-06-19 DIAGNOSIS — R41.89 COGNITIVE CHANGE: Primary | ICD-10-CM

## 2024-06-19 DIAGNOSIS — F41.1 GENERALIZED ANXIETY DISORDER: ICD-10-CM

## 2024-06-19 PROCEDURE — 99499 UNLISTED E&M SERVICE: CPT | Mod: 95,,, | Performed by: STUDENT IN AN ORGANIZED HEALTH CARE EDUCATION/TRAINING PROGRAM

## 2024-06-19 NOTE — PROGRESS NOTES
NEUROPSYCHOLOGICAL EVALUATION FEEDBACK    Referral Information  Name: Amanda Mcguire  MRN: [id number]  Age: 58 y.o.    : 1965  Race: Black or  Gender: female        Chief complaint leading to consultation/medical necessity: Feedback from neuropsychological evaluation.  Established Patient - Telehealth Visit   Patient location: [place], [place]  Visit type: Virtual visit with audio only (telephone)  The reason for the audio only service rather than synchronous audio and video virtual visit was related to technical difficulties or patient preference/necessity.  Total time spent with patient: 30 minutes.  Billin attached to testing visit on 24.  Each patient to whom he or she provides medical services by telemedicine is: (1) informed of the relationship between the physician and patient and the respective role of any other health care provider with respect to management of the patient; and (2) notified that he or she may decline to receive medical services by telemedicine and may withdraw from such care at any time. Patient verbally consented to receive this service via voice-only telephone call. This service was not originating from a related E/M service provided within the previous 7 days nor will  to an E/M service or procedure within the next 24 hours or my soonest available appointment.  Prevailing standard of care was able to be met in this audio-only visit.   Consent/Emergency Plan: The patient expressed an understanding of the purpose of the evaluation and consented to all procedures. I informed the patient of limits to confidentiality and discussed an emergency plan.    FEEDBACK NOTE       Ms. Amanda Mcguire participated in a feedback session today.  We discussed the results of the neuropsychological evaluation. I gave time to discuss questions and concerns. A copy of a evaluation report will be provided to Ms. Mcguire via [employer].     Valentina Hartley, Ph.D.  Licensed  Clinical Neuropsychologist  [facility] - Department of Neurology

## 2024-06-20 ENCOUNTER — OFFICE VISIT (OUTPATIENT)
Dept: RHEUMATOLOGY | Facility: CLINIC | Age: 59
End: 2024-06-20
Payer: MEDICARE

## 2024-06-20 VITALS
HEART RATE: 91 BPM | BODY MASS INDEX: 49.5 KG/M2 | HEIGHT: 62 IN | WEIGHT: 269 LBS | DIASTOLIC BLOOD PRESSURE: 90 MMHG | SYSTOLIC BLOOD PRESSURE: 136 MMHG

## 2024-06-20 DIAGNOSIS — K21.9 GASTROESOPHAGEAL REFLUX DISEASE, UNSPECIFIED WHETHER ESOPHAGITIS PRESENT: ICD-10-CM

## 2024-06-20 DIAGNOSIS — G47.00 INSOMNIA, UNSPECIFIED TYPE: ICD-10-CM

## 2024-06-20 DIAGNOSIS — R94.6 ABNORMAL RESULTS OF THYROID FUNCTION STUDIES: ICD-10-CM

## 2024-06-20 DIAGNOSIS — D84.9 IMMUNOCOMPROMISED: ICD-10-CM

## 2024-06-20 DIAGNOSIS — R33.9 BLADDER RETENTION: ICD-10-CM

## 2024-06-20 DIAGNOSIS — G89.4 CHRONIC PAIN SYNDROME: ICD-10-CM

## 2024-06-20 DIAGNOSIS — J40 BRONCHITIS: ICD-10-CM

## 2024-06-20 DIAGNOSIS — K50.011 CROHN'S DISEASE OF SMALL INTESTINE WITH RECTAL BLEEDING: ICD-10-CM

## 2024-06-20 DIAGNOSIS — M02.39 REACTIVE ARTHRITIS OF MULTIPLE SITES: Primary | ICD-10-CM

## 2024-06-20 DIAGNOSIS — M02.39 REACTIVE ARTHRITIS OF MULTIPLE SITES: ICD-10-CM

## 2024-06-20 DIAGNOSIS — K50.90 CROHN'S DISEASE WITHOUT COMPLICATION, UNSPECIFIED GASTROINTESTINAL TRACT LOCATION: ICD-10-CM

## 2024-06-20 DIAGNOSIS — E11.69 TYPE 2 DIABETES MELLITUS WITH OTHER SPECIFIED COMPLICATION, WITHOUT LONG-TERM CURRENT USE OF INSULIN: ICD-10-CM

## 2024-06-20 DIAGNOSIS — R41.3 MEMORY LOSS: ICD-10-CM

## 2024-06-20 PROCEDURE — 99999 PR PBB SHADOW E&M-EST. PATIENT-LVL V: CPT | Mod: PBBFAC,,, | Performed by: PHYSICIAN ASSISTANT

## 2024-06-20 RX ORDER — TRAZODONE HYDROCHLORIDE 100 MG/1
100 TABLET ORAL NIGHTLY
Qty: 90 TABLET | Refills: 1 | Status: SHIPPED | OUTPATIENT
Start: 2024-06-20

## 2024-06-20 RX ORDER — AMOXICILLIN AND CLAVULANATE POTASSIUM 875; 125 MG/1; MG/1
1 TABLET, FILM COATED ORAL 2 TIMES DAILY
Qty: 14 TABLET | Refills: 0 | Status: SHIPPED | OUTPATIENT
Start: 2024-06-20 | End: 2024-06-27

## 2024-06-20 RX ORDER — GABAPENTIN 800 MG/1
800 TABLET ORAL 3 TIMES DAILY
Qty: 270 TABLET | Refills: 1 | Status: SHIPPED | OUTPATIENT
Start: 2024-06-20 | End: 2025-06-20

## 2024-06-20 RX ORDER — CHLORHEXIDINE GLUCONATE ORAL RINSE 1.2 MG/ML
15 SOLUTION DENTAL 2 TIMES DAILY
Qty: 473 ML | Refills: 2 | Status: SHIPPED | OUTPATIENT
Start: 2024-06-20 | End: 2024-07-04

## 2024-06-20 RX ORDER — KETOROLAC TROMETHAMINE 30 MG/ML
30 INJECTION, SOLUTION INTRAMUSCULAR; INTRAVENOUS
Status: COMPLETED | OUTPATIENT
Start: 2024-06-20 | End: 2024-06-20

## 2024-06-20 RX ORDER — DILTIAZEM HYDROCHLORIDE 240 MG/1
240 CAPSULE, COATED, EXTENDED RELEASE ORAL
Status: CANCELLED | OUTPATIENT
Start: 2024-06-20

## 2024-06-20 RX ORDER — TIZANIDINE 4 MG/1
4 TABLET ORAL EVERY 8 HOURS
Qty: 90 TABLET | Refills: 3 | Status: SHIPPED | OUTPATIENT
Start: 2024-06-20

## 2024-06-20 RX ORDER — DEXAMETHASONE SODIUM PHOSPHATE 4 MG/ML
8 INJECTION, SOLUTION INTRA-ARTICULAR; INTRALESIONAL; INTRAMUSCULAR; INTRAVENOUS; SOFT TISSUE
Status: COMPLETED | OUTPATIENT
Start: 2024-06-20 | End: 2024-06-20

## 2024-06-20 RX ORDER — MEMANTINE HYDROCHLORIDE 5 MG/1
5 TABLET ORAL 2 TIMES DAILY
Qty: 60 TABLET | Refills: 11 | Status: SHIPPED | OUTPATIENT
Start: 2024-06-20 | End: 2025-06-20

## 2024-06-20 RX ADMIN — DEXAMETHASONE SODIUM PHOSPHATE 8 MG: 4 INJECTION, SOLUTION INTRA-ARTICULAR; INTRALESIONAL; INTRAMUSCULAR; INTRAVENOUS; SOFT TISSUE at 10:06

## 2024-06-20 RX ADMIN — KETOROLAC TROMETHAMINE 30 MG: 30 INJECTION, SOLUTION INTRAMUSCULAR; INTRAVENOUS at 10:06

## 2024-06-20 NOTE — PATIENT INSTRUCTIONS
[facility]  [address] , [address]  [place] [address] [phone]      [facility]  [address]  [address]  [place] [address]  GET [phone]

## 2024-06-20 NOTE — PROGRESS NOTES
Subjective:       Patient ID: Amanda Mcguire is a 58 y.o. female.    Chief Complaint: Disease Management      Mrs. Mcguire is a 58 year old female who presents to clinic visit for follow up on reactive arthritis, osteoarthritis. She has been off Remicade since January 2024 and is undergoing evaluation with Dr. Medina to restart treatment for Crohn's disease. She had an episode of shingles L abdomen at the end of April.     She has significant joint pain in her knees and spine. She is taking percocet tid prn for severe pain and this is helping her. She needs assisted walking device due to shortness of breath and weakness.     She complains of sore throat, productive cough, and chest congestion. Subjective fever and chills.  No headache.     We reviewed her recent labs --ESR/CRP are elevated.    She is requesting referrals to several specialist including Pulmonary for COPD and Urology for urinary retention (seen on recent CT abd).    Current tx:  1. Remicade  2. percocet        Review of Systems   Constitutional: Negative for activity change, appetite change, fatigue and fever.   Eyes: Negative for visual disturbance.   Cardiovascular: Negative for chest pain, palpitations and leg swelling.   Gastrointestinal:  Negative for constipation, diarrhea and vomiting.   Musculoskeletal: Positive for arthralgias, joint swelling and myalgias.   Pulmonary: Positive shortness of breath, +cough and PND  Neurological: Negative for dizziness, weakness, light-headedness and headaches.   Skin: no rash  Psych: No anxiety        Objective:     Vitals:    06/20/24 0947   BP: (!) 136/90   Pulse: 91         Past Medical History:   Diagnosis Date    Anxiety     Arthritis     Asthma     Crohn's disease     Depression     Encounter for blood transfusion     Headache     Hypertension     Myofascial pain syndrome, cervical 9/28/2022     Past Surgical History:   Procedure Laterality Date    COLONOSCOPY N/A 03/10/2022    Procedure:  COLONOSCOPY;  Surgeon: Nilson Wiseman MD;  Location: [facility];  Service: Endoscopy;  Laterality: N/A; Repeat colonoscopy in 6 months because the bowel prep was poor    COLONOSCOPY N/A 3/2/2023    Procedure: COLONOSCOPY;  Surgeon: Erik Gracia MD;  Location: [facility];  Service: Endoscopy;  Laterality: N/A;    ESOPHAGOGASTRODUODENOSCOPY N/A 03/10/2022    Procedure: EGD (ESOPHAGOGASTRODUODENOSCOPY);  Surgeon: Nilson Wiseman MD;  Location: [facility];  Service: Endoscopy;  Laterality: N/A; Repeat upper endoscopy in 8 weeks for surveillance    ESOPHAGOGASTRODUODENOSCOPY N/A 3/16/2023    Procedure: EGD (ESOPHAGOGASTRODUODENOSCOPY);  Surgeon: Erik Garcia MD;  Location: [facility];  Service: Endoscopy;  Laterality: N/A;     Physical Exam   Eyes: Right conjunctiva is not injected. Left conjunctiva is not injected.   Cardiovascular: Normal rate and regular rhythm.  Exam reveals no decreased pulses.    Pulmonary/Chest: normal effort        Right Side Rheumatological Exam     Examination finds the shoulder, elbow, wrist, knee, 1st PIP, 1st MCP, 2nd MCP, 3rd MCP, 4th MCP and 5th MCP normal.    The patient is tender to palpation of the 2nd PIP, 3rd PIP, 4th PIP and 5th PIP    She has swelling of the 2nd PIP, 3rd PIP, 4th PIP and 5th PIP     Left Side Rheumatological Exam     Examination finds the shoulder, elbow, wrist, knee, 1st PIP, 1st MCP, 2nd MCP, 3rd MCP, 4th PIP, 4th MCP, 5th PIP and 5th MCP normal.    The patient is tender to palpation of the 2nd PIP and 3rd PIP.    She has swelling of the 2nd PIP and 3rd PIP  Skin: No rash noted.   Psychiatric: Mood and affect normal.   Musculoskeletal: She exhibits tenderness (multiple tender points in her muscles throughout).      Labs:  Component      Latest Ref Rng 6/13/2024   WBC      3.90 - 12.70 K/uL 8.02    RBC      4.00 - 5.40 M/uL 4.87    Hemoglobin      12.0 - 16.0 g/dL 12.8    Hematocrit      37.0 - 48.5 % 41.9    MCV      82 - 98 fL 86    MCH      27.0  - 31.0 pg 26.3 (L)    MCHC      32.0 - 36.0 g/dL 30.5 (L)    RDW      11.5 - 14.5 % 16.1 (H)    Platelet Count      150 - 450 K/uL 318    MPV      9.2 - 12.9 fL 10.6    Immature Granulocytes      0.0 - 0.5 % 1.0 (H)    Gran # (ANC)      1.8 - 7.7 K/uL 5.0    Immature Grans (Abs)      0.00 - 0.04 K/uL 0.08 (H)    Lymph #      1.0 - 4.8 K/uL 1.9    Mono #      0.3 - 1.0 K/uL 0.8    Eos #      0.0 - 0.5 K/uL 0.2    Baso #      0.00 - 0.20 K/uL 0.07    nRBC      0 /100 WBC 0    Gran %      38.0 - 73.0 % 62.4    Lymph %      18.0 - 48.0 % 23.3    Mono %      4.0 - 15.0 % 9.7    Eos %      0.0 - 8.0 % 2.7    Basophil %      0.0 - 1.9 % 0.9    Differential Method Automated    Sodium      136 - 145 mmol/L 145    Potassium      3.5 - 5.1 mmol/L 4.3    Chloride      95 - 110 mmol/L 113 (H)    CO2      23 - 29 mmol/L 22 (L)    Glucose      70 - 110 mg/dL 87    BUN      6 - 20 mg/dL 16    Creatinine      0.5 - 1.4 mg/dL 0.9    Calcium      8.7 - 10.5 mg/dL 10.0    PROTEIN TOTAL      6.0 - 8.4 g/dL 7.0    Albumin      3.5 - 5.2 g/dL 3.5    BILIRUBIN TOTAL      0.1 - 1.0 mg/dL 0.3    ALP      55 - 135 U/L 85    AST      10 - 40 U/L 18    ALT      10 - 44 U/L 21    eGFR      >60 mL/min/1.73 m^2 >60.0    Anion Gap      8 - 16 mmol/L 10    CRP      0.0 - 8.2 mg/L 23.6 (H)    Sed Rate      0 - 20 mm/Hr 43 (H)      CT abd pelvis 4/24   IMPRESSION:    1.  Severe urinary bladder distention which could indicate urinary retention. Symmetrical mild bilateral hydroureteronephrosis.    2.  Left adrenal gland mass, stable dating back until at least 04/21/2021, therefore very likely benign.   3. Leiomyomatous uterus.     ASSESSMENT:    1. Reactive arthritis of multiple sites    2. Immunocompromised    3. Crohn's disease of small intestine with rectal bleeding    4. Bronchitis    5. Memory loss    6. Insomnia, unspecified type    7. Type 2 diabetes mellitus with other specified complication, without long-term current use of insulin    8.  Bladder retention    9. Chronic pain syndrome              Plan:       Reactive arthritis of multiple sites  -     WALKER FOR HOME USE  -     gabapentin (NEURONTIN) 800 MG tablet; Take 1 tablet (800 mg total) by mouth 3 (three) times daily.  Dispense: 270 tablet; Refill: 1  -     tiZANidine (ZANAFLEX) 4 MG tablet; Take 1 tablet (4 mg total) by mouth every 8 (eight) hours.  Dispense: 90 tablet; Refill: 3  -     ketorolac injection 30 mg  -     dexAMETHasone injection 8 mg    Immunocompromised  -     chlorhexidine (PERIDEX) 0.12 % solution; Use as directed 15 mLs in the mouth or throat 2 (two) times daily. for 14 days  Dispense: 473 mL; Refill: 2    Crohn's disease of small intestine with rectal bleeding    Bronchitis  -     Ambulatory referral/consult to Pulmonology; Future; Expected date: 06/27/2024  -     amoxicillin-clavulanate 875-125mg (AUGMENTIN) 875-125 mg per tablet; Take 1 tablet by mouth 2 (two) times daily. for 7 days  Dispense: 14 tablet; Refill: 0    Memory loss  -     memantine (NAMENDA) 5 MG Tab; Take 1 tablet (5 mg total) by mouth 2 (two) times daily.  Dispense: 60 tablet; Refill: 11    Insomnia, unspecified type  -     tiZANidine (ZANAFLEX) 4 MG tablet; Take 1 tablet (4 mg total) by mouth every 8 (eight) hours.  Dispense: 90 tablet; Refill: 3  -     traZODone (DESYREL) 100 MG tablet; Take 1 tablet (100 mg total) by mouth every evening.  Dispense: 90 tablet; Refill: 1    Type 2 diabetes mellitus with other specified complication, without long-term current use of insulin  -     tirzepatide 7.5 mg/0.5 mL PnIj; Inject 7.5 mg into the skin every 7 days.  Dispense: 4 Pen; Refill: 5    Bladder retention  -     Ambulatory referral/consult to Urology; Future; Expected date: 06/27/2024    Chronic pain syndrome            58 year old female with   Reactive arthritis, osteoarthritis, erythema nodosum on plaquenil  --chronic pain syndrome on norco 10/325  --HTN  --hx of asthma  --hx of pancreatitis 9/2019  --  Crohn's disease 10/2020, BON s/p iron infusions    Plan:  1. Cont plaquenil 200 mg bid  2. Augmentin sent. Pulmonary referral  3. Cont. percocet per MD.  I have checked [place] prescription monitoring program site and no unusual or abnormal behavior has occurred pt understand the risk and benefits of taking opioid medications and has decided to continue the medication.  4. Cont gabapentin, increase tizanidine  5. Cont trazodone  6. Increase mounjaro 7.5 mg weekly  7. Urology referral, Pulmonary referral   8. Walker ordered  9. Toradol 30, dexa 8 mg given today    Follow up:  3 mo Dr. Beltran w/lab dena

## 2024-06-21 RX ORDER — OXYCODONE AND ACETAMINOPHEN 10; 325 MG/1; MG/1
1 TABLET ORAL EVERY 8 HOURS PRN
Qty: 90 TABLET | Refills: 0 | Status: SHIPPED | OUTPATIENT
Start: 2024-08-31 | End: 2024-09-30

## 2024-06-21 RX ORDER — OXYCODONE AND ACETAMINOPHEN 10; 325 MG/1; MG/1
1 TABLET ORAL EVERY 8 HOURS PRN
Qty: 90 TABLET | Refills: 0 | Status: SHIPPED | OUTPATIENT
Start: 2024-07-03 | End: 2024-08-02

## 2024-06-21 RX ORDER — OXYCODONE AND ACETAMINOPHEN 10; 325 MG/1; MG/1
1 TABLET ORAL EVERY 8 HOURS PRN
Qty: 90 TABLET | Refills: 0 | Status: SHIPPED | OUTPATIENT
Start: 2024-08-02 | End: 2024-09-01

## 2024-07-10 RX ORDER — IPRATROPIUM BROMIDE AND ALBUTEROL SULFATE 2.5; .5 MG/3ML; MG/3ML
3 SOLUTION RESPIRATORY (INHALATION)
OUTPATIENT
Start: 2024-07-10

## 2024-07-10 RX ORDER — DIPHENHYDRAMINE HYDROCHLORIDE 50 MG/ML
25 INJECTION INTRAMUSCULAR; INTRAVENOUS
OUTPATIENT
Start: 2024-07-10

## 2024-07-10 RX ORDER — EPINEPHRINE 1 MG/ML
0.3 INJECTION, SOLUTION, CONCENTRATE INTRAVENOUS
OUTPATIENT
Start: 2024-07-10

## 2024-07-10 RX ORDER — ACETAMINOPHEN 325 MG/1
650 TABLET ORAL
OUTPATIENT
Start: 2024-07-10

## 2024-07-10 RX ORDER — SODIUM CHLORIDE 0.9 % (FLUSH) 0.9 %
10 SYRINGE (ML) INJECTION
OUTPATIENT
Start: 2024-07-10

## 2024-07-10 RX ORDER — METHYLPREDNISOLONE SOD SUCC 125 MG
40 VIAL (EA) INJECTION
OUTPATIENT
Start: 2024-07-10

## 2024-07-10 RX ORDER — HEPARIN 100 UNIT/ML
500 SYRINGE INTRAVENOUS
OUTPATIENT
Start: 2024-07-10

## 2024-07-10 NOTE — TELEPHONE ENCOUNTER
Spoke with patient and rescheduled her EGD/colonsocpy for 3/11. Patient states that she still has prep instructions. She was also notified of pre procedure COVID testing.    Taking out 24mm Watchman.  Aborting procedure.

## 2024-08-02 ENCOUNTER — TELEPHONE (OUTPATIENT)
Dept: CARDIOLOGY | Facility: CLINIC | Age: 59
End: 2024-08-02
Payer: MEDICARE

## 2024-08-02 NOTE — TELEPHONE ENCOUNTER
----- Message from Heavenly Campos sent at 8/2/2024  3:35 PM CDT -----  Type: Needs Medical Advice  Who Called:  pt  Symptoms (please be specific):  chest pain, sob  How long has patient had these symptoms:  2 weeks  Pharmacy name and phone #:    Best Call Back Number: [phone]    Additional Information: pt needs appt but states she can't wait until September.  Please call to advise  
Followed up with Amanda, scheduled appointment for patient. Patient verbalized understanding of date, time, and location of appointment.  
negative

## 2024-08-06 DIAGNOSIS — R07.9 CHEST PAIN, UNSPECIFIED TYPE: Primary | ICD-10-CM

## 2024-08-12 ENCOUNTER — OFFICE VISIT (OUTPATIENT)
Dept: NEPHROLOGY | Facility: CLINIC | Age: 59
End: 2024-08-12
Payer: MEDICARE

## 2024-08-12 VITALS
SYSTOLIC BLOOD PRESSURE: 132 MMHG | BODY MASS INDEX: 51.5 KG/M2 | HEART RATE: 66 BPM | HEIGHT: 62 IN | WEIGHT: 279.88 LBS | OXYGEN SATURATION: 99 % | DIASTOLIC BLOOD PRESSURE: 80 MMHG

## 2024-08-12 DIAGNOSIS — M02.39 REACTIVE ARTHRITIS OF MULTIPLE SITES: ICD-10-CM

## 2024-08-12 DIAGNOSIS — D84.9 IMMUNOCOMPROMISED: ICD-10-CM

## 2024-08-12 DIAGNOSIS — N13.30 HYDRONEPHROSIS, UNSPECIFIED HYDRONEPHROSIS TYPE: ICD-10-CM

## 2024-08-12 DIAGNOSIS — E27.8 LEFT ADRENAL MASS: Chronic | ICD-10-CM

## 2024-08-12 DIAGNOSIS — G89.4 CHRONIC PAIN SYNDROME: ICD-10-CM

## 2024-08-12 DIAGNOSIS — M87.051 AVASCULAR NECROSIS OF BONES OF BOTH HIPS: ICD-10-CM

## 2024-08-12 DIAGNOSIS — K50.819 CROHN'S DISEASE OF SMALL AND LARGE INTESTINES WITH COMPLICATION: ICD-10-CM

## 2024-08-12 DIAGNOSIS — M02.30 REACTIVE ARTHRITIS, UNSPECIFIED SITE: ICD-10-CM

## 2024-08-12 DIAGNOSIS — R60.9 EDEMA, UNSPECIFIED TYPE: Primary | ICD-10-CM

## 2024-08-12 DIAGNOSIS — M87.052 AVASCULAR NECROSIS OF BONES OF BOTH HIPS: ICD-10-CM

## 2024-08-12 PROBLEM — M79.2 NEUROPATHIC PAIN: Status: ACTIVE | Noted: 2024-04-02

## 2024-08-12 PROBLEM — E78.2 MIXED HYPERLIPIDEMIA: Status: ACTIVE | Noted: 2022-06-02

## 2024-08-12 PROBLEM — E11.69 TYPE 2 DIABETES MELLITUS WITH OTHER SPECIFIED COMPLICATION: Status: ACTIVE | Noted: 2024-08-12

## 2024-08-12 PROCEDURE — 99999 PR PBB SHADOW E&M-EST. PATIENT-LVL IV: CPT | Mod: PBBFAC,,, | Performed by: INTERNAL MEDICINE

## 2024-08-12 PROCEDURE — 1159F MED LIST DOCD IN RCRD: CPT | Mod: CPTII,S$GLB,, | Performed by: INTERNAL MEDICINE

## 2024-08-12 PROCEDURE — 4010F ACE/ARB THERAPY RXD/TAKEN: CPT | Mod: CPTII,S$GLB,, | Performed by: INTERNAL MEDICINE

## 2024-08-12 PROCEDURE — 1160F RVW MEDS BY RX/DR IN RCRD: CPT | Mod: CPTII,S$GLB,, | Performed by: INTERNAL MEDICINE

## 2024-08-12 PROCEDURE — 3079F DIAST BP 80-89 MM HG: CPT | Mod: CPTII,S$GLB,, | Performed by: INTERNAL MEDICINE

## 2024-08-12 PROCEDURE — 3066F NEPHROPATHY DOC TX: CPT | Mod: CPTII,S$GLB,, | Performed by: INTERNAL MEDICINE

## 2024-08-12 PROCEDURE — 99205 OFFICE O/P NEW HI 60 MIN: CPT | Mod: S$GLB,,, | Performed by: INTERNAL MEDICINE

## 2024-08-12 PROCEDURE — 3075F SYST BP GE 130 - 139MM HG: CPT | Mod: CPTII,S$GLB,, | Performed by: INTERNAL MEDICINE

## 2024-08-12 PROCEDURE — 3008F BODY MASS INDEX DOCD: CPT | Mod: CPTII,S$GLB,, | Performed by: INTERNAL MEDICINE

## 2024-08-12 PROCEDURE — 3044F HG A1C LEVEL LT 7.0%: CPT | Mod: CPTII,S$GLB,, | Performed by: INTERNAL MEDICINE

## 2024-08-12 RX ORDER — UPADACITINIB 45 MG/1
1 TABLET, EXTENDED RELEASE ORAL
COMMUNITY
Start: 2024-07-26

## 2024-08-12 NOTE — PATIENT INSTRUCTIONS
Causes of leg swelling: (edema)     1--no blood clots (ultrasound April 30th)   2--refer you to Urology (bladder doctor) because of urine flow issues   3--possible medications: gabapentin and amlodipine   4--Kidney causes  Urine test to check for protein levels   Blood tests tells us that your kidneys are filtering fine   5--Possible heart cause  6--extra weight     --call Dr. Medina's nurse and let him/her know about swallowing

## 2024-08-12 NOTE — PROGRESS NOTES
"Subjective:      Patient ID: Amanda Mcguire is a 58 y.o. Black or  female who presents for new evaluation of Consult    HPI    August 2024:  She presents as a self referred pt for "swelling up bad"   Swelling started April 2024, at the time she sought care and had CT indicating B mild hydro with distend bladder   Was referred to Urologist in June but pt unclear if appt made   Mother on HD, brother on HD X 29 years   She has been on Lasix for years, also gabapentin, amlodipine   Chest pains left side and unable to breathe at night, also with asthma/COPD. Sleeps in a recliner to breathe more easily   WARD   Does not add salt after cooking, some vegetables. Does not dine out   Fullness at throat and hard to swallow, with food, with fluid and pills  Only beverage is water   Notes she is gaining weight X 3 months, is on Mounjao about 3 months ago. Without eating more. clothes are tighter    Review of Systems   Constitutional:  Positive for unexpected weight change (gain). Negative for activity change and appetite change.   HENT:  Negative for facial swelling.    Respiratory:  Positive for chest tightness and shortness of breath.    Cardiovascular:  Positive for leg swelling.   Genitourinary:  Positive for difficulty urinating. Negative for flank pain and hematuria.   Musculoskeletal:  Positive for arthralgias.   Allergic/Immunologic: Positive for immunocompromised state.   Psychiatric/Behavioral:  Negative for confusion and decreased concentration.       Objective:     Physical Exam  Vitals and nursing note reviewed.   Constitutional:       General: She is not in acute distress.     Appearance: She is obese.   HENT:      Head: Atraumatic.   Cardiovascular:      Rate and Rhythm: Normal rate and regular rhythm.      Heart sounds:      No friction rub.   Pulmonary:      Effort: No respiratory distress.      Breath sounds: No wheezing or rales.   Abdominal:      General: There is distension (obesity). "   Musculoskeletal:      Right lower leg: Edema (2-3+ B, up to knees) present.      Left lower leg: Edema present.   Neurological:      Mental Status: She is alert and oriented to person, place, and time.   Psychiatric:         Mood and Affect: Mood normal.       Assessment:     1. Edema, unspecified type    2. Immunocompromised    3. Left adrenal mass    4. Hydronephrosis, unspecified hydronephrosis type    5. Crohn's disease of small and large intestines with complication    6. Avascular necrosis of bones of both hips    7. Reactive arthritis of multiple sites    8. Reactive arthritis, unspecified site    9. Chronic pain syndrome       Plan:     I suspect her LE edema will be multifactorial in nature and from a  renal standpoint I will check for nephrotic range proteinuria, will recheck chemistries to ensure GFR remains >60mL/min in light of samuel imaging from 4/30/2024 indicating B hydro    We discussed the following today:    1--no blood clots (ultrasound April 30th 2024)   2--Refer to Urology for B hydros   3--possible medications: gabapentin and amlodipine   4--Kidney causes of edema:  Urine test to check for protein levels   Blood tests tells us that your kidneys are filtering fine   5--Possible cardiac etiology of edema--will help her reschedule missed Cards appt   6--Obesity can contribute to LE edema      For her swallowing issues I advised her to call Dr. Medina's nurse and let him/her know     Labs then post results and pt to call for results   65 minutes of total time spent on the encounter, which includes face to face time and non-face to face time preparing to see the patient (eg, review of tests), Obtaining and/or reviewing separately obtained history, Documenting clinical information in the electronic or other health record, Independently interpreting results (not separately reported) and communicating results to the patient/family/caregiver, or Care coordination (not separately reported).

## 2024-08-13 ENCOUNTER — LAB VISIT (OUTPATIENT)
Dept: LAB | Facility: HOSPITAL | Age: 59
End: 2024-08-13
Attending: INTERNAL MEDICINE
Payer: MEDICARE

## 2024-08-13 DIAGNOSIS — R60.9 EDEMA, UNSPECIFIED TYPE: ICD-10-CM

## 2024-08-13 LAB
CREAT UR-MCNC: 67 MG/DL (ref 15–325)
PROT UR-MCNC: <7 MG/DL (ref 0–15)
PROT/CREAT UR: NORMAL MG/G{CREAT} (ref 0–0.2)

## 2024-08-13 PROCEDURE — 82570 ASSAY OF URINE CREATININE: CPT | Performed by: INTERNAL MEDICINE

## 2024-08-20 ENCOUNTER — TELEPHONE (OUTPATIENT)
Dept: NEPHROLOGY | Facility: CLINIC | Age: 59
End: 2024-08-20
Payer: MEDICARE

## 2024-08-20 NOTE — TELEPHONE ENCOUNTER
Pt fist seen in Consultation for swelling/edema. I ordered tests to check for possible kidney causes of edema.     Results:   Please let her know:  1--the urine protein test was fine, no extra protein in the urine  2--kidney filtering/kidney function is excellent     There is no kidney cause of her swelling. RTC prn

## 2024-08-21 NOTE — TELEPHONE ENCOUNTER
Patient was called and informed her kidney functions are good. She will see urology tomorrow and follow up with nephrology prn.

## 2024-08-22 ENCOUNTER — OFFICE VISIT (OUTPATIENT)
Dept: UROLOGY | Facility: CLINIC | Age: 59
End: 2024-08-22
Payer: MEDICARE

## 2024-08-22 VITALS — WEIGHT: 274.94 LBS | HEIGHT: 62 IN | BODY MASS INDEX: 50.59 KG/M2

## 2024-08-22 DIAGNOSIS — R35.0 URINARY FREQUENCY: Primary | ICD-10-CM

## 2024-08-22 DIAGNOSIS — R33.9 BLADDER RETENTION: ICD-10-CM

## 2024-08-22 DIAGNOSIS — N32.89 BLADDER SPASMS: ICD-10-CM

## 2024-08-22 DIAGNOSIS — N13.30 HYDRONEPHROSIS, UNSPECIFIED HYDRONEPHROSIS TYPE: ICD-10-CM

## 2024-08-22 LAB
BILIRUBIN, UA POC OHS: NEGATIVE
BLOOD, UA POC OHS: NEGATIVE
CLARITY, UA POC OHS: ABNORMAL
COLOR, UA POC OHS: YELLOW
GLUCOSE, UA POC OHS: NEGATIVE
KETONES, UA POC OHS: NEGATIVE
LEUKOCYTES, UA POC OHS: ABNORMAL
NITRITE, UA POC OHS: NEGATIVE
PH, UA POC OHS: 5.5
POC RESIDUAL URINE VOLUME: 0 ML (ref 0–100)
PROTEIN, UA POC OHS: NEGATIVE
SPECIFIC GRAVITY, UA POC OHS: 1.02
UROBILINOGEN, UA POC OHS: 0.2

## 2024-08-22 PROCEDURE — 99999 PR PBB SHADOW E&M-EST. PATIENT-LVL V: CPT | Mod: PBBFAC,,,

## 2024-08-22 RX ORDER — OXYBUTYNIN CHLORIDE 5 MG/1
5 TABLET, EXTENDED RELEASE ORAL DAILY
Qty: 30 TABLET | Refills: 11 | Status: SHIPPED | OUTPATIENT
Start: 2024-08-22 | End: 2025-08-22

## 2024-08-22 RX ORDER — CEFDINIR 300 MG/1
300 CAPSULE ORAL EVERY 12 HOURS
COMMUNITY
Start: 2024-08-20

## 2024-08-22 NOTE — PROGRESS NOTES
[facility] Note  Staff: Siomara Caba FNP-C    PCP: MD Dieudonne    Chief Complaint: Urinary Frequency    Subjective:        HPI: Amanda Mcguire is a 58 y.o. female NEW PATIENT presents today for evaluation of urinary frequency. She is currently on diuretics which we discussed will aggravate urinary frequency. She had a CT done for abd pain on 4/30/2024 which showed IMPRESSION:    1.  Severe urinary bladder distention which could indicate urinary retention. Symmetrical mild bilateral hydroureteronephrosis.    2.  Left adrenal gland mass, stable dating back until at least 04/21/2021, therefore very likely benign.   3. Leiomyomatous uterus.     These images are not available to review. She denies difficulty urinating. Her PVR today is 0mL.     She denies dysuria, hematuria, fever, flank pain, and difficulty urinating. She complains of stress incontinence. We discussed treatment options including pelvic floor PT and surgical intervention but she is not interested at this time.    We discussed a trial of an OAB medication. We discussed risks, SE, and possible benefits. We also discussed her need to urinate more due to volume overload. She denies constipation. She denies a history of kidney stones.     Questions asked pt during office visit today:  Urgency:Yes - at times, incontinence with urgency? No;   Incontinence with laughing or straining: Yes ;   DysuriaNo  Gross HematuriaNo  History of UTI: No    History of Kidney Stones?:  No    Constipation issues?:  No    PVR by bladder scan performed by MA today:  0 mL    REVIEW OF SYSTEMS:  Review of Systems   Constitutional: Negative.  Negative for chills and fever.   Respiratory:  Positive for shortness of breath.    Cardiovascular:  Positive for leg swelling.   Gastrointestinal:  Positive for abdominal pain. Negative for constipation, diarrhea, nausea and vomiting.   Genitourinary:  Positive for frequency. Negative for dysuria, flank pain,  hematuria and urgency.   Musculoskeletal: Negative.  Negative for back pain.       PMHx:  Past Medical History:   Diagnosis Date    Anxiety     Arthritis     Asthma     Crohn's disease     Depression     Encounter for blood transfusion     Headache     Hypertension     Mixed hyperlipidemia 6/2/2022    Myofascial pain syndrome, cervical 9/28/2022    Type 2 diabetes mellitus with other specified complication 8/12/2024       PSHx:  Past Surgical History:   Procedure Laterality Date    COLONOSCOPY N/A 03/10/2022    Procedure: COLONOSCOPY;  Surgeon: Nilson Wiseman MD;  Location: [facility];  Service: Endoscopy;  Laterality: N/A; Repeat colonoscopy in 6 months because the bowel prep was poor    COLONOSCOPY N/A 3/2/2023    Procedure: COLONOSCOPY;  Surgeon: Erik Garcia MD;  Location: [facility];  Service: Endoscopy;  Laterality: N/A;    ESOPHAGOGASTRODUODENOSCOPY N/A 03/10/2022    Procedure: EGD (ESOPHAGOGASTRODUODENOSCOPY);  Surgeon: Nilson Wiseman MD;  Location: [facility];  Service: Endoscopy;  Laterality: N/A; Repeat upper endoscopy in 8 weeks for surveillance    ESOPHAGOGASTRODUODENOSCOPY N/A 3/16/2023    Procedure: EGD (ESOPHAGOGASTRODUODENOSCOPY);  Surgeon: Erik Garcia MD;  Location: [facility];  Service: Endoscopy;  Laterality: N/A;       Fam Hx:   malignancies: No , gyn malignancies: No   kidney stones: No     Soc Hx:  Lives in [place]    Allergies:  Amlodipine-benazepril, Ace inhibitors, and Tramadol    Medications: reviewed     Objective:   There were no vitals filed for this visit.    Physical Exam  Constitutional:       Appearance: Normal appearance.   Pulmonary:      Effort: Pulmonary effort is normal.   Abdominal:      General: There is no distension.      Palpations: Abdomen is soft.      Tenderness: There is no abdominal tenderness. There is no right CVA tenderness or left CVA tenderness.   Musculoskeletal:      Cervical back: Normal range of motion.      Right lower leg: Edema  present.      Left lower leg: Edema present.   Skin:     General: Skin is warm.   Neurological:      Mental Status: She is alert and oriented to person, place, and time.   Psychiatric:         Mood and Affect: Mood normal.         Behavior: Behavior normal.         LABS REVIEW:  UA today:  pt on abx  Color:Clear, Yellow  Spec. Grav.  1.025  PH  5.5  Negative for nitrates, protein, glucose, ketones, urobili, bili, and blood.  Trace leuks    Assessment:       1. Urinary frequency    2. Hydronephrosis, unspecified hydronephrosis type    3. Bladder spasms    4. Bladder retention          Plan:      Retroperitoneal US ordered and scheduled  Oxybutynin 5mg XL as needed prescribed to pt today as trial to see if med improves pt's current LUTS.  Benefits, risks and side affects were thoroughly explained to pt today in office with all questions answered.    F/u as needed per treatment plan    [facility]: Active    RAFFAELE Suh

## 2024-08-28 ENCOUNTER — TELEPHONE (OUTPATIENT)
Dept: UROLOGY | Facility: CLINIC | Age: 59
End: 2024-08-28
Payer: MEDICARE

## 2024-08-28 ENCOUNTER — HOSPITAL ENCOUNTER (OUTPATIENT)
Dept: RADIOLOGY | Facility: HOSPITAL | Age: 59
Discharge: HOME | End: 2024-08-28
Payer: MEDICARE

## 2024-08-28 DIAGNOSIS — N13.30 HYDRONEPHROSIS, UNSPECIFIED HYDRONEPHROSIS TYPE: ICD-10-CM

## 2024-08-28 PROCEDURE — 76770 US EXAM ABDO BACK WALL COMP: CPT | Mod: 26,,, | Performed by: RADIOLOGY

## 2024-08-28 PROCEDURE — 76770 US EXAM ABDO BACK WALL COMP: CPT | Mod: TC,PO

## 2024-08-28 NOTE — TELEPHONE ENCOUNTER
----- Message from Siomara Caba NP sent at 8/28/2024  1:08 PM CDT -----  No swelling seen on ultrasound, no further evaluation needed

## 2024-09-04 ENCOUNTER — TELEPHONE (OUTPATIENT)
Dept: INFUSION THERAPY | Facility: HOSPITAL | Age: 59
End: 2024-09-04
Payer: MEDICARE

## 2024-09-13 ENCOUNTER — TELEPHONE (OUTPATIENT)
Dept: RHEUMATOLOGY | Facility: CLINIC | Age: 59
End: 2024-09-13
Payer: MEDICARE

## 2024-09-13 NOTE — TELEPHONE ENCOUNTER
----- Message from Kellie Hernandez sent at 9/13/2024 12:10 PM CDT -----  Contact: pt  Type:  Needs Medical Advice    Who Called: pt    Would the patient rather a call back or a response via [facility]?  Call back    Best Call Back Number: [phone]    Additional Information: needs orders for labs to be done before 10/25 appt    Please call to advise    Thanks

## 2024-09-13 NOTE — TELEPHONE ENCOUNTER
----- Message from Wilbert Rose sent at 9/13/2024 12:22 PM CDT -----  Contact: Amanda Patel is requesting a call back to verify the address for her appointment on 10/25. Please give her  a call at [phone]

## 2024-09-18 ENCOUNTER — HOSPITAL ENCOUNTER (OUTPATIENT)
Dept: CARDIOLOGY | Facility: HOSPITAL | Age: 59
Discharge: HOME | End: 2024-09-18
Attending: INTERNAL MEDICINE
Payer: MEDICARE

## 2024-09-18 ENCOUNTER — OFFICE VISIT (OUTPATIENT)
Dept: CARDIOLOGY | Facility: CLINIC | Age: 59
End: 2024-09-18
Payer: MEDICARE

## 2024-09-18 VITALS
BODY MASS INDEX: 50.12 KG/M2 | SYSTOLIC BLOOD PRESSURE: 120 MMHG | DIASTOLIC BLOOD PRESSURE: 67 MMHG | OXYGEN SATURATION: 97 % | HEIGHT: 62 IN | WEIGHT: 272.38 LBS | HEART RATE: 84 BPM

## 2024-09-18 DIAGNOSIS — R07.9 CHEST PAIN, UNSPECIFIED TYPE: ICD-10-CM

## 2024-09-18 DIAGNOSIS — E78.2 MIXED HYPERLIPIDEMIA: ICD-10-CM

## 2024-09-18 DIAGNOSIS — R60.9 EDEMA, UNSPECIFIED TYPE: ICD-10-CM

## 2024-09-18 DIAGNOSIS — E66.01 MORBID (SEVERE) OBESITY DUE TO EXCESS CALORIES: ICD-10-CM

## 2024-09-18 DIAGNOSIS — R07.89 OTHER CHEST PAIN: ICD-10-CM

## 2024-09-18 DIAGNOSIS — I10 HYPERTENSION, UNSPECIFIED TYPE: Primary | Chronic | ICD-10-CM

## 2024-09-18 PROCEDURE — 4010F ACE/ARB THERAPY RXD/TAKEN: CPT | Mod: CPTII,S$GLB,, | Performed by: INTERNAL MEDICINE

## 2024-09-18 PROCEDURE — 3008F BODY MASS INDEX DOCD: CPT | Mod: CPTII,S$GLB,, | Performed by: INTERNAL MEDICINE

## 2024-09-18 PROCEDURE — 3066F NEPHROPATHY DOC TX: CPT | Mod: CPTII,S$GLB,, | Performed by: INTERNAL MEDICINE

## 2024-09-18 PROCEDURE — 93005 ELECTROCARDIOGRAM TRACING: CPT | Mod: PO

## 2024-09-18 PROCEDURE — 1159F MED LIST DOCD IN RCRD: CPT | Mod: CPTII,S$GLB,, | Performed by: INTERNAL MEDICINE

## 2024-09-18 PROCEDURE — 93010 ELECTROCARDIOGRAM REPORT: CPT | Mod: ,,, | Performed by: INTERNAL MEDICINE

## 2024-09-18 PROCEDURE — 3074F SYST BP LT 130 MM HG: CPT | Mod: CPTII,S$GLB,, | Performed by: INTERNAL MEDICINE

## 2024-09-18 PROCEDURE — 99204 OFFICE O/P NEW MOD 45 MIN: CPT | Mod: S$GLB,,, | Performed by: INTERNAL MEDICINE

## 2024-09-18 PROCEDURE — 3044F HG A1C LEVEL LT 7.0%: CPT | Mod: CPTII,S$GLB,, | Performed by: INTERNAL MEDICINE

## 2024-09-18 PROCEDURE — 99999 PR PBB SHADOW E&M-EST. PATIENT-LVL V: CPT | Mod: PBBFAC,,, | Performed by: INTERNAL MEDICINE

## 2024-09-18 PROCEDURE — 3078F DIAST BP <80 MM HG: CPT | Mod: CPTII,S$GLB,, | Performed by: INTERNAL MEDICINE

## 2024-09-18 RX ORDER — METOLAZONE 5 MG/1
TABLET ORAL
Qty: 10 TABLET | Refills: 1 | Status: SHIPPED | OUTPATIENT
Start: 2024-09-18

## 2024-09-18 NOTE — PROGRESS NOTES
Subjective   Patient ID:  Amanda Mcguire is a 58 y.o. female who presents for evaluation of Consult (SOB/LEG SWELLING)      HPI58 yo BF with HTN, HLD and obesity who is retaining fluid with tense painful edema of lower extremities for about 2 months. Has had venous dopplers without evidence of clots. Also with WARD and having CP when lying down.     Review of Systems   Constitutional: Negative for decreased appetite, fever, malaise/fatigue, weight gain and weight loss.   HENT:  Negative for hearing loss and nosebleeds.    Eyes:  Negative for visual disturbance.   Cardiovascular:  Positive for chest pain, dyspnea on exertion and leg swelling. Negative for claudication, cyanosis, irregular heartbeat, near-syncope, orthopnea, palpitations, paroxysmal nocturnal dyspnea and syncope.   Respiratory:  Positive for shortness of breath. Negative for cough, hemoptysis, sleep disturbances due to breathing, snoring and wheezing.    Endocrine: Negative for cold intolerance, heat intolerance, polydipsia and polyuria.   Hematologic/Lymphatic: Negative for adenopathy and bleeding problem. Does not bruise/bleed easily.   Skin:  Negative for color change, itching, poor wound healing, rash and suspicious lesions.   Musculoskeletal:  Negative for arthritis, back pain, falls, joint pain, joint swelling, muscle cramps, muscle weakness and myalgias.   Gastrointestinal:  Negative for bloating, abdominal pain, change in bowel habit, constipation, flatus, heartburn, hematemesis, hematochezia, hemorrhoids, jaundice, melena, nausea and vomiting.   Genitourinary:  Negative for bladder incontinence, decreased libido, frequency, hematuria, hesitancy and urgency.   Neurological:  Negative for brief paralysis, difficulty with concentration, excessive daytime sleepiness, dizziness, focal weakness, headaches, light-headedness, loss of balance, numbness, vertigo and weakness.   Psychiatric/Behavioral:  Negative for altered mental status, depression  "and memory loss. The patient does not have insomnia and is not nervous/anxious.    Allergic/Immunologic: Negative for environmental allergies, hives and persistent infections.          Objective     Physical Exam  Vitals and nursing note reviewed.   Constitutional:       Appearance: She is well-developed. She is obese.      Comments: /67   Pulse 84   Ht 5' 2" (1.575 m)   Wt 123.6 kg (272 lb 6.4 oz)   SpO2 97%   BMI 49.82 kg/m²      HENT:      Head: Normocephalic and atraumatic.      Right Ear: External ear normal.      Left Ear: External ear normal.      Nose: Nose normal.   Eyes:      General: Lids are normal. No scleral icterus.        Right eye: No discharge.         Left eye: No discharge.      Conjunctiva/sclera: Conjunctivae normal.      Right eye: No hemorrhage.     Pupils: Pupils are equal, round, and reactive to light.   Neck:      Thyroid: No thyromegaly.      Vascular: No JVD.      Trachea: No tracheal deviation.   Cardiovascular:      Rate and Rhythm: Normal rate and regular rhythm.      Pulses: Intact distal pulses.      Heart sounds: Murmur heard.      Early systolic murmur is present with a grade of 2/6.      No friction rub. No gallop.   Pulmonary:      Effort: Pulmonary effort is normal. No respiratory distress.      Breath sounds: Normal breath sounds. No wheezing or rales.   Chest:      Chest wall: No tenderness.   Breasts:     Breasts are symmetrical.   Abdominal:      General: Bowel sounds are normal. There is no distension.      Palpations: Abdomen is soft. There is no hepatomegaly or mass.      Tenderness: There is no abdominal tenderness. There is no guarding or rebound.   Musculoskeletal:         General: No tenderness. Normal range of motion.      Cervical back: Normal range of motion and neck supple.      Right lower leg: Edema present.      Left lower leg: Edema present.      Comments: +3 pitting edema   Lymphadenopathy:      Cervical: No cervical adenopathy.   Skin:     " General: Skin is warm and dry.      Coloration: Skin is not pale.      Findings: No erythema or rash.   Neurological:      Mental Status: She is alert and oriented to person, place, and time.      Cranial Nerves: No cranial nerve deficit.      Coordination: Coordination normal.      Deep Tendon Reflexes: Reflexes are normal and symmetric. Reflexes normal.   Psychiatric:         Behavior: Behavior normal.         Thought Content: Thought content normal.         Judgment: Judgment normal.            Assessment and Plan     1. Hypertension, unspecified type    2. Edema, unspecified type    3. Mixed hyperlipidemia    4. Morbid (severe) obesity due to excess calories    5. Other chest pain        Plan:  Reviewed meds. Taking diltiazem and amlodipine  Dc amlodipine   Add metolazone 5 mg daily for 3-5 days  Continue lasix 20mg daily   Low salt diet   Leg elevation  Long term need weight reduction     Orders Placed This Encounter   Procedures    X-Ray Chest PA And Lateral    BASIC METABOLIC PANEL    NT-Pro Natriuretic Peptide    Echo     Follow up in about 3 weeks (around 10/9/2024).   Advance Care Planning     Date: 09/18/2024

## 2024-09-19 LAB
OHS QRS DURATION: 88 MS
OHS QTC CALCULATION: 463 MS

## 2024-09-20 ENCOUNTER — TELEPHONE (OUTPATIENT)
Dept: RHEUMATOLOGY | Facility: CLINIC | Age: 59
End: 2024-09-20
Payer: MEDICARE

## 2024-09-20 DIAGNOSIS — M02.39 REACTIVE ARTHRITIS OF MULTIPLE SITES: ICD-10-CM

## 2024-09-20 DIAGNOSIS — L40.9 PSORIASIS: ICD-10-CM

## 2024-09-20 DIAGNOSIS — G89.29 CHRONIC HIP PAIN, BILATERAL: ICD-10-CM

## 2024-09-20 DIAGNOSIS — M54.2 NECK PAIN: ICD-10-CM

## 2024-09-20 DIAGNOSIS — M79.18 MYOFASCIAL PAIN SYNDROME, CERVICAL: ICD-10-CM

## 2024-09-20 DIAGNOSIS — M25.551 CHRONIC HIP PAIN, BILATERAL: ICD-10-CM

## 2024-09-20 DIAGNOSIS — K63.9 ARTHRITIS ASSOCIATED WITH INFLAMMATORY BOWEL DISEASE: ICD-10-CM

## 2024-09-20 DIAGNOSIS — R76.8 ANA POSITIVE: ICD-10-CM

## 2024-09-20 DIAGNOSIS — K50.90 CROHN'S DISEASE WITHOUT COMPLICATION, UNSPECIFIED GASTROINTESTINAL TRACT LOCATION: ICD-10-CM

## 2024-09-20 DIAGNOSIS — M87.052 AVASCULAR NECROSIS OF BONES OF BOTH HIPS: ICD-10-CM

## 2024-09-20 DIAGNOSIS — M87.051 AVASCULAR NECROSIS OF BONES OF BOTH HIPS: ICD-10-CM

## 2024-09-20 DIAGNOSIS — D84.9 IMMUNOCOMPROMISED: ICD-10-CM

## 2024-09-20 DIAGNOSIS — M25.552 CHRONIC HIP PAIN, BILATERAL: ICD-10-CM

## 2024-09-20 DIAGNOSIS — M07.60 ARTHRITIS ASSOCIATED WITH INFLAMMATORY BOWEL DISEASE: ICD-10-CM

## 2024-09-20 DIAGNOSIS — G89.4 CHRONIC PAIN SYNDROME: Primary | ICD-10-CM

## 2024-09-20 NOTE — TELEPHONE ENCOUNTER
----- Message from Clara Lund sent at 9/19/2024  4:55 PM CDT -----  Type : Patient Call        Who Called : Patient         Does the patient know what this is regarding?: Patient called in requesting orders for her upcoming apt in October; please advise        Would the patient rather a call back or a response via [facility]? Call       Best Call Back Number: [phone]          Additional Information:

## 2024-09-25 ENCOUNTER — TELEPHONE (OUTPATIENT)
Dept: CARDIOLOGY | Facility: CLINIC | Age: 59
End: 2024-09-25
Payer: MEDICARE

## 2024-09-25 NOTE — TELEPHONE ENCOUNTER
PT called and stated she had a death in the family and will call back to reschedule apts after  next week      ----- Message from Maged Wallis sent at 2024  2:52 PM CDT -----  Contact: self   .Type: Patient Call Back        Who called:   Patient      What is the request in detail:    Called in to get appt rescheduled . Please call back   Can the clinic reply by [facility]?           Would the patient rather a call back or a response via [facility]?        Best call back number:  [phone]  Or

## 2024-09-30 ENCOUNTER — TELEPHONE (OUTPATIENT)
Dept: CARDIOLOGY | Facility: CLINIC | Age: 59
End: 2024-09-30
Payer: MEDICARE

## 2024-09-30 ENCOUNTER — PATIENT MESSAGE (OUTPATIENT)
Dept: CARDIOLOGY | Facility: HOSPITAL | Age: 59
End: 2024-09-30
Payer: MEDICARE

## 2024-09-30 DIAGNOSIS — K50.90 CROHN'S DISEASE WITHOUT COMPLICATION, UNSPECIFIED GASTROINTESTINAL TRACT LOCATION: ICD-10-CM

## 2024-09-30 DIAGNOSIS — D84.9 IMMUNOCOMPROMISED: ICD-10-CM

## 2024-09-30 DIAGNOSIS — G89.4 CHRONIC PAIN SYNDROME: ICD-10-CM

## 2024-09-30 DIAGNOSIS — M02.39 REACTIVE ARTHRITIS OF MULTIPLE SITES: ICD-10-CM

## 2024-09-30 DIAGNOSIS — E11.69 TYPE 2 DIABETES MELLITUS WITH OTHER SPECIFIED COMPLICATION, WITHOUT LONG-TERM CURRENT USE OF INSULIN: ICD-10-CM

## 2024-09-30 DIAGNOSIS — R94.6 ABNORMAL RESULTS OF THYROID FUNCTION STUDIES: ICD-10-CM

## 2024-09-30 DIAGNOSIS — K21.9 GASTROESOPHAGEAL REFLUX DISEASE, UNSPECIFIED WHETHER ESOPHAGITIS PRESENT: ICD-10-CM

## 2024-09-30 RX ORDER — OXYCODONE AND ACETAMINOPHEN 10; 325 MG/1; MG/1
1 TABLET ORAL EVERY 8 HOURS PRN
Qty: 90 TABLET | Refills: 0 | OUTPATIENT
Start: 2024-09-30 | End: 2024-10-30

## 2024-09-30 RX ORDER — OXYCODONE AND ACETAMINOPHEN 10; 325 MG/1; MG/1
1 TABLET ORAL EVERY 8 HOURS PRN
Qty: 90 TABLET | Refills: 0 | Status: SHIPPED | OUTPATIENT
Start: 2024-09-30

## 2024-09-30 NOTE — TELEPHONE ENCOUNTER
----- Message from Becky sent at 9/30/2024  1:58 PM CDT -----  Contact: Amanda  .Patient is calling to speak with the nurse regarding appts  . Reports missed some appts last week and needing to reschedule  . Please give patient a call back at   [phone] ([phone] (work)

## 2024-09-30 NOTE — TELEPHONE ENCOUNTER
----- Message from Fifi sent at 9/30/2024  8:55 AM CDT -----  Regarding: refill  Type:  RX Refill Request    Who Called: pt    Refill or New Rx:refill    RX Name and Strength: Disp Refills Start End   oxyCODONE-acetaminophen (PERCOCET)  mg per tablet 90 tablet 0 8/31/2024 9/30/2024   Sig - Route: Take 1 tablet by mouth every 8 (eight) hours as needed for Pain. - Oral   Sent to pharmacy as: oxyCODONE-acetaminophen (PERCOCET)  mg per tablet   Earliest Fill Date: 8/31/2024   Notes to Pharmacy: Quantity prescribed more than 7 day supply? Yes, quantity medically necessary   E-Prescribing Status: Receipt confirmed by pharmacy (6/21/2024  1:01 AM CDT)   No prior authorization was found for this prescription.   Found prior authorization for another prescription for the same medication: Approved     tirzepatide 7.5 mg/0.5 mL PnIj4 Pen56/20/2024-Sig - Route: Inject 7.5 mg into the skin every 7 days. - SubcutaneousSent to pharmacy as: tirzepatide 7.5 mg/0.5 mL PnIjE-Prescribing Status: Receipt confirmed by pharmacy (6/20/2024 10:31 AM CDT)       How is the patient currently taking it? (ex. 1XDay):see above    Is this a 30 day or 90 day RX:see above    Preferred Pharmacy with phone number:  [facility] - [facility] - [address]  [address] [address]  Phone: [phone] Fax: [phone]        Local or Mail Order:local    Ordering Provider:melody Guerra Call Back Number:[phone]      Additional Information:  Please call to discuss.

## 2024-09-30 NOTE — TELEPHONE ENCOUNTER
Followed up with Amanda, rescheduled appointment for patient. Patient verbalized understanding of date, time, and location of appointment.

## 2024-10-02 ENCOUNTER — TELEPHONE (OUTPATIENT)
Dept: ORTHOPEDICS | Facility: CLINIC | Age: 59
End: 2024-10-02
Payer: MEDICARE

## 2024-10-02 NOTE — TELEPHONE ENCOUNTER
----- Message from Brenna sent at 10/2/2024 10:38 AM CDT -----  Regarding: Needs return call  Type:  Same Day Appointment Request    Caller is requesting a same day appointment.  Caller declined first available appointment listed below.      Name of Caller:  Pt  When is the first available appointment?  tomorrow  Symptoms:  pain and needs shot today and states she doesn't have a ride for tomorrow but she does today  Best Call Back Number:  [phone]    Additional Information:   pt states she keeps falling and her knees keep giving out

## 2024-10-03 ENCOUNTER — OFFICE VISIT (OUTPATIENT)
Dept: ORTHOPEDICS | Facility: CLINIC | Age: 59
End: 2024-10-03
Payer: MEDICARE

## 2024-10-03 ENCOUNTER — HOSPITAL ENCOUNTER (OUTPATIENT)
Dept: RADIOLOGY | Facility: HOSPITAL | Age: 59
Discharge: HOME | End: 2024-10-03
Attending: NURSE PRACTITIONER
Payer: MEDICARE

## 2024-10-03 VITALS — HEIGHT: 62 IN | WEIGHT: 272.5 LBS | BODY MASS INDEX: 50.14 KG/M2

## 2024-10-03 DIAGNOSIS — M17.0 BILATERAL PRIMARY OSTEOARTHRITIS OF KNEE: ICD-10-CM

## 2024-10-03 DIAGNOSIS — L03.119 CELLULITIS OF LOWER EXTREMITY, UNSPECIFIED LATERALITY: ICD-10-CM

## 2024-10-03 DIAGNOSIS — M70.61 TROCHANTERIC BURSITIS OF BOTH HIPS: ICD-10-CM

## 2024-10-03 DIAGNOSIS — R60.0 BILATERAL LOWER EXTREMITY EDEMA: Primary | ICD-10-CM

## 2024-10-03 DIAGNOSIS — M17.0 BILATERAL PRIMARY OSTEOARTHRITIS OF KNEE: Primary | ICD-10-CM

## 2024-10-03 DIAGNOSIS — M79.672 BILATERAL FOOT PAIN: ICD-10-CM

## 2024-10-03 DIAGNOSIS — M70.62 TROCHANTERIC BURSITIS OF BOTH HIPS: ICD-10-CM

## 2024-10-03 DIAGNOSIS — M79.671 BILATERAL FOOT PAIN: ICD-10-CM

## 2024-10-03 PROCEDURE — 20610 DRAIN/INJ JOINT/BURSA W/O US: CPT | Mod: 50,S$GLB,, | Performed by: NURSE PRACTITIONER

## 2024-10-03 PROCEDURE — 3066F NEPHROPATHY DOC TX: CPT | Mod: CPTII,S$GLB,, | Performed by: NURSE PRACTITIONER

## 2024-10-03 PROCEDURE — 1160F RVW MEDS BY RX/DR IN RCRD: CPT | Mod: CPTII,S$GLB,, | Performed by: NURSE PRACTITIONER

## 2024-10-03 PROCEDURE — 73562 X-RAY EXAM OF KNEE 3: CPT | Mod: TC,50,PO

## 2024-10-03 PROCEDURE — 4010F ACE/ARB THERAPY RXD/TAKEN: CPT | Mod: CPTII,S$GLB,, | Performed by: NURSE PRACTITIONER

## 2024-10-03 PROCEDURE — 99214 OFFICE O/P EST MOD 30 MIN: CPT | Mod: 25,S$GLB,, | Performed by: NURSE PRACTITIONER

## 2024-10-03 PROCEDURE — 99999 PR PBB SHADOW E&M-EST. PATIENT-LVL IV: CPT | Mod: PBBFAC,,, | Performed by: NURSE PRACTITIONER

## 2024-10-03 PROCEDURE — 1159F MED LIST DOCD IN RCRD: CPT | Mod: CPTII,S$GLB,, | Performed by: NURSE PRACTITIONER

## 2024-10-03 PROCEDURE — 73562 X-RAY EXAM OF KNEE 3: CPT | Mod: 26,50,, | Performed by: RADIOLOGY

## 2024-10-03 PROCEDURE — 3044F HG A1C LEVEL LT 7.0%: CPT | Mod: CPTII,S$GLB,, | Performed by: NURSE PRACTITIONER

## 2024-10-03 PROCEDURE — 3008F BODY MASS INDEX DOCD: CPT | Mod: CPTII,S$GLB,, | Performed by: NURSE PRACTITIONER

## 2024-10-03 RX ORDER — TRIAMCINOLONE ACETONIDE 40 MG/ML
40 INJECTION, SUSPENSION INTRA-ARTICULAR; INTRAMUSCULAR
Status: DISCONTINUED | OUTPATIENT
Start: 2024-10-03 | End: 2024-10-13 | Stop reason: HOSPADM

## 2024-10-03 RX ORDER — DOXYCYCLINE 100 MG/1
100 CAPSULE ORAL 2 TIMES DAILY
Qty: 28 CAPSULE | Refills: 0 | Status: SHIPPED | OUTPATIENT
Start: 2024-10-03 | End: 2024-10-17

## 2024-10-03 RX ORDER — FUROSEMIDE 40 MG/1
40 TABLET ORAL EVERY MORNING
Qty: 7 TABLET | Refills: 0 | Status: SHIPPED | OUTPATIENT
Start: 2024-10-03 | End: 2024-10-14

## 2024-10-03 RX ADMIN — TRIAMCINOLONE ACETONIDE 40 MG: 40 INJECTION, SUSPENSION INTRA-ARTICULAR; INTRAMUSCULAR at 08:10

## 2024-10-03 NOTE — PROGRESS NOTES
Chief Complaint   Patient presents with    Left Knee - Pain     DOI pt fell 10/2/24    Right Knee - Pain     DOI pt fell 10/2/24    Right Hip - Pain    Left Hip - Pain         HPI:   This is a 59 y.o. who presents to clinic today complaining of bilateral knee pain and bilateral hip pain for 3 weeks after no known trauma. Pain is progressively worsening. No numbness or tingling. No associated signs or symptoms. States bilateral foot pain x 2 months.     Past Medical History:   Diagnosis Date    Anxiety     Arthritis     Asthma     Crohn's disease     Depression     Encounter for blood transfusion     Headache     Hypertension     Mixed hyperlipidemia 6/2/2022    Myofascial pain syndrome, cervical 9/28/2022    Type 2 diabetes mellitus with other specified complication 8/12/2024     Past Surgical History:   Procedure Laterality Date    COLONOSCOPY N/A 03/10/2022    Procedure: COLONOSCOPY;  Surgeon: Nilson Wiseman MD;  Location: [facility];  Service: Endoscopy;  Laterality: N/A; Repeat colonoscopy in 6 months because the bowel prep was poor    COLONOSCOPY N/A 3/2/2023    Procedure: COLONOSCOPY;  Surgeon: Erik Garcia MD;  Location: [facility];  Service: Endoscopy;  Laterality: N/A;    ESOPHAGOGASTRODUODENOSCOPY N/A 03/10/2022    Procedure: EGD (ESOPHAGOGASTRODUODENOSCOPY);  Surgeon: Nilson Wiseman MD;  Location: [facility];  Service: Endoscopy;  Laterality: N/A; Repeat upper endoscopy in 8 weeks for surveillance    ESOPHAGOGASTRODUODENOSCOPY N/A 3/16/2023    Procedure: EGD (ESOPHAGOGASTRODUODENOSCOPY);  Surgeon: Erik Garcia MD;  Location: [facility];  Service: Endoscopy;  Laterality: N/A;     Current Outpatient Medications on File Prior to Visit   Medication Sig Dispense Refill    albuterol (PROVENTIL) 2.5 mg /3 mL (0.083 %) nebulizer solution Take 3 mLs (2.5 mg total) by nebulization every 6 (six) hours as needed for Wheezing. Rescue 75 mL 1    ALPRAZolam (XANAX) 0.5 MG tablet Take 0.5 mg by mouth 2  (two) times daily as needed.      atorvastatin (LIPITOR) 20 MG tablet Take 20 mg by mouth every evening.      budesonide 4 mg CpDR Take 4 mg by mouth 2 (two) times daily as needed (crohn's  flare). 30 capsule 3    budesonide-formoterol 160-4.5 mcg (SYMBICORT) 160-4.5 mcg/actuation HFAA Inhale 2 puffs into the lungs 2 (two) times daily.      butalbital-acetaminophen-caffeine -40 mg (FIORICET, ESGIC) -40 mg per tablet Take 1 tablet by mouth every 4 (four) hours as needed.      cefdinir (OMNICEF) 300 MG capsule Take 300 mg by mouth every 12 (twelve) hours.      diclofenac sodium (VOLTAREN) 1 % Gel APPLY TOPICALLY FOUR TIMES DAILY 300 g 3    diltiaZEM (CARDIZEM CD) 240 MG 24 hr capsule Take 240 mg by mouth.      epinephrine (EPIPEN) 0.3 mg/0.3 mL AtIn Inject 0.3 mg into the muscle.      famotidine (PEPCID) 40 MG tablet Take 40 mg by mouth daily as needed.      fluticasone propionate (FLONASE) 50 mcg/actuation nasal spray SPRAY ONE SPRAY IN EACH NOSTRIL TWICE DAILY      fluticasone-salmeterol 500-50 mcg/dose (ADVAIR) 500-50 mcg/dose DsDv diskus inhaler Inhale 1 puff into the lungs.      furosemide (LASIX) 20 MG tablet Take 1 tablet (20 mg total) by mouth daily      gabapentin (NEURONTIN) 800 MG tablet Take 1 tablet (800 mg total) by mouth 3 (three) times daily. 270 tablet 1    hydroxychloroquine (PLAQUENIL) 200 mg tablet Take 1 tablet (200 mg total) by mouth 2 (two) times daily. 180 tablet 3    hydrOXYzine pamoate (VISTARIL) 25 MG Cap TAKE ONE CAPSULE BY MOUTH THREE TIMES DAILY AS NEEDED FOR ITCHING 45 capsule 3    ipratropium-albuteroL (COMBIVENT)  mcg/actuation inhaler Inhale 1 puff into the lungs 4 (four) times daily. Rescue 4 g 12    irbesartan (AVAPRO) 150 MG tablet TAKE 1 TABLET BY MOUTH EVERY NIGHT AT BEDTIME FOR BLOOD PRESSURE      lidocaine HCl 2% (LIDOCAINE VISCOUS) 2 % Soln by Mucous Membrane route every 8 (eight) hours as needed. 100 mL 0    memantine (NAMENDA) 5 MG Tab Take 1 tablet (5 mg  total) by mouth 2 (two) times daily. 60 tablet 11    metOLazone (ZAROXOLYN) 5 MG tablet One tablet po daily for 5 days. 10 tablet 1    montelukast (SINGULAIR) 10 mg tablet Take 10 mg by mouth every evening.      neomycin-polymyxin-hydrocortisone (CORTISPORIN) otic solution PLACE 2 DROPS IN AFFECTED EAR FOUR TIMES DAILY FOR 5-7 DAYS      oxybutynin (DITROPAN-XL) 5 MG TR24 Take 1 tablet (5 mg total) by mouth once daily. 30 tablet 11    oxyCODONE-acetaminophen (PERCOCET)  mg per tablet Take 1 tablet by mouth every 8 (eight) hours as needed for Pain. 90 tablet 0    pantoprazole (PROTONIX) 40 MG tablet Take 1 tablet (40 mg total) by mouth once daily. 90 tablet 3    potassium chloride SA (K-DUR,KLOR-CON M) 10 MEQ tablet Take 1 tablet (10 mEq total) by mouth 2 (two) times daily as needed (Take with lasix). 60 tablet 3    RINVOQ 45 mg Tb24 Take 1 tablet by mouth.      rizatriptan (MAXALT) 10 MG tablet 1 tab PO PRN migraine. May repeat every 2 hours for max 3 tabs in 24 hours. Use no more than 10 days per month. 10 tablet 11    sertraline (ZOLOFT) 100 MG tablet Take 150 mg by mouth.      sumatriptan (IMITREX) 50 MG tablet TAKE ONE TABLET BY MOUTH AT ONSET OF MIGRAINE, MAY REPEAT DOSE once AFTER TWO HOURS IF NO RELIEF      tirzepatide 7.5 mg/0.5 mL PnIj Inject 7.5 mg into the skin every 7 days. 4 Pen 5    tiZANidine (ZANAFLEX) 4 MG tablet Take 1 tablet (4 mg total) by mouth every 8 (eight) hours. 90 tablet 3    traZODone (DESYREL) 100 MG tablet Take 1 tablet (100 mg total) by mouth every evening. 90 tablet 1    triamcinolone acetonide 0.1% (KENALOG) 0.1 % ointment Apply topically 2 (two) times daily. 80 g 3     Current Facility-Administered Medications on File Prior to Visit   Medication Dose Route Frequency Provider Last Rate Last Admin    onabotulinumtoxina injection 200 Units  200 Units Intramuscular q12 weeks Heavenly Fletcher NP   200 Units at 09/30/22 0905    triamcinolone acetonide injection 40 mg  40 mg  Intra-articular  Nguyen, Caro E., FNP   40 mg at 01/04/24 1025    triamcinolone acetonide injection 40 mg  40 mg Intra-articular  Nguyen, Caro E., FNP   40 mg at 01/04/24 1025    triamcinolone acetonide injection 40 mg  40 mg Intra-articular  Nguyen, Caro E., FNP   40 mg at 01/04/24 1025    triamcinolone acetonide injection 40 mg  40 mg Intra-articular  Nguyen, Caro E., FNP   40 mg at 01/04/24 1025     Review of patient's allergies indicates:   Allergen Reactions    Amlodipine-benazepril Swelling    Ace inhibitors Swelling     Angioedema; was taking Benazepril.    Tramadol Itching     Family History   Problem Relation Name Age of Onset    Cancer Mother      Diabetes Mother      Arthritis Mother      Hypertension Mother      Cancer Father      Diabetes Father      Arthritis Father      Hypertension Father      Celiac disease Neg Hx      Colon cancer Neg Hx      Crohn's disease Neg Hx      Esophageal cancer Neg Hx      Stomach cancer Neg Hx      Ulcerative colitis Neg Hx       Social History     Socioeconomic History    Marital status:    Tobacco Use    Smoking status: Never    Smokeless tobacco: Never   Substance and Sexual Activity    Alcohol use: Yes     Alcohol/week: 0.0 - 2.0 standard drinks of alcohol    Drug use: Yes     Types: Hydrocodone    Sexual activity: Never     Social Drivers of Health     Financial Resource Strain: High Risk (4/2/2024)    Received from [facility]    Overall Financial Resource Strain (CARDIA)     Difficulty of Paying Living Expenses: Very hard   Food Insecurity: No Food Insecurity (4/2/2024)    Received from [facility]    Hunger Vital Sign     Worried About Running Out of Food in the Last Year: Never true     Ran Out of Food in the Last Year: Never true   Transportation Needs: Unmet Transportation Needs (4/2/2024)    Received from [facility]    PRAPARE - Transportation     Lack of Transportation (Medical): Yes     Lack of Transportation  (Non-Medical): Yes   Physical Activity: Sufficiently Active (4/2/2024)    Received from [facility]    Exercise Vital Sign     Days of Exercise per Week: 3 days     Minutes of Exercise per Session: 60 min   Stress: Stress Concern Present (4/2/2024)    Received from [facility]    [employer] - Occupational Stress Questionnaire     Feeling of Stress : Rather much   Housing Stability: Low Risk  (4/2/2024)    Received from [facility]    Housing Stability Vital Sign     Unable to Pay for Housing in the Last Year: No     Number of Times Moved in the Last Year: 1     Homeless in the Last Year: No       Review of Systems:  Constitutional:  Denies fever or chills   Eyes:  Denies change in visual acuity   HENT:  Denies nasal congestion or sore throat   Respiratory:  Denies cough or shortness of breath   Cardiovascular:  Denies chest pain or edema   GI:  Denies abdominal pain, nausea, vomiting, bloody stools or diarrhea   :  Denies dysuria   Integument:  Denies rash   Neurologic:  Denies headache, focal weakness or sensory changes   Endocrine:  Denies polyuria or polydipsia   Lymphatic:  Denies swollen glands   Psychiatric:  Denies depression or anxiety     Physical Exam:   Constitutional:  Well developed, well nourished, no acute distress, non-toxic appearance   Integument:  Well hydrated  Neurologic:  Alert & oriented x 3  Psychiatric:  Speech and behavior appropriate     Bilateral Knee Exam    Bilateral Knee Exam     Tenderness   The patient is experiencing tenderness in the medial joint line.    Range of Motion   Extension: abnormal   Flexion: abnormal     Muscle Strength     The patient has normal knee strength.    Tests   Ellie:  Medial - positive   Lachman:  Anterior - negative      Varus: negative  Valgus: negative  Patellar Apprehension: negative    Other   Erythema: absent  Sensation: normal  Pulse: present  Swelling: moderate         X-rays  were performed, personally reviewed by me and findings discussed with the patient.  3 views of the bilateral knee show tricompartmental degenerative change most pronounced in the medial compartment with Kellgren 3 changes      Bilateral Hip Exam Performed    bilateral Hip Exam     Tenderness   The patient is experiencing tenderness in the greater trochanter.    Range of Motion   The patient has normal hip ROM.    Muscle Strength   Abduction: 4/5     Other   Erythema: absent  Sensation: normal  Pulse: present    bilateral Hip Exam   Hip exam performed same as contralateral hip and is normal.           Assessment & plan    Bilateral lower extremity edema  -     furosemide (LASIX) 40 MG tablet; Take 1 tablet (40 mg total) by mouth every morning. for 7 days  Dispense: 7 tablet; Refill: 0  -     doxycycline (VIBRAMYCIN) 100 MG Cap; Take 1 capsule (100 mg total) by mouth 2 (two) times daily. for 14 days  Dispense: 28 capsule; Refill: 0    Cellulitis of lower extremity, unspecified laterality  -     doxycycline (VIBRAMYCIN) 100 MG Cap; Take 1 capsule (100 mg total) by mouth 2 (two) times daily. for 14 days  Dispense: 28 capsule; Refill: 0    Bilateral primary osteoarthritis of knee  -     Large Joint Aspiration/Injection: bilateral knee  -     triamcinolone acetonide injection 40 mg  -     triamcinolone acetonide injection 40 mg    Trochanteric bursitis of both hips  -     Large Joint Aspiration/Injection: bilateral greater trochanteric bursa  -     triamcinolone acetonide injection 40 mg  -     triamcinolone acetonide injection 40 mg    Bilateral foot pain  -     Cancel: Ambulatory referral/consult to Orthopedics; Future; Expected date: 10/10/2024              Using an aseptic technique, I injected 5 cc of lidocaine 1% without and 1 cc of kenalog 40mg into the bilateral hips and bilateral knees. The patient tolerated this well. I will have them return to clinic in 3 months or as needed.   Follow up with cardiologist for  continued BLE pitting edema with +TTP. Just finished metolazone which she states helped tremendously with her lower extremity swelling. Weigh yourself daily (same time every day with approximately same weight of clothes on) and document; then bring weights documented to next cardiology appt.

## 2024-10-03 NOTE — PROCEDURES
Large Joint Aspiration/Injection: bilateral knee    Date/Time: 10/3/2024 8:20 AM    Performed by: Caro Nguyen FNP  Authorized by: Caro Nguyen, TERRELLP    Consent Done?:  Yes (Verbal)  Indications:  Pain  Timeout: prior to procedure the correct patient, procedure, and site was verified    Prep: patient was prepped and draped in usual sterile fashion    Local anesthetic:  Lidocaine 1% without epinephrine  Anesthetic total (ml):  5      Details:  Needle Size:  21 G  Approach:  Anterolateral  Location:  Knee  Laterality:  Bilateral  Site:  Bilateral knee  Medications (Right):  40 mg triamcinolone acetonide 40 mg/mL  Medications (Left):  40 mg triamcinolone acetonide 40 mg/mL  Patient tolerance:  Patient tolerated the procedure well with no immediate complications  Large Joint Aspiration/Injection: bilateral greater trochanteric bursa    Date/Time: 10/3/2024 8:20 AM    Performed by: Caro Nguyen FNP  Authorized by: Caro Nguyen, TERRELLP    Consent Done?:  Yes (Verbal)  Indications:  Pain  Timeout: prior to procedure the correct patient, procedure, and site was verified    Prep: patient was prepped and draped in usual sterile fashion      Local anesthesia used?: Yes    Local anesthetic:  Lidocaine 1% without epinephrine  Anesthetic total (ml):  5      Details:  Needle Size:  21 G  Approach:  Lateral  Location:  Hip  Laterality:  Bilateral  Site:  Bilateral greater trochanteric bursa  Medications (Right):  40 mg triamcinolone acetonide 40 mg/mL  Medications (Left):  40 mg triamcinolone acetonide 40 mg/mL  Patient tolerance:  Patient tolerated the procedure well with no immediate complications

## 2024-10-07 ENCOUNTER — HOSPITAL ENCOUNTER (OUTPATIENT)
Dept: CARDIOLOGY | Facility: HOSPITAL | Age: 59
Discharge: HOME | End: 2024-10-07
Attending: INTERNAL MEDICINE
Payer: MEDICARE

## 2024-10-07 ENCOUNTER — HOSPITAL ENCOUNTER (OUTPATIENT)
Dept: RADIOLOGY | Facility: HOSPITAL | Age: 59
Discharge: HOME | End: 2024-10-07
Attending: INTERNAL MEDICINE
Payer: MEDICARE

## 2024-10-07 VITALS
HEART RATE: 68 BPM | DIASTOLIC BLOOD PRESSURE: 60 MMHG | BODY MASS INDEX: 50.05 KG/M2 | HEIGHT: 62 IN | WEIGHT: 272 LBS | SYSTOLIC BLOOD PRESSURE: 120 MMHG

## 2024-10-07 DIAGNOSIS — I10 HYPERTENSION, UNSPECIFIED TYPE: Chronic | ICD-10-CM

## 2024-10-07 DIAGNOSIS — R60.9 EDEMA, UNSPECIFIED TYPE: ICD-10-CM

## 2024-10-07 PROCEDURE — 93306 TTE W/DOPPLER COMPLETE: CPT | Mod: 26,,, | Performed by: INTERNAL MEDICINE

## 2024-10-07 PROCEDURE — 71046 X-RAY EXAM CHEST 2 VIEWS: CPT | Mod: TC,PO

## 2024-10-07 PROCEDURE — 71046 X-RAY EXAM CHEST 2 VIEWS: CPT | Mod: 26,,, | Performed by: RADIOLOGY

## 2024-10-07 PROCEDURE — 93306 TTE W/DOPPLER COMPLETE: CPT | Mod: PO

## 2024-10-08 LAB
AORTIC ROOT ANNULUS: 2.33 CM
AORTIC VALVE CUSP SEPERATION: 1.78 CM
ASCENDING AORTA: 3.2 CM
AV INDEX (PROSTH): 0.77
AV MEAN GRADIENT: 6.7 MMHG
AV PEAK GRADIENT: 11.6 MMHG
AV VALVE AREA BY VELOCITY RATIO: 2.6 CM²
AV VALVE AREA: 2.7 CM²
AV VELOCITY RATIO: 0.76
BSA FOR ECHO PROCEDURE: 2.32 M2
CV ECHO LV RWT: 0.42 CM
DOP CALC AO PEAK VEL: 1.7 M/S
DOP CALC AO VTI: 36.8 CM
DOP CALC LVOT AREA: 3.5 CM2
DOP CALC LVOT DIAMETER: 2.1 CM
DOP CALC LVOT PEAK VEL: 1.3 M/S
DOP CALC LVOT STROKE VOLUME: 98 CM3
DOP CALC MV VTI: 34 CM
DOP CALC RVOT PEAK VEL: 0.93 M/S
DOP CALC RVOT VTI: 20.9 CM
DOP CALCLVOT PEAK VEL VTI: 28.3 CM
E WAVE DECELERATION TIME: 219.29 MSEC
E/A RATIO: 0.83
E/E' RATIO: 7.3 M/S
ECHO LV POSTERIOR WALL: 1.1 CM (ref 0.6–1.1)
EJECTION FRACTION: 65 %
FRACTIONAL SHORTENING: 36.5 % (ref 28–44)
INTERVENTRICULAR SEPTUM: 1 CM (ref 0.6–1.1)
IVC DIAMETER: 1.71 CM
LA MAJOR: 5.55 CM
LA MINOR: 5.13 CM
LA WIDTH: 4.5 CM
LEFT ATRIUM AREA SYSTOLIC (APICAL 2 CHAMBER): 19.02 CM2
LEFT ATRIUM AREA SYSTOLIC (APICAL 4 CHAMBER): 19.18 CM2
LEFT ATRIUM SIZE: 3.75 CM
LEFT ATRIUM VOLUME INDEX MOD: 25.3 ML/M2
LEFT ATRIUM VOLUME INDEX: 35.1 ML/M2
LEFT ATRIUM VOLUME MOD: 55.18 ML
LEFT ATRIUM VOLUME: 76.48 CM3
LEFT INTERNAL DIMENSION IN SYSTOLE: 3.3 CM (ref 2.1–4)
LEFT VENTRICLE DIASTOLIC VOLUME INDEX: 59.85 ML/M2
LEFT VENTRICLE DIASTOLIC VOLUME: 130.47 ML
LEFT VENTRICLE END SYSTOLIC VOLUME APICAL 2 CHAMBER: 54.66 ML
LEFT VENTRICLE END SYSTOLIC VOLUME APICAL 4 CHAMBER: 56.23 ML
LEFT VENTRICLE MASS INDEX: 95.1 G/M2
LEFT VENTRICLE SYSTOLIC VOLUME INDEX: 20.1 ML/M2
LEFT VENTRICLE SYSTOLIC VOLUME: 43.71 ML
LEFT VENTRICULAR INTERNAL DIMENSION IN DIASTOLE: 5.2 CM (ref 3.5–6)
LEFT VENTRICULAR MASS: 207.3 G
LV LATERAL E/E' RATIO: 6.08 M/S
LV SEPTAL E/E' RATIO: 9.13 M/S
LVED V (TEICH): 130.47 ML
LVES V (TEICH): 43.71 ML
LVOT MG: 3.9 MMHG
LVOT MV: 0.94 CM/S
MV A" WAVE DURATION": 105.61 MSEC
MV MEAN GRADIENT: 2 MMHG
MV PEAK A VEL: 0.88 M/S
MV PEAK E VEL: 0.73 M/S
MV PEAK GRADIENT: 4 MMHG
MV STENOSIS PRESSURE HALF TIME: 63.59 MS
MV VALVE AREA BY CONTINUITY EQUATION: 2.88 CM2
MV VALVE AREA P 1/2 METHOD: 3.46 CM2
PISA MRMAX VEL: 4.6 M/S
PISA TR MAX VEL: 2.66 M/S
PULM VEIN S/D RATIO: 1.29
PV MEAN GRADIENT: 3 MMHG
PV MV: 0.99 M/S
PV PEAK D VEL: 0.58 M/S
PV PEAK GRADIENT: 6 MMHG
PV PEAK S VEL: 0.75 M/S
PV PEAK VELOCITY: 1.25 M/S
RA MAJOR: 5.39 CM
RA PRESSURE ESTIMATED: 3 MMHG
RA WIDTH: 3.76 CM
RIGHT VENTRICULAR END-DIASTOLIC DIMENSION: 2.2 CM
RV TB RVSP: 6 MMHG
STJ: 3.06 CM
TDI LATERAL: 0.12 M/S
TDI SEPTAL: 0.08 M/S
TDI: 0.1 M/S
TR MAX PG: 28 MMHG
TRICUSPID ANNULAR PLANE SYSTOLIC EXCURSION: 2.79 CM
TV REST PULMONARY ARTERY PRESSURE: 31 MMHG
Z-SCORE OF LEFT VENTRICULAR DIMENSION IN END DIASTOLE: -3.37
Z-SCORE OF LEFT VENTRICULAR DIMENSION IN END SYSTOLE: -2.33

## 2024-10-08 RX ORDER — PROMETHAZINE HYDROCHLORIDE 6.25 MG/5ML
SYRUP ORAL
Qty: 240 ML | Refills: 6 | Status: SHIPPED | OUTPATIENT
Start: 2024-10-08

## 2024-10-14 ENCOUNTER — OFFICE VISIT (OUTPATIENT)
Dept: CARDIOLOGY | Facility: CLINIC | Age: 59
End: 2024-10-14
Payer: MEDICARE

## 2024-10-14 VITALS
OXYGEN SATURATION: 97 % | HEART RATE: 84 BPM | WEIGHT: 256.19 LBS | DIASTOLIC BLOOD PRESSURE: 70 MMHG | BODY MASS INDEX: 47.15 KG/M2 | SYSTOLIC BLOOD PRESSURE: 136 MMHG | HEIGHT: 62 IN

## 2024-10-14 DIAGNOSIS — E78.2 MIXED HYPERLIPIDEMIA: ICD-10-CM

## 2024-10-14 DIAGNOSIS — G89.4 CHRONIC PAIN SYNDROME: ICD-10-CM

## 2024-10-14 DIAGNOSIS — I10 PRIMARY HYPERTENSION: Primary | Chronic | ICD-10-CM

## 2024-10-14 DIAGNOSIS — M02.30 REACTIVE ARTHRITIS: ICD-10-CM

## 2024-10-14 DIAGNOSIS — R60.9 EDEMA, UNSPECIFIED TYPE: ICD-10-CM

## 2024-10-14 PROCEDURE — 99999 PR PBB SHADOW E&M-EST. PATIENT-LVL V: CPT | Mod: PBBFAC,,, | Performed by: INTERNAL MEDICINE

## 2024-10-14 PROCEDURE — 3008F BODY MASS INDEX DOCD: CPT | Mod: CPTII,S$GLB,, | Performed by: INTERNAL MEDICINE

## 2024-10-14 PROCEDURE — 3066F NEPHROPATHY DOC TX: CPT | Mod: CPTII,S$GLB,, | Performed by: INTERNAL MEDICINE

## 2024-10-14 PROCEDURE — 4010F ACE/ARB THERAPY RXD/TAKEN: CPT | Mod: CPTII,S$GLB,, | Performed by: INTERNAL MEDICINE

## 2024-10-14 PROCEDURE — 3075F SYST BP GE 130 - 139MM HG: CPT | Mod: CPTII,S$GLB,, | Performed by: INTERNAL MEDICINE

## 2024-10-14 PROCEDURE — 1159F MED LIST DOCD IN RCRD: CPT | Mod: CPTII,S$GLB,, | Performed by: INTERNAL MEDICINE

## 2024-10-14 PROCEDURE — 3078F DIAST BP <80 MM HG: CPT | Mod: CPTII,S$GLB,, | Performed by: INTERNAL MEDICINE

## 2024-10-14 PROCEDURE — 99214 OFFICE O/P EST MOD 30 MIN: CPT | Mod: S$GLB,,, | Performed by: INTERNAL MEDICINE

## 2024-10-14 PROCEDURE — 3044F HG A1C LEVEL LT 7.0%: CPT | Mod: CPTII,S$GLB,, | Performed by: INTERNAL MEDICINE

## 2024-10-14 RX ORDER — DICLOFENAC SODIUM 10 MG/G
GEL TOPICAL 4 TIMES DAILY
Qty: 300 G | Refills: 3 | Status: SHIPPED | OUTPATIENT
Start: 2024-10-14 | End: 2025-04-12

## 2024-10-14 NOTE — PROGRESS NOTES
Subjective   Patient ID:  Amanda Mcguire is a 59 y.o. female who presents for follow-up of Follow-up      HPII58 yo BF with HTN, HLD and obesity who is retaining fluid with tense painful edema of lower extremities for about 2 months. Has had venous dopplers without evidence of clots. Also with WARD and having CP when lying down. Last visit dc'd amlodipine, started metolazone for three to five days. Checked CXR, BNP and echo. Echo normal LV function. CXR and Pro-BNP normal. Patient responded well to metolazone and stopping amlodipine. Edema has resolved.     Summary  Show Result Comparison     Left Ventricle: The left ventricle is normal in size. Normal wall thickness. There is eccentric hypertrophy. There is normal systolic function. Ejection fraction is approximately 65%. There is normal diastolic function.    Right Ventricle: Normal right ventricular cavity size. Systolic function is normal.    Left Atrium: Left atrium is mildly dilated.    : The aortic valve is structurally normal. There is normal leaflet mobility. Aortic valve peak velocity is 1.7 m/s. Mean gradient is 6.7 mmHg.    The mitral valve is structurally normal. There is normal leaflet mobility. The mean pressure gradient across the mitral valve is 2 mmHg at a heart rate of bpm. There is trace regurgitation.    Tricuspid Valve: There is mild regurgitation.    Pulmonary Artery: The estimated pulmonary artery systolic pressure is 31 mmHg.    IVC/SVC: Normal venous pressure at 3 mmHg.    Review of Systems   Constitutional: Negative for decreased appetite, fever, malaise/fatigue, weight gain and weight loss.   HENT:  Negative for hearing loss and nosebleeds.    Eyes:  Negative for visual disturbance.   Cardiovascular:  Negative for chest pain, claudication, cyanosis, dyspnea on exertion, irregular heartbeat, leg swelling, near-syncope, orthopnea, palpitations, paroxysmal nocturnal dyspnea and syncope.   Respiratory:  Negative for cough, hemoptysis,  "shortness of breath, sleep disturbances due to breathing, snoring and wheezing.    Endocrine: Negative for cold intolerance, heat intolerance, polydipsia and polyuria.   Hematologic/Lymphatic: Negative for adenopathy and bleeding problem. Does not bruise/bleed easily.   Skin:  Negative for color change, itching, poor wound healing, rash and suspicious lesions.   Musculoskeletal:  Negative for arthritis, back pain, falls, joint pain, joint swelling, muscle cramps, muscle weakness and myalgias.   Gastrointestinal:  Negative for bloating, abdominal pain, change in bowel habit, constipation, flatus, heartburn, hematemesis, hematochezia, hemorrhoids, jaundice, melena, nausea and vomiting.   Genitourinary:  Negative for bladder incontinence, decreased libido, frequency, hematuria, hesitancy and urgency.   Neurological:  Negative for brief paralysis, difficulty with concentration, excessive daytime sleepiness, dizziness, focal weakness, headaches, light-headedness, loss of balance, numbness, vertigo and weakness.   Psychiatric/Behavioral:  Negative for altered mental status, depression and memory loss. The patient does not have insomnia and is not nervous/anxious.    Allergic/Immunologic: Negative for environmental allergies, hives and persistent infections.          Objective     Physical Exam  Vitals and nursing note reviewed.   Constitutional:       Appearance: She is well-developed. She is obese.      Comments: /70   Pulse 84   Ht 5' 2" (1.575 m)   Wt 116.2 kg (256 lb 3.2 oz)   SpO2 97%   BMI 46.86 kg/m²      HENT:      Head: Normocephalic and atraumatic.      Right Ear: External ear normal.      Left Ear: External ear normal.      Nose: Nose normal.   Eyes:      General: Lids are normal. No scleral icterus.        Right eye: No discharge.         Left eye: No discharge.      Conjunctiva/sclera: Conjunctivae normal.      Right eye: No hemorrhage.     Pupils: Pupils are equal, round, and reactive to light. "   Neck:      Thyroid: No thyromegaly.      Vascular: No JVD.      Trachea: No tracheal deviation.   Cardiovascular:      Rate and Rhythm: Normal rate and regular rhythm.      Pulses: Intact distal pulses.      Heart sounds: Normal heart sounds. No murmur heard.     No friction rub. No gallop.   Pulmonary:      Effort: Pulmonary effort is normal. No respiratory distress.      Breath sounds: Normal breath sounds. No wheezing or rales.   Chest:      Chest wall: No tenderness.   Breasts:     Breasts are symmetrical.   Abdominal:      General: Bowel sounds are normal. There is no distension.      Palpations: Abdomen is soft. There is no hepatomegaly or mass.      Tenderness: There is no abdominal tenderness. There is no guarding or rebound.   Musculoskeletal:         General: No tenderness. Normal range of motion.      Cervical back: Normal range of motion and neck supple.   Lymphadenopathy:      Cervical: No cervical adenopathy.   Skin:     General: Skin is warm and dry.      Coloration: Skin is not pale.      Findings: No erythema or rash.   Neurological:      Mental Status: She is alert and oriented to person, place, and time.      Cranial Nerves: No cranial nerve deficit.      Coordination: Coordination normal.      Deep Tendon Reflexes: Reflexes are normal and symmetric. Reflexes normal.   Psychiatric:         Behavior: Behavior normal.         Thought Content: Thought content normal.         Judgment: Judgment normal.            Assessment and Plan     1. Primary hypertension controlled   2. Mixed hyperlipidemia controlled   3. Edema, unspecified type  Resolved       Plan:  Low salt diet   Continue lasix 20 mg daily   Metolazone prn   No orders of the defined types were placed in this encounter.    Follow up in about 6 months (around 4/14/2025).        Advance Care Planning     Date: 10/14/2024

## 2024-10-14 NOTE — TELEPHONE ENCOUNTER
----- Message from aYz sent at 10/14/2024  8:55 AM CDT -----  Regarding: Refill request  Contact: pt  Type:  RX Refill Request    Who Called: pt    Refill or New Rx:refill    RX Name and Strength:diclofenac sodium (VOLTAREN) 1 % Gel    How is the patient currently taking it? (ex. 1XDay):as needed    Is this a 30 day or 90 day RX:30    Preferred Pharmacy with phone number:  [facility] - [place], [place] - [id number] [facility] [address]  Phone: [phone] Fax: [phone]    Local or Mail Order:local    Ordering Provider:melody    Would the patient rather a call back or a response via [facility]? Call back    Best Call Back Number:[phone]

## 2024-10-17 ENCOUNTER — LAB VISIT (OUTPATIENT)
Dept: LAB | Facility: HOSPITAL | Age: 59
End: 2024-10-17
Attending: INTERNAL MEDICINE
Payer: MEDICARE

## 2024-10-17 DIAGNOSIS — M79.18 MYOFASCIAL PAIN SYNDROME, CERVICAL: ICD-10-CM

## 2024-10-17 DIAGNOSIS — R76.8 ANA POSITIVE: ICD-10-CM

## 2024-10-17 DIAGNOSIS — M25.551 CHRONIC HIP PAIN, BILATERAL: ICD-10-CM

## 2024-10-17 DIAGNOSIS — G89.29 CHRONIC HIP PAIN, BILATERAL: ICD-10-CM

## 2024-10-17 DIAGNOSIS — M07.60 ARTHRITIS ASSOCIATED WITH INFLAMMATORY BOWEL DISEASE: ICD-10-CM

## 2024-10-17 DIAGNOSIS — M02.39 REACTIVE ARTHRITIS OF MULTIPLE SITES: ICD-10-CM

## 2024-10-17 DIAGNOSIS — K50.90 CROHN'S DISEASE WITHOUT COMPLICATION, UNSPECIFIED GASTROINTESTINAL TRACT LOCATION: ICD-10-CM

## 2024-10-17 DIAGNOSIS — M54.2 NECK PAIN: ICD-10-CM

## 2024-10-17 DIAGNOSIS — M87.052 AVASCULAR NECROSIS OF BONES OF BOTH HIPS: ICD-10-CM

## 2024-10-17 DIAGNOSIS — L40.9 PSORIASIS: ICD-10-CM

## 2024-10-17 DIAGNOSIS — K63.9 ARTHRITIS ASSOCIATED WITH INFLAMMATORY BOWEL DISEASE: ICD-10-CM

## 2024-10-17 DIAGNOSIS — M87.051 AVASCULAR NECROSIS OF BONES OF BOTH HIPS: ICD-10-CM

## 2024-10-17 DIAGNOSIS — M25.552 CHRONIC HIP PAIN, BILATERAL: ICD-10-CM

## 2024-10-17 DIAGNOSIS — G89.4 CHRONIC PAIN SYNDROME: ICD-10-CM

## 2024-10-17 DIAGNOSIS — D84.9 IMMUNOCOMPROMISED: ICD-10-CM

## 2024-10-17 LAB
ALBUMIN SERPL BCP-MCNC: 3.4 G/DL (ref 3.5–5.2)
ALP SERPL-CCNC: 88 U/L (ref 40–150)
ALT SERPL W/O P-5'-P-CCNC: 32 U/L (ref 10–44)
AMORPH CRY UR QL COMP ASSIST: ABNORMAL
ANION GAP SERPL CALC-SCNC: 8 MMOL/L (ref 8–16)
AST SERPL-CCNC: 15 U/L (ref 10–40)
BACTERIA #/AREA URNS AUTO: ABNORMAL /HPF
BASOPHILS # BLD AUTO: 0.04 K/UL (ref 0–0.2)
BASOPHILS NFR BLD: 0.4 % (ref 0–1.9)
BILIRUB SERPL-MCNC: 0.4 MG/DL (ref 0.1–1)
BILIRUB UR QL STRIP: NEGATIVE
BUN SERPL-MCNC: 19 MG/DL (ref 6–20)
C3 SERPL-MCNC: 155 MG/DL (ref 50–180)
C4 SERPL-MCNC: 43 MG/DL (ref 11–44)
CALCIUM SERPL-MCNC: 9.6 MG/DL (ref 8.7–10.5)
CHLORIDE SERPL-SCNC: 112 MMOL/L (ref 95–110)
CLARITY UR REFRACT.AUTO: ABNORMAL
CO2 SERPL-SCNC: 23 MMOL/L (ref 23–29)
COLOR UR AUTO: ABNORMAL
CREAT SERPL-MCNC: 0.8 MG/DL (ref 0.5–1.4)
CREAT UR-MCNC: 220 MG/DL (ref 15–325)
CRP SERPL-MCNC: 11.7 MG/L (ref 0–8.2)
DIFFERENTIAL METHOD BLD: ABNORMAL
EOSINOPHIL # BLD AUTO: 0.1 K/UL (ref 0–0.5)
EOSINOPHIL NFR BLD: 0.8 % (ref 0–8)
ERYTHROCYTE [DISTWIDTH] IN BLOOD BY AUTOMATED COUNT: 18.1 % (ref 11.5–14.5)
ERYTHROCYTE [SEDIMENTATION RATE] IN BLOOD BY WESTERGREN METHOD: 58 MM/HR (ref 0–20)
EST. GFR  (NO RACE VARIABLE): >60 ML/MIN/1.73 M^2
GLUCOSE SERPL-MCNC: 100 MG/DL (ref 70–110)
GLUCOSE UR QL STRIP: NEGATIVE
HCT VFR BLD AUTO: 37.8 % (ref 37–48.5)
HGB BLD-MCNC: 11.6 G/DL (ref 12–16)
HGB UR QL STRIP: NEGATIVE
HYALINE CASTS UR QL AUTO: 36 /LPF
IMM GRANULOCYTES # BLD AUTO: 0.14 K/UL (ref 0–0.04)
IMM GRANULOCYTES NFR BLD AUTO: 1.3 % (ref 0–0.5)
KETONES UR QL STRIP: NEGATIVE
LEUKOCYTE ESTERASE UR QL STRIP: ABNORMAL
LYMPHOCYTES # BLD AUTO: 2.1 K/UL (ref 1–4.8)
LYMPHOCYTES NFR BLD: 18.7 % (ref 18–48)
MCH RBC QN AUTO: 27 PG (ref 27–31)
MCHC RBC AUTO-ENTMCNC: 30.7 G/DL (ref 32–36)
MCV RBC AUTO: 88 FL (ref 82–98)
MICROSCOPIC COMMENT: ABNORMAL
MONOCYTES # BLD AUTO: 1.1 K/UL (ref 0.3–1)
MONOCYTES NFR BLD: 9.7 % (ref 4–15)
NEUTROPHILS # BLD AUTO: 7.6 K/UL (ref 1.8–7.7)
NEUTROPHILS NFR BLD: 69.1 % (ref 38–73)
NITRITE UR QL STRIP: NEGATIVE
NRBC BLD-RTO: 0 /100 WBC
PH UR STRIP: 6 [PH] (ref 5–8)
PLATELET # BLD AUTO: 271 K/UL (ref 150–450)
PMV BLD AUTO: 10.4 FL (ref 9.2–12.9)
POTASSIUM SERPL-SCNC: 3.9 MMOL/L (ref 3.5–5.1)
PROT SERPL-MCNC: 6.9 G/DL (ref 6–8.4)
PROT UR QL STRIP: ABNORMAL
PROT UR-MCNC: 18 MG/DL (ref 0–15)
PROT/CREAT UR: 0.08 MG/G{CREAT} (ref 0–0.2)
RBC # BLD AUTO: 4.3 M/UL (ref 4–5.4)
RBC #/AREA URNS AUTO: 0 /HPF (ref 0–4)
SODIUM SERPL-SCNC: 143 MMOL/L (ref 136–145)
SP GR UR STRIP: >1.03 (ref 1–1.03)
SQUAMOUS #/AREA URNS AUTO: 2 /HPF
URN SPEC COLLECT METH UR: ABNORMAL
WBC # BLD AUTO: 10.99 K/UL (ref 3.9–12.7)
WBC #/AREA URNS AUTO: 0 /HPF (ref 0–5)

## 2024-10-17 PROCEDURE — 86225 DNA ANTIBODY NATIVE: CPT | Performed by: INTERNAL MEDICINE

## 2024-10-17 PROCEDURE — 86162 COMPLEMENT TOTAL (CH50): CPT | Performed by: INTERNAL MEDICINE

## 2024-10-17 PROCEDURE — 85651 RBC SED RATE NONAUTOMATED: CPT | Mod: PO | Performed by: INTERNAL MEDICINE

## 2024-10-17 PROCEDURE — 86225 DNA ANTIBODY NATIVE: CPT | Mod: 59 | Performed by: INTERNAL MEDICINE

## 2024-10-17 PROCEDURE — 80053 COMPREHEN METABOLIC PANEL: CPT | Performed by: INTERNAL MEDICINE

## 2024-10-17 PROCEDURE — 36415 COLL VENOUS BLD VENIPUNCTURE: CPT | Mod: PO | Performed by: INTERNAL MEDICINE

## 2024-10-17 PROCEDURE — 83516 IMMUNOASSAY NONANTIBODY: CPT | Performed by: INTERNAL MEDICINE

## 2024-10-17 PROCEDURE — 86160 COMPLEMENT ANTIGEN: CPT | Performed by: INTERNAL MEDICINE

## 2024-10-17 PROCEDURE — 81001 URINALYSIS AUTO W/SCOPE: CPT | Performed by: INTERNAL MEDICINE

## 2024-10-17 PROCEDURE — 86038 ANTINUCLEAR ANTIBODIES: CPT | Performed by: INTERNAL MEDICINE

## 2024-10-17 PROCEDURE — 86140 C-REACTIVE PROTEIN: CPT | Performed by: INTERNAL MEDICINE

## 2024-10-17 PROCEDURE — 86235 NUCLEAR ANTIGEN ANTIBODY: CPT | Mod: 59 | Performed by: INTERNAL MEDICINE

## 2024-10-17 PROCEDURE — 86039 ANTINUCLEAR ANTIBODIES (ANA): CPT | Performed by: INTERNAL MEDICINE

## 2024-10-17 PROCEDURE — 85025 COMPLETE CBC W/AUTO DIFF WBC: CPT | Performed by: INTERNAL MEDICINE

## 2024-10-17 PROCEDURE — 82570 ASSAY OF URINE CREATININE: CPT | Performed by: INTERNAL MEDICINE

## 2024-10-17 PROCEDURE — 86235 NUCLEAR ANTIGEN ANTIBODY: CPT | Performed by: INTERNAL MEDICINE

## 2024-10-17 PROCEDURE — 86160 COMPLEMENT ANTIGEN: CPT | Mod: 59 | Performed by: INTERNAL MEDICINE

## 2024-10-18 LAB
ANA PATTERN 1: NORMAL
ANA SER QL IF: POSITIVE
ANA TITR SER IF: NORMAL {TITER}
ANTI SM ANTIBODY: 0.06 RATIO (ref 0–0.99)
ANTI SM/RNP ANTIBODY: 0.07 RATIO (ref 0–0.99)
ANTI-SM INTERPRETATION: NEGATIVE
ANTI-SM/RNP INTERPRETATION: NEGATIVE
ANTI-SSA ANTIBODY: 0.05 RATIO (ref 0–0.99)
ANTI-SSA INTERPRETATION: NEGATIVE
ANTI-SSB ANTIBODY: 0.07 RATIO (ref 0–0.99)
ANTI-SSB INTERPRETATION: NEGATIVE
DNA TITER: 0
DSDNA AB SER-ACNC: NORMAL [IU]/ML

## 2024-10-19 LAB — HISTONE IGG SER IA-ACNC: 0.4 UNITS (ref 0–0.9)

## 2024-10-21 LAB
CH50 SERPL-ACNC: >102 U/ML (ref 42–95)
ENA SCL70 AB SER-ACNC: <0.6 U/ML

## 2024-10-25 ENCOUNTER — OFFICE VISIT (OUTPATIENT)
Dept: RHEUMATOLOGY | Facility: CLINIC | Age: 59
End: 2024-10-25
Payer: MEDICARE

## 2024-10-25 VITALS
HEIGHT: 62 IN | BODY MASS INDEX: 48.32 KG/M2 | HEART RATE: 91 BPM | DIASTOLIC BLOOD PRESSURE: 77 MMHG | WEIGHT: 262.56 LBS | SYSTOLIC BLOOD PRESSURE: 131 MMHG

## 2024-10-25 DIAGNOSIS — K50.90 CROHN'S DISEASE WITHOUT COMPLICATION, UNSPECIFIED GASTROINTESTINAL TRACT LOCATION: ICD-10-CM

## 2024-10-25 DIAGNOSIS — L40.50 PSA (PSORIATIC ARTHRITIS): ICD-10-CM

## 2024-10-25 DIAGNOSIS — G89.4 CHRONIC PAIN SYNDROME: ICD-10-CM

## 2024-10-25 DIAGNOSIS — L40.9 PSORIASIS OF NAIL: ICD-10-CM

## 2024-10-25 DIAGNOSIS — J40 BRONCHITIS: ICD-10-CM

## 2024-10-25 DIAGNOSIS — E11.69 TYPE 2 DIABETES MELLITUS WITH OTHER SPECIFIED COMPLICATION, WITHOUT LONG-TERM CURRENT USE OF INSULIN: Primary | ICD-10-CM

## 2024-10-25 DIAGNOSIS — D84.9 IMMUNOCOMPROMISED: ICD-10-CM

## 2024-10-25 DIAGNOSIS — K21.9 GASTROESOPHAGEAL REFLUX DISEASE, UNSPECIFIED WHETHER ESOPHAGITIS PRESENT: ICD-10-CM

## 2024-10-25 DIAGNOSIS — R94.6 ABNORMAL RESULTS OF THYROID FUNCTION STUDIES: ICD-10-CM

## 2024-10-25 DIAGNOSIS — M02.39 REACTIVE ARTHRITIS OF MULTIPLE SITES: ICD-10-CM

## 2024-10-25 PROCEDURE — 3075F SYST BP GE 130 - 139MM HG: CPT | Mod: CPTII,S$GLB,, | Performed by: INTERNAL MEDICINE

## 2024-10-25 PROCEDURE — 1159F MED LIST DOCD IN RCRD: CPT | Mod: CPTII,S$GLB,, | Performed by: INTERNAL MEDICINE

## 2024-10-25 PROCEDURE — 99999 PR PBB SHADOW E&M-EST. PATIENT-LVL V: CPT | Mod: PBBFAC,,, | Performed by: INTERNAL MEDICINE

## 2024-10-25 PROCEDURE — 1160F RVW MEDS BY RX/DR IN RCRD: CPT | Mod: CPTII,S$GLB,, | Performed by: INTERNAL MEDICINE

## 2024-10-25 PROCEDURE — 99215 OFFICE O/P EST HI 40 MIN: CPT | Mod: 25,S$GLB,, | Performed by: INTERNAL MEDICINE

## 2024-10-25 PROCEDURE — 3044F HG A1C LEVEL LT 7.0%: CPT | Mod: CPTII,S$GLB,, | Performed by: INTERNAL MEDICINE

## 2024-10-25 PROCEDURE — 3066F NEPHROPATHY DOC TX: CPT | Mod: CPTII,S$GLB,, | Performed by: INTERNAL MEDICINE

## 2024-10-25 PROCEDURE — 3008F BODY MASS INDEX DOCD: CPT | Mod: CPTII,S$GLB,, | Performed by: INTERNAL MEDICINE

## 2024-10-25 PROCEDURE — 96372 THER/PROPH/DIAG INJ SC/IM: CPT | Mod: S$GLB,,, | Performed by: INTERNAL MEDICINE

## 2024-10-25 PROCEDURE — 4010F ACE/ARB THERAPY RXD/TAKEN: CPT | Mod: CPTII,S$GLB,, | Performed by: INTERNAL MEDICINE

## 2024-10-25 PROCEDURE — 3078F DIAST BP <80 MM HG: CPT | Mod: CPTII,S$GLB,, | Performed by: INTERNAL MEDICINE

## 2024-10-25 RX ORDER — OXYCODONE AND ACETAMINOPHEN 10; 325 MG/1; MG/1
1 TABLET ORAL EVERY 8 HOURS PRN
Qty: 90 TABLET | Refills: 0 | Status: SHIPPED | OUTPATIENT
Start: 2024-12-27 | End: 2025-01-26

## 2024-10-25 RX ORDER — KETOROLAC TROMETHAMINE 30 MG/ML
60 INJECTION, SOLUTION INTRAMUSCULAR; INTRAVENOUS
Status: COMPLETED | OUTPATIENT
Start: 2024-10-25 | End: 2024-10-25

## 2024-10-25 RX ORDER — CYANOCOBALAMIN 1000 UG/ML
1000 INJECTION, SOLUTION INTRAMUSCULAR; SUBCUTANEOUS
Status: COMPLETED | OUTPATIENT
Start: 2024-10-25 | End: 2024-10-25

## 2024-10-25 RX ORDER — PROMETHAZINE HYDROCHLORIDE 6.25 MG/5ML
25 SYRUP ORAL EVERY 6 HOURS PRN
Qty: 240 ML | Refills: 6 | Status: SHIPPED | OUTPATIENT
Start: 2024-10-25 | End: 2025-04-23

## 2024-10-25 RX ORDER — METHYLPREDNISOLONE ACETATE 80 MG/ML
160 INJECTION, SUSPENSION INTRA-ARTICULAR; INTRALESIONAL; INTRAMUSCULAR; SOFT TISSUE
Status: COMPLETED | OUTPATIENT
Start: 2024-10-25 | End: 2024-10-25

## 2024-10-25 RX ORDER — OXYCODONE AND ACETAMINOPHEN 10; 325 MG/1; MG/1
1 TABLET ORAL EVERY 8 HOURS PRN
Qty: 90 TABLET | Refills: 0 | Status: SHIPPED | OUTPATIENT
Start: 2024-11-28 | End: 2024-12-28

## 2024-10-25 RX ORDER — OXYCODONE AND ACETAMINOPHEN 10; 325 MG/1; MG/1
1 TABLET ORAL EVERY 8 HOURS PRN
Qty: 90 TABLET | Refills: 0 | Status: SHIPPED | OUTPATIENT
Start: 2024-10-28

## 2024-10-25 RX ADMIN — CYANOCOBALAMIN 1000 MCG: 1000 INJECTION, SOLUTION INTRAMUSCULAR; SUBCUTANEOUS at 12:10

## 2024-10-25 RX ADMIN — KETOROLAC TROMETHAMINE 60 MG: 30 INJECTION, SOLUTION INTRAMUSCULAR; INTRAVENOUS at 12:10

## 2024-10-25 RX ADMIN — METHYLPREDNISOLONE ACETATE 160 MG: 80 INJECTION, SUSPENSION INTRA-ARTICULAR; INTRALESIONAL; INTRAMUSCULAR; SOFT TISSUE at 12:10

## 2024-10-25 NOTE — LETTER
October 25, 2024    Amanda Mcguire  [address] [facility] [place] [address]             [facility]  [address]  [place] [address]  Phone: [phone]  Fax: [phone] Dear Shade,  I am writing to formally request long-term care support for Mrs. Amanda Mcguire, who has been diagnosed with several serious health conditions, including Crohn's disease, depression, memory loss, psoriatic arthritis, COPD, hypertension, and diabetes. Mrs. Mcguire is currently on disability and requires assistance with daily living activities, including cooking, cleaning, dressing, and bathing.  Her daughter, Michelle Mcguire, has been providing full-time care for her, but the level of support needed is beyond what can be managed at home. Given Mrs. Mcguire' complex medical needs and her daughter's commitment to her care, we believe that establishing official long-term care is essential for Mrs. Mcguire well-being.  We kindly request your assistance in evaluating Mrs. Mcguire' situation for long-term care eligibility. We are hopeful for a positive response and appreciate any guidance you can provide regarding the next steps in this process.  Thank you for your attention to this important matter.  Sincerely,      Sukhjinder Beltran MD   Rheumatology

## 2024-10-25 NOTE — PROGRESS NOTES
Subjective:     Patient ID:  Amanda Mcguire    Chief Complaint:  Disease Management     History of Present Illness:  Pt is a 59 y.o. female with reactive arthritis, osteoarthritis. She has been off Remicade since 2024 and on Rinvoq  and is undergoing evaluation with Dr. Medina to restart treatment for Crohn's disease. Her grandson just  as a premature baby.She is very upset and her Restorationist did not support her doing thatShe has significant joint pain in her knees and spine. She is taking percocet tid prn for severe pain and this is helping her. She needs assisted walking device due to shortness of breath and weakness.      She complains of sore throat, productive cough, and chest congestion. Subjective fever and chills.  No headache.         Current tx:  1. Rinvoq  2. percocet          Rheumatologic History:   - Diagnosis/es:  - Positive serologies:  - Infectious screening labs:  - Previous Treatments:  - Current Treatments:     Interval History:   Hospitalization since last office visit: No    Patient Active Problem List    Diagnosis Date Noted    Other chest pain 2024    Type 2 diabetes mellitus with other specified complication 2024    Memory loss 2024    Cognitive change 2024    Moderate episode of recurrent major depressive disorder 2024    Generalized anxiety disorder 2024    Neuropathic pain 2024    Edema 2024    Crohn's disease of small and large intestines with complication 2023    Immunocompromised 2023    Reactive arthritis of multiple sites 2023    Morbid (severe) obesity due to excess calories 2023    Myofascial pain syndrome, cervical 2022    Neck pain 2022    Intractable chronic migraine without aura and without status migrainosus 2022    Mixed hyperlipidemia 2022    Chronic hip pain, bilateral 2022    Crohn's disease of small intestine with intestinal obstruction 2021    Avascular  necrosis of bones of both hips 03/03/2021    Psoriasis of nail 03/03/2021    Chronic pain syndrome 03/03/2021    Reactive arthritis 10/01/2020    Left adrenal mass 05/21/2019    HTN (hypertension) 06/19/2014     Past Surgical History:   Procedure Laterality Date    COLONOSCOPY N/A 03/10/2022    Procedure: COLONOSCOPY;  Surgeon: Nilson Wiseman MD;  Location: [facility];  Service: Endoscopy;  Laterality: N/A; Repeat colonoscopy in 6 months because the bowel prep was poor    COLONOSCOPY N/A 3/2/2023    Procedure: COLONOSCOPY;  Surgeon: Erik Garcia MD;  Location: [facility];  Service: Endoscopy;  Laterality: N/A;    ESOPHAGOGASTRODUODENOSCOPY N/A 03/10/2022    Procedure: EGD (ESOPHAGOGASTRODUODENOSCOPY);  Surgeon: Nilson Wiseman MD;  Location: [facility];  Service: Endoscopy;  Laterality: N/A; Repeat upper endoscopy in 8 weeks for surveillance    ESOPHAGOGASTRODUODENOSCOPY N/A 3/16/2023    Procedure: EGD (ESOPHAGOGASTRODUODENOSCOPY);  Surgeon: Erik Garcia MD;  Location: [facility];  Service: Endoscopy;  Laterality: N/A;     Social History     Tobacco Use    Smoking status: Never    Smokeless tobacco: Never   Substance Use Topics    Alcohol use: Yes     Alcohol/week: 0.0 - 2.0 standard drinks of alcohol    Drug use: Yes     Types: Hydrocodone     Family History   Problem Relation Name Age of Onset    Cancer Mother      Diabetes Mother      Arthritis Mother      Hypertension Mother      Cancer Father      Diabetes Father      Arthritis Father      Hypertension Father      Celiac disease Neg Hx      Colon cancer Neg Hx      Crohn's disease Neg Hx      Esophageal cancer Neg Hx      Stomach cancer Neg Hx      Ulcerative colitis Neg Hx       Review of patient's allergies indicates:   Allergen Reactions    Amlodipine-benazepril Swelling    Ace inhibitors Swelling     Angioedema; was taking Benazepril.    Tramadol Itching       Review of Systems   Review of Systems   Constitutional:  Negative for activity  change, appetite change, chills, diaphoresis, fatigue, fever and unexpected weight change.   HENT:  Negative for congestion, dental problem, ear discharge, ear pain, facial swelling, mouth sores, nosebleeds, postnasal drip, rhinorrhea, sinus pressure, sneezing, sore throat, tinnitus, trouble swallowing and voice change.    Eyes:  Negative for photophobia, pain, discharge, redness and itching.   Respiratory:  Negative for apnea, cough, chest tightness, shortness of breath and wheezing.    Cardiovascular:  Positive for leg swelling. Negative for chest pain and palpitations.   Gastrointestinal:  Negative for abdominal distention, abdominal pain, constipation, diarrhea, nausea and vomiting.   Endocrine: Negative for cold intolerance, heat intolerance, polydipsia and polyuria.   Genitourinary:  Negative for decreased urine volume, difficulty urinating, dysuria, flank pain, frequency, hematuria and urgency.   Musculoskeletal:  Positive for arthralgias, back pain, joint swelling, neck pain and neck stiffness. Negative for gait problem and myalgias.   Skin:  Negative for pallor, rash and wound.   Allergic/Immunologic: Negative for immunocompromised state.   Neurological:  Negative for dizziness, tremors, weakness, numbness and headaches.   Hematological:  Negative for adenopathy. Does not bruise/bleed easily.   Psychiatric/Behavioral:  Negative for sleep disturbance. The patient is not nervous/anxious.         Current Medications:  Current Outpatient Medications   Medication Instructions    albuterol (PROVENTIL) 2.5 mg, Nebulization, Every 6 hours PRN, Rescue    ALPRAZolam (XANAX) 0.5 mg, 2 times daily PRN    atorvastatin (LIPITOR) 20 mg, Nightly    budesonide-formoterol 160-4.5 mcg (SYMBICORT) 160-4.5 mcg/actuation HFAA 2 puffs, 2 times daily    budesonide 4 mg, Oral, 2 times daily PRN    butalbital-acetaminophen-caffeine -40 mg (FIORICET, ESGIC) -40 mg per tablet 1 tablet, Every 4 hours PRN    cefdinir  (OMNICEF) 300 mg, Every 12 hours    diclofenac sodium (VOLTAREN) 1 % Gel Topical (Top), 4 times daily    diltiaZEM (CARDIZEM CD) 240 mg    EPINEPHrine (EPIPEN) 0.3 mg    famotidine (PEPCID) 40 mg, Daily PRN    fluticasone propionate (FLONASE) 50 mcg/actuation nasal spray SPRAY ONE SPRAY IN EACH NOSTRIL TWICE DAILY    fluticasone-salmeterol 500-50 mcg/dose (ADVAIR) 500-50 mcg/dose DsDv diskus inhaler 1 puff    furosemide (LASIX) 20 MG tablet Take 1 tablet (20 mg total) by mouth daily    gabapentin (NEURONTIN) 800 mg, Oral, 3 times daily    hydroxychloroquine (PLAQUENIL) 200 mg, Oral, 2 times daily    hydrOXYzine pamoate (VISTARIL) 25 MG Cap TAKE ONE CAPSULE BY MOUTH THREE TIMES DAILY AS NEEDED FOR ITCHING    ipratropium-albuteroL (COMBIVENT)  mcg/actuation inhaler 1 puff, Inhalation, 4 times daily, Rescue    irbesartan (AVAPRO) 150 MG tablet TAKE 1 TABLET BY MOUTH EVERY NIGHT AT BEDTIME FOR BLOOD PRESSURE    lidocaine HCl 2% (LIDOCAINE VISCOUS) 2 % Soln Mucous Membrane, Every 8 hours PRN    memantine (NAMENDA) 5 mg, Oral, 2 times daily    metOLazone (ZAROXOLYN) 5 MG tablet One tablet po daily for 5 days.    montelukast (SINGULAIR) 10 mg, Nightly    neomycin-polymyxin-hydrocortisone (CORTISPORIN) otic solution PLACE 2 DROPS IN AFFECTED EAR FOUR TIMES DAILY FOR 5-7 DAYS    oxybutynin (DITROPAN-XL) 5 mg, Oral, Daily    [START ON 10/28/2024] oxyCODONE-acetaminophen (PERCOCET)  mg per tablet 1 tablet, Oral, Every 8 hours PRN    [START ON 11/28/2024] oxyCODONE-acetaminophen (PERCOCET)  mg per tablet 1 tablet, Oral, Every 8 hours PRN    [START ON 12/27/2024] oxyCODONE-acetaminophen (PERCOCET)  mg per tablet 1 tablet, Oral, Every 8 hours PRN    pantoprazole (PROTONIX) 40 mg, Oral, Daily    potassium chloride SA (K-DUR,KLOR-CON M) 10 MEQ tablet 10 mEq, Oral, 2 times daily PRN    promethazine (PHENERGAN) 6.25 mg/5 mL syrup TAKE 5 MILLILITERS BY MOUTH EVERY 4 HOURS AS NEEDED FOR COUGH    RINVOQ 45 mg  "Tb24 1 tablet    rizatriptan (MAXALT) 10 MG tablet 1 tab PO PRN migraine. May repeat every 2 hours for max 3 tabs in 24 hours. Use no more than 10 days per month.    sertraline (ZOLOFT) 150 mg    sumatriptan (IMITREX) 50 MG tablet TAKE ONE TABLET BY MOUTH AT ONSET OF MIGRAINE, MAY REPEAT DOSE once AFTER TWO HOURS IF NO RELIEF    tirzepatide 7.5 mg, Subcutaneous, Every 7 days    tirzepatide 10 mg, Subcutaneous, Every 7 days    tiZANidine (ZANAFLEX) 4 mg, Oral, Every 8 hours    traZODone (DESYREL) 100 mg, Oral, Nightly    triamcinolone acetonide 0.1% (KENALOG) 0.1 % ointment Topical (Top), 2 times daily         Objective:     Vitals:    10/25/24 1119   BP: 131/77   Pulse: 91   Weight: 119.1 kg (262 lb 9.1 oz)   Height: 5' 2.01" (1.575 m)   PainSc:   8      Body mass index is 48.01 kg/m².     Physical Examinations:  Physical Exam   Constitutional: She is oriented to person, place, and time. She appears distressed.   HENT:   Head: Normocephalic and atraumatic.   Eyes: Pupils are equal, round, and reactive to light. Right eye exhibits no discharge. Left eye exhibits no discharge.   Neck: No thyromegaly present.   Cardiovascular: Normal rate, regular rhythm and normal heart sounds. Exam reveals no gallop and no friction rub.   No murmur heard.  Pulmonary/Chest: Breath sounds normal. She has no wheezes. She has no rales. She exhibits no tenderness.   Abdominal: There is no abdominal tenderness. There is no rebound and no guarding.   Musculoskeletal:         General: Tenderness and deformity present.      Right shoulder: Tenderness present.      Left shoulder: Tenderness present.      Right elbow: Normal.      Left elbow: Tenderness present.      Right wrist: Tenderness present.      Left wrist: Tenderness present.      Cervical back: Neck supple.      Right knee: Effusion present. Tenderness present.      Left knee: Effusion present. Tenderness present.   Lymphadenopathy:     She has no cervical adenopathy.   Neurological: " She is alert and oriented to person, place, and time. Gait normal.   Skin: Skin is dry. No rash noted. No erythema. There is pallor.   Psychiatric: Mood and affect normal.   Vitals reviewed.      Right Side Rheumatological Exam     Examination finds the elbow normal.    The patient is tender to palpation of the shoulder, wrist, knee, 1st PIP, 1st MCP, 2nd PIP, 2nd MCP, 3rd PIP, 3rd MCP, 4th PIP, 4th MCP, 5th PIP and 5th MCP    She has swelling of the 1st PIP, 1st MCP, 2nd PIP, 2nd MCP, 3rd PIP, 3rd MCP, 4th PIP, 4th MCP, 5th PIP and 5th MCP    Shoulder Exam   Tenderness Location: no tenderness    Range of Motion   Active abduction:  abnormal   Adduction: abnormal  Sensation: normal    Knee Exam   Patellofemoral Crepitus: positive  Effusion: positive  Sensation: normal    Hip Exam   Tenderness Location: posterior  Sensation: normal    Elbow/Wrist Exam   Tenderness Location: no tenderness  Sensation: normal    Left Side Rheumatological Exam     The patient is tender to palpation of the shoulder, elbow, wrist, knee, 1st PIP, 1st MCP, 2nd PIP, 2nd MCP, 3rd PIP, 3rd MCP, 4th PIP, 4th MCP, 5th PIP and 5th MCP.    She has swelling of the 1st PIP, 1st MCP, 2nd PIP, 2nd MCP, 3rd PIP, 3rd MCP, 4th PIP, 4th MCP, 5th PIP and 5th MCP    Shoulder Exam   Tenderness Location: no tenderness    Range of Motion   Active abduction:  abnormal   Sensation: normal    Knee Exam     Patellofemoral Crepitus: positive  Effusion: positive  Sensation: normal    Hip Exam   Tenderness Location: posterior  Sensation: normal    Elbow/Wrist Exam   Sensation: normal      Back/Neck Exam   General Inspection   Gait: normal            Disease Assessment Scores:  Patient's Global Assessment of arthritis (0-10): 1  Physician's Global Assessment of arthritis (0-10): 1  Number of Tender Joints (0-28): 1  Number of Swollen Joints (0-28): 1        4/3/2023     1:00 PM   Rapid3 Question Responses and Scores   MDHAQ Score 0.7   Psychologic Score 4.4   Pain  "Score 10   When you awakened in the morning OVER THE LAST WEEK, did you feel stiff? Yes   If Yes, please indicate the number of hours until you are as limber as you will be for the day 10   Fatigue Score 10   Global Health Score 10   RAPID3 Score 7.44       Monitoring Lab Results:  Lab Results   Component Value Date    WBC 10.99 10/17/2024    RBC 4.30 10/17/2024    HGB 11.6 (L) 10/17/2024    HCT 37.8 10/17/2024    MCV 88 10/17/2024    MCH 27.0 10/17/2024    MCHC 30.7 (L) 10/17/2024    RDW 18.1 (H) 10/17/2024     10/17/2024        Lab Results   Component Value Date     10/17/2024    K 3.9 10/17/2024     (H) 10/17/2024    CO2 23 10/17/2024     10/17/2024    BUN 19 10/17/2024    CREATININE 0.8 10/17/2024    CALCIUM 9.6 10/17/2024    PROT 6.9 10/17/2024    ALBUMIN 3.4 (L) 10/17/2024    BILITOT 0.4 10/17/2024    ALKPHOS 88 10/17/2024    AST 15 10/17/2024    ALT 32 10/17/2024    ANIONGAP 8 10/17/2024    EGFRNORACEVR >60.0 10/17/2024       Lab Results   Component Value Date    SEDRATE 58 (H) 10/17/2024    CRP 11.7 (H) 10/17/2024        Lab Results   Component Value Date    YETBELDP37PZ 32 11/02/2022        Lab Results   Component Value Date    CHOL 180 08/28/2024    HDL 50 08/28/2024    LDLCALC 103 08/28/2024    TRIG 137 08/28/2024       Lab Results   Component Value Date    RF <13.0 02/28/2022    CCPANTIBODIE <0.5 11/02/2022     Lab Results   Component Value Date    ANASCREEN Positive (A) 10/17/2024    ANATITER 1:640 10/17/2024    DSDNA Negative 1:10 10/17/2024     No results found for: "HLABB27"    Infectious Disease Screening:  Lab Results   Component Value Date    HEPBSAG Non-reactive 07/21/2023    HEPBSAB <3.00 06/09/2023    HEPBSAB Non-reactive 06/09/2023     Lab Results   Component Value Date    HEPCAB Non-reactive 06/09/2023     Lab Results   Component Value Date    TBGOLDPLUS Negative 06/09/2023     No results found for: "QUANTIFERON", "SVCMT", "QUANTAGVALUE", "QUANTNILVALU", " ""QUANTMITOGEN", "QFTTBAG", "QINT"     Imaging: DEXA, Xrays, MRIs, CTs, etc    Old & Outside Medical Records:  Reviewed old and all outside medical records available in Care Everywhere     Assessment:     Encounter Diagnoses   Name Primary?    Chronic pain syndrome     Immunocompromised     Reactive arthritis of multiple sites     Crohn's disease without complication, unspecified gastrointestinal tract location     Gastroesophageal reflux disease, unspecified whether esophagitis present     Abnormal results of thyroid function studies     Type 2 diabetes mellitus with other specified complication, without long-term current use of insulin Yes    Psoriasis of nail     PSA (psoriatic arthritis)           Plan:      Encounter Diagnoses   Name Primary?    Chronic pain syndrome     Immunocompromised     Reactive arthritis of multiple sites     Crohn's disease without complication, unspecified gastrointestinal tract location     Gastroesophageal reflux disease, unspecified whether esophagitis present     Abnormal results of thyroid function studies     Type 2 diabetes mellitus with other specified complication, without long-term current use of insulin Yes    Psoriasis of nail     PSA (psoriatic arthritis)      Amanda was seen today for disease management.    Diagnoses and all orders for this visit:    Type 2 diabetes mellitus with other specified complication, without long-term current use of insulin  -     tirzepatide 10 mg/0.5 mL PnIj; Inject 10 mg into the skin every 7 days.  -     Sedimentation rate; Future  -     C-Reactive Protein; Future  -     Comprehensive Metabolic Panel; Future  -     CBC Auto Differential; Future  -     T4, Free; Future  -     TSH; Future  -     T3, Free; Future    Chronic pain syndrome  -     oxyCODONE-acetaminophen (PERCOCET)  mg per tablet; Take 1 tablet by mouth every 8 (eight) hours as needed for Pain.  -     oxyCODONE-acetaminophen (PERCOCET)  mg per tablet; Take 1 tablet by " mouth every 8 (eight) hours as needed for Pain.  -     oxyCODONE-acetaminophen (PERCOCET)  mg per tablet; Take 1 tablet by mouth every 8 (eight) hours as needed for Pain.  -     ketorolac injection 60 mg  -     methylPREDNISolone acetate injection 160 mg  -     cyanocobalamin injection 1,000 mcg  -     Sedimentation rate; Future  -     C-Reactive Protein; Future  -     Comprehensive Metabolic Panel; Future  -     CBC Auto Differential; Future  -     T4, Free; Future  -     TSH; Future  -     T3, Free; Future    Immunocompromised  -     oxyCODONE-acetaminophen (PERCOCET)  mg per tablet; Take 1 tablet by mouth every 8 (eight) hours as needed for Pain.  -     oxyCODONE-acetaminophen (PERCOCET)  mg per tablet; Take 1 tablet by mouth every 8 (eight) hours as needed for Pain.  -     oxyCODONE-acetaminophen (PERCOCET)  mg per tablet; Take 1 tablet by mouth every 8 (eight) hours as needed for Pain.  -     ketorolac injection 60 mg  -     methylPREDNISolone acetate injection 160 mg  -     cyanocobalamin injection 1,000 mcg  -     Sedimentation rate; Future  -     C-Reactive Protein; Future  -     Comprehensive Metabolic Panel; Future  -     CBC Auto Differential; Future  -     T4, Free; Future  -     TSH; Future  -     T3, Free; Future    Reactive arthritis of multiple sites  -     oxyCODONE-acetaminophen (PERCOCET)  mg per tablet; Take 1 tablet by mouth every 8 (eight) hours as needed for Pain.  -     oxyCODONE-acetaminophen (PERCOCET)  mg per tablet; Take 1 tablet by mouth every 8 (eight) hours as needed for Pain.  -     oxyCODONE-acetaminophen (PERCOCET)  mg per tablet; Take 1 tablet by mouth every 8 (eight) hours as needed for Pain.  -     ketorolac injection 60 mg  -     methylPREDNISolone acetate injection 160 mg  -     cyanocobalamin injection 1,000 mcg  -     Sedimentation rate; Future  -     C-Reactive Protein; Future  -     Comprehensive Metabolic Panel; Future  -     CBC  Auto Differential; Future  -     T4, Free; Future  -     TSH; Future  -     T3, Free; Future    Crohn's disease without complication, unspecified gastrointestinal tract location  -     oxyCODONE-acetaminophen (PERCOCET)  mg per tablet; Take 1 tablet by mouth every 8 (eight) hours as needed for Pain.  -     oxyCODONE-acetaminophen (PERCOCET)  mg per tablet; Take 1 tablet by mouth every 8 (eight) hours as needed for Pain.  -     oxyCODONE-acetaminophen (PERCOCET)  mg per tablet; Take 1 tablet by mouth every 8 (eight) hours as needed for Pain.  -     ketorolac injection 60 mg  -     methylPREDNISolone acetate injection 160 mg  -     cyanocobalamin injection 1,000 mcg  -     Sedimentation rate; Future  -     C-Reactive Protein; Future  -     Comprehensive Metabolic Panel; Future  -     CBC Auto Differential; Future  -     T4, Free; Future  -     TSH; Future  -     T3, Free; Future    Gastroesophageal reflux disease, unspecified whether esophagitis present  -     oxyCODONE-acetaminophen (PERCOCET)  mg per tablet; Take 1 tablet by mouth every 8 (eight) hours as needed for Pain.  -     oxyCODONE-acetaminophen (PERCOCET)  mg per tablet; Take 1 tablet by mouth every 8 (eight) hours as needed for Pain.  -     oxyCODONE-acetaminophen (PERCOCET)  mg per tablet; Take 1 tablet by mouth every 8 (eight) hours as needed for Pain.  -     Sedimentation rate; Future  -     C-Reactive Protein; Future  -     Comprehensive Metabolic Panel; Future  -     CBC Auto Differential; Future  -     T4, Free; Future  -     TSH; Future  -     T3, Free; Future    Abnormal results of thyroid function studies  -     oxyCODONE-acetaminophen (PERCOCET)  mg per tablet; Take 1 tablet by mouth every 8 (eight) hours as needed for Pain.  -     oxyCODONE-acetaminophen (PERCOCET)  mg per tablet; Take 1 tablet by mouth every 8 (eight) hours as needed for Pain.  -     oxyCODONE-acetaminophen (PERCOCET)  mg per  tablet; Take 1 tablet by mouth every 8 (eight) hours as needed for Pain.  -     Sedimentation rate; Future  -     C-Reactive Protein; Future  -     Comprehensive Metabolic Panel; Future  -     CBC Auto Differential; Future  -     T4, Free; Future  -     TSH; Future  -     T3, Free; Future    Psoriasis of nail  -     Sedimentation rate; Future  -     C-Reactive Protein; Future  -     Comprehensive Metabolic Panel; Future  -     CBC Auto Differential; Future  -     T4, Free; Future  -     TSH; Future  -     T3, Free; Future    PSA (psoriatic arthritis)  -     Sedimentation rate; Future  -     C-Reactive Protein; Future  -     Comprehensive Metabolic Panel; Future  -     CBC Auto Differential; Future  -     T4, Free; Future  -     TSH; Future  -     T3, Free; Future        1. Continue Rinvoq  2. Labs ordered  3. Nurse injections     Follow-up 4 months    More than 50% of the  40 minute encounter was spent face to face counseling the patient regarding current status and future plan of care as well as side effects  of the medications. All questions were answered to patient's satisfaction also includes  non-face to face time preparing to see the patient (eg, review of tests), Obtaining and/or reviewing separately obtained history, Documenting clinical information in the electronic or other health record, Independently interpreting results

## 2024-11-20 ENCOUNTER — TELEPHONE (OUTPATIENT)
Dept: RHEUMATOLOGY | Facility: CLINIC | Age: 59
End: 2024-11-20
Payer: MEDICARE

## 2024-11-20 NOTE — TELEPHONE ENCOUNTER
----- Message from Cielo sent at 11/20/2024 11:21 AM CST -----  Contact: PT  Type:  Needs Medical Advice    Who Called: PT   Symptoms (please be specific): PT REQUEST THAT THE PROVIDER WRITE A STATEMENT RE: A CALL THAT SHE OVERHEARD DURING THE PT OFFICE VISIT WHERE A CALLER (NICOLASA, FROM THE [facility]) WAS THREATENING TO BEAT THE PT UP.   How long has patient had these symptoms:  N/A  Pharmacy name and phone #:    [facility] [place] - [address] [id number] [facility] [address]  Phone: [phone] Fax: [phone]  Would the patient rather a call back or a response via [facility]? CALL   Best Call Back Number: [phone]  Additional Information: THANK YOU

## 2025-01-10 ENCOUNTER — TELEPHONE (OUTPATIENT)
Dept: RHEUMATOLOGY | Facility: CLINIC | Age: 60
End: 2025-01-10
Payer: MEDICARE

## 2025-01-10 NOTE — TELEPHONE ENCOUNTER
Let her know to bring her paperwork ASAP since it needs to be filled out by the 17th. Also, let her know that her appt in Feb would have to be cancelled and I put her on the waitlist. Pt wanted something called in because her side hurts. She thinks she has an infection. I let the provider know.

## 2025-01-10 NOTE — TELEPHONE ENCOUNTER
----- Message from Kellie sent at 1/10/2025  1:28 PM CST -----  Contact: pt  Type:  Needs Medical Advice    Who Called: pt    Would the patient rather a call back or a response via [facility]?  Call back    Best Call Back Number: [phone]  or  [phone]    Additional Information:  has 3 things she wants to discuss.    #1) needs some paperwork filled out before 01/17 and wants to know when good time to bring it over    #2) Wants to know if the 02/20 appt can be switched to virtual?    #3) Also wants to know if you can call something in for her, thinks she has infection, side hurts when going to the bathroom?      [facility] - [address] [facility]  [address] [facility] [address]  [place] [address]  Phone: [phone] Fax: [phone]        Please call to advise    Thanks

## 2025-01-15 ENCOUNTER — TELEPHONE (OUTPATIENT)
Dept: RHEUMATOLOGY | Facility: CLINIC | Age: 60
End: 2025-01-15
Payer: MEDICARE

## 2025-01-15 NOTE — TELEPHONE ENCOUNTER
Patient dropped off long term care services form for Dr. Beltran to fill out.  Once filled out contact patient for .  Form given to Dr. Beltran for completion.  April BLANK

## 2025-01-17 ENCOUNTER — PATIENT OUTREACH (OUTPATIENT)
Dept: RHEUMATOLOGY | Facility: CLINIC | Age: 60
End: 2025-01-17
Payer: MEDICARE

## 2025-01-17 DIAGNOSIS — J32.9 SINUSITIS, UNSPECIFIED CHRONICITY, UNSPECIFIED LOCATION: Primary | ICD-10-CM

## 2025-01-17 DIAGNOSIS — G89.4 CHRONIC PAIN SYNDROME: ICD-10-CM

## 2025-01-17 DIAGNOSIS — D84.9 IMMUNOCOMPROMISED: ICD-10-CM

## 2025-01-17 DIAGNOSIS — M02.39 REACTIVE ARTHRITIS OF MULTIPLE SITES: ICD-10-CM

## 2025-01-17 PROCEDURE — 99358 PROLONG SERVICE W/O CONTACT: CPT | Mod: S$GLB,,, | Performed by: INTERNAL MEDICINE

## 2025-01-17 RX ORDER — AZITHROMYCIN 250 MG/1
TABLET, FILM COATED ORAL
Qty: 6 TABLET | Refills: 0 | Status: SHIPPED | OUTPATIENT
Start: 2025-01-17

## 2025-01-17 RX ORDER — METHYLPREDNISOLONE 4 MG/1
TABLET ORAL
Qty: 21 EACH | Refills: 0 | Status: SHIPPED | OUTPATIENT
Start: 2025-01-17 | End: 2025-02-07

## 2025-01-18 NOTE — PROGRESS NOTES
Patient states she is ill and has an infection needs an antibiotics also she states she is in pain I will send over a Z-Rolo and a Medrol Dosepak. She will hold rinvoq

## 2025-01-29 ENCOUNTER — TELEPHONE (OUTPATIENT)
Dept: RHEUMATOLOGY | Facility: CLINIC | Age: 60
End: 2025-01-29
Payer: MEDICARE

## 2025-01-29 NOTE — TELEPHONE ENCOUNTER
Tried to contact the patient to see what were her symptoms but her phone stated the call you are trying to reach is not available. Patient needs to be seen in UC or ED if her symptoms worsen and the Dr. Beltran can not get her medicine called in. Patient also has a PCP that she can reach out to as well.

## 2025-01-29 NOTE — TELEPHONE ENCOUNTER
Wanted to confirm that patient picked up her medication that was escribed on 1/17/25. Patient did pick it up and nurse explained was trying to reach the patient to see what other symptoms she might have but her phone was not available. Will try to send patient a message via the portal.

## 2025-02-04 ENCOUNTER — LAB VISIT (OUTPATIENT)
Dept: LAB | Facility: HOSPITAL | Age: 60
End: 2025-02-04
Attending: INTERNAL MEDICINE
Payer: MEDICARE

## 2025-02-04 ENCOUNTER — TELEPHONE (OUTPATIENT)
Dept: RHEUMATOLOGY | Facility: CLINIC | Age: 60
End: 2025-02-04
Payer: MEDICARE

## 2025-02-04 DIAGNOSIS — L40.50 PSA (PSORIATIC ARTHRITIS): ICD-10-CM

## 2025-02-04 DIAGNOSIS — G89.4 CHRONIC PAIN SYNDROME: ICD-10-CM

## 2025-02-04 DIAGNOSIS — K50.90 CROHN'S DISEASE WITHOUT COMPLICATION, UNSPECIFIED GASTROINTESTINAL TRACT LOCATION: ICD-10-CM

## 2025-02-04 DIAGNOSIS — R94.6 ABNORMAL RESULTS OF THYROID FUNCTION STUDIES: ICD-10-CM

## 2025-02-04 DIAGNOSIS — M02.39 REACTIVE ARTHRITIS OF MULTIPLE SITES: ICD-10-CM

## 2025-02-04 DIAGNOSIS — E11.69 TYPE 2 DIABETES MELLITUS WITH OTHER SPECIFIED COMPLICATION, WITHOUT LONG-TERM CURRENT USE OF INSULIN: ICD-10-CM

## 2025-02-04 DIAGNOSIS — L40.9 PSORIASIS OF NAIL: ICD-10-CM

## 2025-02-04 DIAGNOSIS — K21.9 GASTROESOPHAGEAL REFLUX DISEASE, UNSPECIFIED WHETHER ESOPHAGITIS PRESENT: ICD-10-CM

## 2025-02-04 DIAGNOSIS — D84.9 IMMUNOCOMPROMISED: ICD-10-CM

## 2025-02-04 PROCEDURE — 85025 COMPLETE CBC W/AUTO DIFF WBC: CPT | Performed by: INTERNAL MEDICINE

## 2025-02-04 PROCEDURE — 84439 ASSAY OF FREE THYROXINE: CPT | Performed by: INTERNAL MEDICINE

## 2025-02-04 PROCEDURE — 80053 COMPREHEN METABOLIC PANEL: CPT | Performed by: INTERNAL MEDICINE

## 2025-02-04 PROCEDURE — 84481 FREE ASSAY (FT-3): CPT | Performed by: INTERNAL MEDICINE

## 2025-02-04 PROCEDURE — 85651 RBC SED RATE NONAUTOMATED: CPT | Mod: PO | Performed by: INTERNAL MEDICINE

## 2025-02-04 PROCEDURE — 36415 COLL VENOUS BLD VENIPUNCTURE: CPT | Mod: PO | Performed by: INTERNAL MEDICINE

## 2025-02-04 PROCEDURE — 84443 ASSAY THYROID STIM HORMONE: CPT | Performed by: INTERNAL MEDICINE

## 2025-02-04 PROCEDURE — 86140 C-REACTIVE PROTEIN: CPT | Performed by: INTERNAL MEDICINE

## 2025-02-04 NOTE — TELEPHONE ENCOUNTER
Let pt know I sent the provider a message to send in her medication. I will also ask Dr. Beltran when she comes out.

## 2025-02-04 NOTE — TELEPHONE ENCOUNTER
----- Message from Leti sent at 2/4/2025  1:27 PM CST -----  Contact: Patient  Type:  RX Refill Request    Who Called:   Patient    Refill or New Rx:  Refill    RX Name and Strength:  oxyCODONE-acetaminophen (PERCOCET)  mg per tablet     Preferred Pharmacy with phone number:      [facility] - [place] - [address] [facility]  [address] [place] [address]  Phone: [phone] Fax: [phone]    Local or Mail Order:  Local  Ordering Provider:  Dr oliver    Would the patient rather a call back or a response via [facility]?   Call back  Best Call Back Number:  [phone]    Additional Information:   States she is completely out of her medicine and has been calling since last Tuesday to request refills - states she is in a lot of pain - states she hasn't heard from anyone - please call - thank you

## 2025-02-05 LAB
ALBUMIN SERPL BCP-MCNC: 3.9 G/DL (ref 3.5–5.2)
ALP SERPL-CCNC: 86 U/L (ref 40–150)
ALT SERPL W/O P-5'-P-CCNC: 28 U/L (ref 10–44)
ANION GAP SERPL CALC-SCNC: 12 MMOL/L (ref 8–16)
AST SERPL-CCNC: 19 U/L (ref 10–40)
BASOPHILS # BLD AUTO: 0.04 K/UL (ref 0–0.2)
BASOPHILS NFR BLD: 0.5 % (ref 0–1.9)
BILIRUB SERPL-MCNC: 0.4 MG/DL (ref 0.1–1)
BUN SERPL-MCNC: 15 MG/DL (ref 6–20)
CALCIUM SERPL-MCNC: 10.2 MG/DL (ref 8.7–10.5)
CHLORIDE SERPL-SCNC: 108 MMOL/L (ref 95–110)
CO2 SERPL-SCNC: 22 MMOL/L (ref 23–29)
CREAT SERPL-MCNC: 0.9 MG/DL (ref 0.5–1.4)
CRP SERPL-MCNC: 13.1 MG/L (ref 0–8.2)
DIFFERENTIAL METHOD BLD: ABNORMAL
EOSINOPHIL # BLD AUTO: 0 K/UL (ref 0–0.5)
EOSINOPHIL NFR BLD: 0.1 % (ref 0–8)
ERYTHROCYTE [DISTWIDTH] IN BLOOD BY AUTOMATED COUNT: 15.3 % (ref 11.5–14.5)
ERYTHROCYTE [SEDIMENTATION RATE] IN BLOOD BY PHOTOMETRIC METHOD: 19 MM/HR (ref 0–36)
EST. GFR  (NO RACE VARIABLE): >60 ML/MIN/1.73 M^2
GLUCOSE SERPL-MCNC: 105 MG/DL (ref 70–110)
HCT VFR BLD AUTO: 42 % (ref 37–48.5)
HGB BLD-MCNC: 12.9 G/DL (ref 12–16)
IMM GRANULOCYTES # BLD AUTO: 0.19 K/UL (ref 0–0.04)
IMM GRANULOCYTES NFR BLD AUTO: 2.2 % (ref 0–0.5)
LYMPHOCYTES # BLD AUTO: 1.4 K/UL (ref 1–4.8)
LYMPHOCYTES NFR BLD: 16.7 % (ref 18–48)
MCH RBC QN AUTO: 28.4 PG (ref 27–31)
MCHC RBC AUTO-ENTMCNC: 30.7 G/DL (ref 32–36)
MCV RBC AUTO: 92 FL (ref 82–98)
MONOCYTES # BLD AUTO: 0.6 K/UL (ref 0.3–1)
MONOCYTES NFR BLD: 6.7 % (ref 4–15)
NEUTROPHILS # BLD AUTO: 6.3 K/UL (ref 1.8–7.7)
NEUTROPHILS NFR BLD: 73.8 % (ref 38–73)
NRBC BLD-RTO: 0 /100 WBC
PLATELET # BLD AUTO: 332 K/UL (ref 150–450)
PMV BLD AUTO: 10.4 FL (ref 9.2–12.9)
POTASSIUM SERPL-SCNC: 5.3 MMOL/L (ref 3.5–5.1)
PROT SERPL-MCNC: 7.5 G/DL (ref 6–8.4)
RBC # BLD AUTO: 4.55 M/UL (ref 4–5.4)
SODIUM SERPL-SCNC: 142 MMOL/L (ref 136–145)
T3FREE SERPL-MCNC: 2.5 PG/ML (ref 2.3–4.2)
T4 FREE SERPL-MCNC: 0.85 NG/DL (ref 0.71–1.51)
TSH SERPL DL<=0.005 MIU/L-ACNC: 0.71 UIU/ML (ref 0.4–4)
WBC # BLD AUTO: 8.56 K/UL (ref 3.9–12.7)

## 2025-02-11 ENCOUNTER — OFFICE VISIT (OUTPATIENT)
Dept: RHEUMATOLOGY | Facility: CLINIC | Age: 60
End: 2025-02-11
Payer: MEDICARE

## 2025-02-11 VITALS
HEIGHT: 62 IN | HEART RATE: 83 BPM | WEIGHT: 266.31 LBS | DIASTOLIC BLOOD PRESSURE: 84 MMHG | BODY MASS INDEX: 49.01 KG/M2 | SYSTOLIC BLOOD PRESSURE: 147 MMHG

## 2025-02-11 DIAGNOSIS — K50.90 CROHN'S DISEASE WITHOUT COMPLICATION, UNSPECIFIED GASTROINTESTINAL TRACT LOCATION: ICD-10-CM

## 2025-02-11 DIAGNOSIS — M02.39 REACTIVE ARTHRITIS OF MULTIPLE SITES: ICD-10-CM

## 2025-02-11 DIAGNOSIS — F41.9 ANXIETY: ICD-10-CM

## 2025-02-11 DIAGNOSIS — F32.A DEPRESSION, UNSPECIFIED DEPRESSION TYPE: ICD-10-CM

## 2025-02-11 DIAGNOSIS — E11.69 TYPE 2 DIABETES MELLITUS WITH OTHER SPECIFIED COMPLICATION, UNSPECIFIED WHETHER LONG TERM INSULIN USE: ICD-10-CM

## 2025-02-11 DIAGNOSIS — L40.50 PSA (PSORIATIC ARTHRITIS): ICD-10-CM

## 2025-02-11 DIAGNOSIS — R11.2 NAUSEA AND VOMITING, UNSPECIFIED VOMITING TYPE: ICD-10-CM

## 2025-02-11 DIAGNOSIS — K21.9 GASTROESOPHAGEAL REFLUX DISEASE, UNSPECIFIED WHETHER ESOPHAGITIS PRESENT: ICD-10-CM

## 2025-02-11 DIAGNOSIS — R94.6 ABNORMAL RESULTS OF THYROID FUNCTION STUDIES: ICD-10-CM

## 2025-02-11 DIAGNOSIS — D84.9 IMMUNOCOMPROMISED: ICD-10-CM

## 2025-02-11 DIAGNOSIS — K50.819 CROHN'S DISEASE OF SMALL AND LARGE INTESTINES WITH COMPLICATION: ICD-10-CM

## 2025-02-11 DIAGNOSIS — L65.9 ALOPECIA: ICD-10-CM

## 2025-02-11 DIAGNOSIS — G89.4 CHRONIC PAIN SYNDROME: Primary | ICD-10-CM

## 2025-02-11 PROCEDURE — 99999 PR PBB SHADOW E&M-EST. PATIENT-LVL V: CPT | Mod: PBBFAC,,, | Performed by: INTERNAL MEDICINE

## 2025-02-11 PROCEDURE — 1159F MED LIST DOCD IN RCRD: CPT | Mod: CPTII,S$GLB,, | Performed by: INTERNAL MEDICINE

## 2025-02-11 PROCEDURE — 3079F DIAST BP 80-89 MM HG: CPT | Mod: CPTII,S$GLB,, | Performed by: INTERNAL MEDICINE

## 2025-02-11 PROCEDURE — 3077F SYST BP >= 140 MM HG: CPT | Mod: CPTII,S$GLB,, | Performed by: INTERNAL MEDICINE

## 2025-02-11 PROCEDURE — 99215 OFFICE O/P EST HI 40 MIN: CPT | Mod: 25,S$GLB,, | Performed by: INTERNAL MEDICINE

## 2025-02-11 PROCEDURE — 3008F BODY MASS INDEX DOCD: CPT | Mod: CPTII,S$GLB,, | Performed by: INTERNAL MEDICINE

## 2025-02-11 PROCEDURE — 96372 THER/PROPH/DIAG INJ SC/IM: CPT | Mod: S$GLB,,, | Performed by: INTERNAL MEDICINE

## 2025-02-11 PROCEDURE — 1160F RVW MEDS BY RX/DR IN RCRD: CPT | Mod: CPTII,S$GLB,, | Performed by: INTERNAL MEDICINE

## 2025-02-11 RX ORDER — MIRTAZAPINE 7.5 MG/1
7.5 TABLET, FILM COATED ORAL NIGHTLY
Qty: 30 TABLET | Refills: 11 | Status: SHIPPED | OUTPATIENT
Start: 2025-02-11 | End: 2026-02-11

## 2025-02-11 RX ORDER — OXYCODONE AND ACETAMINOPHEN 10; 325 MG/1; MG/1
1 TABLET ORAL EVERY 8 HOURS PRN
Qty: 90 TABLET | Refills: 0 | Status: SHIPPED | OUTPATIENT
Start: 2025-05-02

## 2025-02-11 RX ORDER — VENLAFAXINE HYDROCHLORIDE 75 MG/1
75 CAPSULE, EXTENDED RELEASE ORAL DAILY
Qty: 30 CAPSULE | Refills: 11 | Status: SHIPPED | OUTPATIENT
Start: 2025-02-11 | End: 2026-02-11

## 2025-02-11 RX ORDER — METHYLPREDNISOLONE ACETATE 80 MG/ML
80 INJECTION, SUSPENSION INTRA-ARTICULAR; INTRALESIONAL; INTRAMUSCULAR; SOFT TISSUE
Status: COMPLETED | OUTPATIENT
Start: 2025-02-11 | End: 2025-02-11

## 2025-02-11 RX ORDER — METOLAZONE 5 MG/1
TABLET ORAL
Qty: 10 TABLET | Refills: 1 | Status: SHIPPED | OUTPATIENT
Start: 2025-02-11

## 2025-02-11 RX ORDER — KETOROLAC TROMETHAMINE 30 MG/ML
60 INJECTION, SOLUTION INTRAMUSCULAR; INTRAVENOUS
Status: COMPLETED | OUTPATIENT
Start: 2025-02-11 | End: 2025-02-11

## 2025-02-11 RX ORDER — OXYCODONE AND ACETAMINOPHEN 10; 325 MG/1; MG/1
1 TABLET ORAL EVERY 8 HOURS PRN
Qty: 90 TABLET | Refills: 0 | Status: SHIPPED | OUTPATIENT
Start: 2025-03-03

## 2025-02-11 RX ORDER — ONDANSETRON 4 MG/1
4 TABLET, ORALLY DISINTEGRATING ORAL EVERY 8 HOURS PRN
Qty: 40 TABLET | Refills: 1 | Status: SHIPPED | OUTPATIENT
Start: 2025-02-11

## 2025-02-11 RX ORDER — CYANOCOBALAMIN 1000 UG/ML
1000 INJECTION, SOLUTION INTRAMUSCULAR; SUBCUTANEOUS
Status: COMPLETED | OUTPATIENT
Start: 2025-02-11 | End: 2025-02-11

## 2025-02-11 RX ORDER — FINASTERIDE 1 MG/1
1 TABLET, FILM COATED ORAL DAILY
Qty: 30 TABLET | Refills: 0 | Status: SHIPPED | OUTPATIENT
Start: 2025-02-11 | End: 2025-03-13

## 2025-02-11 RX ORDER — OXYCODONE AND ACETAMINOPHEN 10; 325 MG/1; MG/1
1 TABLET ORAL EVERY 8 HOURS PRN
Qty: 90 TABLET | Refills: 0 | Status: SHIPPED | OUTPATIENT
Start: 2025-04-02

## 2025-02-11 RX ADMIN — METHYLPREDNISOLONE ACETATE 80 MG: 80 INJECTION, SUSPENSION INTRA-ARTICULAR; INTRALESIONAL; INTRAMUSCULAR; SOFT TISSUE at 03:02

## 2025-02-11 RX ADMIN — KETOROLAC TROMETHAMINE 60 MG: 30 INJECTION, SOLUTION INTRAMUSCULAR; INTRAVENOUS at 03:02

## 2025-02-11 RX ADMIN — CYANOCOBALAMIN 1000 MCG: 1000 INJECTION, SOLUTION INTRAMUSCULAR; SUBCUTANEOUS at 03:02

## 2025-02-11 ASSESSMENT — ROUTINE ASSESSMENT OF PATIENT INDEX DATA (RAPID3)
PATIENT GLOBAL ASSESSMENT SCORE: 9.5
PAIN SCORE: 8
FATIGUE SCORE: 2.2
PSYCHOLOGICAL DISTRESS SCORE: 6.6
TOTAL RAPID3 SCORE: 7.83
MDHAQ FUNCTION SCORE: 1.8
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
## 2025-04-07 ENCOUNTER — TELEPHONE (OUTPATIENT)
Dept: ORTHOPEDICS | Facility: CLINIC | Age: 60
End: 2025-04-07
Payer: MEDICARE

## 2025-04-07 NOTE — TELEPHONE ENCOUNTER
Return pt called to schedule appt with Caro Nguyen.  Original call dropped when agent transferred patient.  Pt has a voicemail box that has not been setup

## 2025-04-09 NOTE — PROCEDURES
Catheter removed intact. Large Joint Aspiration/Injection: bilateral greater trochanteric bursa    Date/Time: 1/12/2022 3:20 PM  Performed by: SALVATORE Grey  Authorized by: SALVATORE Grey     Consent Done?:  Yes (Verbal)  Indications:  Pain  Timeout: prior to procedure the correct patient, procedure, and site was verified    Prep: patient was prepped and draped in usual sterile fashion      Local anesthesia used?: Yes    Local anesthetic:  Lidocaine 1% without epinephrine  Anesthetic total (ml):  5      Details:  Needle Size:  21 G  Approach:  Lateral  Location:  Hip  Laterality:  Bilateral  Site:  Bilateral greater trochanteric bursa  Medications (Right):  40 mg triamcinolone acetonide 40 mg/mL; 5 mL LIDOcaine HCL 10 mg/ml (1%) 10 mg/mL (1 %)  Medications (Left):  40 mg triamcinolone acetonide 40 mg/mL; 5 mL LIDOcaine HCL 10 mg/ml (1%) 10 mg/mL (1 %)  Patient tolerance:  Patient tolerated the procedure well with no immediate complications

## 2025-04-11 NOTE — TELEPHONE ENCOUNTER
Attempted to reach pt at provided number, No answer, no voicemail. Pt needs to see her PCP to rule out flu and for proper treatment.    Rx listed below. Preferred Pharmacy has been set up and verified.

## 2025-04-16 ENCOUNTER — TELEPHONE (OUTPATIENT)
Dept: CARDIOLOGY | Facility: CLINIC | Age: 60
End: 2025-04-16
Payer: MEDICARE

## 2025-04-16 NOTE — TELEPHONE ENCOUNTER
----- Message from Qing sent at 4/16/2025 10:34 AM CDT -----  Regarding: Appointment  Contact: patient  Type:  Rescheduling Appointment Who Called:Sukhwinderointment to be rescheduled: 04/16/2025Would the patient rather a call back or a response via [facility]? Call back Best Call Back Number: [phone] Additional Information: the caller would like to reschedule the appointmentRODOLFO Gilmore

## 2025-04-16 NOTE — TELEPHONE ENCOUNTER
Attempted unsuccessfully X2 to reach patient. Left voicemail for patient to call back to discuss rescheduling appointment

## 2025-05-09 ENCOUNTER — OFFICE VISIT (OUTPATIENT)
Dept: ORTHOPEDICS | Facility: CLINIC | Age: 60
End: 2025-05-09
Payer: MEDICARE

## 2025-05-09 VITALS
HEIGHT: 60 IN | BODY MASS INDEX: 52.29 KG/M2 | DIASTOLIC BLOOD PRESSURE: 84 MMHG | SYSTOLIC BLOOD PRESSURE: 147 MMHG | WEIGHT: 266.31 LBS | HEART RATE: 83 BPM

## 2025-05-09 DIAGNOSIS — M17.0 BILATERAL PRIMARY OSTEOARTHRITIS OF KNEE: Primary | ICD-10-CM

## 2025-05-09 PROCEDURE — 99999 PR PBB SHADOW E&M-EST. PATIENT-LVL IV: CPT | Mod: PBBFAC,,, | Performed by: NURSE PRACTITIONER

## 2025-05-09 RX ORDER — TRIAMCINOLONE ACETONIDE 40 MG/ML
40 INJECTION, SUSPENSION INTRA-ARTICULAR; INTRAMUSCULAR
Status: DISCONTINUED | OUTPATIENT
Start: 2025-05-09 | End: 2025-05-09 | Stop reason: HOSPADM

## 2025-05-09 RX ADMIN — TRIAMCINOLONE ACETONIDE 40 MG: 40 INJECTION, SUSPENSION INTRA-ARTICULAR; INTRAMUSCULAR at 10:05

## 2025-05-09 NOTE — PROGRESS NOTES
Chief Complaint   Patient presents with    Right Knee - Pain    Left Knee - Pain         HPI:   This is a 59 y.o. who presents to clinic today complaining of bilateral knee pain for 2 months after no known trauma. Pain is progressively worsening. No numbness or tingling. No associated signs or symptoms.    Past Medical History:   Diagnosis Date    Anxiety     Arthritis     Asthma     Crohn's disease     Depression     Encounter for blood transfusion     Headache     Hypertension     Mixed hyperlipidemia 6/2/2022    Myofascial pain syndrome, cervical 9/28/2022    Type 2 diabetes mellitus with other specified complication 8/12/2024     Past Surgical History:   Procedure Laterality Date    COLONOSCOPY N/A 03/10/2022    Procedure: COLONOSCOPY;  Surgeon: Nilson Wiseman MD;  Location: [facility];  Service: Endoscopy;  Laterality: N/A; Repeat colonoscopy in 6 months because the bowel prep was poor    COLONOSCOPY N/A 3/2/2023    Procedure: COLONOSCOPY;  Surgeon: Erik Garcia MD;  Location: [facility];  Service: Endoscopy;  Laterality: N/A;    ESOPHAGOGASTRODUODENOSCOPY N/A 03/10/2022    Procedure: EGD (ESOPHAGOGASTRODUODENOSCOPY);  Surgeon: Nilson Wiseman MD;  Location: [facility];  Service: Endoscopy;  Laterality: N/A; Repeat upper endoscopy in 8 weeks for surveillance    ESOPHAGOGASTRODUODENOSCOPY N/A 3/16/2023    Procedure: EGD (ESOPHAGOGASTRODUODENOSCOPY);  Surgeon: Erik Garcia MD;  Location: [facility];  Service: Endoscopy;  Laterality: N/A;     Medications Ordered Prior to Encounter[1]  Review of patient's allergies indicates:   Allergen Reactions    Amlodipine-benazepril Swelling    Ace inhibitors Swelling     Angioedema; was taking Benazepril.    Tramadol Itching     Family History   Problem Relation Name Age of Onset    Cancer Mother      Diabetes Mother      Arthritis Mother      Hypertension Mother      Cancer Father      Diabetes Father      Arthritis Father      Hypertension Father       Celiac disease Neg Hx      Colon cancer Neg Hx      Crohn's disease Neg Hx      Esophageal cancer Neg Hx      Stomach cancer Neg Hx      Ulcerative colitis Neg Hx       Social History[2]    Review of Systems:  Constitutional:  Denies fever or chills   Eyes:  Denies change in visual acuity   HENT:  Denies nasal congestion or sore throat   Respiratory:  Denies cough or shortness of breath   Cardiovascular:  Denies chest pain or edema   GI:  Denies abdominal pain, nausea, vomiting, bloody stools or diarrhea   :  Denies dysuria   Integument:  Denies rash   Neurologic:  Denies headache, focal weakness or sensory changes   Endocrine:  Denies polyuria or polydipsia   Lymphatic:  Denies swollen glands   Psychiatric:  Denies depression or anxiety     Physical Exam:   Constitutional:  Well developed, well nourished, no acute distress, non-toxic appearance   Integument:  Well hydrated, no rash   Lymphatic:  No lymphadenopathy noted   Neurologic:  Alert & oriented x 3, CN 2-12 normal, normal motor function, normal sensory function, no focal deficits noted   Psychiatric:  Speech and behavior appropriate   Eyes: EOMI  Gi: abdomen soft    Bilateral Knee Exam    bilateral Knee Exam     Tenderness   The patient is experiencing tenderness in the medial joint line.    Range of Motion   Extension: abnormal   Flexion: abnormal     Muscle Strength     The patient has normal knee strength.    Tests   Ellie:  Medial - positive   Lachman:  Anterior - negative      Varus: negative  Valgus: negative  Patellar Apprehension: negative    Other   Erythema: absent  Sensation: normal  Pulse: present  Swelling: mild      bilateral Knee Exam   bilateral knee exam performed same as contralateral side and is normal.              Bilateral primary osteoarthritis of knee  -     Large Joint Aspiration/Injection: bilateral knee  -     triamcinolone acetonide injection 40 mg  -     triamcinolone acetonide injection 40 mg            Using an aseptic  technique, I injected 5 cc of lidocaine 1% without and 1 cc of kenalog 40mg into the bilateral Knee. The patient tolerated this well. I will have them return to clinic in 3 months.           [1]   Current Outpatient Medications on File Prior to Visit   Medication Sig Dispense Refill    ALPRAZolam (XANAX) 0.5 MG tablet Take 0.5 mg by mouth 2 (two) times daily as needed.      atorvastatin (LIPITOR) 20 MG tablet Take 20 mg by mouth every evening.      azithromycin (Z-DUONG) 250 MG tablet Take 2 tablets by mouth on day 1; Take 1 tablet by mouth on days 2-5 6 tablet 0    budesonide 4 mg CpDR Take 4 mg by mouth 2 (two) times daily as needed (crohn's  flare). 30 capsule 3    budesonide-formoterol 160-4.5 mcg (SYMBICORT) 160-4.5 mcg/actuation HFAA Inhale 2 puffs into the lungs 2 (two) times daily.      butalbital-acetaminophen-caffeine -40 mg (FIORICET, ESGIC) -40 mg per tablet Take 1 tablet by mouth every 4 (four) hours as needed.      cefdinir (OMNICEF) 300 MG capsule Take 300 mg by mouth every 12 (twelve) hours.      diltiaZEM (CARDIZEM CD) 240 MG 24 hr capsule Take 240 mg by mouth.      epinephrine (EPIPEN) 0.3 mg/0.3 mL AtIn Inject 0.3 mg into the muscle.      famotidine (PEPCID) 40 MG tablet Take 40 mg by mouth daily as needed.      fluticasone propionate (FLONASE) 50 mcg/actuation nasal spray SPRAY ONE SPRAY IN EACH NOSTRIL TWICE DAILY      fluticasone-salmeterol 500-50 mcg/dose (ADVAIR) 500-50 mcg/dose DsDv diskus inhaler Inhale 1 puff into the lungs.      furosemide (LASIX) 20 MG tablet Take 1 tablet (20 mg total) by mouth daily      gabapentin (NEURONTIN) 800 MG tablet Take 1 tablet (800 mg total) by mouth 3 (three) times daily. 270 tablet 1    hydroxychloroquine (PLAQUENIL) 200 mg tablet Take 1 tablet (200 mg total) by mouth 2 (two) times daily. 180 tablet 3    hydrOXYzine pamoate (VISTARIL) 25 MG Cap TAKE ONE CAPSULE BY MOUTH THREE TIMES DAILY AS NEEDED FOR ITCHING 45 capsule 3    ipratropium-albuteroL  (COMBIVENT)  mcg/actuation inhaler Inhale 1 puff into the lungs 4 (four) times daily. Rescue 4 g 12    irbesartan (AVAPRO) 150 MG tablet TAKE 1 TABLET BY MOUTH EVERY NIGHT AT BEDTIME FOR BLOOD PRESSURE      lidocaine HCl 2% (LIDOCAINE VISCOUS) 2 % Soln by Mucous Membrane route every 8 (eight) hours as needed. 100 mL 0    memantine (NAMENDA) 5 MG Tab Take 1 tablet (5 mg total) by mouth 2 (two) times daily. 60 tablet 11    metOLazone (ZAROXOLYN) 5 MG tablet One tablet po daily for 5 days. 10 tablet 1    mirtazapine (REMERON) 7.5 MG Tab Take 1 tablet (7.5 mg total) by mouth every evening. 30 tablet 11    montelukast (SINGULAIR) 10 mg tablet Take 10 mg by mouth every evening.      neomycin-polymyxin-hydrocortisone (CORTISPORIN) otic solution PLACE 2 DROPS IN AFFECTED EAR FOUR TIMES DAILY FOR 5-7 DAYS      ondansetron (ZOFRAN-ODT) 4 MG TbDL Take 1 tablet (4 mg total) by mouth every 8 (eight) hours as needed (n/v). 40 tablet 1    oxybutynin (DITROPAN-XL) 5 MG TR24 Take 1 tablet (5 mg total) by mouth once daily. 30 tablet 11    oxyCODONE-acetaminophen (PERCOCET)  mg per tablet Take 1 tablet by mouth every 8 (eight) hours as needed for Pain. 90 tablet 0    oxyCODONE-acetaminophen (PERCOCET)  mg per tablet Take 1 tablet by mouth every 8 (eight) hours as needed for Pain. 90 tablet 0    oxyCODONE-acetaminophen (PERCOCET)  mg per tablet Take 1 tablet by mouth every 8 (eight) hours as needed for Pain. 90 tablet 0    oxyCODONE-acetaminophen (PERCOCET)  mg per tablet Take 1 tablet by mouth every 8 (eight) hours as needed for Pain. 90 tablet 0    potassium chloride SA (K-DUR,KLOR-CON M) 10 MEQ tablet Take 1 tablet (10 mEq total) by mouth 2 (two) times daily as needed (Take with lasix). 60 tablet 3    RINVOQ 45 mg Tb24 Take 1 tablet by mouth.      rizatriptan (MAXALT) 10 MG tablet 1 tab PO PRN migraine. May repeat every 2 hours for max 3 tabs in 24 hours. Use no more than 10 days per month. 10 tablet 11     sertraline (ZOLOFT) 100 MG tablet Take 150 mg by mouth.      sumatriptan (IMITREX) 50 MG tablet TAKE ONE TABLET BY MOUTH AT ONSET OF MIGRAINE, MAY REPEAT DOSE once AFTER TWO HOURS IF NO RELIEF      tirzepatide 10 mg/0.5 mL PnIj Inject 10 mg into the skin every 7 days. 2 mL 11    tirzepatide 12.5 mg/0.5 mL PnIj Inject 12.5 mg into the skin every 7 days. 4 Pen 12    tiZANidine (ZANAFLEX) 4 MG tablet Take 1 tablet (4 mg total) by mouth every 8 (eight) hours. 90 tablet 3    traZODone (DESYREL) 100 MG tablet Take 1 tablet (100 mg total) by mouth every evening. 90 tablet 1    venlafaxine (EFFEXOR XR) 75 MG 24 hr capsule Take 1 capsule (75 mg total) by mouth once daily. 30 capsule 11    albuterol (PROVENTIL) 2.5 mg /3 mL (0.083 %) nebulizer solution Take 3 mLs (2.5 mg total) by nebulization every 6 (six) hours as needed for Wheezing. Rescue 75 mL 1    diclofenac sodium (VOLTAREN) 1 % Gel Apply topically 4 (four) times daily. 300 g 3    pantoprazole (PROTONIX) 40 MG tablet Take 1 tablet (40 mg total) by mouth once daily. 90 tablet 3    triamcinolone acetonide 0.1% (KENALOG) 0.1 % ointment Apply topically 2 (two) times daily. 80 g 3     Current Facility-Administered Medications on File Prior to Visit   Medication Dose Route Frequency Provider Last Rate Last Admin    onabotulinumtoxina injection 200 Units  200 Units Intramuscular q12 weeks Heavenly Fletcher NP   200 Units at 09/30/22 0958    triamcinolone acetonide injection 40 mg  40 mg Intra-articular  Wendy, Caro E., FNP   40 mg at 01/04/24 1025    triamcinolone acetonide injection 40 mg  40 mg Intra-articular  Nguyen, Caro E., FNP   40 mg at 01/04/24 1025    triamcinolone acetonide injection 40 mg  40 mg Intra-articular  Nguyen, Caro E., FNP   40 mg at 01/04/24 1025    triamcinolone acetonide injection 40 mg  40 mg Intra-articular  Wendy, Caro E., FNP   40 mg at 01/04/24 1025   [2]   Social History  Socioeconomic History    Marital status:    Tobacco Use    Smoking  status: Never    Smokeless tobacco: Never   Substance and Sexual Activity    Alcohol use: Yes     Alcohol/week: 0.0 - 2.0 standard drinks of alcohol    Drug use: Yes     Types: Hydrocodone    Sexual activity: Never     Social Drivers of Health     Financial Resource Strain: High Risk (4/2/2024)    Received from [facility]    Overall Financial Resource Strain (CARDIA)     Difficulty of Paying Living Expenses: Very hard   Food Insecurity: No Food Insecurity (4/2/2024)    Received from [facility]    Hunger Vital Sign     Worried About Running Out of Food in the Last Year: Never true     Ran Out of Food in the Last Year: Never true   Transportation Needs: Unmet Transportation Needs (4/2/2024)    Received from [facility]    PRAPARE - Transportation     Lack of Transportation (Medical): Yes     Lack of Transportation (Non-Medical): Yes   Physical Activity: Sufficiently Active (4/2/2024)    Received from [facility]    Exercise Vital Sign     Days of Exercise per Week: 3 days     Minutes of Exercise per Session: 60 min   Stress: Stress Concern Present (4/2/2024)    Received from [facility]    [employer] - Occupational Stress Questionnaire     Feeling of Stress : Rather much   Housing Stability: Low Risk  (4/2/2024)    Received from [facility]    Housing Stability Vital Sign     Unable to Pay for Housing in the Last Year: No     Number of Times Moved in the Last Year: 1     Homeless in the Last Year: No

## 2025-05-09 NOTE — PROCEDURES
Large Joint Aspiration/Injection: bilateral knee    Date/Time: 5/9/2025 10:20 AM    Performed by: Caro Nguyen FNP  Authorized by: Caro Nguyen FNP    Consent Done?:  Yes (Verbal)  Indications:  Pain  Timeout: prior to procedure the correct patient, procedure, and site was verified    Prep: patient was prepped and draped in usual sterile fashion    Local anesthetic:  Lidocaine 1% without epinephrine  Anesthetic total (ml):  5      Details:  Needle Size:  21 G  Approach:  Anterolateral  Location:  Knee  Laterality:  Bilateral  Site:  Bilateral knee  Medications (Right):  40 mg triamcinolone acetonide 40 mg/mL  Medications (Left):  40 mg triamcinolone acetonide 40 mg/mL  Patient tolerance:  Patient tolerated the procedure well with no immediate complications

## 2025-05-30 DIAGNOSIS — M02.39 REACTIVE ARTHRITIS OF MULTIPLE SITES: ICD-10-CM

## 2025-05-30 DIAGNOSIS — G89.4 CHRONIC PAIN SYNDROME: ICD-10-CM

## 2025-05-30 DIAGNOSIS — K21.9 GASTROESOPHAGEAL REFLUX DISEASE, UNSPECIFIED WHETHER ESOPHAGITIS PRESENT: ICD-10-CM

## 2025-05-30 DIAGNOSIS — K50.90 CROHN'S DISEASE WITHOUT COMPLICATION, UNSPECIFIED GASTROINTESTINAL TRACT LOCATION: ICD-10-CM

## 2025-05-30 DIAGNOSIS — R94.6 ABNORMAL RESULTS OF THYROID FUNCTION STUDIES: ICD-10-CM

## 2025-05-30 DIAGNOSIS — D84.9 IMMUNOCOMPROMISED: ICD-10-CM

## 2025-05-30 DIAGNOSIS — J44.89 COPD (CHRONIC OBSTRUCTIVE PULMONARY DISEASE) WITH CHRONIC BRONCHITIS: Primary | ICD-10-CM

## 2025-06-01 RX ORDER — OXYCODONE AND ACETAMINOPHEN 10; 325 MG/1; MG/1
1 TABLET ORAL EVERY 8 HOURS PRN
Qty: 90 TABLET | Refills: 0 | Status: SHIPPED | OUTPATIENT
Start: 2025-07-01

## 2025-06-01 RX ORDER — OXYCODONE AND ACETAMINOPHEN 10; 325 MG/1; MG/1
1 TABLET ORAL EVERY 8 HOURS PRN
Qty: 90 TABLET | Refills: 0 | Status: SHIPPED | OUTPATIENT
Start: 2025-06-01

## 2025-06-01 RX ORDER — OXYCODONE AND ACETAMINOPHEN 10; 325 MG/1; MG/1
1 TABLET ORAL EVERY 8 HOURS PRN
Qty: 90 TABLET | Refills: 0 | Status: SHIPPED | OUTPATIENT
Start: 2025-08-01

## 2025-06-12 ENCOUNTER — LAB VISIT (OUTPATIENT)
Dept: LAB | Facility: HOSPITAL | Age: 60
End: 2025-06-12
Attending: INTERNAL MEDICINE
Payer: MEDICARE

## 2025-06-12 DIAGNOSIS — D84.9 IMMUNOCOMPROMISED: ICD-10-CM

## 2025-06-12 DIAGNOSIS — E11.69 TYPE 2 DIABETES MELLITUS WITH OTHER SPECIFIED COMPLICATION, UNSPECIFIED WHETHER LONG TERM INSULIN USE: ICD-10-CM

## 2025-06-12 DIAGNOSIS — K21.9 GASTROESOPHAGEAL REFLUX DISEASE, UNSPECIFIED WHETHER ESOPHAGITIS PRESENT: ICD-10-CM

## 2025-06-12 DIAGNOSIS — L40.50 PSA (PSORIATIC ARTHRITIS): ICD-10-CM

## 2025-06-12 DIAGNOSIS — G89.4 CHRONIC PAIN SYNDROME: ICD-10-CM

## 2025-06-12 DIAGNOSIS — K50.819 CROHN'S DISEASE OF SMALL AND LARGE INTESTINES WITH COMPLICATION: ICD-10-CM

## 2025-06-12 LAB
25(OH)D3+25(OH)D2 SERPL-MCNC: 12 NG/ML (ref 30–96)
ABSOLUTE EOSINOPHIL (OHS): 0.32 K/UL
ABSOLUTE MONOCYTE (OHS): 0.93 K/UL (ref 0.3–1)
ABSOLUTE NEUTROPHIL COUNT (OHS): 6.38 K/UL (ref 1.8–7.7)
ALBUMIN SERPL BCP-MCNC: 3.9 G/DL (ref 3.5–5.2)
ALP SERPL-CCNC: 80 UNIT/L (ref 40–150)
ALT SERPL W/O P-5'-P-CCNC: 18 UNIT/L (ref 10–44)
ANION GAP (OHS): 13 MMOL/L (ref 8–16)
AST SERPL-CCNC: 22 UNIT/L (ref 11–45)
BASOPHILS # BLD AUTO: 0.07 K/UL
BASOPHILS NFR BLD AUTO: 0.6 %
BILIRUB SERPL-MCNC: 0.3 MG/DL (ref 0.1–1)
BUN SERPL-MCNC: 25 MG/DL (ref 6–20)
CALCIUM SERPL-MCNC: 8.9 MG/DL (ref 8.7–10.5)
CHLORIDE SERPL-SCNC: 103 MMOL/L (ref 95–110)
CO2 SERPL-SCNC: 21 MMOL/L (ref 23–29)
CREAT SERPL-MCNC: 1.1 MG/DL (ref 0.5–1.4)
CRP SERPL-MCNC: 6.6 MG/L
ERYTHROCYTE [DISTWIDTH] IN BLOOD BY AUTOMATED COUNT: 16.1 % (ref 11.5–14.5)
ERYTHROCYTE [SEDIMENTATION RATE] IN BLOOD: 25 MM/HR
GFR SERPLBLD CREATININE-BSD FMLA CKD-EPI: 58 ML/MIN/1.73/M2
GLUCOSE SERPL-MCNC: 67 MG/DL (ref 70–110)
HCT VFR BLD AUTO: 38.2 % (ref 37–48.5)
HGB BLD-MCNC: 11.9 GM/DL (ref 12–16)
IMM GRANULOCYTES # BLD AUTO: 0.27 K/UL (ref 0–0.04)
IMM GRANULOCYTES NFR BLD AUTO: 2.4 % (ref 0–0.5)
LYMPHOCYTES # BLD AUTO: 3.15 K/UL (ref 1–4.8)
MCH RBC QN AUTO: 27.6 PG (ref 27–31)
MCHC RBC AUTO-ENTMCNC: 31.2 G/DL (ref 32–36)
MCV RBC AUTO: 89 FL (ref 82–98)
NUCLEATED RBC (/100WBC) (OHS): 0 /100 WBC
PLATELET # BLD AUTO: 386 K/UL (ref 150–450)
PMV BLD AUTO: 10.1 FL (ref 9.2–12.9)
POTASSIUM SERPL-SCNC: 4 MMOL/L (ref 3.5–5.1)
PROT SERPL-MCNC: 7.3 GM/DL (ref 6–8.4)
RBC # BLD AUTO: 4.31 M/UL (ref 4–5.4)
RELATIVE EOSINOPHIL (OHS): 2.9 %
RELATIVE LYMPHOCYTE (OHS): 28.3 % (ref 18–48)
RELATIVE MONOCYTE (OHS): 8.4 % (ref 4–15)
RELATIVE NEUTROPHIL (OHS): 57.4 % (ref 38–73)
SODIUM SERPL-SCNC: 137 MMOL/L (ref 136–145)
WBC # BLD AUTO: 11.12 K/UL (ref 3.9–12.7)

## 2025-06-12 PROCEDURE — 86140 C-REACTIVE PROTEIN: CPT

## 2025-06-12 PROCEDURE — 84075 ASSAY ALKALINE PHOSPHATASE: CPT

## 2025-06-12 PROCEDURE — 85025 COMPLETE CBC W/AUTO DIFF WBC: CPT

## 2025-06-12 PROCEDURE — 36415 COLL VENOUS BLD VENIPUNCTURE: CPT | Mod: PO

## 2025-06-12 PROCEDURE — 85651 RBC SED RATE NONAUTOMATED: CPT | Mod: PO

## 2025-06-12 PROCEDURE — 82306 VITAMIN D 25 HYDROXY: CPT

## 2025-06-26 ENCOUNTER — OFFICE VISIT (OUTPATIENT)
Dept: RHEUMATOLOGY | Facility: CLINIC | Age: 60
End: 2025-06-26
Payer: MEDICARE

## 2025-06-26 VITALS
WEIGHT: 277.31 LBS | HEART RATE: 85 BPM | HEIGHT: 60 IN | DIASTOLIC BLOOD PRESSURE: 80 MMHG | BODY MASS INDEX: 54.45 KG/M2 | SYSTOLIC BLOOD PRESSURE: 135 MMHG

## 2025-06-26 DIAGNOSIS — F32.A DEPRESSION, UNSPECIFIED DEPRESSION TYPE: ICD-10-CM

## 2025-06-26 DIAGNOSIS — L40.50 PSA (PSORIATIC ARTHRITIS): ICD-10-CM

## 2025-06-26 DIAGNOSIS — G89.4 CHRONIC PAIN SYNDROME: ICD-10-CM

## 2025-06-26 DIAGNOSIS — K50.819 CROHN'S DISEASE OF SMALL AND LARGE INTESTINES WITH COMPLICATION: ICD-10-CM

## 2025-06-26 DIAGNOSIS — E11.69 TYPE 2 DIABETES MELLITUS WITH OTHER SPECIFIED COMPLICATION, UNSPECIFIED WHETHER LONG TERM INSULIN USE: ICD-10-CM

## 2025-06-26 DIAGNOSIS — K21.9 GASTROESOPHAGEAL REFLUX DISEASE, UNSPECIFIED WHETHER ESOPHAGITIS PRESENT: ICD-10-CM

## 2025-06-26 DIAGNOSIS — R11.2 NAUSEA AND VOMITING, UNSPECIFIED VOMITING TYPE: Primary | ICD-10-CM

## 2025-06-26 DIAGNOSIS — D84.9 IMMUNOCOMPROMISED: ICD-10-CM

## 2025-06-26 DIAGNOSIS — K50.90 CROHN'S DISEASE WITHOUT COMPLICATION, UNSPECIFIED GASTROINTESTINAL TRACT LOCATION: ICD-10-CM

## 2025-06-26 DIAGNOSIS — F41.9 ANXIETY: ICD-10-CM

## 2025-06-26 PROCEDURE — 96372 THER/PROPH/DIAG INJ SC/IM: CPT | Mod: S$GLB,,, | Performed by: INTERNAL MEDICINE

## 2025-06-26 PROCEDURE — 1160F RVW MEDS BY RX/DR IN RCRD: CPT | Mod: CPTII,S$GLB,, | Performed by: INTERNAL MEDICINE

## 2025-06-26 PROCEDURE — 3008F BODY MASS INDEX DOCD: CPT | Mod: CPTII,S$GLB,, | Performed by: INTERNAL MEDICINE

## 2025-06-26 PROCEDURE — 99215 OFFICE O/P EST HI 40 MIN: CPT | Mod: 25,S$GLB,, | Performed by: INTERNAL MEDICINE

## 2025-06-26 PROCEDURE — 3079F DIAST BP 80-89 MM HG: CPT | Mod: CPTII,S$GLB,, | Performed by: INTERNAL MEDICINE

## 2025-06-26 PROCEDURE — 4010F ACE/ARB THERAPY RXD/TAKEN: CPT | Mod: CPTII,S$GLB,, | Performed by: INTERNAL MEDICINE

## 2025-06-26 PROCEDURE — 1159F MED LIST DOCD IN RCRD: CPT | Mod: CPTII,S$GLB,, | Performed by: INTERNAL MEDICINE

## 2025-06-26 PROCEDURE — 3075F SYST BP GE 130 - 139MM HG: CPT | Mod: CPTII,S$GLB,, | Performed by: INTERNAL MEDICINE

## 2025-06-26 PROCEDURE — 99999 PR PBB SHADOW E&M-EST. PATIENT-LVL V: CPT | Mod: PBBFAC,,, | Performed by: INTERNAL MEDICINE

## 2025-06-26 RX ORDER — PROMETHAZINE HYDROCHLORIDE 6.25 MG/5ML
25 SYRUP ORAL EVERY 8 HOURS PRN
Qty: 473 ML | Refills: 0 | Status: SHIPPED | OUTPATIENT
Start: 2025-06-26

## 2025-06-26 RX ORDER — VENLAFAXINE HYDROCHLORIDE 75 MG/1
75 CAPSULE, EXTENDED RELEASE ORAL DAILY
Qty: 30 CAPSULE | Refills: 11 | Status: SHIPPED | OUTPATIENT
Start: 2025-06-26 | End: 2026-06-26

## 2025-06-26 RX ORDER — METOLAZONE 5 MG/1
TABLET ORAL
Qty: 10 TABLET | Refills: 0 | Status: SHIPPED | OUTPATIENT
Start: 2025-06-26

## 2025-06-26 RX ORDER — CYANOCOBALAMIN 1000 UG/ML
1000 INJECTION, SOLUTION INTRAMUSCULAR; SUBCUTANEOUS
Status: COMPLETED | OUTPATIENT
Start: 2025-06-26 | End: 2025-06-26

## 2025-06-26 RX ORDER — METHYLPREDNISOLONE ACETATE 80 MG/ML
160 INJECTION, SUSPENSION INTRA-ARTICULAR; INTRALESIONAL; INTRAMUSCULAR; SOFT TISSUE
Status: COMPLETED | OUTPATIENT
Start: 2025-06-26 | End: 2025-06-26

## 2025-06-26 RX ORDER — MIRTAZAPINE 7.5 MG/1
7.5 TABLET, FILM COATED ORAL NIGHTLY
Qty: 30 TABLET | Refills: 11 | Status: SHIPPED | OUTPATIENT
Start: 2025-06-26 | End: 2026-06-26

## 2025-06-26 RX ORDER — KETOROLAC TROMETHAMINE 30 MG/ML
60 INJECTION, SOLUTION INTRAMUSCULAR; INTRAVENOUS
Status: COMPLETED | OUTPATIENT
Start: 2025-06-26 | End: 2025-06-26

## 2025-06-26 RX ADMIN — METHYLPREDNISOLONE ACETATE 160 MG: 80 INJECTION, SUSPENSION INTRA-ARTICULAR; INTRALESIONAL; INTRAMUSCULAR; SOFT TISSUE at 12:06

## 2025-06-26 RX ADMIN — KETOROLAC TROMETHAMINE 60 MG: 30 INJECTION, SOLUTION INTRAMUSCULAR; INTRAVENOUS at 12:06

## 2025-06-26 RX ADMIN — CYANOCOBALAMIN 1000 MCG: 1000 INJECTION, SOLUTION INTRAMUSCULAR; SUBCUTANEOUS at 12:06

## 2025-06-26 ASSESSMENT — ROUTINE ASSESSMENT OF PATIENT INDEX DATA (RAPID3)
PSYCHOLOGICAL DISTRESS SCORE: 6.6
MDHAQ FUNCTION SCORE: 1.6
PATIENT GLOBAL ASSESSMENT SCORE: 8.5
PAIN SCORE: 8
TOTAL RAPID3 SCORE: 7.28

## 2025-06-26 NOTE — PROGRESS NOTES
2-patient identifiers confirmed, allergies reviewed, injection education discussed with patient.  Injections tolerated well with no C/O of pain or complications, see MAR for injection information.  Patient exited room ambulatory in stable condition.  MARIELA Malik LPN

## 2025-06-26 NOTE — PROGRESS NOTES
Subjective:     Patient ID:  Amanda Mcguire    Chief Complaint:  Disease Management     History of Present Illness:  Pt is a 59 y.o. female reactive arthritis, osteoarthritis. Pt started Rinvoq and now has bm three daily. Her depression has improved. Hair has been growing.  Leg have edema now. She has significant joint pain in her knees and spine. She is taking percocet tid prn for severe pain and this is helping her. She complains of sore throat, productive cough, and chest congestion. Subjective fever and chills.  No headache.         Current tx:  1. Rinvoq  2. percocet  Rheumatologic History:   - Diagnosis/es:  - Positive serologies:  - Infectious screening labs:  - Previous Treatments:  - Current Treatments:     Interval History:   Hospitalization since last office visit: No    Patient Active Problem List    Diagnosis Date Noted    Other chest pain 09/18/2024    Type 2 diabetes mellitus with other specified complication 08/12/2024    Memory loss 06/18/2024    Cognitive change 06/18/2024    Moderate episode of recurrent major depressive disorder 06/18/2024    Generalized anxiety disorder 06/18/2024    Neuropathic pain 04/02/2024    Edema 04/02/2024    Crohn's disease of small and large intestines with complication 05/18/2023    Immunocompromised 04/04/2023    Reactive arthritis of multiple sites 04/04/2023    Morbid (severe) obesity due to excess calories 04/04/2023    Myofascial pain syndrome, cervical 09/28/2022    Neck pain 09/28/2022    Intractable chronic migraine without aura and without status migrainosus 09/09/2022    Mixed hyperlipidemia 06/02/2022    Chronic hip pain, bilateral 01/12/2022    Crohn's disease of small intestine with intestinal obstruction 03/03/2021    Avascular necrosis of bones of both hips 03/03/2021    Psoriasis of nail 03/03/2021    Chronic pain syndrome 03/03/2021    Reactive arthritis 10/01/2020    Left adrenal mass 05/21/2019    HTN (hypertension) 06/19/2014     Past Surgical  History:   Procedure Laterality Date    COLONOSCOPY N/A 03/10/2022    Procedure: COLONOSCOPY;  Surgeon: Nilson Wiseman MD;  Location: [facility];  Service: Endoscopy;  Laterality: N/A; Repeat colonoscopy in 6 months because the bowel prep was poor    COLONOSCOPY N/A 3/2/2023    Procedure: COLONOSCOPY;  Surgeon: Erik Garcia MD;  Location: [facility];  Service: Endoscopy;  Laterality: N/A;    ESOPHAGOGASTRODUODENOSCOPY N/A 03/10/2022    Procedure: EGD (ESOPHAGOGASTRODUODENOSCOPY);  Surgeon: Nislon Wiseman MD;  Location: [facility];  Service: Endoscopy;  Laterality: N/A; Repeat upper endoscopy in 8 weeks for surveillance    ESOPHAGOGASTRODUODENOSCOPY N/A 3/16/2023    Procedure: EGD (ESOPHAGOGASTRODUODENOSCOPY);  Surgeon: Erik Garcia MD;  Location: [facility];  Service: Endoscopy;  Laterality: N/A;     Social History[1]  Family History   Problem Relation Name Age of Onset    Cancer Mother      Diabetes Mother      Arthritis Mother      Hypertension Mother      Cancer Father      Diabetes Father      Arthritis Father      Hypertension Father      Celiac disease Neg Hx      Colon cancer Neg Hx      Crohn's disease Neg Hx      Esophageal cancer Neg Hx      Stomach cancer Neg Hx      Ulcerative colitis Neg Hx       Review of patient's allergies indicates:   Allergen Reactions    Amlodipine-benazepril Swelling    Ace inhibitors Swelling     Angioedema; was taking Benazepril.    Tramadol Itching       Review of Systems   Review of Systems   Constitutional:  Positive for chills and fatigue. Negative for activity change, appetite change, diaphoresis, fever and unexpected weight change.   HENT:  Negative for congestion, dental problem, ear discharge, ear pain, facial swelling, mouth sores, nosebleeds, postnasal drip, rhinorrhea, sinus pressure, sneezing, sore throat, tinnitus, trouble swallowing and voice change.    Eyes:  Negative for photophobia, pain, discharge, redness and itching.   Respiratory:   Positive for cough. Negative for apnea, chest tightness, shortness of breath and wheezing.    Cardiovascular:  Positive for leg swelling. Negative for chest pain and palpitations.   Gastrointestinal:  Positive for abdominal pain. Negative for abdominal distention, constipation, diarrhea, nausea and vomiting.   Endocrine: Negative for cold intolerance, heat intolerance, polydipsia and polyuria.   Genitourinary:  Negative for decreased urine volume, difficulty urinating, dysuria, flank pain, frequency, hematuria and urgency.   Musculoskeletal:  Positive for arthralgias, back pain, joint swelling, neck pain and neck stiffness. Negative for gait problem and myalgias.   Skin:  Negative for pallor, rash and wound.   Allergic/Immunologic: Negative for immunocompromised state.   Neurological:  Negative for dizziness, tremors, numbness and headaches.   Hematological:  Negative for adenopathy. Does not bruise/bleed easily.   Psychiatric/Behavioral:  Negative for sleep disturbance. The patient is not nervous/anxious.         Current Medications:  Current Outpatient Medications   Medication Instructions    albuterol (PROVENTIL) 2.5 mg, Nebulization, Every 6 hours PRN, Rescue    ALPRAZolam (XANAX) 0.5 mg, 2 times daily PRN    atorvastatin (LIPITOR) 20 mg, Nightly    budesonide-formoterol 160-4.5 mcg (SYMBICORT) 160-4.5 mcg/actuation HFAA 2 puffs, 2 times daily    budesonide 4 mg, Oral, 2 times daily PRN    butalbital-acetaminophen-caffeine -40 mg (FIORICET, ESGIC) -40 mg per tablet 1 tablet, Every 4 hours PRN    diclofenac sodium (VOLTAREN) 1 % Gel Topical (Top), 4 times daily    diltiaZEM (CARDIZEM CD) 240 mg    EPINEPHrine (EPIPEN) 0.3 mg    famotidine (PEPCID) 40 mg, Daily PRN    fluticasone propionate (FLONASE) 50 mcg/actuation nasal spray SPRAY ONE SPRAY IN EACH NOSTRIL TWICE DAILY    fluticasone-salmeterol 500-50 mcg/dose (ADVAIR) 500-50 mcg/dose DsDv diskus inhaler 1 puff    furosemide (LASIX) 20 MG tablet  Take 1 tablet (20 mg total) by mouth daily    gabapentin (NEURONTIN) 800 mg, Oral, 3 times daily    hydroxychloroquine (PLAQUENIL) 200 mg, Oral, 2 times daily    hydrOXYzine pamoate (VISTARIL) 25 MG Cap TAKE ONE CAPSULE BY MOUTH THREE TIMES DAILY AS NEEDED FOR ITCHING    ipratropium-albuteroL (COMBIVENT)  mcg/actuation inhaler 1 puff, Inhalation, 4 times daily, Rescue    irbesartan (AVAPRO) 150 MG tablet TAKE 1 TABLET BY MOUTH EVERY NIGHT AT BEDTIME FOR BLOOD PRESSURE    lidocaine HCl 2% (LIDOCAINE VISCOUS) 2 % Soln Mucous Membrane, Every 8 hours PRN    memantine (NAMENDA) 5 mg, Oral, 2 times daily    metOLazone (ZAROXOLYN) 5 MG tablet One tablet po daily for 10 days.    mirtazapine (REMERON) 7.5 mg, Oral, Nightly    montelukast (SINGULAIR) 10 mg, Nightly    neomycin-polymyxin-hydrocortisone (CORTISPORIN) otic solution PLACE 2 DROPS IN AFFECTED EAR FOUR TIMES DAILY FOR 5-7 DAYS    oxybutynin (DITROPAN-XL) 5 mg, Oral, Daily    oxyCODONE-acetaminophen (PERCOCET)  mg per tablet 1 tablet, Oral, Every 8 hours PRN    oxyCODONE-acetaminophen (PERCOCET)  mg per tablet 1 tablet, Oral, Every 8 hours PRN    [START ON 8/1/2025] oxyCODONE-acetaminophen (PERCOCET)  mg per tablet 1 tablet, Oral, Every 8 hours PRN    pantoprazole (PROTONIX) 40 mg, Oral, Daily    potassium chloride SA (K-DUR,KLOR-CON M) 10 MEQ tablet 10 mEq, Oral, 2 times daily PRN    promethazine (PHENERGAN) 25 mg, Oral, Every 8 hours PRN    RINVOQ 45 mg Tb24 1 tablet    rizatriptan (MAXALT) 10 MG tablet 1 tab PO PRN migraine. May repeat every 2 hours for max 3 tabs in 24 hours. Use no more than 10 days per month.    sertraline (ZOLOFT) 150 mg    sumatriptan (IMITREX) 50 MG tablet TAKE ONE TABLET BY MOUTH AT ONSET OF MIGRAINE, MAY REPEAT DOSE once AFTER TWO HOURS IF NO RELIEF    tirzepatide 10 mg, Subcutaneous, Every 7 days    tirzepatide 12.5 mg, Subcutaneous, Every 7 days    tiZANidine (ZANAFLEX) 4 mg, Oral, Every 8 hours    traZODone  (DESYREL) 100 mg, Oral, Nightly    triamcinolone acetonide 0.1% (KENALOG) 0.1 % ointment Topical (Top), 2 times daily    venlafaxine (EFFEXOR XR) 75 mg, Oral, Daily         Objective:     Vitals:    06/26/25 1031   BP: 135/80   Pulse: 85   Weight: 125.8 kg (277 lb 5.4 oz)   Height: 5' (1.524 m)   PainSc:   8   PainLoc: Knee      Body mass index is 54.16 kg/m².     Physical Examinations:  Physical Exam   Constitutional: She is oriented to person, place, and time.   HENT:   Head: Normocephalic and atraumatic.   Mouth/Throat: Oropharynx is clear and moist.   Eyes: Pupils are equal, round, and reactive to light.   Neck: No thyromegaly present.   Cardiovascular: Normal rate, regular rhythm and normal heart sounds. Exam reveals no gallop and no friction rub.   No murmur heard.  Pulmonary/Chest: Breath sounds normal. She has no wheezes. She has no rales. She exhibits no tenderness.   Abdominal: There is no abdominal tenderness. There is no rebound and no guarding.   Musculoskeletal:         General: Tenderness and deformity present.      Right shoulder: Tenderness present.      Left shoulder: Tenderness present.      Right elbow: Normal.      Left elbow: Normal.      Cervical back: Neck supple.      Right knee: Swelling and effusion present. Tenderness present.      Left knee: Effusion present.   Lymphadenopathy:     She has no cervical adenopathy.   Neurological: She is alert and oriented to person, place, and time. She has normal sensation. Gait normal.   Reflex Scores:       Patellar reflexes are 3+ on the right side and 3+ on the left side.  Skin: No rash noted. No erythema. No pallor.   Psychiatric: Mood and affect normal.   Vitals reviewed.      Right Side Rheumatological Exam     Examination finds the elbow, 1st MCP, 2nd MCP, 3rd MCP, 4th MCP and 5th MCP normal.    The patient is tender to palpation of the shoulder and knee    She has swelling of the knee    The patient has an enlarged wrist, 1st PIP, 2nd PIP, 3rd  PIP, 4th PIP and 5th PIP    Shoulder Exam   Tenderness Location: acromion    Range of Motion   Active abduction:  abnormal   Adduction: abnormal  Sensation: normal    Knee Exam   Tenderness Location: medial joint line  Patellofemoral Crepitus: positive  Effusion: positive  Sensation: normal    Hip Exam   Tenderness Location: no tenderness  Sensation: normal    Elbow/Wrist Exam   Tenderness Location: no tenderness  Sensation: normal    Left Side Rheumatological Exam     Examination finds the elbow, 1st MCP, 2nd MCP, 3rd MCP, 4th MCP and 5th MCP normal.    The patient is tender to palpation of the shoulder.    The patient has an enlarged wrist, 1st PIP, 2nd PIP, 3rd PIP, 4th PIP and 5th PIP.    Shoulder Exam   Tenderness Location: acromion    Range of Motion   Active abduction:  abnormal   Sensation: normal    Knee Exam   Tenderness Location: lateral joint line and medial joint line    Patellofemoral Crepitus: positive  Effusion: positive  Sensation: normal    Hip Exam   Tenderness Location: no tenderness  Sensation: normal    Elbow/Wrist Exam   Sensation: normal      Back/Neck Exam   General Inspection   Gait: normal       Tenderness Right paramedian tenderness of the Lower C-Spine and Lower L-Spine.Left paramedian tenderness of the Upper C-Spine and Lower L-Spine.         Disease Assessment Scores:  Patient's Global Assessment of arthritis (0-10): 2  Physician's Global Assessment of arthritis (0-10): 2  Number of Tender Joints (0-28): 2  Number of Swollen Joints (0-28): 2        4/3/2023     1:00 PM   Rapid3 Question Responses and Scores   MDHAQ Score 0.7   Psychologic Score 4.4   Pain Score 10   When you awakened in the morning OVER THE LAST WEEK, did you feel stiff? Yes   If Yes, please indicate the number of hours until you are as limber as you will be for the day 10   Fatigue Score 10   Global Health Score 10   RAPID3 Score 7.44       Monitoring Lab Results:  Lab Results   Component Value Date    WBC 11.12  "06/12/2025    RBC 4.31 06/12/2025    HGB 11.9 (L) 06/12/2025    HCT 38.2 06/12/2025    MCV 89 06/12/2025    MCH 27.6 06/12/2025    MCHC 31.2 (L) 06/12/2025    RDW 16.1 (H) 06/12/2025     06/12/2025        Lab Results   Component Value Date     06/12/2025    K 4.0 06/12/2025     06/12/2025    CO2 21 (L) 06/12/2025    GLU 67 (L) 06/12/2025    BUN 25 (H) 06/12/2025    CREATININE 1.1 06/12/2025    CALCIUM 8.9 06/12/2025    PROT 7.3 06/12/2025    ALBUMIN 3.9 06/12/2025    BILITOT 0.3 06/12/2025    ALKPHOS 80 06/12/2025    AST 22 06/12/2025    ALT 18 06/12/2025    ANIONGAP 13 06/12/2025    EGFRNORACEVR 58 (L) 06/12/2025       Lab Results   Component Value Date    SEDRATE 25 (H) 06/12/2025    CRP 6.6 06/12/2025        Lab Results   Component Value Date    XDVPPLHX44UA 12 (L) 06/12/2025        Lab Results   Component Value Date    CHOL 180 08/28/2024    HDL 50 08/28/2024    LDLCALC 103 08/28/2024    TRIG 137 08/28/2024       Lab Results   Component Value Date    RF <13.0 02/28/2022    CCPANTIBODIE <0.5 11/02/2022     Lab Results   Component Value Date    ANASCREEN Positive (A) 10/17/2024    ANATITER 1:640 10/17/2024    DSDNA Negative 1:10 10/17/2024     No results found for: "HLABB27"    Infectious Disease Screening:  Lab Results   Component Value Date    HEPBSAG Non-reactive 07/21/2023    HEPBSAB <3.00 06/09/2023    HEPBSAB Non-reactive 06/09/2023     Lab Results   Component Value Date    HEPCAB Non-reactive 06/09/2023     Lab Results   Component Value Date    TBGOLDPLUS Negative 06/09/2023     No results found for: "QUANTIFERON", "SVCMT", "QUANTAGVALUE", "QUANTNILVALU", "QUANTMITOGEN", "QFTTBAG", "QINT"     Imaging:  DEXA, Xrays, MRIs, CTs, etc    Old & Outside Medical Records:  Reviewed old and all outside medical records available in Care Everywhere     Assessment:     Encounter Diagnoses   Name Primary?    Nausea and vomiting, unspecified vomiting type Yes    Chronic pain syndrome     Type 2 diabetes " mellitus with other specified complication, unspecified whether long term insulin use     Gastroesophageal reflux disease, unspecified whether esophagitis present     PSA (psoriatic arthritis)     Crohn's disease of small and large intestines with complication     Immunocompromised     Anxiety     Depression, unspecified depression type     Crohn's disease without complication, unspecified gastrointestinal tract location        Plan:      Encounter Diagnoses   Name Primary?    Chronic pain syndrome     Type 2 diabetes mellitus with other specified complication, unspecified whether long term insulin use     Gastroesophageal reflux disease, unspecified whether esophagitis present     PSA (psoriatic arthritis)     Crohn's disease of small and large intestines with complication     Immunocompromised     Anxiety     Depression, unspecified depression type     Nausea and vomiting, unspecified vomiting type Yes    Crohn's disease without complication, unspecified gastrointestinal tract location      Nausea and vomiting, unspecified vomiting type  -     promethazine (PHENERGAN) 6.25 mg/5 mL syrup; Take 20 mLs (25 mg total) by mouth every 8 (eight) hours as needed for Nausea.  Dispense: 473 mL; Refill: 0    Chronic pain syndrome  -     Insulin, Random; Future; Expected date: 06/26/2025  -     Hemoglobin A1C; Future; Expected date: 06/26/2025  -     Sedimentation rate; Future; Expected date: 06/26/2025  -     C-Reactive Protein; Future; Expected date: 06/26/2025  -     Comprehensive Metabolic Panel; Future; Expected date: 06/26/2025  -     CBC Auto Differential; Future; Expected date: 06/26/2025  -     tirzepatide 12.5 mg/0.5 mL PnIj; Inject 12.5 mg into the skin every 7 days.  Dispense: 4 Pen; Refill: 12  -     methylPREDNISolone acetate injection 160 mg  -     ketorolac injection 60 mg  -     cyanocobalamin injection 1,000 mcg  -     metOLazone (ZAROXOLYN) 5 MG tablet; One tablet po daily for 10 days.  Dispense: 10 tablet;  Refill: 0  -     venlafaxine (EFFEXOR XR) 75 MG 24 hr capsule; Take 1 capsule (75 mg total) by mouth once daily.  Dispense: 30 capsule; Refill: 11  -     mirtazapine (REMERON) 7.5 MG Tab; Take 1 tablet (7.5 mg total) by mouth every evening.  Dispense: 30 tablet; Refill: 11    Type 2 diabetes mellitus with other specified complication, unspecified whether long term insulin use  -     Insulin, Random; Future; Expected date: 06/26/2025  -     Hemoglobin A1C; Future; Expected date: 06/26/2025  -     Sedimentation rate; Future; Expected date: 06/26/2025  -     C-Reactive Protein; Future; Expected date: 06/26/2025  -     Comprehensive Metabolic Panel; Future; Expected date: 06/26/2025  -     CBC Auto Differential; Future; Expected date: 06/26/2025  -     tirzepatide 12.5 mg/0.5 mL PnIj; Inject 12.5 mg into the skin every 7 days.  Dispense: 4 Pen; Refill: 12  -     methylPREDNISolone acetate injection 160 mg  -     ketorolac injection 60 mg  -     cyanocobalamin injection 1,000 mcg  -     metOLazone (ZAROXOLYN) 5 MG tablet; One tablet po daily for 10 days.  Dispense: 10 tablet; Refill: 0    Gastroesophageal reflux disease, unspecified whether esophagitis present  -     Insulin, Random; Future; Expected date: 06/26/2025  -     Hemoglobin A1C; Future; Expected date: 06/26/2025  -     Sedimentation rate; Future; Expected date: 06/26/2025  -     C-Reactive Protein; Future; Expected date: 06/26/2025  -     Comprehensive Metabolic Panel; Future; Expected date: 06/26/2025  -     CBC Auto Differential; Future; Expected date: 06/26/2025  -     tirzepatide 12.5 mg/0.5 mL PnIj; Inject 12.5 mg into the skin every 7 days.  Dispense: 4 Pen; Refill: 12  -     methylPREDNISolone acetate injection 160 mg  -     ketorolac injection 60 mg  -     cyanocobalamin injection 1,000 mcg  -     metOLazone (ZAROXOLYN) 5 MG tablet; One tablet po daily for 10 days.  Dispense: 10 tablet; Refill: 0  -     venlafaxine (EFFEXOR XR) 75 MG 24 hr capsule;  Take 1 capsule (75 mg total) by mouth once daily.  Dispense: 30 capsule; Refill: 11  -     mirtazapine (REMERON) 7.5 MG Tab; Take 1 tablet (7.5 mg total) by mouth every evening.  Dispense: 30 tablet; Refill: 11    PSA (psoriatic arthritis)  -     Insulin, Random; Future; Expected date: 06/26/2025  -     Hemoglobin A1C; Future; Expected date: 06/26/2025  -     Sedimentation rate; Future; Expected date: 06/26/2025  -     C-Reactive Protein; Future; Expected date: 06/26/2025  -     Comprehensive Metabolic Panel; Future; Expected date: 06/26/2025  -     CBC Auto Differential; Future; Expected date: 06/26/2025  -     tirzepatide 12.5 mg/0.5 mL PnIj; Inject 12.5 mg into the skin every 7 days.  Dispense: 4 Pen; Refill: 12  -     methylPREDNISolone acetate injection 160 mg  -     ketorolac injection 60 mg  -     cyanocobalamin injection 1,000 mcg  -     metOLazone (ZAROXOLYN) 5 MG tablet; One tablet po daily for 10 days.  Dispense: 10 tablet; Refill: 0  -     venlafaxine (EFFEXOR XR) 75 MG 24 hr capsule; Take 1 capsule (75 mg total) by mouth once daily.  Dispense: 30 capsule; Refill: 11  -     mirtazapine (REMERON) 7.5 MG Tab; Take 1 tablet (7.5 mg total) by mouth every evening.  Dispense: 30 tablet; Refill: 11    Crohn's disease of small and large intestines with complication  -     Insulin, Random; Future; Expected date: 06/26/2025  -     Hemoglobin A1C; Future; Expected date: 06/26/2025  -     Sedimentation rate; Future; Expected date: 06/26/2025  -     C-Reactive Protein; Future; Expected date: 06/26/2025  -     Comprehensive Metabolic Panel; Future; Expected date: 06/26/2025  -     CBC Auto Differential; Future; Expected date: 06/26/2025  -     tirzepatide 12.5 mg/0.5 mL PnIj; Inject 12.5 mg into the skin every 7 days.  Dispense: 4 Pen; Refill: 12  -     methylPREDNISolone acetate injection 160 mg  -     ketorolac injection 60 mg  -     cyanocobalamin injection 1,000 mcg  -     metOLazone (ZAROXOLYN) 5 MG tablet; One  tablet po daily for 10 days.  Dispense: 10 tablet; Refill: 0  -     venlafaxine (EFFEXOR XR) 75 MG 24 hr capsule; Take 1 capsule (75 mg total) by mouth once daily.  Dispense: 30 capsule; Refill: 11  -     mirtazapine (REMERON) 7.5 MG Tab; Take 1 tablet (7.5 mg total) by mouth every evening.  Dispense: 30 tablet; Refill: 11    Immunocompromised  -     Insulin, Random; Future; Expected date: 06/26/2025  -     Hemoglobin A1C; Future; Expected date: 06/26/2025  -     Sedimentation rate; Future; Expected date: 06/26/2025  -     C-Reactive Protein; Future; Expected date: 06/26/2025  -     Comprehensive Metabolic Panel; Future; Expected date: 06/26/2025  -     CBC Auto Differential; Future; Expected date: 06/26/2025  -     tirzepatide 12.5 mg/0.5 mL PnIj; Inject 12.5 mg into the skin every 7 days.  Dispense: 4 Pen; Refill: 12  -     methylPREDNISolone acetate injection 160 mg  -     ketorolac injection 60 mg  -     cyanocobalamin injection 1,000 mcg  -     metOLazone (ZAROXOLYN) 5 MG tablet; One tablet po daily for 10 days.  Dispense: 10 tablet; Refill: 0  -     venlafaxine (EFFEXOR XR) 75 MG 24 hr capsule; Take 1 capsule (75 mg total) by mouth once daily.  Dispense: 30 capsule; Refill: 11  -     mirtazapine (REMERON) 7.5 MG Tab; Take 1 tablet (7.5 mg total) by mouth every evening.  Dispense: 30 tablet; Refill: 11    Anxiety  -     venlafaxine (EFFEXOR XR) 75 MG 24 hr capsule; Take 1 capsule (75 mg total) by mouth once daily.  Dispense: 30 capsule; Refill: 11  -     mirtazapine (REMERON) 7.5 MG Tab; Take 1 tablet (7.5 mg total) by mouth every evening.  Dispense: 30 tablet; Refill: 11    Depression, unspecified depression type  -     venlafaxine (EFFEXOR XR) 75 MG 24 hr capsule; Take 1 capsule (75 mg total) by mouth once daily.  Dispense: 30 capsule; Refill: 11  -     mirtazapine (REMERON) 7.5 MG Tab; Take 1 tablet (7.5 mg total) by mouth every evening.  Dispense: 30 tablet; Refill: 11    Crohn's disease without  complication, unspecified gastrointestinal tract location  -     promethazine (PHENERGAN) 6.25 mg/5 mL syrup; Take 20 mLs (25 mg total) by mouth every 8 (eight) hours as needed for Nausea.  Dispense: 473 mL; Refill: 0        1. Nurse injections  2. Labs ordered  3. Continue rinvoq     Follow-up 4 months    More than 50% of the  40 minute encounter was spent face to face counseling the patient regarding current status and future plan of care as well as side effects  of the medications. All questions were answered to patient's satisfaction also includes  non-face to face time preparing to see the patient (eg, review of tests), Obtaining and/or reviewing separately obtained history, Documenting clinical information in the electronic or other health record, Independently interpreting results            [1]   Social History  Tobacco Use    Smoking status: Never    Smokeless tobacco: Never   Substance Use Topics    Alcohol use: Yes     Alcohol/week: 0.0 - 2.0 standard drinks of alcohol    Drug use: Yes     Types: Hydrocodone

## 2025-07-09 ENCOUNTER — TELEPHONE (OUTPATIENT)
Dept: RHEUMATOLOGY | Facility: CLINIC | Age: 60
End: 2025-07-09
Payer: MEDICARE

## 2025-07-09 NOTE — TELEPHONE ENCOUNTER
Copied from CRM #[id number]. Topic: General Inquiry - Patient Advice  >> Jul 9, 2025 11:17 AM Zenaida wrote:  Type:  Needs Medical Advice    Who Called: pt  Symptoms (please be specific):    How long has patient had these symptoms:    Pharmacy name and phone #:    Would the patient rather a call back or a response via [facility]? call  Best Call Back Number: [phone]    Additional Information: pt states she needs to speak with office pt states dr was suppose to write a letter for long term care and pt has not received    Pt also says she has a ear infection, and throat and pt states she needs antibiotics

## 2025-07-09 NOTE — TELEPHONE ENCOUNTER
Returned patient call and informed letter was written. Patient requested letter to be mailed to home address. Nurse informed for ear infection and throat will need to go to an urgent care to have someone take a look to see what kind of antibiotic she will need

## 2025-07-31 DIAGNOSIS — K50.90 CROHN'S DISEASE WITHOUT COMPLICATION, UNSPECIFIED GASTROINTESTINAL TRACT LOCATION: ICD-10-CM

## 2025-07-31 DIAGNOSIS — R11.2 NAUSEA AND VOMITING, UNSPECIFIED VOMITING TYPE: ICD-10-CM

## 2025-07-31 NOTE — TELEPHONE ENCOUNTER
Copied from CRM #[id number]. Topic: Medications - Medication Refill  >> Jul 31, 2025 10:57 AM Mckenzie wrote:  Type:  RX Refill Request    Who Called: Patient    Refill or New Rx:Refill    RX Name and Strength:promethazine (PHENERGAN) 6.25 mg/5 mL syrup    How is the patient currently taking it? (ex. 1XDay): as directed    Is this a 30 day or 90 day RX:30    Preferred Pharmacy with phone number:  [facility] [place] - [address] [address] [facility] [address]  Phone: [phone] Fax: [phone]    Local or Mail Order:Local     Ordering Provider:Dr Beltran    Would the patient rather a call back or a response via [facility]? Call    Best Call Back Number: [phone] (home)    Additional Information: Pt needs refill. Thanks!

## 2025-08-02 RX ORDER — PROMETHAZINE HYDROCHLORIDE 6.25 MG/5ML
SYRUP ORAL
Qty: 473 ML | Refills: 0 | Status: SHIPPED | OUTPATIENT
Start: 2025-08-02

## 2025-09-01 DIAGNOSIS — K21.9 GASTROESOPHAGEAL REFLUX DISEASE, UNSPECIFIED WHETHER ESOPHAGITIS PRESENT: ICD-10-CM

## 2025-09-01 DIAGNOSIS — G89.4 CHRONIC PAIN SYNDROME: Primary | ICD-10-CM

## 2025-09-01 DIAGNOSIS — D84.9 IMMUNOCOMPROMISED: ICD-10-CM

## 2025-09-01 DIAGNOSIS — M02.39 REACTIVE ARTHRITIS OF MULTIPLE SITES: ICD-10-CM

## 2025-09-01 DIAGNOSIS — R94.6 ABNORMAL RESULTS OF THYROID FUNCTION STUDIES: ICD-10-CM

## 2025-09-01 DIAGNOSIS — J44.89 COPD (CHRONIC OBSTRUCTIVE PULMONARY DISEASE) WITH CHRONIC BRONCHITIS: ICD-10-CM

## 2025-09-01 DIAGNOSIS — K50.90 CROHN'S DISEASE WITHOUT COMPLICATION, UNSPECIFIED GASTROINTESTINAL TRACT LOCATION: ICD-10-CM

## 2025-09-03 RX ORDER — OXYCODONE AND ACETAMINOPHEN 10; 325 MG/1; MG/1
1 TABLET ORAL EVERY 8 HOURS PRN
Qty: 90 TABLET | Refills: 0 | Status: SHIPPED | OUTPATIENT
Start: 2025-11-01

## 2025-09-03 RX ORDER — OXYCODONE AND ACETAMINOPHEN 10; 325 MG/1; MG/1
1 TABLET ORAL EVERY 8 HOURS PRN
Qty: 90 TABLET | Refills: 0 | Status: SHIPPED | OUTPATIENT
Start: 2025-09-03

## 2025-09-03 RX ORDER — OXYCODONE AND ACETAMINOPHEN 10; 325 MG/1; MG/1
1 TABLET ORAL EVERY 8 HOURS PRN
Qty: 90 TABLET | Refills: 0 | Status: SHIPPED | OUTPATIENT
Start: 2025-10-02